# Patient Record
Sex: FEMALE | Race: BLACK OR AFRICAN AMERICAN | NOT HISPANIC OR LATINO | Employment: OTHER | URBAN - METROPOLITAN AREA
[De-identification: names, ages, dates, MRNs, and addresses within clinical notes are randomized per-mention and may not be internally consistent; named-entity substitution may affect disease eponyms.]

---

## 2020-01-27 ENCOUNTER — APPOINTMENT (EMERGENCY)
Dept: RADIOLOGY | Facility: HOSPITAL | Age: 80
End: 2020-01-27
Payer: MEDICARE

## 2020-01-27 ENCOUNTER — HOSPITAL ENCOUNTER (EMERGENCY)
Facility: HOSPITAL | Age: 80
Discharge: HOME | End: 2020-01-27
Attending: EMERGENCY MEDICINE | Admitting: EMERGENCY MEDICINE
Payer: MEDICARE

## 2020-01-27 VITALS
WEIGHT: 216.71 LBS | OXYGEN SATURATION: 99 % | DIASTOLIC BLOOD PRESSURE: 74 MMHG | SYSTOLIC BLOOD PRESSURE: 145 MMHG | TEMPERATURE: 99.2 F | RESPIRATION RATE: 22 BRPM | HEART RATE: 68 BPM

## 2020-01-27 DIAGNOSIS — J40 BRONCHITIS: Primary | ICD-10-CM

## 2020-01-27 DIAGNOSIS — R05.9 COUGH: ICD-10-CM

## 2020-01-27 DIAGNOSIS — R07.9 CHEST PAIN: ICD-10-CM

## 2020-01-27 LAB
ALBUMIN SERPL BCP-MCNC: 3.8 G/DL (ref 3.5–5)
ALP SERPL-CCNC: 85 U/L (ref 46–116)
ALT SERPL W P-5'-P-CCNC: 21 U/L (ref 12–78)
ANION GAP SERPL CALCULATED.3IONS-SCNC: 7 MMOL/L (ref 4–13)
APTT PPP: 28 SECONDS (ref 25–32)
AST SERPL W P-5'-P-CCNC: 14 U/L (ref 5–45)
BASOPHILS # BLD AUTO: 0.01 THOUSANDS/ΜL (ref 0–0.1)
BASOPHILS NFR BLD AUTO: 0 % (ref 0–1)
BILIRUB SERPL-MCNC: 0.3 MG/DL (ref 0.2–1)
BUN SERPL-MCNC: 14 MG/DL (ref 5–25)
CALCIUM SERPL-MCNC: 8.7 MG/DL (ref 8.3–10.1)
CHLORIDE SERPL-SCNC: 103 MMOL/L (ref 100–108)
CO2 SERPL-SCNC: 30 MMOL/L (ref 21–32)
CREAT SERPL-MCNC: 1.27 MG/DL (ref 0.6–1.3)
EOSINOPHIL # BLD AUTO: 0.12 THOUSAND/ΜL (ref 0–0.61)
EOSINOPHIL NFR BLD AUTO: 3 % (ref 0–6)
ERYTHROCYTE [DISTWIDTH] IN BLOOD BY AUTOMATED COUNT: 14.1 % (ref 11.6–15.1)
GFR SERPL CREATININE-BSD FRML MDRD: 47 ML/MIN/1.73SQ M
GLUCOSE SERPL-MCNC: 83 MG/DL (ref 65–140)
HCT VFR BLD AUTO: 40.7 % (ref 34.8–46.1)
HGB BLD-MCNC: 13.2 G/DL (ref 11.5–15.4)
IMM GRANULOCYTES # BLD AUTO: 0.01 THOUSAND/UL (ref 0–0.2)
IMM GRANULOCYTES NFR BLD AUTO: 0 % (ref 0–2)
INR PPP: 0.96 (ref 0.91–1.09)
LYMPHOCYTES # BLD AUTO: 1.69 THOUSANDS/ΜL (ref 0.6–4.47)
LYMPHOCYTES NFR BLD AUTO: 37 % (ref 14–44)
MCH RBC QN AUTO: 28.8 PG (ref 26.8–34.3)
MCHC RBC AUTO-ENTMCNC: 32.4 G/DL (ref 31.4–37.4)
MCV RBC AUTO: 89 FL (ref 82–98)
MONOCYTES # BLD AUTO: 0.34 THOUSAND/ΜL (ref 0.17–1.22)
MONOCYTES NFR BLD AUTO: 8 % (ref 4–12)
NEUTROPHILS # BLD AUTO: 2.37 THOUSANDS/ΜL (ref 1.85–7.62)
NEUTS SEG NFR BLD AUTO: 52 % (ref 43–75)
NRBC BLD AUTO-RTO: 0 /100 WBCS
PLATELET # BLD AUTO: 187 THOUSANDS/UL (ref 149–390)
PMV BLD AUTO: 10.5 FL (ref 8.9–12.7)
POTASSIUM SERPL-SCNC: 4.3 MMOL/L (ref 3.5–5.3)
PROT SERPL-MCNC: 8.2 G/DL (ref 6.4–8.2)
PROTHROMBIN TIME: 10.3 SECONDS (ref 9.8–12)
RBC # BLD AUTO: 4.58 MILLION/UL (ref 3.81–5.12)
SODIUM SERPL-SCNC: 140 MMOL/L (ref 136–145)
TROPONIN I SERPL-MCNC: <0.02 NG/ML
WBC # BLD AUTO: 4.54 THOUSAND/UL (ref 4.31–10.16)

## 2020-01-27 PROCEDURE — 84484 ASSAY OF TROPONIN QUANT: CPT

## 2020-01-27 PROCEDURE — 93005 ELECTROCARDIOGRAM TRACING: CPT

## 2020-01-27 PROCEDURE — 85730 THROMBOPLASTIN TIME PARTIAL: CPT

## 2020-01-27 PROCEDURE — 99283 EMERGENCY DEPT VISIT LOW MDM: CPT | Performed by: EMERGENCY MEDICINE

## 2020-01-27 PROCEDURE — 85025 COMPLETE CBC W/AUTO DIFF WBC: CPT

## 2020-01-27 PROCEDURE — 99285 EMERGENCY DEPT VISIT HI MDM: CPT

## 2020-01-27 PROCEDURE — 36415 COLL VENOUS BLD VENIPUNCTURE: CPT

## 2020-01-27 PROCEDURE — 94640 AIRWAY INHALATION TREATMENT: CPT

## 2020-01-27 PROCEDURE — 85610 PROTHROMBIN TIME: CPT

## 2020-01-27 PROCEDURE — 71045 X-RAY EXAM CHEST 1 VIEW: CPT

## 2020-01-27 PROCEDURE — 80053 COMPREHEN METABOLIC PANEL: CPT

## 2020-01-27 RX ORDER — IPRATROPIUM BROMIDE AND ALBUTEROL SULFATE 2.5; .5 MG/3ML; MG/3ML
3 SOLUTION RESPIRATORY (INHALATION) ONCE
Status: COMPLETED | OUTPATIENT
Start: 2020-01-27 | End: 2020-01-27

## 2020-01-27 RX ORDER — MELATONIN
1000 DAILY
COMMUNITY
End: 2020-11-10 | Stop reason: SDUPTHER

## 2020-01-27 RX ORDER — AMOXICILLIN AND CLAVULANATE POTASSIUM 875; 125 MG/1; MG/1
1 TABLET, FILM COATED ORAL ONCE
Status: COMPLETED | OUTPATIENT
Start: 2020-01-27 | End: 2020-01-27

## 2020-01-27 RX ORDER — MULTIVIT WITH MINERALS/LUTEIN
1000 TABLET ORAL DAILY
COMMUNITY
End: 2020-11-10 | Stop reason: SDUPTHER

## 2020-01-27 RX ORDER — PANTOPRAZOLE SODIUM 40 MG/1
40 TABLET, DELAYED RELEASE ORAL DAILY
COMMUNITY
End: 2020-11-10 | Stop reason: SDUPTHER

## 2020-01-27 RX ORDER — METHIMAZOLE 5 MG/1
2.5 TABLET ORAL DAILY
COMMUNITY
End: 2020-11-10 | Stop reason: SDUPTHER

## 2020-01-27 RX ORDER — GUAIFENESIN AND CODEINE PHOSPHATE 100; 10 MG/5ML; MG/5ML
5 SOLUTION ORAL 3 TIMES DAILY PRN
COMMUNITY
End: 2020-11-10 | Stop reason: SDUPTHER

## 2020-01-27 RX ORDER — LISINOPRIL 20 MG/1
20 TABLET ORAL DAILY
COMMUNITY
End: 2020-11-10 | Stop reason: SDUPTHER

## 2020-01-27 RX ORDER — FUROSEMIDE 20 MG/1
20 TABLET ORAL DAILY
COMMUNITY
End: 2020-11-10 | Stop reason: SDUPTHER

## 2020-01-27 RX ORDER — IPRATROPIUM BROMIDE AND ALBUTEROL SULFATE 2.5; .5 MG/3ML; MG/3ML
SOLUTION RESPIRATORY (INHALATION)
Status: COMPLETED
Start: 2020-01-27 | End: 2020-01-27

## 2020-01-27 RX ORDER — PREDNISONE 20 MG/1
40 TABLET ORAL DAILY
Qty: 10 TABLET | Refills: 0 | Status: SHIPPED | OUTPATIENT
Start: 2020-01-27 | End: 2020-02-01

## 2020-01-27 RX ORDER — ISOSORBIDE DINITRATE 30 MG/1
30 TABLET ORAL 4 TIMES DAILY
COMMUNITY
End: 2020-11-10 | Stop reason: ALTCHOICE

## 2020-01-27 RX ORDER — LYSINE HCL 500 MG
1 TABLET ORAL DAILY
COMMUNITY

## 2020-01-27 RX ORDER — AMOXICILLIN AND CLAVULANATE POTASSIUM 875; 125 MG/1; MG/1
1 TABLET, FILM COATED ORAL EVERY 12 HOURS
Qty: 20 TABLET | Refills: 0 | Status: SHIPPED | OUTPATIENT
Start: 2020-01-27 | End: 2020-02-06

## 2020-01-27 RX ORDER — ASPIRIN 81 MG/1
81 TABLET, CHEWABLE ORAL DAILY
COMMUNITY
End: 2020-11-10 | Stop reason: SDUPTHER

## 2020-01-27 RX ADMIN — IPRATROPIUM BROMIDE AND ALBUTEROL SULFATE 3 ML: 2.5; .5 SOLUTION RESPIRATORY (INHALATION) at 21:03

## 2020-01-27 RX ADMIN — AMOXICILLIN AND CLAVULANATE POTASSIUM 1 TABLET: 875; 125 TABLET, FILM COATED ORAL at 21:52

## 2020-01-28 LAB
ATRIAL RATE: 72 BPM
P AXIS: 9 DEGREES
PR INTERVAL: 136 MS
QRS AXIS: -12 DEGREES
QRSD INTERVAL: 86 MS
QT INTERVAL: 360 MS
QTC INTERVAL: 394 MS
T WAVE AXIS: 31 DEGREES
VENTRICULAR RATE: 72 BPM

## 2020-01-28 PROCEDURE — 93010 ELECTROCARDIOGRAM REPORT: CPT | Performed by: INTERNAL MEDICINE

## 2020-01-28 NOTE — ED PROVIDER NOTES
History  Chief Complaint   Patient presents with    Chest Pain     C/O midsternal CP X 2 weeks  Patient reports she has an appointment with her Dr for friday but could wait "The pain is really bad tonight   Cough     Pt reports she always has a cough and is always SOB X 14 years but cough is worse today  Patient is a 71-year-old female that is presenting with complaint of approximately 2 week history of intermittent sharp stabbing chest pain that is substernal, she has also been complaining about increased mucus production from her tracheostomy and worsening of cough  The patient has no chest pain at this time  Patient states there is some shortness of breath this is a common issue with the patient secondary to her underlying medical conditions  Patient denies any direct sick contacts, there has been no aggravating or alleviating factors to this chest pain that she is having  The pain is sharp and stabbing nonradiating and only last for several seconds  Patient denies any URI symptoms consistent with influenza  Prior to Admission Medications   Prescriptions Last Dose Informant Patient Reported? Taking?    Ascorbic Acid (VITAMIN C) 1000 MG tablet 1/27/2020 at Unknown time  Yes Yes   Sig: Take 1,000 mg by mouth daily   Calcium Carbonate-Vitamin D (CALCIUM 600+D) 600-400 MG-UNIT per tablet 1/27/2020 at Unknown time  Yes Yes   Sig: Take 1 tablet by mouth daily   Multiple Vitamins-Minerals (CENTRUM SILVER 50+WOMEN PO) 1/27/2020 at Unknown time  Yes Yes   Sig: Take by mouth   aspirin 81 mg chewable tablet 1/27/2020 at Unknown time  Yes Yes   Sig: Chew 81 mg daily   cholecalciferol (VITAMIN D3) 1,000 units tablet 1/27/2020 at Unknown time  Yes Yes   Sig: Take 1,000 Units by mouth daily   furosemide (LASIX) 20 mg tablet 1/27/2020 at Unknown time  Yes Yes   Sig: Take 20 mg by mouth 2 (two) times a day   guaifenesin-codeine (GUAIFENESIN AC) 100-10 MG/5ML liquid Past Week at Unknown time  Yes Yes   Sig: Take 5 mL by mouth 3 (three) times a day as needed for cough   isosorbide dinitrate (ISORDIL) 30 mg tablet 1/27/2020 at Unknown time  Yes Yes   Sig: Take 30 mg by mouth 4 (four) times a day   lisinopril (ZESTRIL) 20 mg tablet 1/27/2020 at Unknown time  Yes Yes   Sig: Take 20 mg by mouth daily   methimazole (TAPAZOLE) 5 mg tablet 1/27/2020 at Unknown time  Yes Yes   Sig: Take 5 mg by mouth 3 (three) times a day   pantoprazole (PROTONIX) 40 mg tablet 1/27/2020 at Unknown time  Yes Yes   Sig: Take 40 mg by mouth daily      Facility-Administered Medications: None       Past Medical History:   Diagnosis Date    BOOP (bronchiolitis obliterans with organizing pneumonia) ([facility] ) 01/01/2006    Diabetes mellitus ([facility] )     Hypertension        Past Surgical History:   Procedure Laterality Date    TRACHEOSTOMY  01/01/2006       History reviewed  No pertinent family history  I have reviewed and agree with the history as documented  Social History     Tobacco Use    Smoking status: Never Smoker    Smokeless tobacco: Never Used   Substance Use Topics    Alcohol use: Never     Frequency: Never    Drug use: Never        Review of Systems   Constitutional: Negative for chills and fever  HENT: Negative for congestion, facial swelling, sore throat and trouble swallowing  Respiratory: Positive for cough, chest tightness and shortness of breath  Cardiovascular: Positive for chest pain  Negative for palpitations and leg swelling  Gastrointestinal: Negative for abdominal pain, nausea and vomiting  Genitourinary: Negative for dysuria and flank pain  Musculoskeletal: Negative for back pain and neck pain  Skin: Negative  Neurological: Negative for weakness and numbness  Hematological: Negative  Psychiatric/Behavioral: Negative  Physical Exam  Physical Exam   Constitutional: She is oriented to person, place, and time  She appears well-developed and well-nourished  No distress     HENT:   Head: Normocephalic and atraumatic  Neck: Normal range of motion  No tracheal deviation present  Tracheostomy in place   Cardiovascular: Normal rate, regular rhythm and normal pulses  Pulmonary/Chest: Tachypnea noted  No respiratory distress  She has decreased breath sounds in the right lower field and the left lower field  She has no wheezes  She has rhonchi in the right upper field, the right middle field, the left upper field and the left middle field  Musculoskeletal: Normal range of motion  Right lower leg: She exhibits no edema  Left lower leg: She exhibits no edema  Neurological: She is alert and oriented to person, place, and time  Skin: Skin is warm and dry  Capillary refill takes less than 2 seconds  Psychiatric: She has a normal mood and affect  Her behavior is normal    Nursing note and vitals reviewed        Vital Signs  ED Triage Vitals [01/27/20 2011]   Temperature Pulse Respirations Blood Pressure SpO2   99 2 °F (37 3 °C) 75 (!) 24 168/89 98 %      Temp Source Heart Rate Source Patient Position - Orthostatic VS BP Location FiO2 (%)   Tympanic Monitor Lying Left arm --      Pain Score       9           Vitals:    01/27/20 2011 01/27/20 2130   BP: 168/89 145/74   Pulse: 75 68   Patient Position - Orthostatic VS: Lying          Visual Acuity      ED Medications  Medications   ipratropium-albuterol (DUO-NEB) 0 5-2 5 mg/3 mL inhalation solution 3 mL (3 mL Nebulization Given 1/27/20 2103)   amoxicillin-clavulanate (AUGMENTIN) 875-125 mg per tablet 1 tablet (1 tablet Oral Given 1/27/20 2152)       Diagnostic Studies  Results Reviewed     Procedure Component Value Units Date/Time    Protime-INR [[id number]]  (Normal) Collected:  01/27/20 2019    Lab Status:  Final result Specimen:  Blood from Arm, Right Updated:  01/27/20 2052     Protime 10 3 seconds      INR 0 96    APTT [[id number]]  (Normal) Collected:  01/27/20 2019    Lab Status:  Final result Specimen:  Blood from Arm, Right Updated:  01/27/20 2052     PTT 28 seconds     Troponin I [[id number]]  (Normal) Collected:  01/27/20 2019    Lab Status:  Final result Specimen:  Blood from Arm, Right Updated:  01/27/20 2045     Troponin I <0 02 ng/mL     Comprehensive metabolic panel [[id number]] Collected:  01/27/20 2019    Lab Status:  Final result Specimen:  Blood from Arm, Right Updated:  01/27/20 2043     Sodium 140 mmol/L      Potassium 4 3 mmol/L      Chloride 103 mmol/L      CO2 30 mmol/L      ANION GAP 7 mmol/L      BUN 14 mg/dL      Creatinine 1 27 mg/dL      Glucose 83 mg/dL      Calcium 8 7 mg/dL      AST 14 U/L      ALT 21 U/L      Alkaline Phosphatase 85 U/L      Total Protein 8 2 g/dL      Albumin 3 8 g/dL      Total Bilirubin 0 30 mg/dL      eGFR 47 ml/min/1 73sq m     Narrative:       [place] guidelines for Chronic Kidney Disease (CKD):     Stage 1 with normal or high GFR (GFR > 90 mL/min/1 73 square meters)    Stage 2 Mild CKD (GFR = 60-89 mL/min/1 73 square meters)    Stage 3A Moderate CKD (GFR = 45-59 mL/min/1 73 square meters)    Stage 3B Moderate CKD (GFR = 30-44 mL/min/1 73 square meters)    Stage 4 Severe CKD (GFR = 15-29 mL/min/1 73 square meters)    Stage 5 End Stage CKD (GFR <15 mL/min/1 73 square meters)  Note: GFR calculation is accurate only with a steady state creatinine    CBC and differential [[id number]] Collected:  01/27/20 2019    Lab Status:  Final result Specimen:  Blood from Arm, Right Updated:  01/27/20 2025     WBC 4 54 Thousand/uL      RBC 4 58 Million/uL      Hemoglobin 13 2 g/dL      Hematocrit 40 7 %      MCV 89 fL      MCH 28 8 pg      MCHC 32 4 g/dL      RDW 14 1 %      MPV 10 5 fL      Platelets 875 Thousands/uL      nRBC 0 /100 WBCs      Neutrophils Relative 52 %      Immat GRANS % 0 %      Lymphocytes Relative 37 %      Monocytes Relative 8 %      Eosinophils Relative 3 %      Basophils Relative 0 %      Neutrophils Absolute 2 37 Thousands/µL      Immature Grans Absolute 0 01 Thousand/uL      Lymphocytes Absolute 1 69 Thousands/µL      Monocytes Absolute 0 34 Thousand/µL      Eosinophils Absolute 0 12 Thousand/µL      Basophils Absolute 0 01 Thousands/µL                  XR chest 1 view portable   Final Result by Larry Dorman MD (01/28 0840)      No acute cardiopulmonary disease  Workstation performed: [id number]                    Procedures  ECG 12 Lead Documentation Only  Date/Time: 1/27/2020 8:10 PM  Performed by: Clare Lutz MD  Authorized by: Clare Lutz MD     Indications / Diagnosis:  Chest pain  Patient location:  ED  Previous ECG:     Previous ECG:  Unavailable    Comparison to cardiac monitor: Yes    Interpretation:     Interpretation: abnormal    Rate:     ECG rate:  72    ECG rate assessment: normal    Rhythm:     Rhythm: sinus rhythm    Ectopy:     Ectopy: none    QRS:     QRS axis:  Normal    QRS intervals:  Normal  Conduction:     Conduction: normal    ST segments:     ST segments:  Normal  T waves:     T waves: normal    Other findings:     Other findings: LVH               ED Course         HEART Risk Score      Most Recent Value   History  0 Filed at: 01/27/2020 2020   ECG  0 Filed at: 01/27/2020 2020   Age  2 Filed at: 01/27/2020 2020   Risk Factors  1 Filed at: 01/27/2020 2020   Troponin  0 Filed at: 01/27/2020 2020   Heart Score Risk Calculator   History  0 Filed at: 01/27/2020 2020   ECG  0 Filed at: 01/27/2020 2020   Age  2 Filed at: 01/27/2020 2020   Risk Factors  1 Filed at: 01/27/2020 2020   Troponin  0 Filed at: 01/27/2020 2020   HEART Score  3 Filed at: 01/27/2020 2020   HEART Score  3 Filed at: 01/27/2020 2020                            MDM  Number of Diagnoses or Management Options  Bronchitis:   Chest pain:   Cough:   Diagnosis management comments: Patient felt better after nebulizer, the x-ray showed no acute abnormalities, other troponins and EKG showed no acute changes    Given the low risk score the patient is going to be discharged home with follow-up to her cardiologist which is not in the [address] [place] [address] system  She has also instructed return to the emergency room any worsening of symptoms  I answered all questions the best my ability, the patient verbalizes understanding and is in agreement with the assessment and plan  Amount and/or Complexity of Data Reviewed  Clinical lab tests: ordered and reviewed  Tests in the radiology section of CPT®: ordered and reviewed          Disposition  Final diagnoses:   Bronchitis   Cough   Chest pain     Time reflects when diagnosis was documented in both MDM as applicable and the Disposition within this note     Time User Action Codes Description Comment    1/27/2020  9:45 PM Estee Anthony Add [J40] Bronchitis     1/27/2020  9:45 PM Estee Anthony Add [R05] Cough     1/27/2020  9:45 PM Estee Anthony Add [R07 9] Chest pain       ED Disposition     ED Disposition Condition Date/Time Comment    Discharge Stable Mon Jan 27, 2020  9:45 PM Swati Nails discharge to home/self care              Follow-up Information     Follow up With Specialties Details Why Marcus March MD Internal Medicine Schedule an appointment as soon as possible for a visit in 1 week  [address] [place] [address]  [phone]            Discharge Medication List as of 1/27/2020  9:48 PM      START taking these medications    Details   amoxicillin-clavulanate (AUGMENTIN) 875-125 mg per tablet Take 1 tablet by mouth every 12 (twelve) hours for 10 days, Starting Mon 1/27/2020, Until Thu 2/6/2020, Print      predniSONE 20 mg tablet Take 2 tablets (40 mg total) by mouth daily for 5 days, Starting Mon 1/27/2020, Until Sat 2/1/2020, Print         CONTINUE these medications which have NOT CHANGED    Details   Ascorbic Acid (VITAMIN C) 1000 MG tablet Take 1,000 mg by mouth daily, Historical Med      aspirin 81 mg chewable tablet Chew 81 mg daily, Historical Med      Calcium Carbonate-Vitamin D (CALCIUM 600+D) 600-400 MG-UNIT per tablet Take 1 tablet by mouth daily, Historical Med      cholecalciferol (VITAMIN D3) 1,000 units tablet Take 1,000 Units by mouth daily, Historical Med      furosemide (LASIX) 20 mg tablet Take 20 mg by mouth 2 (two) times a day, Historical Med      guaifenesin-codeine (GUAIFENESIN AC) 100-10 MG/5ML liquid Take 5 mL by mouth 3 (three) times a day as needed for cough, Historical Med      isosorbide dinitrate (ISORDIL) 30 mg tablet Take 30 mg by mouth 4 (four) times a day, Historical Med      lisinopril (ZESTRIL) 20 mg tablet Take 20 mg by mouth daily, Historical Med      methimazole (TAPAZOLE) 5 mg tablet Take 5 mg by mouth 3 (three) times a day, Historical Med      Multiple Vitamins-Minerals (CENTRUM SILVER 50+WOMEN PO) Take by mouth, Historical Med      pantoprazole (PROTONIX) 40 mg tablet Take 40 mg by mouth daily, Historical Med           No discharge procedures on file      ED Provider  Electronically Signed by           Victor Manuel Rodriguez MD  02/02/20 7650

## 2020-07-14 ENCOUNTER — APPOINTMENT (EMERGENCY)
Dept: RADIOLOGY | Facility: HOSPITAL | Age: 80
End: 2020-07-14
Attending: EMERGENCY MEDICINE
Payer: MEDICARE

## 2020-07-14 ENCOUNTER — HOSPITAL ENCOUNTER (EMERGENCY)
Facility: HOSPITAL | Age: 80
Discharge: HOME | End: 2020-07-14
Attending: EMERGENCY MEDICINE | Admitting: EMERGENCY MEDICINE
Payer: MEDICARE

## 2020-07-14 VITALS
WEIGHT: 210 LBS | OXYGEN SATURATION: 98 % | HEART RATE: 69 BPM | RESPIRATION RATE: 40 BRPM | TEMPERATURE: 98.5 F | DIASTOLIC BLOOD PRESSURE: 74 MMHG | SYSTOLIC BLOOD PRESSURE: 149 MMHG

## 2020-07-14 DIAGNOSIS — I80.9 SUPERFICIAL THROMBOPHLEBITIS: Primary | ICD-10-CM

## 2020-07-14 DIAGNOSIS — I83.91 VARICOSE VEINS OF RIGHT LOWER EXTREMITY: ICD-10-CM

## 2020-07-14 LAB
ANION GAP SERPL CALCULATED.3IONS-SCNC: 7 MMOL/L (ref 4–13)
APTT PPP: 30 SECONDS (ref 23–37)
BUN SERPL-MCNC: 17 MG/DL (ref 5–25)
CALCIUM SERPL-MCNC: 8.7 MG/DL (ref 8.3–10.1)
CHLORIDE SERPL-SCNC: 104 MMOL/L (ref 100–108)
CO2 SERPL-SCNC: 29 MMOL/L (ref 21–32)
CREAT SERPL-MCNC: 1.16 MG/DL (ref 0.6–1.3)
GFR SERPL CREATININE-BSD FRML MDRD: 52 ML/MIN/1.73SQ M
GLUCOSE SERPL-MCNC: 125 MG/DL (ref 65–140)
INR PPP: 1 (ref 0.84–1.19)
POTASSIUM SERPL-SCNC: 4.3 MMOL/L (ref 3.5–5.3)
PROTHROMBIN TIME: 13.1 SECONDS (ref 11.6–14.5)
SODIUM SERPL-SCNC: 140 MMOL/L (ref 136–145)

## 2020-07-14 PROCEDURE — 93971 EXTREMITY STUDY: CPT | Performed by: SURGERY

## 2020-07-14 PROCEDURE — 80048 BASIC METABOLIC PNL TOTAL CA: CPT | Performed by: EMERGENCY MEDICINE

## 2020-07-14 PROCEDURE — 93971 EXTREMITY STUDY: CPT

## 2020-07-14 PROCEDURE — 93005 ELECTROCARDIOGRAM TRACING: CPT

## 2020-07-14 PROCEDURE — 99285 EMERGENCY DEPT VISIT HI MDM: CPT | Performed by: EMERGENCY MEDICINE

## 2020-07-14 PROCEDURE — 36415 COLL VENOUS BLD VENIPUNCTURE: CPT | Performed by: EMERGENCY MEDICINE

## 2020-07-14 PROCEDURE — 99285 EMERGENCY DEPT VISIT HI MDM: CPT

## 2020-07-14 PROCEDURE — 85610 PROTHROMBIN TIME: CPT | Performed by: EMERGENCY MEDICINE

## 2020-07-14 PROCEDURE — 85730 THROMBOPLASTIN TIME PARTIAL: CPT | Performed by: EMERGENCY MEDICINE

## 2020-07-14 RX ORDER — ACETAMINOPHEN 325 MG/1
650 TABLET ORAL ONCE
Status: COMPLETED | OUTPATIENT
Start: 2020-07-14 | End: 2020-07-14

## 2020-07-14 RX ORDER — GLIPIZIDE 2.5 MG/1
2.5 TABLET, EXTENDED RELEASE ORAL DAILY
COMMUNITY
End: 2020-11-10 | Stop reason: SDUPTHER

## 2020-07-14 RX ORDER — FAMOTIDINE 20 MG/1
20 TABLET, FILM COATED ORAL 2 TIMES DAILY
COMMUNITY
End: 2020-11-10 | Stop reason: ALTCHOICE

## 2020-07-14 RX ADMIN — ACETAMINOPHEN 650 MG: 325 TABLET, FILM COATED ORAL at 10:08

## 2020-07-14 NOTE — ED PROVIDER NOTES
History  Chief Complaint   Patient presents with    Shortness of Breath     sx started at 1230 last night -     Leg Swelling     R calf swelling since last night     27-year-old female with history of BOOP status post chronic trach, hypertension, diabetes, presents to the ED for evaluation of right proximal/medial calf pain over the past few days  Patient states that she had an episode of blood clots about 15 years ago for which she was on anticoagulation for short period of time  She cannot recall which extremity she had the clot in  Patient has some pain with walking in right medial proximal calf  Patient came to the ED for further evaluation  Patient has chronic shortness of breath secondary to her BOOP and trach placement  Patient does not complain of any increased shortness of breath from baseline  History provided by:  Patient  Shortness of Breath   Associated symptoms: no abdominal pain, no chest pain, no cough, no ear pain, no fever, no headaches, no rash and no wheezing        Prior to Admission Medications   Prescriptions Last Dose Informant Patient Reported? Taking?    Ascorbic Acid (VITAMIN C) 1000 MG tablet 7/13/2020 at Unknown time  Yes Yes   Sig: Take 1,000 mg by mouth daily   Calcium Carbonate-Vitamin D (CALCIUM 600+D) 600-400 MG-UNIT per tablet   Yes No   Sig: Take 1 tablet by mouth daily   Multiple Vitamins-Minerals (CENTRUM SILVER 50+WOMEN PO) 7/13/2020 at Unknown time  Yes Yes   Sig: Take by mouth   aspirin 81 mg chewable tablet 7/13/2020 at Unknown time  Yes Yes   Sig: Chew 81 mg daily   cholecalciferol (VITAMIN D3) 1,000 units tablet 7/13/2020 at Unknown time  Yes Yes   Sig: Take 1,000 Units by mouth daily   famotidine (PEPCID) 20 mg tablet 7/13/2020 at Unknown time  Yes Yes   Sig: Take 20 mg by mouth 2 (two) times a day   furosemide (LASIX) 20 mg tablet 7/13/2020 at Unknown time  Yes Yes   Sig: Take 20 mg by mouth daily    glipiZIDE (GLUCOTROL XL) 2 5 mg 24 hr tablet 7/13/2020 at Unknown time  Yes Yes   Sig: Take 2 5 mg by mouth daily   guaifenesin-codeine (GUAIFENESIN AC) 100-10 MG/5ML liquid   Yes No   Sig: Take 5 mL by mouth 3 (three) times a day as needed for cough   isosorbide dinitrate (ISORDIL) 30 mg tablet 7/13/2020 at Unknown time  Yes Yes   Sig: Take 30 mg by mouth 4 (four) times a day   lisinopril (ZESTRIL) 20 mg tablet 7/13/2020 at Unknown time  Yes Yes   Sig: Take 20 mg by mouth daily   methimazole (TAPAZOLE) 5 mg tablet 7/13/2020 at Unknown time  Yes Yes   Sig: Take 2 5 mg by mouth daily    pantoprazole (PROTONIX) 40 mg tablet 7/13/2020 at Unknown time  Yes Yes   Sig: Take 40 mg by mouth daily      Facility-Administered Medications: None       Past Medical History:   Diagnosis Date    BOOP (bronchiolitis obliterans with organizing pneumonia) [facility] ) 01/01/2006    Diabetes mellitus ([facility] )     Hypertension        Past Surgical History:   Procedure Laterality Date    TRACHEOSTOMY  01/01/2006       History reviewed  No pertinent family history  I have reviewed and agree with the history as documented  E-Cigarette/Vaping     E-Cigarette/Vaping Substances     Social History     Tobacco Use    Smoking status: Never Smoker    Smokeless tobacco: Never Used   Substance Use Topics    Alcohol use: Never     Frequency: Never    Drug use: Never       Review of Systems   Constitutional: Negative for activity change, appetite change, chills and fever  HENT: Negative for congestion and ear pain  Eyes: Negative for pain and discharge  Respiratory: Negative for cough, chest tightness, wheezing and stridor  Cardiovascular: Negative for chest pain and palpitations  Gastrointestinal: Negative for abdominal distention, abdominal pain, constipation, diarrhea and nausea  Endocrine: Negative for cold intolerance  Genitourinary: Negative for dysuria, frequency and urgency  Musculoskeletal: Positive for myalgias  Negative for arthralgias and back pain     Skin: Negative for color change and rash  Allergic/Immunologic: Negative for environmental allergies and food allergies  Neurological: Negative for dizziness, weakness, numbness and headaches  Hematological: Negative for adenopathy  Psychiatric/Behavioral: Negative for agitation, behavioral problems and confusion  The patient is not nervous/anxious  All other systems reviewed and are negative  Physical Exam  Physical Exam   Constitutional: She is oriented to person, place, and time  She appears well-developed and well-nourished  HENT:   Head: Normocephalic and atraumatic  Mouth/Throat: Oropharynx is clear and moist    Eyes: Conjunctivae and EOM are normal    Neck: Normal range of motion  Neck supple  Cardiovascular: Normal rate, regular rhythm, normal heart sounds and intact distal pulses  Pulmonary/Chest: Effort normal and breath sounds normal    Abdominal: Soft  Bowel sounds are normal  She exhibits no distension  There is no tenderness  Musculoskeletal: Normal range of motion  Pulses intact bilateral lower extremities  3 cm x 3 cm area of venous varicosities noted to the proximal medial right calf region that is mildly tender to palpation  Neurological: She is alert and oriented to person, place, and time  Skin: Skin is warm and dry  Psychiatric: She has a normal mood and affect  Her behavior is normal  Judgment and thought content normal    Nursing note and vitals reviewed        Vital Signs  ED Triage Vitals [07/14/20 0819]   Temperature Pulse Respirations Blood Pressure SpO2   98 5 °F (36 9 °C) 69 (!) 40 149/74 98 %      Temp Source Heart Rate Source Patient Position - Orthostatic VS BP Location FiO2 (%)   Oral Monitor Sitting Left arm --      Pain Score       --           Vitals:    07/14/20 0819   BP: 149/74   Pulse: 69   Patient Position - Orthostatic VS: Sitting         Visual Acuity      ED Medications  Medications   acetaminophen (TYLENOL) tablet 650 mg (650 mg Oral Given 7/14/20 1008) Diagnostic Studies  Results Reviewed     Procedure Component Value Units Date/Time    Basic metabolic panel [[id number]] Collected:  07/14/20 0903    Lab Status:  Final result Specimen:  Blood from Arm, Right Updated:  07/14/20 4780     Sodium 140 mmol/L      Potassium 4 3 mmol/L      Chloride 104 mmol/L      CO2 29 mmol/L      ANION GAP 7 mmol/L      BUN 17 mg/dL      Creatinine 1 16 mg/dL      Glucose 125 mg/dL      Calcium 8 7 mg/dL      eGFR 52 ml/min/1 73sq m     Narrative:       [place] guidelines for Chronic Kidney Disease (CKD):     Stage 1 with normal or high GFR (GFR > 90 mL/min/1 73 square meters)    Stage 2 Mild CKD (GFR = 60-89 mL/min/1 73 square meters)    Stage 3A Moderate CKD (GFR = 45-59 mL/min/1 73 square meters)    Stage 3B Moderate CKD (GFR = 30-44 mL/min/1 73 square meters)    Stage 4 Severe CKD (GFR = 15-29 mL/min/1 73 square meters)    Stage 5 End Stage CKD (GFR <15 mL/min/1 73 square meters)  Note: GFR calculation is accurate only with a steady state creatinine    Protime-INR [[id number]]  (Normal) Collected:  07/14/20 0903    Lab Status:  Final result Specimen:  Blood from Arm, Left Updated:  07/14/20 0920     Protime 13 1 seconds      INR 1 00    APTT [[id number]]  (Normal) Collected:  07/14/20 8476    Lab Status:  Final result Specimen:  Blood from Arm, Left Updated:  07/14/20 0920     PTT 30 seconds                  VAS lower limb venous duplex study, unilateral/limited    (Results Pending)              Procedures  ECG 12 Lead Documentation Only  Date/Time: 7/14/2020 8:14 AM  Performed by: Brenna Francois DO  Authorized by: Brenna Francois DO     Indications / Diagnosis:  Shortness of breath  ECG reviewed by me, the ED Provider: yes    Patient location:  ED  Previous ECG:     Previous ECG:  Compared to current    Similarity:  No change    Comparison to cardiac monitor: Yes    Interpretation:     Interpretation: abnormal    Comments:      Sinus rhythm, rate 71, normal axis, normal intervals, no acute ST/T-wave abnormalities noted, moderate voltage criteria for LVH noted that is unchanged from previous study  ED Course                                             MDM  Number of Diagnoses or Management Options  Superficial thrombophlebitis: new and requires workup  Varicose veins of right lower extremity: new and requires workup  Diagnosis management comments: Obtain blood work, venous duplex right lower extremity to rule out DVT  Give Tylenol for pain  Amount and/or Complexity of Data Reviewed  Clinical lab tests: reviewed and ordered  Tests in the radiology section of CPT®: ordered and reviewed  Tests in the medicine section of CPT®: ordered and reviewed  Review and summarize past medical records: yes  Independent visualization of images, tracings, or specimens: yes    Risk of Complications, Morbidity, and/or Mortality  General comments: Blood work was unremarkable  Venous duplex lower extremity did not show any DVTs  Study did show some superficial thrombophlebitis in the area where patient was having pain  At this time I discussed supportive care with patient including applying warm compress  Patient can take over-the-counter pain medication as needed  Patient discharged home with follow-up to PCP  Close return instructions given to return to the ER for any worsening symptoms  Patient agrees with discharge plan  Patient well appearing at time of discharge        Patient Progress  Patient progress: stable        Disposition  Final diagnoses:   Superficial thrombophlebitis   Varicose veins of right lower extremity     Time reflects when diagnosis was documented in both MDM as applicable and the Disposition within this note     Time User Action Codes Description Comment    7/14/2020  9:54 AM Joanne Cee Add [I80 9] Superficial thrombophlebitis     7/14/2020  9:55 AM Joanne Cee Add [I83 91] Varicose veins of right lower extremity ED Disposition     ED Disposition Condition Date/Time Comment    Discharge Stable Tue Jul 14, 2020  9:54 AM Pennie May discharge to home/self care  Follow-up Information     Follow up With Specialties Details Why Randy Muro MD Internal Medicine In 1 week  [address]  [phone]            Discharge Medication List as of 7/14/2020  9:59 AM      CONTINUE these medications which have NOT CHANGED    Details   Ascorbic Acid (VITAMIN C) 1000 MG tablet Take 1,000 mg by mouth daily, Historical Med      aspirin 81 mg chewable tablet Chew 81 mg daily, Historical Med      cholecalciferol (VITAMIN D3) 1,000 units tablet Take 1,000 Units by mouth daily, Historical Med      famotidine (PEPCID) 20 mg tablet Take 20 mg by mouth 2 (two) times a day, Historical Med      furosemide (LASIX) 20 mg tablet Take 20 mg by mouth daily , Historical Med      glipiZIDE (GLUCOTROL XL) 2 5 mg 24 hr tablet Take 2 5 mg by mouth daily, Historical Med      isosorbide dinitrate (ISORDIL) 30 mg tablet Take 30 mg by mouth 4 (four) times a day, Historical Med      lisinopril (ZESTRIL) 20 mg tablet Take 20 mg by mouth daily, Historical Med      methimazole (TAPAZOLE) 5 mg tablet Take 2 5 mg by mouth daily , Historical Med      Multiple Vitamins-Minerals (CENTRUM SILVER 50+WOMEN PO) Take by mouth, Historical Med      pantoprazole (PROTONIX) 40 mg tablet Take 40 mg by mouth daily, Historical Med      Calcium Carbonate-Vitamin D (CALCIUM 600+D) 600-400 MG-UNIT per tablet Take 1 tablet by mouth daily, Historical Med      guaifenesin-codeine (GUAIFENESIN AC) 100-10 MG/5ML liquid Take 5 mL by mouth 3 (three) times a day as needed for cough, Historical Med           No discharge procedures on file      PDMP Review     None          ED Provider  Electronically Signed by           Hal Hutchins DO  07/14/20 901 [facility],   07/14/20 5185

## 2020-07-16 LAB
ATRIAL RATE: 71 BPM
P AXIS: 49 DEGREES
PR INTERVAL: 134 MS
QRS AXIS: -17 DEGREES
QRSD INTERVAL: 88 MS
QT INTERVAL: 378 MS
QTC INTERVAL: 410 MS
T WAVE AXIS: 15 DEGREES
VENTRICULAR RATE: 71 BPM

## 2020-07-16 PROCEDURE — 93010 ELECTROCARDIOGRAM REPORT: CPT | Performed by: INTERNAL MEDICINE

## 2020-09-13 ENCOUNTER — HOSPITAL ENCOUNTER (EMERGENCY)
Facility: HOSPITAL | Age: 80
Discharge: HOME | End: 2020-09-13
Attending: EMERGENCY MEDICINE | Admitting: EMERGENCY MEDICINE
Payer: MEDICARE

## 2020-09-13 ENCOUNTER — APPOINTMENT (EMERGENCY)
Dept: RADIOLOGY | Facility: HOSPITAL | Age: 80
End: 2020-09-13
Payer: MEDICARE

## 2020-09-13 VITALS
TEMPERATURE: 97.3 F | RESPIRATION RATE: 26 BRPM | DIASTOLIC BLOOD PRESSURE: 79 MMHG | HEART RATE: 56 BPM | SYSTOLIC BLOOD PRESSURE: 152 MMHG | OXYGEN SATURATION: 98 %

## 2020-09-13 DIAGNOSIS — J40 BRONCHITIS: ICD-10-CM

## 2020-09-13 DIAGNOSIS — R07.89 ATYPICAL CHEST PAIN: Primary | ICD-10-CM

## 2020-09-13 LAB
ALBUMIN SERPL BCP-MCNC: 3.5 G/DL (ref 3.5–5)
ALP SERPL-CCNC: 72 U/L (ref 46–116)
ALT SERPL W P-5'-P-CCNC: 18 U/L (ref 12–78)
ANION GAP SERPL CALCULATED.3IONS-SCNC: 9 MMOL/L (ref 4–13)
AST SERPL W P-5'-P-CCNC: 15 U/L (ref 5–45)
BASOPHILS # BLD AUTO: 0.01 THOUSANDS/ΜL (ref 0–0.1)
BASOPHILS NFR BLD AUTO: 0 % (ref 0–1)
BILIRUB SERPL-MCNC: 0.4 MG/DL (ref 0.2–1)
BUN SERPL-MCNC: 17 MG/DL (ref 5–25)
CALCIUM SERPL-MCNC: 8.7 MG/DL (ref 8.3–10.1)
CHLORIDE SERPL-SCNC: 105 MMOL/L (ref 100–108)
CO2 SERPL-SCNC: 25 MMOL/L (ref 21–32)
CREAT SERPL-MCNC: 1.11 MG/DL (ref 0.6–1.3)
EOSINOPHIL # BLD AUTO: 0.07 THOUSAND/ΜL (ref 0–0.61)
EOSINOPHIL NFR BLD AUTO: 1 % (ref 0–6)
ERYTHROCYTE [DISTWIDTH] IN BLOOD BY AUTOMATED COUNT: 13.2 % (ref 11.6–15.1)
GFR SERPL CREATININE-BSD FRML MDRD: 55 ML/MIN/1.73SQ M
GLUCOSE SERPL-MCNC: 127 MG/DL (ref 65–140)
HCT VFR BLD AUTO: 38.9 % (ref 34.8–46.1)
HGB BLD-MCNC: 12.7 G/DL (ref 11.5–15.4)
IMM GRANULOCYTES # BLD AUTO: 0.01 THOUSAND/UL (ref 0–0.2)
IMM GRANULOCYTES NFR BLD AUTO: 0 % (ref 0–2)
LYMPHOCYTES # BLD AUTO: 1.46 THOUSANDS/ΜL (ref 0.6–4.47)
LYMPHOCYTES NFR BLD AUTO: 30 % (ref 14–44)
MCH RBC QN AUTO: 29.7 PG (ref 26.8–34.3)
MCHC RBC AUTO-ENTMCNC: 32.6 G/DL (ref 31.4–37.4)
MCV RBC AUTO: 91 FL (ref 82–98)
MONOCYTES # BLD AUTO: 0.33 THOUSAND/ΜL (ref 0.17–1.22)
MONOCYTES NFR BLD AUTO: 7 % (ref 4–12)
NEUTROPHILS # BLD AUTO: 3.01 THOUSANDS/ΜL (ref 1.85–7.62)
NEUTS SEG NFR BLD AUTO: 62 % (ref 43–75)
NRBC BLD AUTO-RTO: 0 /100 WBCS
PLATELET # BLD AUTO: 170 THOUSANDS/UL (ref 149–390)
PMV BLD AUTO: 10.9 FL (ref 8.9–12.7)
POTASSIUM SERPL-SCNC: 4 MMOL/L (ref 3.5–5.3)
PROT SERPL-MCNC: 7.9 G/DL (ref 6.4–8.2)
RBC # BLD AUTO: 4.28 MILLION/UL (ref 3.81–5.12)
SODIUM SERPL-SCNC: 139 MMOL/L (ref 136–145)
TROPONIN I SERPL-MCNC: <0.02 NG/ML
WBC # BLD AUTO: 4.89 THOUSAND/UL (ref 4.31–10.16)

## 2020-09-13 PROCEDURE — 85025 COMPLETE CBC W/AUTO DIFF WBC: CPT | Performed by: EMERGENCY MEDICINE

## 2020-09-13 PROCEDURE — 84484 ASSAY OF TROPONIN QUANT: CPT | Performed by: EMERGENCY MEDICINE

## 2020-09-13 PROCEDURE — 99285 EMERGENCY DEPT VISIT HI MDM: CPT

## 2020-09-13 PROCEDURE — 36415 COLL VENOUS BLD VENIPUNCTURE: CPT | Performed by: EMERGENCY MEDICINE

## 2020-09-13 PROCEDURE — 93005 ELECTROCARDIOGRAM TRACING: CPT

## 2020-09-13 PROCEDURE — 71045 X-RAY EXAM CHEST 1 VIEW: CPT

## 2020-09-13 PROCEDURE — 99285 EMERGENCY DEPT VISIT HI MDM: CPT | Performed by: EMERGENCY MEDICINE

## 2020-09-13 PROCEDURE — 94640 AIRWAY INHALATION TREATMENT: CPT

## 2020-09-13 PROCEDURE — 80053 COMPREHEN METABOLIC PANEL: CPT | Performed by: EMERGENCY MEDICINE

## 2020-09-13 RX ORDER — IPRATROPIUM BROMIDE AND ALBUTEROL SULFATE 2.5; .5 MG/3ML; MG/3ML
3 SOLUTION RESPIRATORY (INHALATION) ONCE
Status: COMPLETED | OUTPATIENT
Start: 2020-09-13 | End: 2020-09-13

## 2020-09-13 RX ORDER — PREDNISONE 20 MG/1
40 TABLET ORAL DAILY
Qty: 10 TABLET | Refills: 0 | Status: SHIPPED | OUTPATIENT
Start: 2020-09-13 | End: 2020-09-18

## 2020-09-13 RX ORDER — PREDNISONE 20 MG/1
40 TABLET ORAL ONCE
Status: COMPLETED | OUTPATIENT
Start: 2020-09-13 | End: 2020-09-13

## 2020-09-13 RX ORDER — AMOXICILLIN AND CLAVULANATE POTASSIUM 875; 125 MG/1; MG/1
1 TABLET, FILM COATED ORAL EVERY 12 HOURS SCHEDULED
Qty: 20 TABLET | Refills: 0 | Status: SHIPPED | OUTPATIENT
Start: 2020-09-13 | End: 2020-09-23

## 2020-09-13 RX ORDER — AMOXICILLIN AND CLAVULANATE POTASSIUM 875; 125 MG/1; MG/1
1 TABLET, FILM COATED ORAL ONCE
Status: COMPLETED | OUTPATIENT
Start: 2020-09-13 | End: 2020-09-13

## 2020-09-13 RX ADMIN — AMOXICILLIN AND CLAVULANATE POTASSIUM 1 TABLET: 875; 125 TABLET, FILM COATED ORAL at 14:23

## 2020-09-13 RX ADMIN — PREDNISONE 40 MG: 20 TABLET ORAL at 14:23

## 2020-09-13 RX ADMIN — IPRATROPIUM BROMIDE AND ALBUTEROL SULFATE 3 ML: 2.5; .5 SOLUTION RESPIRATORY (INHALATION) at 12:29

## 2020-09-13 NOTE — RESPIRATORY THERAPY NOTE
Patient with a cap trach tube on room air at home was set up with a trach mask for nebulization tx Rn informed and aware

## 2020-09-13 NOTE — ED PROVIDER NOTES
History  Chief Complaint   Patient presents with    Chest Pain     patient c/o chest pain since Friday  History provided by:  Patient   used: No      Patient with chest pain for the past several days  States he feels this way when she gets bronchitis  Has a history of chronic tracheostomy  Is not requiring increased oxygen home  Denies any new shortness of breath  Describes cough but states this is baseline  Prior to Admission Medications   Prescriptions Last Dose Informant Patient Reported? Taking?    Ascorbic Acid (VITAMIN C) 1000 MG tablet   Yes Yes   Sig: Take 1,000 mg by mouth daily   Calcium Carbonate-Vitamin D (CALCIUM 600+D) 600-400 MG-UNIT per tablet   Yes Yes   Sig: Take 1 tablet by mouth daily   Multiple Vitamins-Minerals (CENTRUM SILVER 50+WOMEN PO)   Yes Yes   Sig: Take by mouth   aspirin 81 mg chewable tablet   Yes Yes   Sig: Chew 81 mg daily   cholecalciferol (VITAMIN D3) 1,000 units tablet   Yes Yes   Sig: Take 1,000 Units by mouth daily   famotidine (PEPCID) 20 mg tablet   Yes Yes   Sig: Take 20 mg by mouth 2 (two) times a day   furosemide (LASIX) 20 mg tablet   Yes Yes   Sig: Take 20 mg by mouth daily    glipiZIDE (GLUCOTROL XL) 2 5 mg 24 hr tablet   Yes Yes   Sig: Take 2 5 mg by mouth daily   guaifenesin-codeine (GUAIFENESIN AC) 100-10 MG/5ML liquid   Yes Yes   Sig: Take 5 mL by mouth 3 (three) times a day as needed for cough   isosorbide dinitrate (ISORDIL) 30 mg tablet   Yes Yes   Sig: Take 30 mg by mouth 4 (four) times a day   lisinopril (ZESTRIL) 20 mg tablet   Yes Yes   Sig: Take 20 mg by mouth daily   methimazole (TAPAZOLE) 5 mg tablet   Yes Yes   Sig: Take 2 5 mg by mouth daily    pantoprazole (PROTONIX) 40 mg tablet   Yes Yes   Sig: Take 40 mg by mouth daily      Facility-Administered Medications: None       Past Medical History:   Diagnosis Date    BOOP (bronchiolitis obliterans with organizing pneumonia) ([place] [address] ) 01/01/2006    Diabetes mellitus ([place] [address] )     Hypertension        Past Surgical History:   Procedure Laterality Date    TRACHEOSTOMY  01/01/2006       History reviewed  No pertinent family history  I have reviewed and agree with the history as documented  E-Cigarette/Vaping     E-Cigarette/Vaping Substances     Social History     Tobacco Use    Smoking status: Never Smoker    Smokeless tobacco: Never Used   Substance Use Topics    Alcohol use: Never     Frequency: Never    Drug use: Never       Review of Systems   Cardiovascular: Positive for chest pain  All other systems reviewed and are negative  Physical Exam  Physical Exam  Vitals signs and nursing note reviewed  Constitutional:       General: She is not in acute distress  Appearance: She is well-developed  HENT:      Head: Normocephalic and atraumatic  Eyes:      Pupils: Pupils are equal, round, and reactive to light  Neck:      Comments: Tracheostomy in place  Clean, dry, intact  Cardiovascular:      Rate and Rhythm: Normal rate and regular rhythm  Heart sounds: Normal heart sounds  No murmur  Pulmonary:      Effort: Pulmonary effort is normal  Tachypnea present  No respiratory distress  Breath sounds: Normal breath sounds  No wheezing or rales  Abdominal:      General: Bowel sounds are normal  There is no distension  Palpations: Abdomen is soft  Tenderness: There is no abdominal tenderness  There is no guarding or rebound  Musculoskeletal: Normal range of motion  General: No deformity  Lymphadenopathy:      Cervical: No cervical adenopathy  Skin:     Capillary Refill: Capillary refill takes less than 2 seconds  Findings: No erythema or rash  Neurological:      Mental Status: She is alert and oriented to person, place, and time  Cranial Nerves: No cranial nerve deficit  Motor: No abnormal muscle tone        Coordination: Coordination normal    Psychiatric:         Behavior: Behavior normal          Vital Signs  ED Triage Vitals [09/13/20 1213]   Temperature Pulse Respirations Blood Pressure SpO2   (!) 97 3 °F (36 3 °C) 76 (!) 26 (!) 189/91 99 %      Temp Source Heart Rate Source Patient Position - Orthostatic VS BP Location FiO2 (%)   Tympanic Monitor Lying Right arm --      Pain Score       9           Vitals:    09/13/20 1213 09/13/20 1329 09/13/20 1330   BP: (!) 189/91 132/72 132/72   Pulse: 76 64 62   Patient Position - Orthostatic VS: Lying Lying          Visual Acuity      ED Medications  Medications   ipratropium-albuterol (DUO-NEB) 0 5-2 5 mg/3 mL inhalation solution 3 mL (3 mL Nebulization Given 9/13/20 1229)   predniSONE tablet 40 mg (40 mg Oral Given 9/13/20 1423)   amoxicillin-clavulanate (AUGMENTIN) 875-125 mg per tablet 1 tablet (1 tablet Oral Given 9/13/20 1423)       Diagnostic Studies  Results Reviewed     Procedure Component Value Units Date/Time    Troponin I [[id number]]  (Normal) Collected:  09/13/20 1225    Lab Status:  Final result Specimen:  Blood from Arm, Left Updated:  09/13/20 1251     Troponin I <0 02 ng/mL     Comprehensive metabolic panel [[id number]] Collected:  09/13/20 1225    Lab Status:  Final result Specimen:  Blood from Arm, Left Updated:  09/13/20 1247     Sodium 139 mmol/L      Potassium 4 0 mmol/L      Chloride 105 mmol/L      CO2 25 mmol/L      ANION GAP 9 mmol/L      BUN 17 mg/dL      Creatinine 1 11 mg/dL      Glucose 127 mg/dL      Calcium 8 7 mg/dL      AST 15 U/L      ALT 18 U/L      Alkaline Phosphatase 72 U/L      Total Protein 7 9 g/dL      Albumin 3 5 g/dL      Total Bilirubin 0 40 mg/dL      eGFR 55 ml/min/1 73sq m     Narrative:       [place] guidelines for Chronic Kidney Disease (CKD):     Stage 1 with normal or high GFR (GFR > 90 mL/min/1 73 square meters)    Stage 2 Mild CKD (GFR = 60-89 mL/min/1 73 square meters)    Stage 3A Moderate CKD (GFR = 45-59 mL/min/1 73 square meters)    Stage 3B Moderate CKD (GFR = 30-44 mL/min/1 73 square meters)   Stage 4 Severe CKD (GFR = 15-29 mL/min/1 73 square meters)    Stage 5 End Stage CKD (GFR <15 mL/min/1 73 square meters)  Note: GFR calculation is accurate only with a steady state creatinine    CBC and differential [[id number]] Collected:  09/13/20 1225    Lab Status:  Final result Specimen:  Blood from Arm, Left Updated:  09/13/20 1231     WBC 4 89 Thousand/uL      RBC 4 28 Million/uL      Hemoglobin 12 7 g/dL      Hematocrit 38 9 %      MCV 91 fL      MCH 29 7 pg      MCHC 32 6 g/dL      RDW 13 2 %      MPV 10 9 fL      Platelets 105 Thousands/uL      nRBC 0 /100 WBCs      Neutrophils Relative 62 %      Immat GRANS % 0 %      Lymphocytes Relative 30 %      Monocytes Relative 7 %      Eosinophils Relative 1 %      Basophils Relative 0 %      Neutrophils Absolute 3 01 Thousands/µL      Immature Grans Absolute 0 01 Thousand/uL      Lymphocytes Absolute 1 46 Thousands/µL      Monocytes Absolute 0 33 Thousand/µL      Eosinophils Absolute 0 07 Thousand/µL      Basophils Absolute 0 01 Thousands/µL                  XR chest 1 view portable   Final Result by Jaden Armstrong MD (09/13 1401)      No acute cardiopulmonary disease              Workstation performed: [id number]                    Procedures  ECG 12 Lead Documentation Only    Date/Time: 9/13/2020 12:05 PM  Performed by: Efe Tate MD  Authorized by: Efe Tate MD     Indications / Diagnosis:  Chest pain  ECG reviewed by me, the ED Provider: yes    Patient location:  ED  Interpretation:     Interpretation: normal    Rate:     ECG rate:  68    ECG rate assessment: normal    Rhythm:     Rhythm: sinus rhythm    QRS:     QRS axis:  Left    QRS intervals:  Normal  Conduction:     Conduction: normal    ST segments:     ST segments:  Normal  T waves:     T waves: normal               ED Course           HEART Risk Score      Most Recent Value   Heart Score Risk Calculator   History  0 Filed at: 09/13/2020 1407   ECG  0 Filed at: 09/13/2020 1407   Age  2 Filed at: 09/13/2020 1407   Risk Factors  1 Filed at: 09/13/2020 1407   Troponin  0 Filed at: 09/13/2020 1407   HEART Score  3 Filed at: 09/13/2020 1407                      SBIRT 20yo+      Most Recent Value   SBIRT (23 yo +)   In order to provide better care to our patients, we are screening all of our patients for alcohol and drug use  Would it be okay to ask you these screening questions? Yes Filed at: 09/13/2020 1237   Initial Alcohol Screen: US AUDIT-C    1  How often do you have a drink containing alcohol?  0 Filed at: 09/13/2020 1237   2  How many drinks containing alcohol do you have on a typical day you are drinking? 0 Filed at: 09/13/2020 1237   3a  Male UNDER 65: How often do you have five or more drinks on one occasion? 0 Filed at: 09/13/2020 1237   3b  FEMALE Any Age, or MALE 65+: How often do you have 4 or more drinks on one occassion? 0 Filed at: 09/13/2020 1237   Audit-C Score  0 Filed at: 09/13/2020 1237   SERINA: How many times in the past year have you    Used an illegal drug or used a prescription medication for non-medical reasons? Never Filed at: 09/13/2020 1237                  MDM  Number of Diagnoses or Management Options  Atypical chest pain: new and requires workup  Bronchitis: new and requires workup  Diagnosis management comments: Cough, congestion, chest pain  History of the same  Reports recurrent infections  States she has improved above fentanyl prednisone in the past   States she has always tachypneic  EKG, laboratory studies unremarkable  Pain since Friday  Heart score 3  Doubt cardiac pathology  Pain since Friday, no 2nd troponin indicated  Patient on room air  Will give Augmentin and prednisone as this has helped her in the past for bronchitis  She will follow up her primary care physician or return ER with any new or concerning symptoms         Amount and/or Complexity of Data Reviewed  Clinical lab tests: ordered and reviewed  Tests in the radiology section of CPT®: ordered and reviewed    Risk of Complications, Morbidity, and/or Mortality  Presenting problems: high  Diagnostic procedures: moderate  Management options: moderate    Patient Progress  Patient progress: improved      Disposition  Final diagnoses:   Atypical chest pain   Bronchitis     Time reflects when diagnosis was documented in both MDM as applicable and the Disposition within this note     Time User Action Codes Description Comment    9/13/2020  2:08 PM Dakotah Hobbs Add [R07 89] Atypical chest pain     9/13/2020  2:08 PM Dakotah Hobbs Add [J40] Bronchitis       ED Disposition     ED Disposition Condition Date/Time Comment    Discharge Stable Sun Sep 13, 2020  2:08 PM Rj Park discharge to home/self care  Follow-up Information     Follow up With Specialties Details Why Contact Info Additional Information    Stella Richmond MD Internal Medicine Schedule an appointment as soon as possible for a visit in 2 days As needed [address] [place] [address] Emergency Department Emergency Medicine Go to  If symptoms worsen [address] [facility] ED, [place], [place], [address]          Patient's Medications   Discharge Prescriptions    AMOXICILLIN-CLAVULANATE (AUGMENTIN) 875-125 MG PER TABLET    Take 1 tablet by mouth every 12 (twelve) hours for 10 days       Start Date: 9/13/2020 End Date: 9/23/2020       Order Dose: 1 tablet       Quantity: 20 tablet    Refills: 0    PREDNISONE 20 MG TABLET    Take 2 tablets (40 mg total) by mouth daily for 5 days       Start Date: 9/13/2020 End Date: 9/18/2020       Order Dose: 40 mg       Quantity: 10 tablet    Refills: 0     No discharge procedures on file      PDMP Review     None          ED Provider  Electronically Signed by           Pratima Yin MD  09/13/20 4169

## 2020-09-18 LAB
ATRIAL RATE: 68 BPM
P AXIS: 43 DEGREES
PR INTERVAL: 134 MS
QRS AXIS: -15 DEGREES
QRSD INTERVAL: 86 MS
QT INTERVAL: 380 MS
QTC INTERVAL: 404 MS
T WAVE AXIS: 7 DEGREES
VENTRICULAR RATE: 68 BPM

## 2020-09-18 PROCEDURE — 93010 ELECTROCARDIOGRAM REPORT: CPT | Performed by: INTERNAL MEDICINE

## 2020-11-10 ENCOUNTER — OFFICE VISIT (OUTPATIENT)
Dept: FAMILY MEDICINE CLINIC | Facility: CLINIC | Age: 80
End: 2020-11-10
Payer: MEDICARE

## 2020-11-10 VITALS
OXYGEN SATURATION: 92 % | DIASTOLIC BLOOD PRESSURE: 68 MMHG | HEART RATE: 85 BPM | SYSTOLIC BLOOD PRESSURE: 146 MMHG | WEIGHT: 221.4 LBS | TEMPERATURE: 98.3 F

## 2020-11-10 DIAGNOSIS — Z00.00 WELL ADULT EXAM: ICD-10-CM

## 2020-11-10 DIAGNOSIS — E55.9 VITAMIN D DEFICIENCY: ICD-10-CM

## 2020-11-10 DIAGNOSIS — J84.89 BOOP (BRONCHIOLITIS OBLITERANS WITH ORGANIZING PNEUMONIA) (HCC): Primary | ICD-10-CM

## 2020-11-10 DIAGNOSIS — E11.9 TYPE 2 DIABETES MELLITUS WITHOUT COMPLICATION, WITHOUT LONG-TERM CURRENT USE OF INSULIN (HCC): ICD-10-CM

## 2020-11-10 DIAGNOSIS — K21.9 GASTROESOPHAGEAL REFLUX DISEASE WITHOUT ESOPHAGITIS: ICD-10-CM

## 2020-11-10 DIAGNOSIS — Z00.00 ENCOUNTER FOR MEDICAL EXAMINATION TO ESTABLISH CARE: Primary | ICD-10-CM

## 2020-11-10 DIAGNOSIS — J39.8 TRACHEAL STENOSIS: ICD-10-CM

## 2020-11-10 DIAGNOSIS — I25.119 CORONARY ARTERY DISEASE WITH ANGINA PECTORIS, UNSPECIFIED VESSEL OR LESION TYPE, UNSPECIFIED WHETHER NATIVE OR TRANSPLANTED HEART (HCC): ICD-10-CM

## 2020-11-10 DIAGNOSIS — E89.0 POST-SURGICAL HYPOTHYROIDISM: ICD-10-CM

## 2020-11-10 DIAGNOSIS — Z93.0 TRACHEOSTOMY PRESENT (HCC): ICD-10-CM

## 2020-11-10 DIAGNOSIS — I10 HYPERTENSION, ESSENTIAL: ICD-10-CM

## 2020-11-10 DIAGNOSIS — J84.89 BOOP (BRONCHIOLITIS OBLITERANS WITH ORGANIZING PNEUMONIA) (HCC): ICD-10-CM

## 2020-11-10 PROBLEM — I25.10 CORONARY ARTERY DISEASE: Status: ACTIVE | Noted: 2017-07-05

## 2020-11-10 PROCEDURE — 99203 OFFICE O/P NEW LOW 30 MIN: CPT | Performed by: FAMILY MEDICINE

## 2020-11-10 RX ORDER — ASPIRIN 81 MG/1
81 TABLET, CHEWABLE ORAL DAILY
Qty: 30 TABLET | Refills: 5 | Status: SHIPPED | OUTPATIENT
Start: 2020-11-10

## 2020-11-10 RX ORDER — MULTIVIT WITH MINERALS/LUTEIN
1000 TABLET ORAL DAILY
Qty: 30 TABLET | Refills: 5 | Status: SHIPPED | OUTPATIENT
Start: 2020-11-10

## 2020-11-10 RX ORDER — PANTOPRAZOLE SODIUM 40 MG/1
40 TABLET, DELAYED RELEASE ORAL DAILY
Qty: 30 TABLET | Refills: 5 | Status: SHIPPED | OUTPATIENT
Start: 2020-11-10 | End: 2021-04-12

## 2020-11-10 RX ORDER — ALBUTEROL SULFATE 2.5 MG/3ML
2.5 SOLUTION RESPIRATORY (INHALATION)
COMMUNITY
Start: 2020-05-11 | End: 2020-11-10 | Stop reason: SDUPTHER

## 2020-11-10 RX ORDER — ISOSORBIDE MONONITRATE 30 MG/1
TABLET, EXTENDED RELEASE ORAL
COMMUNITY
Start: 2020-09-28 | End: 2020-11-10 | Stop reason: SDUPTHER

## 2020-11-10 RX ORDER — MELATONIN
1000 DAILY
Qty: 30 TABLET | Refills: 5 | Status: SHIPPED | OUTPATIENT
Start: 2020-11-10

## 2020-11-10 RX ORDER — GUAIFENESIN AND CODEINE PHOSPHATE 100; 10 MG/5ML; MG/5ML
5 SOLUTION ORAL 3 TIMES DAILY PRN
Qty: 118 ML | Refills: 0 | Status: SHIPPED | OUTPATIENT
Start: 2020-11-10 | End: 2021-03-01 | Stop reason: SDUPTHER

## 2020-11-10 RX ORDER — ALBUTEROL SULFATE 2.5 MG/3ML
2.5 SOLUTION RESPIRATORY (INHALATION) EVERY 4 HOURS PRN
Qty: 1 VIAL | Refills: 5 | Status: SHIPPED | OUTPATIENT
Start: 2020-11-10 | End: 2021-04-08 | Stop reason: SDUPTHER

## 2020-11-10 RX ORDER — LISINOPRIL 20 MG/1
20 TABLET ORAL DAILY
Qty: 30 TABLET | Refills: 5 | Status: SHIPPED | OUTPATIENT
Start: 2020-11-10 | End: 2021-05-11

## 2020-11-10 RX ORDER — FUROSEMIDE 20 MG/1
20 TABLET ORAL DAILY
Qty: 30 TABLET | Refills: 5 | Status: SHIPPED | OUTPATIENT
Start: 2020-11-10 | End: 2021-08-16 | Stop reason: SDUPTHER

## 2020-11-10 RX ORDER — GLIPIZIDE 2.5 MG/1
2.5 TABLET, EXTENDED RELEASE ORAL DAILY
Qty: 30 TABLET | Refills: 5 | Status: SHIPPED | OUTPATIENT
Start: 2020-11-10 | End: 2021-03-25 | Stop reason: SDUPTHER

## 2020-11-10 RX ORDER — ISOSORBIDE MONONITRATE 30 MG/1
30 TABLET, EXTENDED RELEASE ORAL DAILY
Qty: 30 TABLET | Refills: 5 | Status: SHIPPED | OUTPATIENT
Start: 2020-11-10 | End: 2021-06-08

## 2020-11-10 RX ORDER — BUDESONIDE 0.5 MG/2ML
1 INHALANT ORAL
COMMUNITY
Start: 2020-05-11 | End: 2020-11-10 | Stop reason: SDUPTHER

## 2020-11-10 RX ORDER — LISINOPRIL 40 MG/1
TABLET ORAL
COMMUNITY
Start: 2020-09-04 | End: 2020-11-10 | Stop reason: ALTCHOICE

## 2020-11-10 RX ORDER — BUDESONIDE 0.5 MG/2ML
1 INHALANT ORAL 2 TIMES DAILY
Qty: 240 ML | Refills: 11 | Status: SHIPPED | OUTPATIENT
Start: 2020-11-10 | End: 2021-04-08 | Stop reason: SDUPTHER

## 2020-11-10 RX ORDER — METHIMAZOLE 5 MG/1
2.5 TABLET ORAL DAILY
Qty: 30 TABLET | Refills: 2 | Status: SHIPPED | OUTPATIENT
Start: 2020-11-10 | End: 2021-01-25 | Stop reason: SDUPTHER

## 2020-11-27 ENCOUNTER — HOSPITAL ENCOUNTER (EMERGENCY)
Facility: HOSPITAL | Age: 80
Discharge: HOME | End: 2020-11-27
Attending: EMERGENCY MEDICINE | Admitting: EMERGENCY MEDICINE
Payer: MEDICARE

## 2020-11-27 VITALS
HEIGHT: 63 IN | TEMPERATURE: 97.8 F | SYSTOLIC BLOOD PRESSURE: 169 MMHG | RESPIRATION RATE: 24 BRPM | HEART RATE: 64 BPM | WEIGHT: 224.65 LBS | BODY MASS INDEX: 39.8 KG/M2 | DIASTOLIC BLOOD PRESSURE: 80 MMHG | OXYGEN SATURATION: 100 %

## 2020-11-27 DIAGNOSIS — J95.00 TRACHEOSTOMY COMPLICATION (HCC): Primary | ICD-10-CM

## 2020-11-27 PROCEDURE — 99284 EMERGENCY DEPT VISIT MOD MDM: CPT | Performed by: EMERGENCY MEDICINE

## 2020-11-27 PROCEDURE — 99283 EMERGENCY DEPT VISIT LOW MDM: CPT

## 2020-11-27 PROCEDURE — 99283 EMERGENCY DEPT VISIT LOW MDM: CPT | Performed by: OTOLARYNGOLOGY

## 2020-11-27 PROCEDURE — 31502 CHANGE OF WINDPIPE AIRWAY: CPT | Performed by: OTOLARYNGOLOGY

## 2020-11-27 RX ORDER — LIDOCAINE HYDROCHLORIDE 20 MG/ML
JELLY TOPICAL ONCE
Status: COMPLETED | OUTPATIENT
Start: 2020-11-27 | End: 2020-11-27

## 2020-11-27 RX ORDER — LIDOCAINE HYDROCHLORIDE AND EPINEPHRINE 10; 10 MG/ML; UG/ML
1 INJECTION, SOLUTION INFILTRATION; PERINEURAL ONCE
Status: COMPLETED | OUTPATIENT
Start: 2020-11-27 | End: 2020-11-27

## 2020-11-27 RX ADMIN — LIDOCAINE HYDROCHLORIDE,EPINEPHRINE BITARTRATE 1 ML: 10; .01 INJECTION, SOLUTION INFILTRATION; PERINEURAL at 14:41

## 2020-11-27 RX ADMIN — LIDOCAINE HYDROCHLORIDE: 20 JELLY TOPICAL at 14:41

## 2020-11-27 RX ADMIN — SILVER NITRATE APPLICATORS 1 APPLICATOR: 25; 75 STICK TOPICAL at 14:41

## 2020-12-16 ENCOUNTER — OFFICE VISIT (OUTPATIENT)
Dept: FAMILY MEDICINE CLINIC | Facility: CLINIC | Age: 80
End: 2020-12-16
Payer: MEDICARE

## 2020-12-16 VITALS
TEMPERATURE: 97.5 F | OXYGEN SATURATION: 99 % | HEART RATE: 76 BPM | WEIGHT: 226.19 LBS | RESPIRATION RATE: 24 BRPM | HEIGHT: 63 IN | DIASTOLIC BLOOD PRESSURE: 76 MMHG | SYSTOLIC BLOOD PRESSURE: 148 MMHG | BODY MASS INDEX: 40.08 KG/M2

## 2020-12-16 DIAGNOSIS — Z00.00 MEDICARE ANNUAL WELLNESS VISIT, INITIAL: Primary | ICD-10-CM

## 2020-12-16 PROCEDURE — G0438 PPPS, INITIAL VISIT: HCPCS | Performed by: FAMILY MEDICINE

## 2020-12-16 RX ORDER — LISINOPRIL 40 MG/1
TABLET ORAL
COMMUNITY
Start: 2020-11-27 | End: 2021-05-11 | Stop reason: SDUPTHER

## 2020-12-21 ENCOUNTER — OFFICE VISIT (OUTPATIENT)
Dept: OTOLARYNGOLOGY | Facility: CLINIC | Age: 80
End: 2020-12-21
Payer: MEDICARE

## 2020-12-21 VITALS
OXYGEN SATURATION: 99 % | TEMPERATURE: 98.2 F | HEIGHT: 63 IN | WEIGHT: 226 LBS | BODY MASS INDEX: 40.04 KG/M2 | HEART RATE: 68 BPM

## 2020-12-21 DIAGNOSIS — Z93.0 TRACHEOSTOMY PRESENT (HCC): ICD-10-CM

## 2020-12-21 DIAGNOSIS — J39.8 TRACHEAL STENOSIS: Primary | ICD-10-CM

## 2020-12-21 PROCEDURE — 99213 OFFICE O/P EST LOW 20 MIN: CPT | Performed by: SPECIALIST

## 2020-12-30 ENCOUNTER — CONSULT (OUTPATIENT)
Dept: PULMONOLOGY | Facility: MEDICAL CENTER | Age: 80
End: 2020-12-30
Payer: MEDICARE

## 2020-12-30 VITALS
BODY MASS INDEX: 40.04 KG/M2 | WEIGHT: 226 LBS | RESPIRATION RATE: 20 BRPM | OXYGEN SATURATION: 97 % | HEART RATE: 75 BPM | SYSTOLIC BLOOD PRESSURE: 156 MMHG | DIASTOLIC BLOOD PRESSURE: 98 MMHG | TEMPERATURE: 97.6 F | HEIGHT: 63 IN

## 2020-12-30 DIAGNOSIS — J45.30 MILD PERSISTENT ASTHMA WITHOUT COMPLICATION: Primary | ICD-10-CM

## 2020-12-30 DIAGNOSIS — G47.34 SLEEP-RELATED HYPOXIA: ICD-10-CM

## 2020-12-30 DIAGNOSIS — J84.89 BOOP (BRONCHIOLITIS OBLITERANS WITH ORGANIZING PNEUMONIA) (HCC): ICD-10-CM

## 2020-12-30 DIAGNOSIS — R05.3 CHRONIC COUGH: ICD-10-CM

## 2020-12-30 DIAGNOSIS — E66.01 CLASS 3 SEVERE OBESITY DUE TO EXCESS CALORIES WITHOUT SERIOUS COMORBIDITY WITH BODY MASS INDEX (BMI) OF 40.0 TO 44.9 IN ADULT (HCC): ICD-10-CM

## 2020-12-30 DIAGNOSIS — J38.6 SUBGLOTTIC STENOSIS: ICD-10-CM

## 2020-12-30 PROCEDURE — 99204 OFFICE O/P NEW MOD 45 MIN: CPT | Performed by: INTERNAL MEDICINE

## 2020-12-30 RX ORDER — GUAIFENESIN AND CODEINE PHOSPHATE 100; 10 MG/5ML; MG/5ML
5 SOLUTION ORAL 2 TIMES DAILY PRN
Qty: 300 ML | Refills: 0 | Status: SHIPPED | OUTPATIENT
Start: 2020-12-30 | End: 2021-03-01 | Stop reason: SDUPTHER

## 2020-12-30 RX ORDER — BENZONATATE 100 MG/1
100 CAPSULE ORAL 2 TIMES DAILY PRN
Qty: 30 CAPSULE | Refills: 3 | Status: SHIPPED | OUTPATIENT
Start: 2020-12-30 | End: 2021-04-27

## 2020-12-30 NOTE — PROGRESS NOTES
Assessment/Plan:       Problem List Items Addressed This Visit        Respiratory    BOOP (bronchiolitis obliterans with organizing pneumonia) ([facility] [address] )     Patient has history of bronchiolitis obliterans with organizing pneumonia back in 2005 2006  She is on steroids for appeared time but not on any on regular basis 1st last several years  She had CT scan done in 2017 which showed just couple small lung nodules and no evidence of any lung infiltrates  Chest x-ray done September was reviewed and there are no lung infiltrates  These lung nodules are not visible  Relevant Medications    benzonatate (TESSALON PERLES) 100 mg capsule    Subglottic stenosis     Patient has history of subglottic stenosis  She did have a prolonged intubation back in 2005 2006  She has had tracheostomy since then  Was attempted to be removed at 1 point she had difficulty breathing had have a tracheostomy placed back again       She had recent tracheostomy change done by ENT in ER on November 27th  She will be following with Dr Patrick Fermin for further tracheostomy tube changes  Sleep-related hypoxia     Alaina uses 3 L of oxygen at night will continue use 3 L of oxygen at night  Not having any nocturnal dyspnea  Mild persistent asthma without complication - Primary     She does get some intermittent wheeze and does have exertional shortness of breath  Has been budesonide 0 5 mg b i d  And periodically uses nebulizer with albuterol  She appears stable on this regimen will not make any changes  Other    Chronic cough     Honey has had chronic cough for several years  She does take Tussionex periodically which helps her  She has for refill this  This cough often bothers her at nighttime and disturbs her sleep  I also told she can try benzonatate 100 mg b i d   As needed for cough         Relevant Medications    benzonatate (TESSALON PERLES) 100 mg capsule    Class 3 severe obesity due to excess calories without serious comorbidity with body mass index (BMI) of 40 0 to 44 9 in [facility])     Patient is overweight  This contributes to her shortness of breath  I did encourage her to lose weight  Return in about 4 months (around 4/30/2021)  All questions are answered to the patient's satisfaction and understanding  She verbalizes understanding  She is encouraged to call with any further questions or concerns  Portions of the record may have been created with voice recognition software  Occasional wrong word or "sound a like" substitutions may have occurred due to the inherent limitations of voice recognition software  Read the chart carefully and recognize, using context, where substitutions have occurred  a    Electronically Signed by Kelin To DO    ______________________________________________________________________    Chief Complaint:   Chief Complaint   Patient presents with    Tracheal Stenosis        Patient ID: Garret Urias is a 79 y o  y o  female has a past medical history of BOOP (bronchiolitis obliterans with organizing pneumonia) ([facility] [address] ) (01/01/2006), Diabetes mellitus ([facility] [address] ), and Hypertension  12/30/2020  Patient presents today for initial visit  HPI    She does have chronic cough that is usually nonproductive  Her previous pulmonologist had prescribed her guaifenesin with codeine which she take 5 mL usually twice a day to help control the cough  She does not recall being on benzonatate  Patient states she became very sick in year 2005 to 2006  She was issued 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  She also may was transferred to [facility]  She thinks she had pneumonia or bronchiolitis obliterans  She states she is very ill for at least 6 weeks and was on a ventilator  She did have tracheostomy and PEG tube placed  This was around 2005   Her tracheostomy tube was removed but then she started to have difficulty breathing and had to be revised and put back in  She was diagnosed with subglottic stenosis  Peg tube was able to be removed  She does have oxygen she uses sometimes 3 L at nighttime  She who axis up with humidity to her tracheostomy tube with a trach collar  She denies any history of asthma  Does get occasional gastric reflux  Also has some chest discomfort at times from coughing frequently  This is more anterior and along the parasternal regions bilaterally  Not have any fever chills  No recent weight loss  She does use nebulizer treatments with budesonide twice a day    On November 27th she was having problems with tracheostomy tube and had difficulty some granulation tissue partly occluding it  She was seen in consultation then by ENT and granulation tissue was removed in her 6  Shiley cuffless fenestrated tracheostomy tube was replaced with a new one by ENT physician  She now follows with Dr Shy Abraham had recent appointment with him  She has had prior coronary artery stent done before  I was several years ago  She also has had prior echocardiogram showed normal LV systolic function, grade 2 diastolic dysfunction and mild-to-moderate pulmonary hypertension with estimated PA pressure of 46 mm Hg  She is able to get around her house but she does have some chronic exertional dyspnea  She states that she did have some thyroid nodules that were initially felt to be cancerous but if the questioned partial thyroidectomy was done was found that was not cancer  She is on methimazole  She does have an appointment with a local endocrinologist Dr Jorge Dimas now  She just recently moved to this area and previously 1 to [address] [facility]  She has seen pulmonologist, Dr Nelson Mayers    She has had prior CT scans done last 1 June 21, 2017 there which reported some small lung nodules measuring 4-6 mm   6 mm nodule in the right middle lobe and she had immediate left lower lobe lung nodule not changed from before  Is also a questionable history of prior cryptogenic organizing pneumonia the past   She did have echocardiogram done before which showed normal LV systolic function ejection fraction of 79% grade 2 diastolic dysfunction  Estimated PA pressure was 46 mm Hg and RV function was normal       Patient has history of coronary disease and did have cardiac stent done around 2000  She also history of asthma  She has had bilateral knee replacement surgeries  These were done several years ago prior to her respiratory problems  Her mother had heart failure  Patient cannot really recall being on any long-term oral steroid therapy  She may have had it at the time she had her tracheostomy and respiratory difficulties but was never kept on for prolonged period of time  She denies any history of asthma when she was younger  Not have any difficulty swallowing  She was accompanied by her son Junior Johanna Jovel history:  Did work in Environmental services for about 15 years at [employer] before she had her respiratory issues  She did use cleaning agents to clean rooms    Review of Systems   Constitutional: Negative for activity change, appetite change, chills, fever and unexpected weight change  HENT: Negative for congestion, dental problem, postnasal drip, sinus pressure, sinus pain, sore throat and voice change  Eyes: Negative for visual disturbance  Respiratory: Positive for cough, shortness of breath and wheezing  See HPI   Cardiovascular: Negative for chest pain, palpitations and leg swelling  Gastrointestinal: Negative for abdominal pain, diarrhea, nausea and vomiting  Endocrine: Negative for polydipsia and polyphagia  Genitourinary: Negative for difficulty urinating and hematuria  Musculoskeletal: Negative for arthralgias  Skin: Negative for rash and wound     Allergic/Immunologic: Negative for environmental allergies and food allergies  Neurological: Negative for dizziness, light-headedness and headaches  Hematological: Negative for adenopathy  Psychiatric/Behavioral: Negative for agitation, behavioral problems and confusion  Social history: She reports that she has never smoked  She has never used smokeless tobacco  She reports that she does not drink alcohol or use drugs      Past surgical history:   Past Surgical History:   Procedure Laterality Date    HYSTERECTOMY      REPLACEMENT TOTAL KNEE      TRACHEOSTOMY  01/01/2006     Family history:   Family History   Problem Relation Age of Onset    Heart disease Mother     Hypertension Mother     Heart disease Father        Immunization History   Administered Date(s) Administered    INFLUENZA 09/30/2005, 02/23/2008    Pneumococcal Polysaccharide PPV23 09/30/2005     Current Outpatient Medications   Medication Sig Dispense Refill    Ascorbic Acid (vitamin C) 1000 MG tablet Take 1 tablet (1,000 mg total) by mouth daily 30 tablet 5    aspirin 81 mg chewable tablet Chew 1 tablet (81 mg total) daily 30 tablet 5    Calcium Carbonate-Vitamin D (CALCIUM 600+D) 600-400 MG-UNIT per tablet Take 1 tablet by mouth daily      cholecalciferol (VITAMIN D3) 1,000 units tablet Take 1 tablet (1,000 Units total) by mouth daily 30 tablet 5    furosemide (LASIX) 20 mg tablet Take 1 tablet (20 mg total) by mouth daily 30 tablet 5    isosorbide mononitrate (IMDUR) 30 mg 24 hr tablet Take 1 tablet (30 mg total) by mouth daily 30 tablet 5    lisinopril (ZESTRIL) 20 mg tablet Take 1 tablet (20 mg total) by mouth daily 30 tablet 5    lisinopril (ZESTRIL) 40 mg tablet       Multiple Vitamins-Minerals (CENTRUM SILVER 50+WOMEN PO) Take by mouth      albuterol (2 5 mg/3 mL) 0 083 % nebulizer solution Take 1 vial (2 5 mg total) by nebulization every 4 (four) hours as needed for wheezing or shortness of breath 120 vial 11    atorvastatin (LIPITOR) 20 mg tablet Take 1 tablet (20 mg total) by mouth daily 90 tablet 2    benzonatate (TESSALON PERLES) 100 mg capsule Take 1 capsule (100 mg total) by mouth 2 (two) times a day as needed for cough 30 capsule 3    budesonide (PULMICORT) 0 5 mg/2 mL nebulizer solution Take 1 vial (0 5 mg total) by nebulization 2 (two) times a day 60 vial 11    glipiZIDE (GLUCOTROL XL) 2 5 mg 24 hr tablet Take 1 tablet (2 5 mg total) by mouth daily 30 tablet 5    guaifenesin-codeine (GUAIFENESIN AC) 100-10 MG/5ML liquid Take 5 mL by mouth 2 (two) times a day as needed for cough 300 mL 0    methimazole (TAPAZOLE) 5 mg tablet Take 0 5 tablets (2 5 mg total) by mouth daily 45 tablet 1    pantoprazole (PROTONIX) 40 mg tablet TAKE 1 TABLET DAILY 30 tablet 5     No current facility-administered medications for this visit  Allergies: Iodine - food allergy, Shellfish-derived products - food allergy, and Morphine    Objective:  Vitals:    12/30/20 1033   BP: 156/98   BP Location: Left arm   Patient Position: Sitting   Cuff Size: Adult   Pulse: 75   Resp: 20   Temp: 97 6 °F (36 4 °C)   SpO2: 97%   Weight: 103 kg (226 lb)   Height: 5' 3" (1 6 m)   Oxygen Therapy  SpO2: 97 %    Wt Readings from Last 3 Encounters:   04/08/21 102 kg (224 lb)   04/05/21 102 kg (224 lb)   04/01/21 102 kg (224 lb)     Body mass index is 40 03 kg/m²  Physical Exam  Vitals signs reviewed  Constitutional:       General: She is not in acute distress  Appearance: She is well-developed  She is obese  HENT:      Head: Normocephalic  Nose: Nose normal       Mouth/Throat:      Mouth: Mucous membranes are dry  Pharynx: Oropharynx is clear  No oropharyngeal exudate  Comments: Mallampati score is 4  Eyes:      Conjunctiva/sclera: Conjunctivae normal       Pupils: Pupils are equal, round, and reactive to light  Neck:      Musculoskeletal: Neck supple  Vascular: No JVD  Trachea: No tracheal deviation        Comments: Shiley tracheostomy tube in place and tracheostomy site is clear without any erythema or discharge  Cardiovascular:      Rate and Rhythm: Normal rate and regular rhythm  Heart sounds: Normal heart sounds  Pulmonary:      Effort: Pulmonary effort is normal       Comments: Few inspiratory crackles right lower lobe which cleared after deep breaths  Abdominal:      General: There is no distension  Palpations: Abdomen is soft  Tenderness: There is no abdominal tenderness  There is no guarding  Musculoskeletal:      Comments: Mild tenderness over anterior parasternal regions of chest bilaterally  No edema, cyanosis or clubbing   Lymphadenopathy:      Cervical: No cervical adenopathy  Skin:     General: Skin is warm and dry  Findings: No rash  Neurological:      Mental Status: She is alert and oriented to person, place, and time  Psychiatric:         Behavior: Behavior normal          Thought Content: Thought content normal          Lab Review:   Lab Results   Component Value Date    K 4 3 01/20/2021     01/20/2021    CO2 23 01/20/2021    BUN 12 01/20/2021    CREATININE 1 05 (H) 01/20/2021    CREATININE 1 11 09/13/2020    CALCIUM 8 7 09/13/2020     Lab Results   Component Value Date    WBC 4 1 01/20/2021    WBC 4 89 09/13/2020    HGB 12 5 01/20/2021    HGB 12 7 09/13/2020    HCT 37 5 01/20/2021    HCT 38 9 09/13/2020    MCV 90 01/20/2021    MCV 91 09/13/2020     01/20/2021     09/13/2020       Diagnostics:  I have personally reviewed pertinent films in PACS  Chest x-ray: 9/13/20 - decreased lung volumes  No infiltrates    Has tracheostomy tube in place

## 2021-01-08 PROBLEM — E66.813 CLASS 3 SEVERE OBESITY DUE TO EXCESS CALORIES WITHOUT SERIOUS COMORBIDITY WITH BODY MASS INDEX (BMI) OF 40.0 TO 44.9 IN ADULT (HCC): Status: ACTIVE | Noted: 2021-01-08

## 2021-01-08 PROBLEM — J45.30 MILD PERSISTENT ASTHMA WITHOUT COMPLICATION: Status: ACTIVE | Noted: 2021-01-08

## 2021-01-08 PROBLEM — E66.01 CLASS 3 SEVERE OBESITY DUE TO EXCESS CALORIES WITHOUT SERIOUS COMORBIDITY WITH BODY MASS INDEX (BMI) OF 40.0 TO 44.9 IN ADULT (HCC): Status: ACTIVE | Noted: 2021-01-08

## 2021-01-08 PROBLEM — G47.34 SLEEP-RELATED HYPOXIA: Status: ACTIVE | Noted: 2021-01-08

## 2021-01-08 PROBLEM — R05.3 CHRONIC COUGH: Status: ACTIVE | Noted: 2021-01-08

## 2021-01-08 PROBLEM — J38.6 SUBGLOTTIC STENOSIS: Status: ACTIVE | Noted: 2021-01-08

## 2021-01-08 NOTE — ASSESSMENT & PLAN NOTE
She does get some intermittent wheeze and does have exertional shortness of breath  Has been budesonide 0 5 mg b i d  And periodically uses nebulizer with albuterol  She appears stable on this regimen will not make any changes

## 2021-01-08 NOTE — ASSESSMENT & PLAN NOTE
Patient is overweight  This contributes to her shortness of breath  I did encourage her to lose weight

## 2021-01-08 NOTE — ASSESSMENT & PLAN NOTE
Honey has had chronic cough for several years  She does take Tussionex periodically which helps her  She has for refill this  This cough often bothers her at nighttime and disturbs her sleep  I also told she can try benzonatate 100 mg b i d   As needed for cough

## 2021-01-08 NOTE — ASSESSMENT & PLAN NOTE
Patient has history of subglottic stenosis  She did have a prolonged intubation back in 2005 2006  She has had tracheostomy since then  Was attempted to be removed at 1 point she had difficulty breathing had have a tracheostomy placed back again       She had recent tracheostomy change done by ENT in ER on November 27th  She will be following with Dr Rasheeda Torres for further tracheostomy tube changes

## 2021-01-08 NOTE — ASSESSMENT & PLAN NOTE
Alaina uses 3 L of oxygen at night will continue use 3 L of oxygen at night  Not having any nocturnal dyspnea

## 2021-01-08 NOTE — ASSESSMENT & PLAN NOTE
Patient has history of bronchiolitis obliterans with organizing pneumonia back in 2005 2006  She is on steroids for appeared time but not on any on regular basis 1st last several years  She had CT scan done in 2017 which showed just couple small lung nodules and no evidence of any lung infiltrates  Chest x-ray done September was reviewed and there are no lung infiltrates  These lung nodules are not visible

## 2021-01-19 ENCOUNTER — CONSULT (OUTPATIENT)
Dept: ENDOCRINOLOGY | Facility: CLINIC | Age: 81
End: 2021-01-19
Payer: COMMERCIAL

## 2021-01-19 VITALS
HEIGHT: 63 IN | BODY MASS INDEX: 41.83 KG/M2 | WEIGHT: 236.1 LBS | TEMPERATURE: 96.9 F | SYSTOLIC BLOOD PRESSURE: 142 MMHG | HEART RATE: 98 BPM | DIASTOLIC BLOOD PRESSURE: 86 MMHG

## 2021-01-19 DIAGNOSIS — E05.90 HYPERTHYROIDISM: Primary | ICD-10-CM

## 2021-01-19 DIAGNOSIS — E11.9 TYPE 2 DIABETES MELLITUS WITHOUT COMPLICATION, WITHOUT LONG-TERM CURRENT USE OF INSULIN (HCC): ICD-10-CM

## 2021-01-19 DIAGNOSIS — C73 PAPILLARY THYROID CARCINOMA (HCC): ICD-10-CM

## 2021-01-19 DIAGNOSIS — E89.0 POST-SURGICAL HYPOTHYROIDISM: ICD-10-CM

## 2021-01-19 PROCEDURE — 99204 OFFICE O/P NEW MOD 45 MIN: CPT | Performed by: INTERNAL MEDICINE

## 2021-01-19 PROCEDURE — 1160F RVW MEDS BY RX/DR IN RCRD: CPT | Performed by: INTERNAL MEDICINE

## 2021-01-19 NOTE — PROGRESS NOTES
ENDOCRINOLOGY  NEW PATIENT H&P     ? 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 Grave's (had TSI Ab positive Oct 2020) vs  Toxic adenoma/MNG, currently on methimazole 5mg qday, repeat TSH, Free T4, Total T3, TSI Ab for further evaluation  Counseled on adverse SEs of methimazole therapy including hepatotoxicity and agranulocytosis  Instructed to stop methimazole if developed sore throat/fever and call office  DM2- BG on labs in Oct 2020 high 312, A1C Oct 2020 9 3% high, repeat diabetes labs now, continue glipizide 2 5mg XL qday for now, if still uncontrolled will need to start check BGs for medication adjustment     RTC 3 months    Jenaro HERNÁNDEZ  Endocrinology        History of Present Illness:  Mrs Grzegorz Owen is a 58-year-old female who presents for thyroid disorder evaluation  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  PMH-hyperthyroidism, BOOP s/p tracheostomy, hypertension, diabetes, vitamin-D deficiency, CAD status post stent, tracheal stenosis, hyperlipidemia, history of PE  PSH-knee surgery, PEG tube  FHx-no thyroid issues in family   SHx-neg x 2, retired housekeeping at hospital, from [employer]        ? Review of Systems:     Review of Systems   Constitutional: Negative for appetite change, chills, diaphoresis, fatigue, fever and unexpected weight change  HENT: Negative for congestion, ear pain, hearing loss, rhinorrhea, sinus pressure, sinus pain, sore throat, trouble swallowing and voice change  Eyes: Negative for photophobia, redness and visual disturbance  Respiratory: Negative for apnea, cough, chest tightness, shortness of breath, wheezing and stridor  Cardiovascular: Negative for palpitations and leg swelling  +chest pain when coughing  Gastrointestinal: Negative for abdominal distention, abdominal pain, constipation, diarrhea, nausea and vomiting  Endocrine: Negative for cold intolerance, heat intolerance, polydipsia, polyphagia and polyuria  Genitourinary: Negative for difficulty urinating, dysuria, flank pain, frequency, hematuria and urgency  Musculoskeletal: Negative for arthralgias, back pain, gait problem, joint swelling and myalgias  Skin: Negative for color change, pallor, rash and wound  Allergic/Immunologic: Negative for immunocompromised state  Neurological: Negative for dizziness, tremors, syncope, weakness, light-headedness and headaches  Hematological: Negative for adenopathy  Does not bruise/bleed easily  Psychiatric/Behavioral: Negative for confusion and sleep disturbance  The patient is not nervous/anxious  ?   Patient History:     Past Medical History:   Diagnosis Date    BOOP (bronchiolitis obliterans with organizing pneumonia) ([facility] ) 01/01/2006    Diabetes mellitus ([facility] )     Hypertension      Past Surgical History:   Procedure Laterality Date    HYSTERECTOMY      REPLACEMENT TOTAL KNEE      TRACHEOSTOMY  01/01/2006     Social History     Socioeconomic History    Marital status:      Spouse name: Not on file    Number of children: Not on file    Years of education: Not on file    Highest education level: Not on file   Occupational History    Not on file   Social Needs    Financial resource strain: Not on file    Food insecurity     Worry: Not on file     Inability: Not on file    Transportation needs     Medical: Not on file     Non-medical: Not on file   Tobacco Use    Smoking status: Never Smoker    Smokeless tobacco: Never Used   Substance and Sexual Activity    Alcohol use: Never     Frequency: Never    Drug use: Never    Sexual activity: Not on file   Lifestyle    Physical activity     Days per week: Not on file     Minutes per session: Not on file    Stress: Not on file   Relationships    Social connections     Talks on phone: Not on file     Gets together: Not on file     Attends Caoda[employer] service: Not on file     Active member of club or organization: Not on file     Attends meetings of clubs or organizations: Not on file     Relationship status: Not on file    Intimate partner violence     Fear of current or ex partner: Not on file     Emotionally abused: Not on file     Physically abused: Not on file     Forced sexual activity: Not on file   Other Topics Concern    Not on file   Social History Narrative    Not on file     Family History   Problem Relation Age of Onset    Heart disease Mother     Hypertension Mother     Heart disease Father        Current Medications: At the time this note was written these were the medications the patient was on    Current Outpatient Medications   Medication Sig Dispense Refill    albuterol (2 5 mg/3 mL) 0 083 % nebulizer solution Take 1 vial (2 5 mg total) by nebulization every 4 (four) hours as needed for wheezing or shortness of breath 1 vial 5    aspirin 81 mg chewable tablet Chew 1 tablet (81 mg total) daily 30 tablet 5    benzonatate (TESSALON PERLES) 100 mg capsule Take 1 capsule (100 mg total) by mouth 2 (two) times a day as needed for cough 30 capsule 3    budesonide (PULMICORT) 0 5 mg/2 mL nebulizer solution Take 2 vials (1 mg total) by nebulization 2 (two) times a day 240 mL 11    Calcium Carbonate-Vitamin D (CALCIUM 600+D) 600-400 MG-UNIT per tablet Take 1 tablet by mouth daily      cholecalciferol (VITAMIN D3) 1,000 units tablet Take 1 tablet (1,000 Units total) by mouth daily 30 tablet 5    furosemide (LASIX) 20 mg tablet Take 1 tablet (20 mg total) by mouth daily 30 tablet 5    glipiZIDE (GLUCOTROL XL) 2 5 mg 24 hr tablet Take 1 tablet (2 5 mg total) by mouth daily 30 tablet 5    guaifenesin-codeine (GUAIFENESIN AC) 100-10 MG/5ML liquid Take 5 mL by mouth 3 (three) times a day as needed for cough 118 mL 0    guaifenesin-codeine (GUAIFENESIN AC) 100-10 MG/5ML liquid Take 5 mL by mouth 2 (two) times a day as needed for cough 300 mL 0    isosorbide mononitrate (IMDUR) 30 mg 24 hr tablet Take 1 tablet (30 mg total) by mouth daily 30 tablet 5    lisinopril (ZESTRIL) 20 mg tablet Take 1 tablet (20 mg total) by mouth daily 30 tablet 5    methimazole (TAPAZOLE) 5 mg tablet Take 0 5 tablets (2 5 mg total) by mouth daily 30 tablet 2    Multiple Vitamins-Minerals (CENTRUM SILVER 50+WOMEN PO) Take by mouth      pantoprazole (PROTONIX) 40 mg tablet Take 1 tablet (40 mg total) by mouth daily 30 tablet 5    Ascorbic Acid (vitamin C) 1000 MG tablet Take 1 tablet (1,000 mg total) by mouth daily 30 tablet 5    lisinopril (ZESTRIL) 40 mg tablet        No current facility-administered medications for this visit          Allergies: Iodine, Shellfish-derived products, and Morphine    Physical Exam:   Vital Signs:   /86   Pulse 98   Temp (!) 96 9 °F (36 1 °C)   Ht 5' 3" (1 6 m)   Wt 107 kg (236 lb 1 6 oz)   BMI 41 82 kg/m² Physical Exam  Vitals signs reviewed  Constitutional:       General: She is not in acute distress  Appearance: Normal appearance  She is not ill-appearing, toxic-appearing or diaphoretic  HENT:      Head: Normocephalic and atraumatic  Right Ear: External ear normal       Left Ear: External ear normal       Nose: Nose normal    Eyes:      General: No scleral icterus  Extraocular Movements: Extraocular movements intact  Conjunctiva/sclera: Conjunctivae normal    Neck:      Musculoskeletal: Normal range of motion and neck supple  No muscular tenderness  Comments: +tracheostomy intact   Cardiovascular:      Rate and Rhythm: Normal rate and regular rhythm  Heart sounds: Normal heart sounds  No murmur  No friction rub  No gallop  Pulmonary:      Effort: Pulmonary effort is normal  No respiratory distress  Breath sounds: Normal breath sounds  No stridor  No wheezing, rhonchi or rales  Abdominal:      General: Bowel sounds are normal  There is no distension  Palpations: Abdomen is soft  There is no mass  Tenderness: There is no abdominal tenderness  There is no guarding or rebound  Hernia: No hernia is present  Musculoskeletal: Normal range of motion  General: No swelling  Lymphadenopathy:      Cervical: No cervical adenopathy  Skin:     General: Skin is warm and dry  Coloration: Skin is not pale  Findings: No erythema or rash  Neurological:      General: No focal deficit present  Mental Status: She is alert and oriented to person, place, and time  Psychiatric:         Mood and Affect: Mood normal          Behavior: Behavior normal          Thought Content:  Thought content normal          Judgment: Judgment normal           Labs and Imaging:      ?no recent labs

## 2021-01-22 LAB
ALBUMIN SERPL-MCNC: 4.1 G/DL (ref 3.7–4.7)
ALBUMIN/CREAT UR: <3 MG/G CREAT (ref 0–29)
ALBUMIN/GLOB SERPL: 1.2 {RATIO} (ref 1.2–2.2)
ALP SERPL-CCNC: 69 IU/L (ref 39–117)
ALT SERPL-CCNC: 13 IU/L (ref 0–32)
AST SERPL-CCNC: 16 IU/L (ref 0–40)
BASOPHILS # BLD AUTO: 0 X10E3/UL (ref 0–0.2)
BASOPHILS NFR BLD AUTO: 0 %
BILIRUB SERPL-MCNC: 0.3 MG/DL (ref 0–1.2)
BUN SERPL-MCNC: 12 MG/DL (ref 8–27)
BUN/CREAT SERPL: 11 (ref 12–28)
CALCIUM SERPL-MCNC: 9.3 MG/DL (ref 8.7–10.3)
CHLORIDE SERPL-SCNC: 100 MMOL/L (ref 96–106)
CHOLEST SERPL-MCNC: 213 MG/DL (ref 100–199)
CO2 SERPL-SCNC: 23 MMOL/L (ref 20–29)
CREAT SERPL-MCNC: 1.05 MG/DL (ref 0.57–1)
CREAT UR-MCNC: 119.4 MG/DL
EOSINOPHIL # BLD AUTO: 0.1 X10E3/UL (ref 0–0.4)
EOSINOPHIL NFR BLD AUTO: 3 %
ERYTHROCYTE [DISTWIDTH] IN BLOOD BY AUTOMATED COUNT: 13.1 % (ref 11.7–15.4)
GLOBULIN SER-MCNC: 3.3 G/DL (ref 1.5–4.5)
GLUCOSE SERPL-MCNC: 133 MG/DL (ref 65–99)
HBA1C MFR BLD: 8 % (ref 4.8–5.6)
HCT VFR BLD AUTO: 37.5 % (ref 34–46.6)
HDLC SERPL-MCNC: 41 MG/DL
HGB BLD-MCNC: 12.5 G/DL (ref 11.1–15.9)
IMM GRANULOCYTES # BLD: 0 X10E3/UL (ref 0–0.1)
IMM GRANULOCYTES NFR BLD: 0 %
LDLC SERPL CALC-MCNC: 152 MG/DL (ref 0–99)
LYMPHOCYTES # BLD AUTO: 1.7 X10E3/UL (ref 0.7–3.1)
LYMPHOCYTES NFR BLD AUTO: 42 %
MCH RBC QN AUTO: 29.8 PG (ref 26.6–33)
MCHC RBC AUTO-ENTMCNC: 33.3 G/DL (ref 31.5–35.7)
MCV RBC AUTO: 90 FL (ref 79–97)
MICROALBUMIN UR-MCNC: <3 UG/ML
MONOCYTES # BLD AUTO: 0.3 X10E3/UL (ref 0.1–0.9)
MONOCYTES NFR BLD AUTO: 8 %
NEUTROPHILS # BLD AUTO: 1.9 X10E3/UL (ref 1.4–7)
NEUTROPHILS NFR BLD AUTO: 47 %
PLATELET # BLD AUTO: 179 X10E3/UL (ref 150–450)
POTASSIUM SERPL-SCNC: 4.3 MMOL/L (ref 3.5–5.2)
PROT SERPL-MCNC: 7.4 G/DL (ref 6–8.5)
RBC # BLD AUTO: 4.19 X10E6/UL (ref 3.77–5.28)
SL AMB EGFR AFRICAN AMERICAN: 58 ML/MIN/1.73
SL AMB EGFR NON AFRICAN AMERICAN: 51 ML/MIN/1.73
SL AMB VLDL CHOLESTEROL CALC: 20 MG/DL (ref 5–40)
SODIUM SERPL-SCNC: 136 MMOL/L (ref 134–144)
T3 SERPL-MCNC: 75 NG/DL (ref 71–180)
T4 FREE SERPL-MCNC: 0.82 NG/DL (ref 0.82–1.77)
THYROPEROXIDASE AB SERPL-ACNC: <9 IU/ML (ref 0–34)
TRIGL SERPL-MCNC: 109 MG/DL (ref 0–149)
TSH SERPL DL<=0.005 MIU/L-ACNC: 6.55 UIU/ML (ref 0.45–4.5)
TSI SER-ACNC: 1.46 IU/L (ref 0–0.55)
WBC # BLD AUTO: 4.1 X10E3/UL (ref 3.4–10.8)

## 2021-01-25 ENCOUNTER — TELEPHONE (OUTPATIENT)
Dept: ENDOCRINOLOGY | Facility: CLINIC | Age: 81
End: 2021-01-25

## 2021-01-25 DIAGNOSIS — E89.0 POST-SURGICAL HYPOTHYROIDISM: ICD-10-CM

## 2021-01-25 DIAGNOSIS — E05.90 HYPERTHYROIDISM: Primary | ICD-10-CM

## 2021-01-25 RX ORDER — METHIMAZOLE 5 MG/1
2.5 TABLET ORAL DAILY
Qty: 45 TABLET | Refills: 1 | Status: SHIPPED | OUTPATIENT
Start: 2021-01-25 | End: 2021-04-01 | Stop reason: SDUPTHER

## 2021-01-25 NOTE — TELEPHONE ENCOUNTER
Spoke to patient, went over lab results in detail, send BG log in 2-3 weeks, repeat labs in 6 weeks, reduce methimazole to 2 5 mg once a day She understood  She stated she was on cholesterol medication 2 yrs ago

## 2021-02-12 DIAGNOSIS — E05.90 HYPERTHYROIDISM: Primary | ICD-10-CM

## 2021-03-01 DIAGNOSIS — R05.3 CHRONIC COUGH: ICD-10-CM

## 2021-03-03 RX ORDER — GUAIFENESIN AND CODEINE PHOSPHATE 100; 10 MG/5ML; MG/5ML
5 SOLUTION ORAL 2 TIMES DAILY PRN
Qty: 300 ML | Refills: 0 | Status: SHIPPED | OUTPATIENT
Start: 2021-03-03 | End: 2021-04-01

## 2021-03-05 LAB
T3 SERPL-MCNC: 99 NG/DL (ref 71–180)
T4 FREE SERPL-MCNC: 0.99 NG/DL (ref 0.82–1.77)
TSH SERPL DL<=0.005 MIU/L-ACNC: 3.45 UIU/ML (ref 0.45–4.5)

## 2021-03-08 ENCOUNTER — TELEPHONE (OUTPATIENT)
Dept: ENDOCRINOLOGY | Facility: CLINIC | Age: 81
End: 2021-03-08

## 2021-03-25 DIAGNOSIS — E11.9 TYPE 2 DIABETES MELLITUS WITHOUT COMPLICATION, WITHOUT LONG-TERM CURRENT USE OF INSULIN (HCC): ICD-10-CM

## 2021-03-25 NOTE — TELEPHONE ENCOUNTER
Received fax from [facility] [address] [place] [address],[address] on behalf of [address] [place] [address] and patient for a 90 days supply refill on Glipizide ER tablets 2 5 mg  Initially prescribed by Dr Shary Habermann

## 2021-03-29 RX ORDER — GLIPIZIDE 2.5 MG/1
2.5 TABLET, EXTENDED RELEASE ORAL DAILY
Qty: 30 TABLET | Refills: 5 | Status: SHIPPED | OUTPATIENT
Start: 2021-03-29 | End: 2021-09-10

## 2021-04-01 ENCOUNTER — OFFICE VISIT (OUTPATIENT)
Dept: FAMILY MEDICINE CLINIC | Facility: CLINIC | Age: 81
End: 2021-04-01
Payer: COMMERCIAL

## 2021-04-01 VITALS
OXYGEN SATURATION: 97 % | RESPIRATION RATE: 18 BRPM | WEIGHT: 224 LBS | TEMPERATURE: 97.3 F | HEART RATE: 73 BPM | DIASTOLIC BLOOD PRESSURE: 80 MMHG | HEIGHT: 62 IN | BODY MASS INDEX: 41.22 KG/M2 | SYSTOLIC BLOOD PRESSURE: 126 MMHG

## 2021-04-01 DIAGNOSIS — I25.119 CORONARY ARTERY DISEASE WITH ANGINA PECTORIS, UNSPECIFIED VESSEL OR LESION TYPE, UNSPECIFIED WHETHER NATIVE OR TRANSPLANTED HEART (HCC): Primary | ICD-10-CM

## 2021-04-01 DIAGNOSIS — R05.3 CHRONIC COUGH: ICD-10-CM

## 2021-04-01 DIAGNOSIS — E05.90 HYPERTHYROIDISM: ICD-10-CM

## 2021-04-01 PROCEDURE — G0439 PPPS, SUBSEQ VISIT: HCPCS | Performed by: FAMILY MEDICINE

## 2021-04-01 PROCEDURE — 3288F FALL RISK ASSESSMENT DOCD: CPT | Performed by: FAMILY MEDICINE

## 2021-04-01 RX ORDER — METHIMAZOLE 5 MG/1
2.5 TABLET ORAL DAILY
Qty: 45 TABLET | Refills: 1 | Status: SHIPPED | OUTPATIENT
Start: 2021-04-01 | End: 2022-01-04

## 2021-04-01 RX ORDER — GUAIFENESIN AND CODEINE PHOSPHATE 100; 10 MG/5ML; MG/5ML
5 SOLUTION ORAL 2 TIMES DAILY PRN
Qty: 300 ML | Refills: 0 | Status: SHIPPED | OUTPATIENT
Start: 2021-04-01 | End: 2021-05-05

## 2021-04-01 RX ORDER — GUAIFENESIN AND CODEINE PHOSPHATE 100; 10 MG/5ML; MG/5ML
5 SOLUTION ORAL 2 TIMES DAILY PRN
Qty: 300 ML | Refills: 0 | Status: CANCELLED | OUTPATIENT
Start: 2021-04-01

## 2021-04-01 NOTE — PROGRESS NOTES
Last Medicare Wellness visit information reviewed, patient interviewed and updates made to the record today  Health Risk Assessment:   Patient rates overall health as good  Patient feels that their physical health rating is slightly worse  Patient is satisfied with their life  Eyesight was rated as same  Hearing was rated as same  Patient feels that their emotional and mental health rating is same  Patients states they are never, rarely angry  Patient states they are never, rarely unusually tired/fatigued  Pain experienced in the last 7 days has been a lot  Patient's pain rating has been 10/10  Patient states that she has experienced weight loss or gain in last 6 months  Depression Screening:   PHQ-2 Score: 0      Fall Risk Screening: In the past year, patient has experienced: no history of falling in past year      Urinary Incontinence Screening:   Patient has not leaked urine accidently in the last six months  Home Safety:  Patient does not have trouble with stairs inside or outside of their home  Patient has working smoke alarms and has working carbon monoxide detector  Home safety hazards include: none  Nutrition:   Current diet is Regular  Medications:   Patient is currently taking over-the-counter supplements  OTC medications include: see medication list  Multi vitamin,calcium,vit c,vit d     Activities of Daily Living (ADLs)/Instrumental Activities of Daily Living (IADLs):   Walk and transfer into and out of bed and chair?: Yes  Dress and groom yourself?: Yes    Bathe or shower yourself?: Yes    Feed yourself? Yes  Do your laundry/housekeeping?: Yes  Manage your money, pay your bills and track your expenses?: Yes  Make your own meals?: Yes    Do your own shopping?: Yes    ADL comments: Lynne Cotter is son pt  Live with him and he helps with every thing she needs help with       Durable Medical Equipment Suppliers  none    Previous Hospitalizations:   Any hospitalizations or ED visits within the last 12 months?: Yes    How many hospitalizations have you had in the last year?: 3-4    Hospitalization Comments: Chest pain twice also blood clot,trach needs to change  Advance Care Planning:   Living will: No    Durable POA for healthcare: No    Advanced directive: No    Advanced directive counseling given: No    Five wishes given: No    Patient declined ACP directive: No    End of Life Decisions reviewed with patient: No    Provider agrees with end of life decisions: No      Comments: Per pt  All her 6 childern will do the best for her so she is not going to do any of these paper work  PREVENTIVE SCREENINGS      Cardiovascular Screening:    General: Screening Current      Diabetes Screening:     General: Screening Not Indicated and History Diabetes      Cervical Cancer Screening:    General: Screening Not Indicated      Lung Cancer Screening:     General: Screening Not Indicated    Screening, Brief Intervention, and Referral to Treatment (SBIRT)    Screening  Typical number of drinks in a day: 0    Single Item Drug Screening:  How often have you used an illegal drug (including marijuana) or a prescription medication for non-medical reasons in the past year? never    Single Item Drug Screen Score: 0  Interpretation: Negative screen for possible drug use disorder      PMH DM, hyperthroidism, asthma, htn, BOOP, chronic trach for medicare AWV  Patient also complains of cough and chest pain  Chest pain is chronic, located in center of chest and does not radiate  Can not describe quality  Coughing makes the pain worse  The cough is chronic and becoming more severe, coughing keeps her awake at night  Patient has history of CAD s/p stent 15 years ago  Previously seen by cardiologist at [facility], would like to establish care with cardiologist at [facility]  Patient follows with pulmonology for BOOP, prescribed guaifenesin-codeine and Tessalon perles for cough    Patient reports having rash after taking Tessalon perles and stopped taking them  Her cough is now worse  A/P  Chest pain likely secondary to cough although cardiac etiology within differential   Needs to establish new cardiology care  · Ordering referral to Cardiology  · Refilling guaifenesin-codeine syrup  · Encouraged patient follow up with pulmonologist about alternative medications  Hyperthroidism  Patient takes methimazole 2 5mg daily, follows with endocrinology  Out of medication    · Methimazole refilled

## 2021-04-05 ENCOUNTER — OFFICE VISIT (OUTPATIENT)
Dept: OTOLARYNGOLOGY | Facility: CLINIC | Age: 81
End: 2021-04-05
Payer: COMMERCIAL

## 2021-04-05 VITALS — BODY MASS INDEX: 41.22 KG/M2 | WEIGHT: 224 LBS | HEIGHT: 62 IN | TEMPERATURE: 98 F

## 2021-04-05 DIAGNOSIS — R05.3 CHRONIC COUGH: ICD-10-CM

## 2021-04-05 DIAGNOSIS — J39.8 TRACHEAL STENOSIS: Primary | ICD-10-CM

## 2021-04-05 DIAGNOSIS — Z93.0 TRACHEOSTOMY PRESENT (HCC): ICD-10-CM

## 2021-04-05 PROCEDURE — 99214 OFFICE O/P EST MOD 30 MIN: CPT | Performed by: SPECIALIST

## 2021-04-05 NOTE — PROGRESS NOTES
Assessment/Plan:    Tracheal stenosis  Reviewed recent ER visit with tracheostomy change  Discussed Chronic tracheostomy for subglottic stenosis with granulation tissue growing into the fenestrated tracheostomy tube observed during ER visit  Excision of granuloma and removal of the tube with replacement of a nonfenestrated tube      Bronchoscopy today indicating granulation tissue, mobile vocal cords, no procedure  Tracheostomy cleansed prior to reinsertion  Discussed options for tracheostomy including sizing down, and permanent removal   Reviewed on going care for tracheostomy change and cleansing  Recommend changing trache itself every 3 months  Agree to continue 6 CFS  Follow up in 2 months for change tracheostomy       Chronic cough  Defer to pulmonology for medication management of nebulizer and oxygen  PT in need of renewal of trache nebulizer equipment and trache ties  Diagnoses and all orders for this visit:    Tracheal stenosis    Tracheostomy present (HCC)    Chronic cough          Subjective:      Patient ID: Adria Anderson is a 79 y o  female  Presents today as a new paitent due to tracheostomy care  Tracheotsomy for 14 to 15 years  Initially placed at [facility]  Replaced about 2 weeks ago while in [place] ER  Some bleeding around trach this morning    6 CFS in place today  No difficulty breathing  Cough at times  History of a long standing tracheostomy secondary to subglottic stenosis, at least since 2016, according to [place] records on care everywhere  She has had resections of granuloma the area of the tracheostomy (stoma?) in the past according to the records from the [place]  Last tracheostomy change February of 2020 in [place]    Surgical pathology from 09/07/2006, 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, tracheal granuloma         The following portions of the patient's history were reviewed and updated as appropriate: allergies, current medications, past family history, past medical history, past social history, past surgical history and problem list     Review of Systems   Constitutional: Negative  HENT: Negative for congestion, ear discharge, ear pain, hearing loss, nosebleeds, postnasal drip, rhinorrhea, sinus pressure, sinus pain, sore throat, tinnitus and voice change  Eyes: Negative  Respiratory: Positive for cough and shortness of breath  Negative for chest tightness  Tracheostomy     Cardiovascular: Negative  Gastrointestinal: Negative  Endocrine: Negative  Musculoskeletal: Negative  Skin: Negative for color change  Neurological: Negative for dizziness, numbness and headaches  Psychiatric/Behavioral: Negative  Objective:      Temp 98 °F (36 7 °C) (Temporal)   Ht 5' 2" (1 575 m)   Wt 102 kg (224 lb)   BMI 40 97 kg/m²          Physical Exam  Constitutional:       Appearance: She is well-developed  HENT:      Head: Normocephalic  Right Ear: Hearing, tympanic membrane, ear canal and external ear normal  No decreased hearing noted  No drainage or tenderness  Tympanic membrane is not perforated or erythematous  Left Ear: Hearing, tympanic membrane, ear canal and external ear normal  No decreased hearing noted  No drainage or tenderness  Tympanic membrane is not perforated or erythematous  Nose: Nose normal  No nasal deformity or septal deviation  Mouth/Throat:      Mouth: Mucous membranes are not pale and not dry  No oral lesions  Dentition: Normal dentition  Pharynx: Uvula midline  No oropharyngeal exudate  Neck:      Musculoskeletal: Full passive range of motion without pain, normal range of motion and neck supple  Trachea: Tracheostomy present  No tracheal deviation  Cardiovascular:      Rate and Rhythm: Normal rate  Pulmonary:      Effort: Pulmonary effort is normal  No accessory muscle usage or respiratory distress     Musculoskeletal:      Right shoulder: She exhibits normal range of motion  Lymphadenopathy:      Cervical: No cervical adenopathy  Skin:     General: Skin is warm and dry  Neurological:      Mental Status: She is alert and oriented to person, place, and time  Cranial Nerves: No cranial nerve deficit  Sensory: No sensory deficit  Psychiatric:         Behavior: Behavior is cooperative           Scribe Attestation    I,:  KIERA Nino am acting as a scribe while in the presence of the attending physician :       I,:  Fara Morales MD personally performed the services described in this documentation    as scribed in my presence :

## 2021-04-05 NOTE — ASSESSMENT & PLAN NOTE
Reviewed recent ER visit with tracheostomy change  Discussed Chronic tracheostomy for subglottic stenosis with granulation tissue growing into the fenestrated tracheostomy tube observed during ER visit  Excision of granuloma and removal of the tube with replacement of a nonfenestrated tube      Bronchoscopy today indicating granulation tissue, mobile vocal cords, no procedure  Tracheostomy cleansed prior to reinsertion  Discussed options for tracheostomy including sizing down, and permanent removal   Reviewed on going care for tracheostomy change and cleansing  Recommend changing trache itself every 3 months      Agree to continue 6 CFS  Follow up in 2 months for change tracheostomy

## 2021-04-05 NOTE — ASSESSMENT & PLAN NOTE
Defer to pulmonology for medication management of nebulizer and oxygen  PT in need of renewal of trache nebulizer equipment and trache ties

## 2021-04-08 ENCOUNTER — CONSULT (OUTPATIENT)
Dept: CARDIOLOGY CLINIC | Facility: CLINIC | Age: 81
End: 2021-04-08
Payer: COMMERCIAL

## 2021-04-08 VITALS
DIASTOLIC BLOOD PRESSURE: 70 MMHG | TEMPERATURE: 97.6 F | RESPIRATION RATE: 18 BRPM | HEIGHT: 62 IN | SYSTOLIC BLOOD PRESSURE: 130 MMHG | OXYGEN SATURATION: 98 % | WEIGHT: 224 LBS | HEART RATE: 81 BPM | BODY MASS INDEX: 41.22 KG/M2

## 2021-04-08 DIAGNOSIS — I25.119 CORONARY ARTERY DISEASE WITH ANGINA PECTORIS, UNSPECIFIED VESSEL OR LESION TYPE, UNSPECIFIED WHETHER NATIVE OR TRANSPLANTED HEART (HCC): ICD-10-CM

## 2021-04-08 DIAGNOSIS — E78.5 HYPERLIPIDEMIA LDL GOAL <70: Primary | ICD-10-CM

## 2021-04-08 DIAGNOSIS — J84.89 BOOP (BRONCHIOLITIS OBLITERANS WITH ORGANIZING PNEUMONIA) (HCC): ICD-10-CM

## 2021-04-08 PROCEDURE — 1036F TOBACCO NON-USER: CPT | Performed by: INTERNAL MEDICINE

## 2021-04-08 PROCEDURE — 93000 ELECTROCARDIOGRAM COMPLETE: CPT | Performed by: INTERNAL MEDICINE

## 2021-04-08 PROCEDURE — 99203 OFFICE O/P NEW LOW 30 MIN: CPT | Performed by: INTERNAL MEDICINE

## 2021-04-08 PROCEDURE — 1160F RVW MEDS BY RX/DR IN RCRD: CPT | Performed by: INTERNAL MEDICINE

## 2021-04-08 RX ORDER — ALBUTEROL SULFATE 2.5 MG/3ML
2.5 SOLUTION RESPIRATORY (INHALATION) EVERY 4 HOURS PRN
Qty: 120 VIAL | Refills: 11 | Status: SHIPPED | OUTPATIENT
Start: 2021-04-08 | End: 2021-05-08

## 2021-04-08 RX ORDER — BUDESONIDE 0.5 MG/2ML
0.5 INHALANT ORAL 2 TIMES DAILY
Qty: 60 VIAL | Refills: 11 | Status: SHIPPED | OUTPATIENT
Start: 2021-04-08 | End: 2022-01-27 | Stop reason: SDUPTHER

## 2021-04-08 RX ORDER — ATORVASTATIN CALCIUM 20 MG/1
20 TABLET, FILM COATED ORAL DAILY
Qty: 90 TABLET | Refills: 2 | Status: SHIPPED | OUTPATIENT
Start: 2021-04-08 | End: 2021-07-23

## 2021-04-08 NOTE — PROGRESS NOTES
Consultation - Cardiology Office  [facility]  Gumaro Dang 79 y o  female MRN: [id number]  : 1940  Unit/Bed#:  Encounter: [id number]      ASSESSMENT:  Retrosternal chest pain, off and on unrelated to any activity     CAD, s/p PCI at [facility], 25 years ago  ASCVD risk score is 34 3%  On aspirin 81 mg, Imdur 30 mg  Was previously on Zocor which was stopped a few years ago  According to the patient it was started because her cholesterol was too low  Currently LDL is 152    Echo   Normal left ventricular size  Normal LV systolic function (EF 53 %)  There are no regional wall motion abnormalities  Normal diastolic LV function  Normal RV function  Normal size right ventricle  Left atrium is enlarged  Right atrium is enlarged  Normal mitral valve  Aortic valve tri-leaflet  Normal tricuspid valve  Pulmonary artery systolic pressure is normal (30 mmHg)  Stress :10/16: - Assessment: no ischemia-induced wall motion abnormalities (negative stress echo  test)  - At rest, normal global systolic LV-function (EF 85-69%)  - With stress, hypercontractility of the left ventricle (EF 80%)  - In the recovery phase, normal global systolic LV function (EF 17%)  - Assessment: normal LV function      Hyperlipidemia  2021:  , normal liver enzymes    Hyperthyroidism    DMT2, hemoglobin A1c is 8 0    Obesity:  BMI is 40 97    Hypertension  On lisinopril, Imdur, Lasix    History of PE    BOOP, history of tracheal stenosis, s/p tracheostomy    Allergy to iodine, shellfish derived products and morphine      RECOMMENDATIONS:  Atorvastatin 20 mg daily  Recheck lipid profile and LFTs in 3 months  Lexiscan nuclear stress      Thank you for your consultation    If you have any question please call me at [phone]      Primary Care Physician Requesting Consult: Nina Marquez DO      Reason for Consult / Principal Problem:  Cardiac management        HPI : Leatha Gabriel is a 79y o  year old female who was referred by primary care doctor for ongoing cardiac management  She has a history of Coronary artery disease and remote stent placement in unknown blood vessel about 25 years ago at [facility]   She has more recently been having retrosternal chest pain which she sees has been going on for about 5 years it occurs off and on last for a short while and is unrelated to any activity or level of exertion and even occurs at rest   Patient has multiple other medical problems including BOOP and status post, tracheostomy,, diabetes mellitus, obesity, hypertension, and hyperlipidemia      REVIEW OF SYSTEMS:    Constitutional: Negative for activity change, appetite change, chills, fatigue, fever and unexpected weight change  HENT: Negative for congestion, sore throat and trouble swallowing  Eyes: Negative for discharge and redness  Respiratory: Negative for apnea, cough,  and wheezing  positive for shortness of breath    Cardiovascular:  Positive for chest pain, and shortness of breath, negative for palpitations and leg swelling  Gastrointestinal: Negative for abdominal distention, abdominal pain, anal bleeding, blood in stool, constipation, diarrhea, nausea and vomiting  Endocrine: Negative for polydipsia, polyphagia and polyuria  Genitourinary: Negative for difficulty urinating, dysuria, flank pain and hematuria  Musculoskeletal: Negative for arthralgias, myalgias and neck stiffness  Skin: Negative for pallor and rash  Allergic/Immunologic: Negative for environmental allergies  Neurological: Negative for dizziness, syncope, light-headedness, numbness and headaches  Hematological: Negative for adenopathy  Does not bruise/bleed easily  Psychiatric/Behavioral: Negative for confusion and hallucinations  The patient is not nervous/anxious        Historical Information   Past Medical History:   Diagnosis Date    [facility]/2006    Diabetes mellitus ([facility] )     Hypertension      Past Surgical History:   Procedure Laterality Date    HYSTERECTOMY      REPLACEMENT TOTAL KNEE      TRACHEOSTOMY  01/01/2006     Social History     Substance and Sexual Activity   Alcohol Use Never    Frequency: Never     Social History     Substance and Sexual Activity   Drug Use Never     Social History     Tobacco Use   Smoking Status Never Smoker   Smokeless Tobacco Never Used     Family History:   Family History   Problem Relation Age of Onset    Heart disease Mother     Hypertension Mother     Heart disease Father        Meds/Allergies     Allergies   Allergen Reactions    Iodine - Food Allergy Swelling    Shellfish-Derived Products - Food Allergy Hives    Morphine Rash       Current Outpatient Medications:     albuterol (2 5 mg/3 mL) 0 083 % nebulizer solution, Take 1 vial (2 5 mg total) by nebulization every 4 (four) hours as needed for wheezing or shortness of breath, Disp: 120 vial, Rfl: 11    Ascorbic Acid (vitamin C) 1000 MG tablet, Take 1 tablet (1,000 mg total) by mouth daily, Disp: 30 tablet, Rfl: 5    aspirin 81 mg chewable tablet, Chew 1 tablet (81 mg total) daily, Disp: 30 tablet, Rfl: 5    benzonatate (TESSALON PERLES) 100 mg capsule, Take 1 capsule (100 mg total) by mouth 2 (two) times a day as needed for cough, Disp: 30 capsule, Rfl: 3    budesonide (PULMICORT) 0 5 mg/2 mL nebulizer solution, Take 1 vial (0 5 mg total) by nebulization 2 (two) times a day, Disp: 60 vial, Rfl: 11    Calcium Carbonate-Vitamin D (CALCIUM 600+D) 600-400 MG-UNIT per tablet, Take 1 tablet by mouth daily, Disp: , Rfl:     cholecalciferol (VITAMIN D3) 1,000 units tablet, Take 1 tablet (1,000 Units total) by mouth daily, Disp: 30 tablet, Rfl: 5    furosemide (LASIX) 20 mg tablet, Take 1 tablet (20 mg total) by mouth daily, Disp: 30 tablet, Rfl: 5    glipiZIDE (GLUCOTROL XL) 2 5 mg 24 hr tablet, Take 1 tablet (2 5 mg total) by mouth daily, Disp: 30 tablet, Rfl: 5    guaifenesin-codeine (GUAIFENESIN AC) 100-10 MG/5ML liquid, Take 5 mL by mouth 2 (two) times a day as needed for cough, Disp: 300 mL, Rfl: 0    isosorbide mononitrate (IMDUR) 30 mg 24 hr tablet, Take 1 tablet (30 mg total) by mouth daily, Disp: 30 tablet, Rfl: 5    lisinopril (ZESTRIL) 20 mg tablet, Take 1 tablet (20 mg total) by mouth daily, Disp: 30 tablet, Rfl: 5    lisinopril (ZESTRIL) 40 mg tablet, , Disp: , Rfl:     methimazole (TAPAZOLE) 5 mg tablet, Take 0 5 tablets (2 5 mg total) by mouth daily, Disp: 45 tablet, Rfl: 1    Multiple Vitamins-Minerals (CENTRUM SILVER 50+WOMEN PO), Take by mouth, Disp: , Rfl:     pantoprazole (PROTONIX) 40 mg tablet, Take 1 tablet (40 mg total) by mouth daily, Disp: 30 tablet, Rfl: 5    Vitals:   /70 mmHg  HR 81/Min  BP Readings from Last 3 Encounters:   04/01/21 126/80   01/19/21 142/86   12/30/20 156/98         PHYSICAL EXAMINATION:  Neurologic:  Alert & oriented x 3, no new focal deficits, Not in any acute distress,  Constitutional:  Obese  Eyes:  Pupil equal and reacting to light, conjunctiva normal, No JVP, No LNP   HENT:  Atraumatic, oropharynx moist, Neck- patient has tracheostomy, no tenderness,  Neck supple   Respiratory:  Bilateral air entry, mostly clear to auscultation  Cardiovascular: S1-S2 regular with a I/VI systolic murmur   GI:  Soft, nondistended, normal bowel sounds, nontender, no hepatosplenomegaly appreciated  Musculoskeletal:  , no tenderness, no deformities  Skin:  Well hydrated, no rash   Lymphatic:  No lymphadenopathy noted   Extremities:  Trace edema and distal pulses are present    Diagnostic Studies Review Cardio:      EKG:  Normal sinus rhythm, heart rate 71 per minute, isolated PVC, minimal voltage criteria for LVH, maybe normal variant,    Cardiac testing:   No results found for this or any previous visit        Imaging:  Chest X-Ray:   No Chest XR results available for this patient  CT-scan of the chest:     No CTA results available for this patient  Lab Review   Lab Results   Component Value Date    WBC 4 1 01/20/2021    HGB 12 5 01/20/2021    HCT 37 5 01/20/2021    MCV 90 01/20/2021    RDW 13 1 01/20/2021     01/20/2021     BMP:  Lab Results   Component Value Date    SODIUM 136 01/20/2021    K 4 3 01/20/2021     01/20/2021    CO2 23 01/20/2021    BUN 12 01/20/2021    CREATININE 1 05 (H) 01/20/2021    GLUC 133 (H) 01/20/2021    CALCIUM 8 7 09/13/2020    EGFR 55 09/13/2020     LFT:  Lab Results   Component Value Date    AST 16 01/20/2021    ALT 13 01/20/2021    ALKPHOS 72 09/13/2020    TP 7 4 01/20/2021    ALB 4 1 01/20/2021      No results found for: [id number]  No components found for: [facility]  Lab Results   Component Value Date    HGBA1C 8 0 (H) 01/20/2021     Lipid Profile:   Lab Results   Component Value Date    CHOLESTEROL 213 (H) 01/20/2021    HDL 41 01/20/2021    LDLCALC 152 (H) 01/20/2021    TRIG 109 01/20/2021     Lab Results   Component Value Date    CHOLESTEROL 213 (H) 01/20/2021     Lab Results   Component Value Date    TROPONINI <0 02 09/13/2020     No results found for: NTBNP   No results found for this or any previous visit (from the past 672 hour(s))  Dr Tequila Brady MD, [facility]      "This note has been constructed using a voice recognition system  Therefore there may be syntax, spelling, and/or grammatical errors   Please call if you have any questions  "

## 2021-04-10 DIAGNOSIS — K21.9 GASTROESOPHAGEAL REFLUX DISEASE WITHOUT ESOPHAGITIS: ICD-10-CM

## 2021-04-12 RX ORDER — PANTOPRAZOLE SODIUM 40 MG/1
TABLET, DELAYED RELEASE ORAL
Qty: 30 TABLET | Refills: 5 | Status: SHIPPED | OUTPATIENT
Start: 2021-04-12 | End: 2021-10-04

## 2021-04-15 ENCOUNTER — HOSPITAL ENCOUNTER (OUTPATIENT)
Dept: RADIOLOGY | Facility: HOSPITAL | Age: 81
Discharge: HOME | End: 2021-04-15
Attending: INTERNAL MEDICINE
Payer: COMMERCIAL

## 2021-04-15 ENCOUNTER — HOSPITAL ENCOUNTER (OUTPATIENT)
Dept: NON INVASIVE DIAGNOSTICS | Facility: HOSPITAL | Age: 81
Discharge: HOME | End: 2021-04-15
Attending: INTERNAL MEDICINE
Payer: COMMERCIAL

## 2021-04-15 DIAGNOSIS — I25.119 CORONARY ARTERY DISEASE WITH ANGINA PECTORIS, UNSPECIFIED VESSEL OR LESION TYPE, UNSPECIFIED WHETHER NATIVE OR TRANSPLANTED HEART (HCC): ICD-10-CM

## 2021-04-15 LAB
CHEST PAIN STATEMENT: NORMAL
MAX DIASTOLIC BP: 77 MMHG
MAX HEART RATE: 88 BPM
MAX PREDICTED HEART RATE: 141 BPM
MAX. SYSTOLIC BP: 140 MMHG
PROTOCOL NAME: NORMAL
REASON FOR TERMINATION: NORMAL
TARGET HR FORMULA: NORMAL
TEST INDICATION: NORMAL
TIME IN EXERCISE PHASE: NORMAL

## 2021-04-15 PROCEDURE — 93016 CV STRESS TEST SUPVJ ONLY: CPT | Performed by: INTERNAL MEDICINE

## 2021-04-15 PROCEDURE — G1004 CDSM NDSC: HCPCS

## 2021-04-15 PROCEDURE — 93017 CV STRESS TEST TRACING ONLY: CPT

## 2021-04-15 PROCEDURE — 93018 CV STRESS TEST I&R ONLY: CPT | Performed by: INTERNAL MEDICINE

## 2021-04-15 PROCEDURE — 78452 HT MUSCLE IMAGE SPECT MULT: CPT

## 2021-04-15 PROCEDURE — A9502 TC99M TETROFOSMIN: HCPCS

## 2021-04-15 PROCEDURE — 78452 HT MUSCLE IMAGE SPECT MULT: CPT | Performed by: INTERNAL MEDICINE

## 2021-04-15 RX ADMIN — REGADENOSON 0.4 MG: 0.08 INJECTION, SOLUTION INTRAVENOUS at 09:12

## 2021-04-16 ENCOUNTER — TELEPHONE (OUTPATIENT)
Dept: CARDIOLOGY CLINIC | Facility: CLINIC | Age: 81
End: 2021-04-16

## 2021-04-16 NOTE — TELEPHONE ENCOUNTER
Please call the patient's son at your earliest convenience [phone]  He would like to know more details about the abnormal echo and the suggested cardiac cath  Thank you

## 2021-04-16 NOTE — TELEPHONE ENCOUNTER
Pt was given results  She is concerned about the cardiac cath and would like to discuss with her family and let us know if she decides to have it done

## 2021-04-16 NOTE — TELEPHONE ENCOUNTER
----- Message from Mae Castro MD sent at 4/15/2021  4:38 PM EDT -----  Please inform the patient that the stress test  Is abnormal and she needs to have a cardiac catheterization      If she is agreeable this should preferably be scheduled at [facility]

## 2021-04-21 ENCOUNTER — TELEPHONE (OUTPATIENT)
Dept: CARDIOLOGY CLINIC | Facility: CLINIC | Age: 81
End: 2021-04-21

## 2021-04-21 DIAGNOSIS — Z01.818 PREOP TESTING: Primary | ICD-10-CM

## 2021-04-21 NOTE — TELEPHONE ENCOUNTER
Patient is ready to have cath done  Could you please enter the order in Epic and I will get her scheduled at [facility]   Thank you

## 2021-04-21 NOTE — TELEPHONE ENCOUNTER
Just found out that her insurance does not allow her to go to [place]  She would have to stay in [place]  Should I schedule her for [facility]?

## 2021-04-23 NOTE — TELEPHONE ENCOUNTER
Verbal instructions given to her son (jayden) to increase her Atorvastatin to 40 mg daily as per Dr Rik Chi

## 2021-04-27 ENCOUNTER — OFFICE VISIT (OUTPATIENT)
Dept: PULMONOLOGY | Facility: MEDICAL CENTER | Age: 81
End: 2021-04-27
Payer: COMMERCIAL

## 2021-04-27 VITALS
SYSTOLIC BLOOD PRESSURE: 122 MMHG | TEMPERATURE: 97 F | HEIGHT: 62 IN | RESPIRATION RATE: 12 BRPM | HEART RATE: 87 BPM | DIASTOLIC BLOOD PRESSURE: 82 MMHG | WEIGHT: 227 LBS | BODY MASS INDEX: 41.77 KG/M2 | OXYGEN SATURATION: 97 %

## 2021-04-27 DIAGNOSIS — G47.34 SLEEP-RELATED HYPOXIA: ICD-10-CM

## 2021-04-27 DIAGNOSIS — J38.6 SUBGLOTTIC STENOSIS: ICD-10-CM

## 2021-04-27 DIAGNOSIS — Z01.818 PREOP TESTING: ICD-10-CM

## 2021-04-27 DIAGNOSIS — J84.89 BOOP (BRONCHIOLITIS OBLITERANS WITH ORGANIZING PNEUMONIA) (HCC): Primary | ICD-10-CM

## 2021-04-27 DIAGNOSIS — J45.30 MILD PERSISTENT ASTHMA WITHOUT COMPLICATION: ICD-10-CM

## 2021-04-27 PROCEDURE — U0003 INFECTIOUS AGENT DETECTION BY NUCLEIC ACID (DNA OR RNA); SEVERE ACUTE RESPIRATORY SYNDROME CORONAVIRUS 2 (SARS-COV-2) (CORONAVIRUS DISEASE [COVID-19]), AMPLIFIED PROBE TECHNIQUE, MAKING USE OF HIGH THROUGHPUT TECHNOLOGIES AS DESCRIBED BY CMS-2020-01-R: HCPCS | Performed by: INTERNAL MEDICINE

## 2021-04-27 PROCEDURE — U0005 INFEC AGEN DETEC AMPLI PROBE: HCPCS | Performed by: INTERNAL MEDICINE

## 2021-04-27 PROCEDURE — 99214 OFFICE O/P EST MOD 30 MIN: CPT | Performed by: INTERNAL MEDICINE

## 2021-04-27 NOTE — PATIENT INSTRUCTIONS
No changes and year pulmonary medication    Call cardiologist Dr Una Valdes about whether you need any kind of steroid prep for your contrast allergy for your cardiac catheterization on Friday

## 2021-04-27 NOTE — PROGRESS NOTES
Assessment/Plan        Problem List Items Addressed This Visit        Respiratory    BOOP (bronchiolitis obliterans with organizing pneumonia) ([facility] ) - Primary       She has history of bronchiolitis obliterans with organizing pneumonia  Not on any chronic oral steroid therapy at this time  Relevant Orders    Pulse oximetry overnight    Subglottic stenosis      She has chronic tracheostomy tube in place  Her tracheostomy tube is 6  In size  Her neck is tracheostomy tube be change in June this year by ENT  Sleep-related hypoxia      She had been on oxygen 3 liters/minute at bedtime before but recently has not been using her oxygen  Not have any nocturnal dyspnea  I did  Order nocturnal pulse oximetry be done with her on  room air to see if she has any oxygen desaturation without her oxygen         Relevant Orders    Pulse oximetry overnight    Mild persistent asthma without complication       She does have some intermittent wheeze and chronic cough  This could be due to mild asthma versus just chronic bronchitis related to her subglottic stenosis and some impaired mucus clearance  She is using nebulizer with budesonide 0 5 mg b i d  and also nebulizer with albuterol periodically this seems to help her  No change in her symptoms since last visit  She did get a rash from benzonatate so this is now this is an allergy                 CC:    Has chronic cough and some mild shortness of breath activity      KIMBERLEE     Nakul Cormier presents for follow-up visit today  Since last visit she did have nuclear stress test done April 15th  EKG showed no ischemia but on perfusion portion there is evidence of some reversible ischemia in the tired lateral and apical wall  LV ejection fraction was normal at 61%  She is scheduled for was cardiac catheterization April 21st by Dr Rik Chi    she does have subglottic stenosis from prior prolonged intubation back in 2005 to 2006  She does use 3 L of oxygen nighttime    She also has mild persistent asthma for which she is using budesonide nebulizer 0 5 mg b i d  and albuterol in the nebulizer periodically  She does have chronic cough which she does use Tussionex  She has had this chronic cough for years  Did not have asthma when she  was younger but has had problems with chronic bronchitis and wheezing since she has had a tracheostomy tube and BOOP  Last visit I gave her benzonatate 100 mg she can try for her cough but she states she got a rash from it and now is listed as an allergy  Has some mild chronic exertional dyspnea but this is stable  Does get occasional retrosternal chest pain that is nonradiating  She is scheduled for  a cardiac catheterization on Friday April 30th at [facility]  She states she has history of coronary disease and had stent placed about 25 years ago at [facility] [place]   She states she is allergic to Yesenia Railing had recent follow-up with ENT physician Dr Betty Wolf  Her initial trach change done few months ago was done by ENT in ER as she has some granulation tissue growing inter fenestrated trach 2  This excise and she now has a regular trach tube in place 6  In size  She will be scheduled for tracheostomy change  in June of this year  She has had 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, 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  Previously in 2005 she had large left thyroid mass extend to the left superior mediastinum by 5 cm  She states her breathing has been stable  She has mild chronic exertional dyspnea  She has oxygen  at home and was supposed to be using 3 liters/minute at bedtime but she states does not use any oxygen at nighttime  No nocturnal dyspnea  She has a chronic cough sometimes productive for some white mucus    She does get periodic wheezing        Past Medical History:   Diagnosis Date    [facility]    [facility]  Hypertension        Past Surgical History:   Procedure Laterality Date    HYSTERECTOMY      REPLACEMENT TOTAL KNEE      TRACHEOSTOMY  01/01/2006         Current Outpatient Medications:     Ascorbic Acid (vitamin C) 1000 MG tablet, Take 1 tablet (1,000 mg total) by mouth daily, Disp: 30 tablet, Rfl: 5    aspirin 81 mg chewable tablet, Chew 1 tablet (81 mg total) daily, Disp: 30 tablet, Rfl: 5    atorvastatin (LIPITOR) 20 mg tablet, Take 1 tablet (20 mg total) by mouth daily (Patient taking differently: Take 40 mg by mouth daily ), Disp: 90 tablet, Rfl: 2    budesonide (PULMICORT) 0 5 mg/2 mL nebulizer solution, Take 1 vial (0 5 mg total) by nebulization 2 (two) times a day, Disp: 60 vial, Rfl: 11    Calcium Carbonate-Vitamin D (CALCIUM 600+D) 600-400 MG-UNIT per tablet, Take 1 tablet by mouth daily, Disp: , Rfl:     cholecalciferol (VITAMIN D3) 1,000 units tablet, Take 1 tablet (1,000 Units total) by mouth daily, Disp: 30 tablet, Rfl: 5    furosemide (LASIX) 20 mg tablet, Take 1 tablet (20 mg total) by mouth daily, Disp: 30 tablet, Rfl: 5    glipiZIDE (GLUCOTROL XL) 2 5 mg 24 hr tablet, Take 1 tablet (2 5 mg total) by mouth daily, Disp: 30 tablet, Rfl: 5    isosorbide mononitrate (IMDUR) 30 mg 24 hr tablet, Take 1 tablet (30 mg total) by mouth daily, Disp: 30 tablet, Rfl: 5    lisinopril (ZESTRIL) 20 mg tablet, Take 1 tablet (20 mg total) by mouth daily, Disp: 30 tablet, Rfl: 5    lisinopril (ZESTRIL) 40 mg tablet, , Disp: , Rfl:     methimazole (TAPAZOLE) 5 mg tablet, Take 0 5 tablets (2 5 mg total) by mouth daily, Disp: 45 tablet, Rfl: 1    Multiple Vitamins-Minerals (CENTRUM SILVER 50+WOMEN PO), Take by mouth, Disp: , Rfl:     pantoprazole (PROTONIX) 40 mg tablet, TAKE 1 TABLET DAILY, Disp: 30 tablet, Rfl: 5    guaiFENesin-codeine (ROBITUSSIN AC) 100-10 mg/5 mL oral solution, ONE TEASPOONFUL TWICE A DAY AS NEEDED FOR COUGH, Disp: 300 mL, Rfl: 0    Allergies   Allergen Reactions    Iodine - Food Allergy Swelling    Shellfish-Derived Products - Food Allergy Hives    Benzonatate Rash    Morphine Rash       Social History     Tobacco Use    Smoking status: Never Smoker    Smokeless tobacco: Never Used   Substance Use Topics    Alcohol use: Never     Frequency: Never         Family History   Problem Relation Age of Onset    Heart disease Mother     Hypertension Mother     Heart disease Father        Review of Systems   Constitutional: Negative for chills, fever and unexpected weight change  HENT: Negative for congestion, rhinorrhea and sore throat  Eyes: Negative for discharge and redness  Respiratory: Positive for cough and shortness of breath  Cardiovascular: Positive for chest pain  Negative for palpitations and leg swelling  Gastrointestinal: Negative for abdominal distention, abdominal pain and nausea  Endocrine: Negative for polydipsia and polyphagia  Genitourinary: Negative for dysuria  Musculoskeletal: Negative for joint swelling and myalgias  Skin: Negative for rash  Neurological: Negative for light-headedness  Psychiatric/Behavioral: Negative for decreased concentration  Vitals:    04/27/21 0802   BP: 122/82   Pulse: 87   Resp: 12   Temp: (!) 97 °F (36 1 °C)   SpO2: 97%           Physical Exam  Vitals signs reviewed  Constitutional:       General: She is not in acute distress  Appearance: She is well-developed  She is obese  Comments: Has tracheostomy tube in place  Room air O2 saturation 97%   HENT:      Head: Normocephalic  Nose: Nose normal       Mouth/Throat:      Pharynx: No oropharyngeal exudate  Eyes:      Conjunctiva/sclera: Conjunctivae normal       Pupils: Pupils are equal, round, and reactive to light  Neck:      Musculoskeletal: Neck supple  No neck rigidity  Cardiovascular:      Rate and Rhythm: Normal rate and regular rhythm  Heart sounds: Normal heart sounds     Pulmonary:      Effort: Pulmonary effort is normal  Comments: Lung sounds reveal few faint expiratory wheezes in both lower lobes  Abdominal:      General: There is no distension  Palpations: Abdomen is soft  Tenderness: There is no abdominal tenderness  Musculoskeletal:      Comments: Trace edema  No cyanosis or clubbing   Skin:     General: Skin is warm and dry  Neurological:      Mental Status: She is alert and oriented to person, place, and time  Psychiatric:         Mood and Affect: Mood normal          Behavior: Behavior normal          Thought Content:  Thought content normal

## 2021-04-28 ENCOUNTER — HOSPITAL ENCOUNTER (OUTPATIENT)
Dept: RADIOLOGY | Facility: HOSPITAL | Age: 81
Discharge: HOME | End: 2021-04-28
Attending: INTERNAL MEDICINE
Payer: COMMERCIAL

## 2021-04-28 ENCOUNTER — TRANSCRIBE ORDERS (OUTPATIENT)
Dept: ADMINISTRATIVE | Facility: HOSPITAL | Age: 81
End: 2021-04-28

## 2021-04-28 DIAGNOSIS — Z01.818 PREOP EXAMINATION: ICD-10-CM

## 2021-04-28 LAB
ALBUMIN SERPL-MCNC: 4 G/DL (ref 3.7–4.7)
ALP SERPL-CCNC: 69 IU/L (ref 39–117)
ALT SERPL-CCNC: 9 IU/L (ref 0–32)
AST SERPL-CCNC: 13 IU/L (ref 0–40)
BILIRUB DIRECT SERPL-MCNC: 0.08 MG/DL (ref 0–0.4)
BILIRUB SERPL-MCNC: 0.3 MG/DL (ref 0–1.2)
CHOLEST SERPL-MCNC: 131 MG/DL (ref 100–199)
CHOLEST/HDLC SERPL: 3.7 RATIO (ref 0–4.4)
HDLC SERPL-MCNC: 35 MG/DL
LDLC SERPL CALC-MCNC: 79 MG/DL (ref 0–99)
PROT SERPL-MCNC: 6.9 G/DL (ref 6–8.5)
SARS-COV-2 RNA RESP QL NAA+PROBE: NEGATIVE
SL AMB VLDL CHOLESTEROL CALC: 17 MG/DL (ref 5–40)
TRIGL SERPL-MCNC: 90 MG/DL (ref 0–149)

## 2021-04-28 PROCEDURE — 71046 X-RAY EXAM CHEST 2 VIEWS: CPT

## 2021-04-29 ENCOUNTER — RA CDI HCC (OUTPATIENT)
Dept: OTHER | Facility: HOSPITAL | Age: 81
End: 2021-04-29

## 2021-04-29 ENCOUNTER — TELEPHONE (OUTPATIENT)
Dept: CARDIOLOGY CLINIC | Facility: CLINIC | Age: 81
End: 2021-04-29

## 2021-04-29 DIAGNOSIS — Z01.818 PREOP TESTING: Primary | ICD-10-CM

## 2021-04-29 NOTE — PROGRESS NOTES
[facility]  coding opportunities          Chart reviewed, no opportunity found: CHART REVIEWED, NO OPPORTUNITY FOUND              Patients insurance company: [employer] (Medicare and Commercial for [employer] and [employer])

## 2021-04-29 NOTE — TELEPHONE ENCOUNTER
Pt son jayden called to let us know that erin wants to cancel her cardiac cath  He said we should call her and go over everything

## 2021-04-29 NOTE — TELEPHONE ENCOUNTER
Called and spoke with pt, she declined to having apt tomorrow and still wishes to cancel cardiac cath

## 2021-05-03 ENCOUNTER — TELEPHONE (OUTPATIENT)
Dept: CARDIOLOGY CLINIC | Facility: CLINIC | Age: 81
End: 2021-05-03

## 2021-05-03 NOTE — TELEPHONE ENCOUNTER
----- Message from Brea Cates MD sent at 5/3/2021  9:07 AM EDT -----  Please inform the patient that her chest x-ray revealed no significant abnormality of the heart or the lungs

## 2021-05-04 DIAGNOSIS — R05.3 CHRONIC COUGH: ICD-10-CM

## 2021-05-05 RX ORDER — CODEINE PHOSPHATE AND GUAIFENESIN 10; 100 MG/5ML; MG/5ML
SOLUTION ORAL
Qty: 300 ML | Refills: 0 | Status: SHIPPED | OUTPATIENT
Start: 2021-05-05 | End: 2021-06-01

## 2021-05-10 NOTE — ASSESSMENT & PLAN NOTE
She had been on oxygen 3 liters/minute at bedtime before but recently has not been using her oxygen  Not have any nocturnal dyspnea    I did  Order nocturnal pulse oximetry be done with her on  room air to see if she has any oxygen desaturation without her oxygen

## 2021-05-10 NOTE — ASSESSMENT & PLAN NOTE
She does have some intermittent wheeze and chronic cough  This could be due to mild asthma versus just chronic bronchitis related to her subglottic stenosis and some impaired mucus clearance  She is using nebulizer with budesonide 0 5 mg b i d  and also nebulizer with albuterol periodically this seems to help her  No change in her symptoms since last visit    She did get a rash from benzonatate so this is now this is an allergy

## 2021-05-10 NOTE — ASSESSMENT & PLAN NOTE
She has history of bronchiolitis obliterans with organizing pneumonia  Not on any chronic oral steroid therapy at this time

## 2021-05-10 NOTE — ASSESSMENT & PLAN NOTE
She has chronic tracheostomy tube in place  Her tracheostomy tube is 6  In size  Her neck is tracheostomy tube be change in June this year by ENT

## 2021-05-11 DIAGNOSIS — I10 HYPERTENSION, ESSENTIAL: ICD-10-CM

## 2021-05-11 RX ORDER — LISINOPRIL 20 MG/1
TABLET ORAL
Qty: 30 TABLET | Refills: 5 | Status: SHIPPED | OUTPATIENT
Start: 2021-05-11 | End: 2021-07-01 | Stop reason: SDUPTHER

## 2021-05-31 DIAGNOSIS — R05.3 CHRONIC COUGH: ICD-10-CM

## 2021-06-01 RX ORDER — CODEINE PHOSPHATE AND GUAIFENESIN 10; 100 MG/5ML; MG/5ML
SOLUTION ORAL
Qty: 300 ML | Refills: 0 | Status: SHIPPED | OUTPATIENT
Start: 2021-06-01 | End: 2021-06-29

## 2021-06-08 DIAGNOSIS — I10 HYPERTENSION, ESSENTIAL: ICD-10-CM

## 2021-06-08 RX ORDER — ISOSORBIDE MONONITRATE 30 MG/1
TABLET, EXTENDED RELEASE ORAL
Qty: 30 TABLET | Refills: 5 | Status: SHIPPED | OUTPATIENT
Start: 2021-06-08 | End: 2021-12-10

## 2021-06-28 DIAGNOSIS — R05.3 CHRONIC COUGH: ICD-10-CM

## 2021-06-29 RX ORDER — CODEINE PHOSPHATE AND GUAIFENESIN 10; 100 MG/5ML; MG/5ML
SOLUTION ORAL
Qty: 300 ML | Refills: 0 | Status: SHIPPED | OUTPATIENT
Start: 2021-06-29 | End: 2021-07-27 | Stop reason: SDUPTHER

## 2021-06-30 ENCOUNTER — APPOINTMENT (EMERGENCY)
Dept: RADIOLOGY | Facility: HOSPITAL | Age: 81
End: 2021-06-30
Payer: COMMERCIAL

## 2021-06-30 ENCOUNTER — HOSPITAL ENCOUNTER (EMERGENCY)
Facility: HOSPITAL | Age: 81
Discharge: HOME | End: 2021-06-30
Attending: EMERGENCY MEDICINE
Payer: COMMERCIAL

## 2021-06-30 VITALS
OXYGEN SATURATION: 97 % | DIASTOLIC BLOOD PRESSURE: 60 MMHG | BODY MASS INDEX: 41.52 KG/M2 | RESPIRATION RATE: 26 BRPM | HEART RATE: 70 BPM | WEIGHT: 227 LBS | TEMPERATURE: 97.4 F | SYSTOLIC BLOOD PRESSURE: 108 MMHG

## 2021-06-30 DIAGNOSIS — M54.50 LOW BACK PAIN: Primary | ICD-10-CM

## 2021-06-30 PROCEDURE — 72131 CT LUMBAR SPINE W/O DYE: CPT

## 2021-06-30 PROCEDURE — 99284 EMERGENCY DEPT VISIT MOD MDM: CPT | Performed by: EMERGENCY MEDICINE

## 2021-06-30 PROCEDURE — 99284 EMERGENCY DEPT VISIT MOD MDM: CPT

## 2021-06-30 PROCEDURE — G1004 CDSM NDSC: HCPCS

## 2021-06-30 RX ORDER — TRAMADOL HYDROCHLORIDE 50 MG/1
50 TABLET ORAL EVERY 8 HOURS PRN
Qty: 15 TABLET | Refills: 0 | Status: SHIPPED | OUTPATIENT
Start: 2021-06-30 | End: 2021-06-30

## 2021-06-30 RX ORDER — ACETAMINOPHEN 325 MG/1
650 TABLET ORAL ONCE
Status: COMPLETED | OUTPATIENT
Start: 2021-06-30 | End: 2021-06-30

## 2021-06-30 RX ORDER — SENNOSIDES 8.6 MG
650 CAPSULE ORAL EVERY 8 HOURS PRN
Qty: 30 TABLET | Refills: 0 | Status: SHIPPED | OUTPATIENT
Start: 2021-06-30

## 2021-06-30 RX ORDER — PREDNISONE 50 MG/1
50 TABLET ORAL DAILY
Qty: 5 TABLET | Refills: 0 | Status: SHIPPED | OUTPATIENT
Start: 2021-06-30 | End: 2021-07-05

## 2021-06-30 RX ORDER — TRAMADOL HYDROCHLORIDE 50 MG/1
50 TABLET ORAL ONCE
Status: DISCONTINUED | OUTPATIENT
Start: 2021-06-30 | End: 2021-06-30

## 2021-06-30 RX ADMIN — ACETAMINOPHEN 650 MG: 325 TABLET, FILM COATED ORAL at 20:12

## 2021-06-30 RX ADMIN — PREDNISONE 50 MG: 20 TABLET ORAL at 23:35

## 2021-07-01 DIAGNOSIS — I10 HYPERTENSION, ESSENTIAL: ICD-10-CM

## 2021-07-01 RX ORDER — LISINOPRIL 20 MG/1
20 TABLET ORAL DAILY
Qty: 90 TABLET | Refills: 1 | Status: SHIPPED | OUTPATIENT
Start: 2021-07-01 | End: 2021-07-09

## 2021-07-01 NOTE — ED PROVIDER NOTES
History  Chief Complaint   Patient presents with    Back Pain     Co back paiun for 2 weeks and worsening, denies any injury  50-year-old female presents to the ED complaining back pain for 2 weeks worsening states she denies any injury no history of back issues the past   Patient is awake alert no fevers no chills no urinary symptoms no bowel or bladder dysfunction no diarrhea  She states pain is worse when she sits on it and when she is up walking around  No distal neuro complaints  No alleviating factors  History provided by:  Patient   used: No        Prior to Admission Medications   Prescriptions Last Dose Informant Patient Reported? Taking?    Ascorbic Acid (vitamin C) 1000 MG tablet  Self No No   Sig: Take 1 tablet (1,000 mg total) by mouth daily   Calcium Carbonate-Vitamin D (CALCIUM 600+D) 600-400 MG-UNIT per tablet  Self Yes No   Sig: Take 1 tablet by mouth daily   Multiple Vitamins-Minerals (CENTRUM SILVER 50+WOMEN PO)  Self Yes No   Sig: Take by mouth   aspirin 81 mg chewable tablet  Self No No   Sig: Chew 1 tablet (81 mg total) daily   atorvastatin (LIPITOR) 20 mg tablet  Self No No   Sig: Take 1 tablet (20 mg total) by mouth daily   Patient taking differently: Take 40 mg by mouth daily    budesonide (PULMICORT) 0 5 mg/2 mL nebulizer solution  Self No No   Sig: Take 1 vial (0 5 mg total) by nebulization 2 (two) times a day   cholecalciferol (VITAMIN D3) 1,000 units tablet  Self No No   Sig: Take 1 tablet (1,000 Units total) by mouth daily   furosemide (LASIX) 20 mg tablet  Self No No   Sig: Take 1 tablet (20 mg total) by mouth daily   glipiZIDE (GLUCOTROL XL) 2 5 mg 24 hr tablet  Self No No   Sig: Take 1 tablet (2 5 mg total) by mouth daily   guaiFENesin-codeine (ROBITUSSIN AC) 100-10 mg/5 mL oral solution   No No   Sig: ONE TEASPOONFUL TWICE A DAY AS NEEDED FOR COUGH   isosorbide mononitrate (IMDUR) 30 mg 24 hr tablet   No No   Sig: TAKE 1 TABLET DAILY   lisinopril (ZESTRIL) 20 mg tablet   No No   Sig: TAKE 1 TABLET DAILY   methimazole (TAPAZOLE) 5 mg tablet  Self No No   Sig: Take 0 5 tablets (2 5 mg total) by mouth daily   pantoprazole (PROTONIX) 40 mg tablet  Self No No   Sig: TAKE 1 TABLET DAILY      Facility-Administered Medications: None       Past Medical History:   Diagnosis Date    BOOP (bronchiolitis obliterans with organizing pneumonia) ([facility] ) 01/01/2006    Diabetes mellitus ([facility] )     Hypertension        Past Surgical History:   Procedure Laterality Date    HYSTERECTOMY      REPLACEMENT TOTAL KNEE      TRACHEOSTOMY  01/01/2006       Family History   Problem Relation Age of Onset    Heart disease Mother     Hypertension Mother     Heart disease Father      I have reviewed and agree with the history as documented  E-Cigarette/Vaping    E-Cigarette Use Never User      E-Cigarette/Vaping Substances    Nicotine No     THC No     CBD No     Flavoring No     Other No     Unknown No      Social History     Tobacco Use    Smoking status: Never Smoker    Smokeless tobacco: Never Used   Vaping Use    Vaping Use: Never used   Substance Use Topics    Alcohol use: Never    Drug use: Never       Review of Systems   Constitutional: Negative for activity change, chills, diaphoresis and fever  HENT: Negative for congestion, ear pain, nosebleeds, sore throat, trouble swallowing and voice change  Eyes: Negative for pain, discharge and redness  Respiratory: Negative for apnea, cough, choking, shortness of breath, wheezing and stridor  Cardiovascular: Negative for chest pain and palpitations  Gastrointestinal: Negative for abdominal distention, abdominal pain, constipation, diarrhea, nausea and vomiting  Endocrine: Negative for polydipsia  Genitourinary: Negative for difficulty urinating, dysuria, flank pain, frequency, hematuria and urgency  Musculoskeletal: Positive for back pain   Negative for gait problem, joint swelling, myalgias, neck pain and neck stiffness  Skin: Negative for pallor and rash  Neurological: Negative for dizziness, tremors, syncope, speech difficulty, weakness, numbness and headaches  Hematological: Negative for adenopathy  Psychiatric/Behavioral: Negative for confusion, hallucinations, self-injury and suicidal ideas  The patient is not nervous/anxious  Physical Exam  Physical Exam  Vitals and nursing note reviewed  Constitutional:       General: She is not in acute distress  Appearance: She is well-developed  She is not diaphoretic  HENT:      Head: Normocephalic and atraumatic  Right Ear: External ear normal       Left Ear: External ear normal       Nose: Nose normal    Eyes:      Conjunctiva/sclera: Conjunctivae normal       Pupils: Pupils are equal, round, and reactive to light  Cardiovascular:      Rate and Rhythm: Normal rate and regular rhythm  Heart sounds: Normal heart sounds  Pulmonary:      Effort: Pulmonary effort is normal       Breath sounds: Normal breath sounds  Abdominal:      General: Bowel sounds are normal       Palpations: Abdomen is soft  Musculoskeletal:         General: Normal range of motion  Cervical back: Normal range of motion and neck supple  Comments: Patient does have some slight tenderness in the lumbar spine and midline  Does not appear to radiate down either buttock  Skin:     General: Skin is warm and dry  Neurological:      Mental Status: She is alert and oriented to person, place, and time  Deep Tendon Reflexes: Reflexes are normal and symmetric  Psychiatric:         Behavior: Behavior is cooperative           Vital Signs  ED Triage Vitals [06/30/21 1853]   Temperature Pulse Respirations Blood Pressure SpO2   (!) 97 4 °F (36 3 °C) 73 (!) 25 147/88 97 %      Temp src Heart Rate Source Patient Position - Orthostatic VS BP Location FiO2 (%)   -- Monitor Sitting Right arm --      Pain Score       Worst Possible Pain           Vitals: 06/30/21 1853 06/30/21 2015 06/30/21 6647   BP: 147/88 100/55 108/60   Pulse: 73 69 70   Patient Position - Orthostatic VS: Sitting Lying          Visual Acuity      ED Medications  Medications   acetaminophen (TYLENOL) tablet 650 mg (650 mg Oral Given 6/30/21 2012)   predniSONE tablet 50 mg (50 mg Oral Given 6/30/21 2335)       Diagnostic Studies  Results Reviewed     None                 CT lumbar spine without contrast   Final Result by Charlene Aguilar MD (06/30 2207)      Multilevel degenerative changes as detailed above, most notably involving the L4-L5 and L5-S1 levels  Workstation performed: [id number]                    Procedures  Procedures         ED Course                                           MDM    Disposition  Final diagnoses:   Low back pain     Time reflects when diagnosis was documented in both MDM as applicable and the Disposition within this note     Time User Action Codes Description Comment    6/30/2021 11:24 PM Dusty Medellin Add [M54 5] Low back pain       ED Disposition     ED Disposition Condition Date/Time Comment    Discharge Stable Wed Jun 30, 2021 11:24 PM [facility] discharge to home/self care              Follow-up Information     Follow up With Specialties Details Why Contact Info    Xavi Rosa MD Orthopedic Surgery Schedule an appointment as soon as possible for a visit   [address] [place] [address], [address] In 1 week  [place] [address]  [phone]            Discharge Medication List as of 6/30/2021 11:46 PM      START taking these medications    Details   acetaminophen (TYLENOL) 650 mg CR tablet Take 1 tablet (650 mg total) by mouth every 8 (eight) hours as needed for mild pain, Starting Wed 6/30/2021, Normal      predniSONE 50 mg tablet Take 1 tablet (50 mg total) by mouth daily for 5 days, Starting Wed 6/30/2021, Until Mon 7/5/2021, Normal         CONTINUE these medications which have NOT CHANGED    Details   Ascorbic Acid (vitamin C) 1000 MG tablet Take 1 tablet (1,000 mg total) by mouth daily, Starting Tue 11/10/2020, Normal      aspirin 81 mg chewable tablet Chew 1 tablet (81 mg total) daily, Starting Tue 11/10/2020, Normal      atorvastatin (LIPITOR) 20 mg tablet Take 1 tablet (20 mg total) by mouth daily, Starting Thu 4/8/2021, Normal      budesonide (PULMICORT) 0 5 mg/2 mL nebulizer solution Take 1 vial (0 5 mg total) by nebulization 2 (two) times a day, Starting Thu 4/8/2021, Until Sat 5/8/2021, Normal      Calcium Carbonate-Vitamin D (CALCIUM 600+D) 600-400 MG-UNIT per tablet Take 1 tablet by mouth daily, Historical Med      cholecalciferol (VITAMIN D3) 1,000 units tablet Take 1 tablet (1,000 Units total) by mouth daily, Starting Tue 11/10/2020, Normal      furosemide (LASIX) 20 mg tablet Take 1 tablet (20 mg total) by mouth daily, Starting Tue 11/10/2020, Normal      glipiZIDE (GLUCOTROL XL) 2 5 mg 24 hr tablet Take 1 tablet (2 5 mg total) by mouth daily, Starting Mon 3/29/2021, Normal      guaiFENesin-codeine (ROBITUSSIN AC) 100-10 mg/5 mL oral solution ONE TEASPOONFUL TWICE A DAY AS NEEDED FOR COUGH, Normal      isosorbide mononitrate (IMDUR) 30 mg 24 hr tablet TAKE 1 TABLET DAILY, Normal      lisinopril (ZESTRIL) 20 mg tablet TAKE 1 TABLET DAILY, Normal      methimazole (TAPAZOLE) 5 mg tablet Take 0 5 tablets (2 5 mg total) by mouth daily, Starting Thu 4/1/2021, Normal      Multiple Vitamins-Minerals (CENTRUM SILVER 50+WOMEN PO) Take by mouth, Historical Med      pantoprazole (PROTONIX) 40 mg tablet TAKE 1 TABLET DAILY, Normal           No discharge procedures on file      PDMP Review     None          ED Provider  Electronically Signed by           Emily Tatum DO  07/01/21 3063

## 2021-07-01 NOTE — ED CARE HANDOFF
Emergency Department Sign Out Note        Sign out and transfer of care from previous provider  See Separate Emergency Department note  The patient, Antonino Crowder, was evaluated by the previous provider for back pain  Workup Completed:  Pain medication    ED Course / Workup Pending (followup):  CT lumbar spine                                  ED Course as of Jun 30 2337   Wed Jun 30, 2021 2114 Patient signed out from previous attending  Patient is currently awaiting CT lumbar spine  If CT lumbar spine is unremarkable then patient will be discharged home  Procedures  MDM  Number of Diagnoses or Management Options  Low back pain: new and requires workup     Amount and/or Complexity of Data Reviewed  Tests in the radiology section of CPT®: reviewed  Tests in the medicine section of CPT®: ordered and reviewed  Review and summarize past medical records: yes  Independent visualization of images, tracings, or specimens: yes    Risk of Complications, Morbidity, and/or Mortality  General comments: CT lumbar spine showed multilevel degenerative changes as detailed above, most notably involving the L4-L5 and L5-S1 levels  At this time patient placed on prednisone burst for her pain  Patient states that Tylenol is helping but she needs something stronger  Patient refused to take tramadol  Patient is discharged home with follow-up to Orthopedic surgery  Close return instructions given to return to the ER for any worsening symptoms  Patient agrees with discharge plan  Patient well appearing at time of discharge  Please Note: Fluency Direct voice recognition software may have been used in the creation of this document  Wrong words or sound a like substitutions may have occurred due to the inherent limitations of the voice software         Patient Progress  Patient progress: stable      Disposition  Final diagnoses:   Low back pain     Time reflects when diagnosis was documented in both MDM as applicable and the Disposition within this note     Time User Action Codes Description Comment    6/30/2021 11:24 PM Dianelys Flores Add [M54 5] Low back pain       ED Disposition     ED Disposition Condition Date/Time Comment    Discharge Stable Wed Jun 30, 2021 11:24 PM Jolene Doyleom discharge to home/self care  Follow-up Information     Follow up With Specialties Details Why Contact Info    Logan Ashford MD Orthopedic Surgery Schedule an appointment as soon as possible for a visit   [address] [facility] [address] [facility]      Alisha Henriquez MD Family Medicine In 1 week  [address]  [phone]          Patient's Medications   Discharge Prescriptions    ACETAMINOPHEN (TYLENOL) 650 MG CR TABLET    Take 1 tablet (650 mg total) by mouth every 8 (eight) hours as needed for mild pain       Start Date: 6/30/2021 End Date: --       Order Dose: 650 mg       Quantity: 30 tablet    Refills: 0    PREDNISONE 50 MG TABLET    Take 1 tablet (50 mg total) by mouth daily for 5 days       Start Date: 6/30/2021 End Date: 7/5/2021       Order Dose: 50 mg       Quantity: 5 tablet    Refills: 0     No discharge procedures on file         ED Provider  Electronically Signed by     Celio Decker DO  06/30/21 8030

## 2021-07-09 ENCOUNTER — OFFICE VISIT (OUTPATIENT)
Dept: CARDIOLOGY CLINIC | Facility: CLINIC | Age: 81
End: 2021-07-09
Payer: COMMERCIAL

## 2021-07-09 ENCOUNTER — RA CDI HCC (OUTPATIENT)
Dept: OTHER | Facility: HOSPITAL | Age: 81
End: 2021-07-09

## 2021-07-09 VITALS
RESPIRATION RATE: 18 BRPM | SYSTOLIC BLOOD PRESSURE: 110 MMHG | DIASTOLIC BLOOD PRESSURE: 72 MMHG | TEMPERATURE: 98.2 F | WEIGHT: 219 LBS | HEIGHT: 62 IN | HEART RATE: 78 BPM | OXYGEN SATURATION: 99 % | BODY MASS INDEX: 40.3 KG/M2

## 2021-07-09 DIAGNOSIS — E78.5 HYPERLIPIDEMIA LDL GOAL <70: Primary | ICD-10-CM

## 2021-07-09 DIAGNOSIS — E66.01 CLASS 3 SEVERE OBESITY DUE TO EXCESS CALORIES WITHOUT SERIOUS COMORBIDITY WITH BODY MASS INDEX (BMI) OF 40.0 TO 44.9 IN ADULT (HCC): ICD-10-CM

## 2021-07-09 DIAGNOSIS — I10 HYPERTENSION, ESSENTIAL: ICD-10-CM

## 2021-07-09 PROCEDURE — 99214 OFFICE O/P EST MOD 30 MIN: CPT | Performed by: INTERNAL MEDICINE

## 2021-07-09 RX ORDER — LISINOPRIL 10 MG/1
10 TABLET ORAL DAILY
Qty: 90 TABLET | Refills: 1 | Status: SHIPPED | OUTPATIENT
Start: 2021-07-09 | End: 2021-10-06 | Stop reason: DRUGHIGH

## 2021-07-09 NOTE — PROGRESS NOTES
[facility]  coding opportunities          Chart reviewed, no opportunity found: CHART REVIEWED, NO OPPORTUNITY FOUND                     Patients insurance company: [employer] (Medicare and Commercial for [employer] and [employer])

## 2021-07-09 NOTE — PROGRESS NOTES
Progress Note - Cardiology Office  [facility]    Alida [de-identified] y o  female MRN: [id number]  : 1940  Encounter: [id number]      ASSESSMENT:   Patient states that since she is not having chest pain any more she does not want to undergo cardiac catheterization  She was informed that the purpose of the procedure was to evaluate and revascularize if needed in view of an abnormal stress test   Advised patient to let us know if she changes her mind and she starts having chest pain again     CAD, s/p PCI at [facility], 25 years ago  ASCVD risk score is 34 3%  On aspirin 81 mg, Imdur 30 mg, atorvastatin 40 mg     LDL was 152     Echo   Normal left ventricular size  Normal LV systolic function (EF 53 %)  There are no regional wall motion abnormalities  Normal diastolic LV function  Normal RV function  Normal size right ventricle  Left atrium is enlarged  Right atrium is enlarged  Normal mitral valve  Aortic valve tri-leaflet  Normal tricuspid valve  Pulmonary artery systolic pressure is normal (30 mmHg)        Stress :10/16: - Assessment: no ischemia-induced wall motion abnormalities (negative stress echo  test)  - At rest, normal global systolic LV-function (EF 88-09%)  - With stress, hypercontractility of the left ventricle (EF 80%)  - In the recovery phase, normal global systolic LV function (EF 37%)  - Assessment: normal LV function    Lexiscan, 04/15/2021    IMPRESSIONS: Abnormal study after pharmacologic vasodilation  There is evidence of partially reversible apical lateral wall defect  Defect was of mild in severity  TID ratio of 1 4 also noted  EF 61% Left ventricular systolic function was  normal      Patient was scheduled for cardiac catheterization at [place] [address]    Patient at that time had deferred the plans for cardiac catheterization        Hyperlipidemia  2021:  , normal liver enzymes  2021:  LDL 79, triglyceride 90, HDL 35, normal AST and ALT       Hyperthyroidism     DMT2, hemoglobin A1c is 8 0     Obesity:  BMI is 40 06     Hypertension  On lisinopril, Imdur, Lasix     History of PE     BOOP, history of tracheal stenosis, s/p tracheostomy     Allergy to iodine, shellfish derived products and morphine        RECOMMENDATIONS:  Recheck lipid profile and LFTs and adjust dose of atorvastatin  Patient and son currently not showed of how much atorvastatin she is taking and will call and let us know about the dose    Decrease lisinopril to 10 mg daily, since severe are adding beta-blocker because of her underlying Coronary artery disease and abnormal stress test and history of angina    Metoprolol tartrate 12 5 mg b i d  Please call [phone] if any questions  HPI :     Wong Perkins is a [de-identified]y o  year old female who came for follow up  She was previously seen and underwent a pharmacologic stress test which was abnormal and subsequently she was scheduled for a cardiac catheterization at [place] [address]  with Dr Kd Solitario  Patient did not undergo the procedure and currently does not wanted since she is not having chest pain any more   She was informed that the purpose of the procedure was to evaluate and revascularize if needed in view of an abnormal stress test   Advised patient to let us know if she changes her mind and she starts having chest pain again  Because of underlying Coronary artery disease, abnormal stress test and history of angina I am adding a beta-blocker, low-dose metoprolol and decreasing her lisinopril in order not to lower her blood pressure        REVIEW OF SYSTEMS:  Complains of severe back pain and has difficulty in ambulation  Denies chest pain, worsening dyspnea, palpitations or syncope    Historical Information   Past Medical History:   Diagnosis Date    BOOP (bronchiolitis obliterans with organizing pneumonia) ([place] [address] ) 01/01/2006    Diabetes mellitus ([place] [address] )  Hypertension      Past Surgical History:   Procedure Laterality Date    HYSTERECTOMY      REPLACEMENT TOTAL KNEE      TRACHEOSTOMY  01/01/2006     Social History     Substance and Sexual Activity   Alcohol Use Never     Social History     Substance and Sexual Activity   Drug Use Never     Social History     Tobacco Use   Smoking Status Never Smoker   Smokeless Tobacco Never Used     Family History:   Family History   Problem Relation Age of Onset    Heart disease Mother     Hypertension Mother     Heart disease Father        Meds/Allergies     Allergies   Allergen Reactions    Iodine - Food Allergy Swelling    Shellfish-Derived Products - Food Allergy Hives    Benzonatate Rash    Morphine Rash       Current Outpatient Medications:     acetaminophen (TYLENOL) 650 mg CR tablet, Take 1 tablet (650 mg total) by mouth every 8 (eight) hours as needed for mild pain, Disp: 30 tablet, Rfl: 0    Ascorbic Acid (vitamin C) 1000 MG tablet, Take 1 tablet (1,000 mg total) by mouth daily, Disp: 30 tablet, Rfl: 5    aspirin 81 mg chewable tablet, Chew 1 tablet (81 mg total) daily, Disp: 30 tablet, Rfl: 5    atorvastatin (LIPITOR) 20 mg tablet, Take 1 tablet (20 mg total) by mouth daily (Patient taking differently: Take 40 mg by mouth daily ), Disp: 90 tablet, Rfl: 2    budesonide (PULMICORT) 0 5 mg/2 mL nebulizer solution, Take 1 vial (0 5 mg total) by nebulization 2 (two) times a day, Disp: 60 vial, Rfl: 11    Calcium Carbonate-Vitamin D (CALCIUM 600+D) 600-400 MG-UNIT per tablet, Take 1 tablet by mouth daily, Disp: , Rfl:     cholecalciferol (VITAMIN D3) 1,000 units tablet, Take 1 tablet (1,000 Units total) by mouth daily, Disp: 30 tablet, Rfl: 5    furosemide (LASIX) 20 mg tablet, Take 1 tablet (20 mg total) by mouth daily, Disp: 30 tablet, Rfl: 5    glipiZIDE (GLUCOTROL XL) 2 5 mg 24 hr tablet, Take 1 tablet (2 5 mg total) by mouth daily, Disp: 30 tablet, Rfl: 5    guaiFENesin-codeine (ROBITUSSIN AC) 100-10 mg/5 mL oral solution, ONE TEASPOONFUL TWICE A DAY AS NEEDED FOR COUGH, Disp: 300 mL, Rfl: 0    isosorbide mononitrate (IMDUR) 30 mg 24 hr tablet, TAKE 1 TABLET DAILY, Disp: 30 tablet, Rfl: 5    lisinopril (ZESTRIL) 20 mg tablet, Take 1 tablet (20 mg total) by mouth daily, Disp: 90 tablet, Rfl: 1    methimazole (TAPAZOLE) 5 mg tablet, Take 0 5 tablets (2 5 mg total) by mouth daily, Disp: 45 tablet, Rfl: 1    Multiple Vitamins-Minerals (CENTRUM SILVER 50+WOMEN PO), Take by mouth, Disp: , Rfl:     pantoprazole (PROTONIX) 40 mg tablet, TAKE 1 TABLET DAILY, Disp: 30 tablet, Rfl: 5    Vitals:  BP is 110/72 mmHg  HR 78/min  BP Readings from Last 3 Encounters:   21 108/60   21 122/82   21 130/70       Physical Exam:    Neurologic:  Alert & oriented x 3, no new focal deficits, Not in any acute distress,  Constitutional:  Obese  Eyes:  Pupil equal and reacting to light, conjunctiva normal,   HENT:  Atraumatic, oropharynx moist, Neck- normal range of motion, no tenderness,  Neck supple, No JVP,   Respiratory:  Bilateral air entry, mostly clear to auscultation  Cardiovascular: S1-S2 regular with a I/VI ejection systolic murmur   GI:  Soft, nondistended, normal bowel sounds, nontender, no hepatosplenomegaly appreciated    Musculoskeletal:  Positive tenderness in the back  Skin:  Well hydrated, no rash   Lymphatic:  No lymphadenopathy noted   Extremities:  No edema and distal pulses are present      Results for orders placed during the hospital encounter of 04/15/21    NM myocardial perfusion spect (rx stress and/or rest)    [address] 6 [phone]    Rest/Stress Gated SPECT Myocardial Perfusion Imaging After Regadenoson    Patient: Devendra Hyatt  MR number: [id number]  Account number: [de-identified]  : 1940  Age: 79 years  Gender: Female  Status: Outpatient  Location: Stress lab  Height: 62 in  Weight: 224 lb  BP: 122/ 65 mmHg    Allergies: IODINE - FOOD ALLERGY, SHELLFISH-DERIVED PRODUCTS - FOOD ALLERGY, MORPHINE    Diagnosis: R07 9 - Chest pain, unspecified    Primary Physician:  Sam Martinez  RN:  RISA Aparicio  Referring Physician:  Shelby Roberto MD  Group:  [facility]  Report Prepared By[de-identified]  RISA Aparicio  Interpreting Physician:  Sim Barclay MD    INDICATIONS: Evaluation of known coronary artery disease  HISTORY: The patient is a 79year old  female  Chest pain status: chest pain  Coronary artery disease risk factors: dyslipidemia, hypertension, family history of premature coronary artery disease, and diabetes mellitus  Cardiovascular history: coronary artery disease  Prior cardiovascular procedures: percutaneous transluminal coronary angioplasty  Co-morbidity: history of lung disease and obesity  Medications: a nitrate, an ACE inhibitor/ARB, a diuretic,  aspirin, and diabetic medications  PHYSICAL EXAM: Baseline physical exam screening: no wheezes audible  REST ECG: Normal sinus rhythm  The ECG showed rare premature ventricular contractions  PROCEDURE: The study was performed in the the Stress lab  A regadenoson infusion pharmacologic stress test was performed  Gated SPECT myocardial perfusion imaging was performed after stress and at rest  Systolic blood pressure was 122  mmHg, at the start of the study  Diastolic blood pressure was 65 mmHg, at the start of the study  The heart rate was 66 bpm, at the start of the study  IV double checked  Regadenoson protocol:  Time HR bpm SBP mmHg DBP mmHg Symptoms Rhythm/conduct  Baseline 09:09 66 122 65 none NSR  Immediate 09:12 86 132 76 none NSR, frequent PVC's  1 min 09:13 836 136 79 none same as above  2 min 09:14 82 140 77 none same as above  3 min 09:15 85 133 74 none --  4 min 09:16 79 134 76 none same as above  No medications or fluids given  STRESS SUMMARY: Duration of pharmacologic stress was 3 min   Maximal heart rate during stress was 88 bpm  The heart rate response to stress was normal  There was normal resting blood pressure with an appropriate response to stress  The  rate-pressure product for the peak heart rate and blood pressure was 34114  There was no chest pain during stress  The stress test was terminated due to protocol completion  Pre oxygen saturation: 97 %  Peak oxygen saturation: 99 %  The  stress ECG was negative for ischemia and normal  Arrhythmia during stress: isolated premature ventricular beats  ISOTOPE ADMINISTRATION:  Resting isotope administration Stress isotope administration  Agent Tetrofosmin Tetrofosmin  Dose 10 5 mCi 33 mCi  Date 04/15/2021 04/15/2021    The radiopharmaceutical was injected at the peak effect of pharmacologic stress  MYOCARDIAL PERFUSION IMAGING:  Rotating projection images reveal mild breast attenuation and moderate subdiaphragmatic activity  Left ventricular size was normal  The TID ratio was 1 4  PERFUSION DEFECTS:  -  There was a small to moderate, mildly severe, partially reversible myocardial perfusion defect of the entire lateral and apical wall  GATED SPECT:  The calculated left ventricular ejection fraction was 61 %  Left ventricular ejection fraction was within normal limits by visual estimate  There was no left ventricular regional abnormality  SUMMARY:  -  Stress results: There was no chest pain during stress  -  ECG conclusions: The stress ECG was negative for ischemia and normal   -  Perfusion imaging: There was a small to moderate, mildly severe, partially reversible myocardial perfusion defect of the entire lateral and apical wall  -  Gated SPECT: The calculated left ventricular ejection fraction was 61 %  Left ventricular ejection fraction was within normal limits by visual estimate  There was no left ventricular regional abnormality   -  Impressions and recommendations: Abnormal study after pharmacologic vasodilation   There is evidence of partially reversible apical lateral wall defect  Defect was of mild severity  T i d  of 1 4 also noted  EF 61%    IMPRESSIONS: Abnormal study after pharmacologic vasodilation  There is evidence of partially reversible apical lateral wall defect  Defect was of mild severity  T i d  of 1 4 also noted  EF 61% Left ventricular systolic function was  normal     Prepared and signed by    Adarsh Kamara MD  Signed 04/15/2021 13:53:15        Imaging:  Chest X-Ray:   X-ray chest 2 views    Result Date: 5/2/2021  Impression No acute cardiopulmonary disease  Workstation performed: [id number]       CT-scan of the chest:     No CTA results available for this patient  Lab Review   Lab Results   Component Value Date    WBC 4 1 01/20/2021    HGB 12 5 01/20/2021    HCT 37 5 01/20/2021    MCV 90 01/20/2021    RDW 13 1 01/20/2021     01/20/2021     BMP:  Lab Results   Component Value Date    SODIUM 136 01/20/2021    K 4 3 01/20/2021     01/20/2021    CO2 23 01/20/2021    BUN 12 01/20/2021    CREATININE 1 05 (H) 01/20/2021    GLUC 133 (H) 01/20/2021    CALCIUM 8 7 09/13/2020    EGFR 55 09/13/2020     LFT:  Lab Results   Component Value Date    AST 13 04/26/2021    ALT 9 04/26/2021    ALKPHOS 72 09/13/2020    TP 6 9 04/26/2021    ALB 4 0 04/26/2021      No components found for: TSH3  No results found for: [facility]  Lab Results   Component Value Date    HGBA1C 8 0 (H) 01/20/2021     Lipid Profile:   Lab Results   Component Value Date    CHOLESTEROL 131 04/26/2021    HDL 35 (L) 04/26/2021    LDLCALC 79 04/26/2021    TRIG 90 04/26/2021     Lab Results   Component Value Date    CHOLESTEROL 131 04/26/2021    CHOLESTEROL 213 (H) 01/20/2021     Lab Results   Component Value Date    TROPONINI <0 02 09/13/2020     No results found for: NTBNP   No results found for this or any previous visit (from the past 672 hour(s))  Dr Karissa Gomes MD, [facility]      "This note has been constructed using a voice recognition system  Therefore there may be syntax, spelling, and/or grammatical errors   Please call if you have any questions  "

## 2021-07-15 ENCOUNTER — OFFICE VISIT (OUTPATIENT)
Dept: FAMILY MEDICINE CLINIC | Facility: CLINIC | Age: 81
End: 2021-07-15
Payer: COMMERCIAL

## 2021-07-15 VITALS
SYSTOLIC BLOOD PRESSURE: 110 MMHG | BODY MASS INDEX: 40.48 KG/M2 | RESPIRATION RATE: 18 BRPM | OXYGEN SATURATION: 97 % | HEART RATE: 55 BPM | WEIGHT: 220 LBS | TEMPERATURE: 97.9 F | HEIGHT: 62 IN | DIASTOLIC BLOOD PRESSURE: 62 MMHG

## 2021-07-15 DIAGNOSIS — M54.50 CHRONIC BILATERAL LOW BACK PAIN WITHOUT SCIATICA: Primary | ICD-10-CM

## 2021-07-15 DIAGNOSIS — G89.29 CHRONIC BILATERAL LOW BACK PAIN WITHOUT SCIATICA: Primary | ICD-10-CM

## 2021-07-15 PROBLEM — E78.5 HYPERLIPIDEMIA: Status: ACTIVE | Noted: 2017-07-05

## 2021-07-15 PROBLEM — I11.9 HYPERTENSIVE HEART DISEASE: Status: ACTIVE | Noted: 2017-07-05

## 2021-07-15 PROCEDURE — 1036F TOBACCO NON-USER: CPT | Performed by: FAMILY MEDICINE

## 2021-07-15 PROCEDURE — 3078F DIAST BP <80 MM HG: CPT | Performed by: FAMILY MEDICINE

## 2021-07-15 PROCEDURE — 1160F RVW MEDS BY RX/DR IN RCRD: CPT | Performed by: FAMILY MEDICINE

## 2021-07-15 PROCEDURE — 99214 OFFICE O/P EST MOD 30 MIN: CPT | Performed by: FAMILY MEDICINE

## 2021-07-15 PROCEDURE — 3074F SYST BP LT 130 MM HG: CPT | Performed by: FAMILY MEDICINE

## 2021-07-15 NOTE — PROGRESS NOTES
HPI: [de-identified] y/o women  presents with low back pain for few months  Only a little better  Significant trauma history? no  Significant overuse history? no  What makes symptoms worse: ? What makes symptoms better/treatments that have helped: sleeping, laying down  Radiation of symptoms down leg(s)? no  Severity range: severe 7 out of 10   Associated symptoms: none    Previous Physical therapy? no    Workman's compensation or legal case? no}    Stress level: moderate  Relevant Home & Work activities: does house activities  Fitness/exercise/sports: none  Sleep: fair    Red flag screens: No unexplained fevers/chills/sweats/weight loss/history of IVDA/immunosuppressive disorder/personal history of cancer/unusual night pain/deep bone pain/unexplained neurological symptoms such as numbness, motor weakness, sphincter dysfunction of bladder or bowel, saddle anesthesia, cramping ache legs with activity such as walking (for spinal stenosis) NEG    Chart review of relevant past medical and surgical and psychosocial history, problem list, medications and allergies completed  Imaging done: 6/30/21 CT LS spine: Multilevel degenerative changes as detailed above, most notably involving the L4-L5 and L5-S1 levels  CNS pain processing dysfunction clues (multiple functional dx, mood disorders, possible  secondary gain conducive situation)? Risk appears: medium     BMI Counseling: Body mass index is 40 24 kg/m²  The BMI is above normal  Nutrition recommendations include encouraging healthy choices of fruits and vegetables, decreasing fast food intake and consuming healthier snacks  EXAM: Vital signs as noted  Alert, NAD  Posture: fair   General coordination/movement integration, gait: poor - leaning forward, hunched    Spine exam:  ? Inspection & palpation: No midline tenderness  Paralumbar muscles: + spasm   Myofascial findings: + regional paralumbar trigger points  ? Active range of motion: very limited in extension  Flex 1/2 nl, lateral bends mild limited  ? Special tests for:  ? Nerve tension signs:  ? SLR (with augmentation signs as needed): NEG    Distal neurovascular (heel and toe walking/strength, sensory to light touch, vibration, deep tendon reflexes, Plantar (Babinski) reflexes, pulses and color) intact intact         General summary and Impression:   1  Chronic bilateral low back pain without sciatica  Mainly likely caused by spondylosis LS spine with weak core, poor posture  Need to clarify psychosocial context further  RTO for acupuncture  Tylenol, PT, heating pad    F/u 7/21      Risks and benefits of therapeutic plan discussed, answered all patient questions and concerns and patient expressed understanding and agreement of therapeutic plan      Follow up in few weeks

## 2021-07-21 ENCOUNTER — PROCEDURE VISIT (OUTPATIENT)
Dept: FAMILY MEDICINE CLINIC | Facility: CLINIC | Age: 81
End: 2021-07-21
Payer: COMMERCIAL

## 2021-07-21 VITALS
DIASTOLIC BLOOD PRESSURE: 78 MMHG | OXYGEN SATURATION: 99 % | HEART RATE: 79 BPM | WEIGHT: 224 LBS | RESPIRATION RATE: 26 BRPM | SYSTOLIC BLOOD PRESSURE: 132 MMHG | HEIGHT: 62 IN | TEMPERATURE: 98.3 F | BODY MASS INDEX: 41.22 KG/M2

## 2021-07-21 DIAGNOSIS — M54.50 CHRONIC BILATERAL LOW BACK PAIN WITHOUT SCIATICA: Primary | ICD-10-CM

## 2021-07-21 DIAGNOSIS — G89.29 CHRONIC BILATERAL LOW BACK PAIN WITHOUT SCIATICA: Primary | ICD-10-CM

## 2021-07-21 PROCEDURE — 3078F DIAST BP <80 MM HG: CPT | Performed by: FAMILY MEDICINE

## 2021-07-21 PROCEDURE — 99214 OFFICE O/P EST MOD 30 MIN: CPT | Performed by: FAMILY MEDICINE

## 2021-07-21 PROCEDURE — 97813 ACUP 1/> W/ESTIM 1ST 15 MIN: CPT | Performed by: FAMILY MEDICINE

## 2021-07-21 PROCEDURE — 3075F SYST BP GE 130 - 139MM HG: CPT | Performed by: FAMILY MEDICINE

## 2021-07-21 NOTE — PATIENT INSTRUCTIONS
Dear Ms Chris Fede : Welcome to the acupuncture/myofascial treatment suite! Dr Cal Salter is certified in medical acupuncture and will evaluate your clinical situation and decide with you the best treatment course  In addition to [facility], Dr Cal Salter is board certified in primary care Sports Medicine and in [address] [place] [address]  He will generally combine different approaches and modalities to optimize your path towards healing  In other words, acupuncture will generally be just one modality in the total therapeutic plan  Usually you will need several acupuncture treatments 1/2 to 4 weeks apart to address most concerns  What to expect during an acupuncture/myofascial treatment session:  Please arrive a few minutes early so the medical assistant can get you set up in the treatment room  Generally the full treatment time (a minimum of 30 minutes should be scheduled) is needed for the treatment, so it is usually not possible to attend to other issues at the same time  If you have other health needs that also need attention, please schedule a separate visit for these  Please wear loose clothing so it is easy to disrobe or expose areas of the body for treatment, including the arms and feet and lower legs  In most cases, you do not need to take off under-garments, though women may need to undo the back of their bra to access the back as needed  Avoid eating a heavy meal right before a session and be sure to empty your bladder/bowels before a session  Acupuncture is designed to treat the entire bio-energy system of the body, so don't be surprised if Dr Cal Salter places needles in your feet, legs, arms or ear (very tiny ones) even though your area of concern is your headache, neck or back, etc      Once an initial evaluation is done, you will generally lay down on your back (supine) or stomach (prone)  Pillows and blankets are available for your comfort  [place] are single use, sterile packed   Usually no antiseptic is used or needed, since infection rate from acupuncture is extremely low  Needles are placed, often with electrostimulation added to enhance the effect  Electrostimulation will generally feel like a pleasant 'flow' or 'buzz' of energy through the part of the body being treated  By giving Dr Jitendra Ward feedback as he adjusts the level of electrostimulation, you will be totally in control of the level that feels right for you  Dr Jitendra Ward may add some modalities such as ear acupuncture (auriculotherapy), massage, cupping, heat, relaxing aromas and sound and other techniques to enhance the treatment  Many patients will experience the treatment as a pleasant, relaxing sensation, and you may fall asleep  After the treatment is completed, please get up slowly  Sometimes patients will feel a little 'floaty' or giddy after a treatment  Be sure not to drive if you do not feel you can concentrate sufficiently  You are welcome to wait in our waiting room until your sense of concentration re-establishes, usually in several minutes  After a treatment, your body will need some space to re-equilibrate its bio-energy, so take it easy for the 24 hrs  after a treatment, avoiding any intense mental or physical activity  Best to avoid sexual activity and heavy meals during this period  Over the next few days your symptoms will generally improve  Infrequently there may be a temporary worsening for a day or so and then an improvement, or no change at all, indicating the treatment protocol needs adjustment  Please give Dr Jitendra Ward feedback on the effect of your treatment or any concerns you have  FAQ's:  Q: Will acupuncture hurt? A: Acupuncture needles are much thinner than needles used to draw blood or give medical injections, which are cutting needles  Acupuncture needles instead are designed to part tissue, that is, to slip between tissues   So the experience of receiving acupuncture is markedly different than getting blood drawn or getting a shot  Most patients will either feel nothing or minimal to mild discomfort, though occasionally if a body part is particularly sensitive, there might be more discomfort  Dr Porsha Palmer considers your comfort paramount and will work with you to minimize any discomfort  Once an acupuncture needle is placed, is is designed to tap into the body's energy system, at which time you may feel a mild deep ache that travels along the energy channel being treated (this is a good sign of potential efficacy)  Q: What is acupuncture useful for?  A: Acupuncture can be used to treat a wide range of medical problems  Some of the most common include:   1  Musculoskeletal pain such as neck, back, shoulder, knee pain   2  Headaches, including tension, migraine, etc    3  Various other pain syndromes, such as chronic pelvic pain   3  Anxiety and stress disorders   4  Chronic functional issues such as Irritable bowel or bladder syndromes    Q: Is there any scientific evidence supporting efficacy of acupuncture? A: There is good scientific evidence supporting acupuncture for many chronic pain syndromes such as chronic low back pain and headaches  Other traditional indications have less clear evidence  Q: Are there any adverse effects or complications of acupuncture? A: Acupuncture is a remarkably safe modality, much safer than many medical treatments commonly used  Nevertheless, like any modality, there are possible adverse effects  Notify us if:   1  - you easily faint with needling (vasovagal reaction)    2  - you are pregnant, since certain acupuncture points should be avoided in this case  3  - you have a  pacemaker or other electronic implant  Dr Porsha Palmer will ensure the treatment is adjusted to be  compatible with these devices  4  - you are on blood thinners   Generally acupuncture can still proceed, but Dr Porsha Palmer will take this into account in designing the treatment program  Blood thinners increase the chance of bleeding or bruising, which is usually harmless and self-limited  5  Common minor adverse effects include occasional tiny bleeds or small bruises or mild residual soreness at a needling site   These are generally harmless and resolve over several days, but report if you have any unusual symptoms  Occasionally an acupuncture treatment may flare up an issue it is designed to treat, such as increasing muscle irritability at a needled site, this usually resolves on its own after several days  6  Occasionally the needle could touch a nerve or other sensitive structure  This usually does not cause any significant long term harm, but be sure to give Dr Abril Haskins feedback if you experience any such discomfort and he can adjust the needle  7  Rarely a needle could  puncture a lung, other organ or cause damage to a nerve or other structure leading to serious medical issues  Infections at needling sites are also rare, though less rare in patients with significant immunosuppression such as cancer patients on chemotherapy or transplant patients on immunosuppressants  All acupuncture needles are sterile packed and single use, so there is no chance of getting an infection from another patient via a contaminated needle  Serious adverse effects are very rare in acupuncture, and Dr Abril Haskins is trained to minimize the chance of these happening

## 2021-07-21 NOTE — PROGRESS NOTES
1  Chronic bilateral low back pain without sciatica  Tx as noted       Follow up in 1 - 2 weeks    HPI: Here for follow up of  myofascial dysfunction/pain at L lower back    Chart reviewed for relevant medical, surgical and psychosocial history, medications and allergies  Review of systems  NO unexplained fever, chills, sweats or unexplained weight loss  NO new or unexplained chest pain or shortness of breath  NO unexplained changes in digestion or bowel movements or urination  NO red flag symptoms such as new or worsening neurological symptoms, deep bone pain in middle of night, major immunosuppressive disorder    Exam:   Alert, NAD /78 (BP Location: Left arm, Patient Position: Sitting, Cuff Size: Standard)   Pulse 79   Temp 98 3 °F (36 8 °C) (Tympanic)   Resp (!) 26   Ht 5' 2" (1 575 m)   Wt 102 kg (224 lb)   SpO2 99%   BMI 40 97 kg/m²   Pulse: regular  + KI def  Skin: no pallor  Respiratory: no respiratory labor  Cardiac: Regular rate  Extremities: No cyanosis, clubbing or edema  Has trigger points, tender muscle regions in areas noted  Risks and benefits of therapeutic plan discussed, answered all patient questions and concerns and patient expressed understanding and agreement of therapeutic plan  Patient gave verbal consent for acupuncture/dry needling of noted trigger points with electrostimulation/Percutaneous neuro- stimulation (PENS) as needed  Anca chain trigger point deactivation (15 Hz, 15 min ) L ischial origin lumbar muscles      Patient tolerated procedure well  Sterile single use disposable acupuncture needles used and all needles were accounted for and disposed of in the sharps container after the procedure  Patient instructed in post-procedure care and expressed understanding        I spent 30 minutes face to face with patient

## 2021-07-23 ENCOUNTER — TELEPHONE (OUTPATIENT)
Dept: CARDIOLOGY CLINIC | Facility: CLINIC | Age: 81
End: 2021-07-23

## 2021-07-23 LAB
ALBUMIN SERPL-MCNC: 4 G/DL (ref 3.7–4.7)
ALP SERPL-CCNC: 82 IU/L (ref 48–121)
ALT SERPL-CCNC: 11 IU/L (ref 0–32)
AST SERPL-CCNC: 18 IU/L (ref 0–40)
BILIRUB DIRECT SERPL-MCNC: 0.09 MG/DL (ref 0–0.4)
BILIRUB SERPL-MCNC: 0.4 MG/DL (ref 0–1.2)
CHOLEST SERPL-MCNC: 138 MG/DL (ref 100–199)
CHOLEST/HDLC SERPL: 4.1 RATIO (ref 0–4.4)
HDLC SERPL-MCNC: 34 MG/DL
LDLC SERPL CALC-MCNC: 80 MG/DL (ref 0–99)
PROT SERPL-MCNC: 7 G/DL (ref 6–8.5)
SL AMB VLDL CHOLESTEROL CALC: 24 MG/DL (ref 5–40)
TRIGL SERPL-MCNC: 133 MG/DL (ref 0–149)

## 2021-07-23 NOTE — TELEPHONE ENCOUNTER
----- Message from Siva Vigil MD sent at 7/23/2021  9:19 AM EDT -----   Please call and inform patient that her bad cholesterol LDL is still elevated at 80  Target is less than 70      Recommend increasing atorvastatin  to 40 mg daily  Recommend repeat lipid profile and Liver Enzymes/Hepatic Panel in 6 months

## 2021-07-23 NOTE — TELEPHONE ENCOUNTER
Spoke with patient's son, message given per Dr Melisa Carrera  Patient will continue lisinopril and check BP next week       Jayant (son) verbalized understanding and was transferred to  for nurse appointment for next week

## 2021-07-23 NOTE — TELEPHONE ENCOUNTER
Spoke with patient's son, message given to increase atorvastatin to 40mg daily and repeat labs  Son verbalized understanding, however was questioning whether patient should continue lisinopril,  concerned BP would go to low      Please advise  Thank you

## 2021-07-27 ENCOUNTER — OFFICE VISIT (OUTPATIENT)
Dept: PULMONOLOGY | Facility: MEDICAL CENTER | Age: 81
End: 2021-07-27
Payer: COMMERCIAL

## 2021-07-27 ENCOUNTER — CLINICAL SUPPORT (OUTPATIENT)
Dept: CARDIOLOGY CLINIC | Facility: CLINIC | Age: 81
End: 2021-07-27
Payer: COMMERCIAL

## 2021-07-27 VITALS
DIASTOLIC BLOOD PRESSURE: 84 MMHG | TEMPERATURE: 96.4 F | RESPIRATION RATE: 12 BRPM | WEIGHT: 224 LBS | HEIGHT: 62 IN | OXYGEN SATURATION: 99 % | SYSTOLIC BLOOD PRESSURE: 122 MMHG | HEART RATE: 79 BPM | BODY MASS INDEX: 41.22 KG/M2

## 2021-07-27 VITALS
TEMPERATURE: 97.6 F | DIASTOLIC BLOOD PRESSURE: 70 MMHG | HEIGHT: 62 IN | RESPIRATION RATE: 18 BRPM | BODY MASS INDEX: 41.04 KG/M2 | WEIGHT: 223 LBS | SYSTOLIC BLOOD PRESSURE: 122 MMHG | HEART RATE: 67 BPM | OXYGEN SATURATION: 97 %

## 2021-07-27 DIAGNOSIS — J84.89 BOOP (BRONCHIOLITIS OBLITERANS WITH ORGANIZING PNEUMONIA) (HCC): Primary | ICD-10-CM

## 2021-07-27 DIAGNOSIS — J38.6 SUBGLOTTIC STENOSIS: ICD-10-CM

## 2021-07-27 DIAGNOSIS — J45.30 MILD PERSISTENT ASTHMA WITHOUT COMPLICATION: ICD-10-CM

## 2021-07-27 DIAGNOSIS — E66.01 CLASS 3 SEVERE OBESITY DUE TO EXCESS CALORIES WITHOUT SERIOUS COMORBIDITY WITH BODY MASS INDEX (BMI) OF 40.0 TO 44.9 IN ADULT (HCC): ICD-10-CM

## 2021-07-27 DIAGNOSIS — I10 HYPERTENSION, ESSENTIAL: Primary | ICD-10-CM

## 2021-07-27 DIAGNOSIS — R05.3 CHRONIC COUGH: ICD-10-CM

## 2021-07-27 PROCEDURE — 99212 OFFICE O/P EST SF 10 MIN: CPT

## 2021-07-27 PROCEDURE — 99214 OFFICE O/P EST MOD 30 MIN: CPT | Performed by: INTERNAL MEDICINE

## 2021-07-27 PROCEDURE — 1160F RVW MEDS BY RX/DR IN RCRD: CPT | Performed by: INTERNAL MEDICINE

## 2021-07-27 PROCEDURE — 1036F TOBACCO NON-USER: CPT | Performed by: INTERNAL MEDICINE

## 2021-07-27 RX ORDER — CODEINE PHOSPHATE AND GUAIFENESIN 10; 100 MG/5ML; MG/5ML
5 SOLUTION ORAL 2 TIMES DAILY PRN
Qty: 300 ML | Refills: 1 | Status: SHIPPED | OUTPATIENT
Start: 2021-07-27 | End: 2021-09-10

## 2021-07-27 NOTE — PATIENT INSTRUCTIONS
Call our back line and let us know the names of the 2 nebulizer medicatiosn you have at home  [phone]    Appointment with Dr Xander Elizabeth to change tracheostomy tube in August

## 2021-07-27 NOTE — PROGRESS NOTES
Assessment/Plan        Problem List Items Addressed This Visit        Respiratory    BOOP (bronchiolitis obliterans with organizing pneumonia) ([facility] [address] ) - Primary      She has history of group in 2005 to 2006 and was on steroid therapy for for appeared time  No longer on steroids and seems to be doing okay  Relevant Medications    guaiFENesin-codeine (ROBITUSSIN AC) 100-10 mg/5 mL oral solution    Subglottic stenosis      She sees Dr Junior Aguila in mid August likely to have tracheostomy tube change done at that time  She hs #6 CF Shiley tracheostomy  Tube in place  She does  Used to inner cannula and only inserts it once a day to help clear mucus  I did advise her that inter cannula was meant to be kept in continuously and removed to clean or replace with disposable inner cannula         Mild persistent asthma without complication       She does have some reactive airways disease and/or chronic bronchitis probably related her group and prior respiratory failure  She does seem to benefit from use of budesonide 0 5 mg b i d  and albuterol 2 5 mg nebulized when needed            Other    Chronic cough      She has chronic cough and Robitussin codeine as only thing that seems to help this  She usually takes this at nighttime         Relevant Medications    guaiFENesin-codeine (ROBITUSSIN AC) 100-10 mg/5 mL oral solution    Class 3 severe obesity due to excess calories without serious comorbidity with body mass index (BMI) of 40 0 to 44 9 in [facility])      I did encourage her to lose weight                 CC:  Has chronic cough  Cough is not that bad right now      [facility] presents today for follow-up visit  She was accompanied by her son jayden  She states she is doing okay  She does have a chronic cough right now it is not that bad  She does take Robitussin with codeine 5 mL twice a day when needed for cough  This seemed to control her cough  She does have a 6  Tracheostomy tube in placed    She is scheduled to see Dr Geraldine Boswell in mid August and will have tracheostomy tube  change at that time  Sherle Dakins  does have  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 back in 2005 to 2006 for acute respiratory failure  She has tracheostomy because of this  She is not have much in way of any respiratory tract secretions  She does have chronic lower back pain  She does have some mild exertional dyspnea which is chronic  She does have chronic lower back pain  No recent respiratory illnesses  She denies any nocturnal dyspnea  She used to use oxygen at bedtime but has not used at night for quite some time and is not having shortness of breath at night   She did not have asthma when she was younger  She was going to be scheduled for cardiac catheterization after I last saw her but as she is not have any chest pain she changed her mind and decided not to have the cardiac catheterization  She does see cardiologist Harini Carrillo   She does have history coronary disease and had cardiac stent placed about 25 years ago at [facility]   She is allergic to IV contrast       She also has 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, 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  Dr Natividad Hong note indicates surgical pathology report was done 09/07/2006     She was accompanied by her son Jayant     She  did have bronchiolitis obliterans with organizing pneumonia back in 2006 to 2006  She had been on steroid therapy for several years but now is off of them  She never smoked  She does use nebulized treatment with budesonide 0 5 mg twice a day for chronic bronchitis-possible mild asthma  Never had asthma when she was younger    Also has albuterol 2 5 mg she can use when needed      Past Medical History:   Diagnosis Date    BOOP (bronchiolitis obliterans with organizing pneumonia) ([facility] [address] ) 01/01/2006    [facility]     Hypertension        Past Surgical History:   Procedure Laterality Date    HYSTERECTOMY      REPLACEMENT TOTAL KNEE      TRACHEOSTOMY  01/01/2006         Current Outpatient Medications:     Ascorbic Acid (vitamin C) 1000 MG tablet, Take 1 tablet (1,000 mg total) by mouth daily, Disp: 30 tablet, Rfl: 5    aspirin 81 mg chewable tablet, Chew 1 tablet (81 mg total) daily, Disp: 30 tablet, Rfl: 5    atorvastatin (LIPITOR) 40 mg tablet, Take 1 tablet (40 mg total) by mouth daily, Disp: 90 tablet, Rfl: 2    budesonide (PULMICORT) 0 5 mg/2 mL nebulizer solution, Take 1 vial (0 5 mg total) by nebulization 2 (two) times a day, Disp: 60 vial, Rfl: 11    Calcium Carbonate-Vitamin D (CALCIUM 600+D) 600-400 MG-UNIT per tablet, Take 1 tablet by mouth daily, Disp: , Rfl:     cholecalciferol (VITAMIN D3) 1,000 units tablet, Take 1 tablet (1,000 Units total) by mouth daily, Disp: 30 tablet, Rfl: 5    furosemide (LASIX) 20 mg tablet, Take 1 tablet (20 mg total) by mouth daily, Disp: 30 tablet, Rfl: 5    glipiZIDE (GLUCOTROL XL) 2 5 mg 24 hr tablet, Take 1 tablet (2 5 mg total) by mouth daily, Disp: 30 tablet, Rfl: 5    guaiFENesin-codeine (ROBITUSSIN AC) 100-10 mg/5 mL oral solution, Take 5 mL by mouth 2 (two) times a day as needed for cough, Disp: 300 mL, Rfl: 1    isosorbide mononitrate (IMDUR) 30 mg 24 hr tablet, TAKE 1 TABLET DAILY, Disp: 30 tablet, Rfl: 5    lisinopril (ZESTRIL) 10 mg tablet, Take 1 tablet (10 mg total) by mouth daily, Disp: 90 tablet, Rfl: 1    methimazole (TAPAZOLE) 5 mg tablet, Take 0 5 tablets (2 5 mg total) by mouth daily, Disp: 45 tablet, Rfl: 1    metoprolol tartrate (LOPRESSOR) 25 mg tablet, Take 0 5 tablets (12 5 mg total) by mouth every 12 (twelve) hours, Disp: 60 tablet, Rfl: 2    Multiple Vitamins-Minerals (CENTRUM SILVER 50+WOMEN PO), Take by mouth, Disp: , Rfl:     pantoprazole (PROTONIX) 40 mg tablet, TAKE 1 TABLET DAILY, Disp: 30 tablet, Rfl: 5    acetaminophen (TYLENOL) 650 mg CR tablet, Take 1 tablet (650 mg total) by mouth every 8 (eight) hours as needed for mild pain (Patient not taking: Reported on 7/15/2021), Disp: 30 tablet, Rfl: 0    Allergies   Allergen Reactions    Iodine - Food Allergy Swelling    Shellfish-Derived Products - Food Allergy Hives    Benzonatate Rash    Morphine Rash       Social History     Tobacco Use    Smoking status: Never Smoker    Smokeless tobacco: Never Used   Substance Use Topics    Alcohol use: Never         Family History   Problem Relation Age of Onset    Heart disease Mother     Hypertension Mother     Heart disease Father        Review of Systems   Constitutional: Negative for chills, fever and unexpected weight change  HENT: Negative for congestion, rhinorrhea and sore throat  Eyes: Negative for discharge and redness  Respiratory: Positive for cough  Has some mild chronic shortness of breath   Cardiovascular: Negative for chest pain, palpitations and leg swelling  Gastrointestinal: Negative for abdominal distention, abdominal pain and nausea  Endocrine: Negative for polydipsia and polyphagia  Genitourinary: Negative for dysuria  Musculoskeletal: Negative for joint swelling and myalgias  Has lower back pain   Skin: Negative for rash  Neurological: Negative for light-headedness  Psychiatric/Behavioral: Negative for confusion  weight 223 lb BMI 40 79    Vitals:    07/27/21 0800   BP: 122/84   Pulse: 79   Resp: 12   Temp: (!) 96 4 °F (35 8 °C)   SpO2: 99%      Room air O2 saturation was 98-99%      Physical Exam  Vitals reviewed  Constitutional:       General: She is not in acute distress  Appearance: Normal appearance  She is well-developed  She is obese  HENT:      Head: Normocephalic  Nose: Nose normal       Mouth/Throat:      Mouth: Mucous membranes are moist       Pharynx: Oropharynx is clear  No oropharyngeal exudate  Comments: Has tracheostomy tube in place    Tracheostomy site appears clear without any drainage or erythema  Eyes:      Conjunctiva/sclera: Conjunctivae normal       Pupils: Pupils are equal, round, and reactive to light  Cardiovascular:      Rate and Rhythm: Normal rate and regular rhythm  Heart sounds: Normal heart sounds  Pulmonary:      Effort: Pulmonary effort is normal       Comments: There are some faint expiratory wheezes right lower lobe  Otherwise lung sounds are clear  i  Abdominal:      General: There is no distension  Palpations: Abdomen is soft  Tenderness: There is no abdominal tenderness  Musculoskeletal:      Cervical back: Neck supple  Comments: No edema, cyanosis or clubbing   Lymphadenopathy:      Cervical: No cervical adenopathy  Skin:     General: Skin is warm and dry  Neurological:      Mental Status: She is alert and oriented to person, place, and time  Psychiatric:         Mood and Affect: Mood normal          Behavior: Behavior normal          Thought Content:  Thought content normal

## 2021-08-01 NOTE — ASSESSMENT & PLAN NOTE
She sees Dr Junior Aguila in mid August likely to have tracheostomy tube change done at that time  She hs #6 CF Shiley tracheostomy  Tube in place  She does  Used to inner cannula and only inserts it once a day to help clear mucus    I did advise her that inter cannula was meant to be kept in continuously and removed to clean or replace with disposable inner cannula

## 2021-08-01 NOTE — ASSESSMENT & PLAN NOTE
She does have some reactive airways disease and/or chronic bronchitis probably related her group and prior respiratory failure    She does seem to benefit from use of budesonide 0 5 mg b i d  and albuterol 2 5 mg nebulized when needed

## 2021-08-01 NOTE — ASSESSMENT & PLAN NOTE
She has history of group in 2005 to 2006 and was on steroid therapy for for appeared time  No longer on steroids and seems to be doing okay

## 2021-08-01 NOTE — ASSESSMENT & PLAN NOTE
She has chronic cough and Robitussin codeine as only thing that seems to help this    She usually takes this at nighttime

## 2021-08-16 ENCOUNTER — OFFICE VISIT (OUTPATIENT)
Dept: OTOLARYNGOLOGY | Facility: CLINIC | Age: 81
End: 2021-08-16
Payer: COMMERCIAL

## 2021-08-16 VITALS
DIASTOLIC BLOOD PRESSURE: 82 MMHG | HEIGHT: 62 IN | HEART RATE: 72 BPM | BODY MASS INDEX: 41.04 KG/M2 | SYSTOLIC BLOOD PRESSURE: 120 MMHG | WEIGHT: 223 LBS

## 2021-08-16 DIAGNOSIS — R05.3 CHRONIC COUGH: ICD-10-CM

## 2021-08-16 DIAGNOSIS — Z93.0 TRACHEOSTOMY PRESENT (HCC): ICD-10-CM

## 2021-08-16 DIAGNOSIS — J39.8 TRACHEAL STENOSIS: Primary | ICD-10-CM

## 2021-08-16 DIAGNOSIS — I10 HYPERTENSION, ESSENTIAL: ICD-10-CM

## 2021-08-16 DIAGNOSIS — J38.6 SUBGLOTTIC STENOSIS: ICD-10-CM

## 2021-08-16 PROCEDURE — 3079F DIAST BP 80-89 MM HG: CPT | Performed by: SPECIALIST

## 2021-08-16 PROCEDURE — 99214 OFFICE O/P EST MOD 30 MIN: CPT | Performed by: SPECIALIST

## 2021-08-16 PROCEDURE — 1160F RVW MEDS BY RX/DR IN RCRD: CPT | Performed by: SPECIALIST

## 2021-08-16 PROCEDURE — 3074F SYST BP LT 130 MM HG: CPT | Performed by: SPECIALIST

## 2021-08-16 PROCEDURE — 1036F TOBACCO NON-USER: CPT | Performed by: SPECIALIST

## 2021-08-16 RX ORDER — FUROSEMIDE 20 MG/1
20 TABLET ORAL DAILY
Qty: 30 TABLET | Refills: 0 | Status: SHIPPED | OUTPATIENT
Start: 2021-08-16 | End: 2021-09-12

## 2021-08-16 NOTE — ASSESSMENT & PLAN NOTE
Reviewed recent ER visit with tracheostomy change   Discussed Chronic tracheostomy for subglottic stenosis with granulation tissue growing into the fenestrated tracheostomy tube observed during ER visit  Deborah Jain of granuloma and removal of the tube with replacement of a nonfenestrated tube      Bronchoscopy today indicating granulation tissue, mobile vocal cords, no procedure  Tracheostomy cleansed prior to reinsertion     Discussed options for tracheostomy including sizing down, and permanent removal   Reviewed on going care for tracheostomy change and cleansing   Recommend changing trache itself every 3 months     Agree to continue 6 CFS  Follow up in 6 months, sooner if needed

## 2021-08-16 NOTE — PROGRESS NOTES
Assessment/Plan:    Tracheal stenosis  Reviewed recent ER visit with tracheostomy change   Discussed Chronic tracheostomy for subglottic stenosis with granulation tissue growing into the fenestrated tracheostomy tube observed during ER visit  Elissa Johnson of granuloma and removal of the tube with replacement of a nonfenestrated tube      Bronchoscopy today indicating granulation tissue, mobile vocal cords, no procedure  Tracheostomy cleansed prior to reinsertion  Discussed options for tracheostomy including sizing down, and permanent removal   Reviewed on going care for tracheostomy change and cleansing   Recommend changing trache itself every 3 months     Agree to continue 6 CFS  Follow up in 6 months, sooner if needed                Diagnoses and all orders for this visit:    Tracheal stenosis    Tracheostomy present (HCC)    Subglottic stenosis    Chronic cough          Subjective:      Patient ID: Chi Olivares is a [de-identified] y o  female  Presents today as a follow up due to tracheostomy care  Tracheostomy for 14 to 15 years  [facility]ially placed at [facility]  Replaced about 2 weeks ago while in [facility] ER  Some bleeding around trach this morning    6 CFS in place today  No difficulty breathing  Cough at times  History of a long standing tracheostomy secondary to subglottic stenosis, at least since 2016, according to [place] records on care everywhere  She has had resections of granuloma the area of the tracheostomy (stoma) in the past according to the records from the [place]  Last tracheostomy change February of 2020 in [place]    Surgical pathology from 09/07/2006, 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, tracheal granuloma         The following portions of the patient's history were reviewed and updated as appropriate: allergies, current medications, past family history, past medical history, past social history, past surgical history and problem list     Review of Systems Constitutional: Negative  HENT: Positive for sore throat and voice change  Negative for congestion, ear discharge, ear pain, hearing loss, nosebleeds, postnasal drip, rhinorrhea, sinus pressure, sinus pain and tinnitus  Eyes: Negative  Respiratory: Positive for cough  Negative for chest tightness and shortness of breath  Cardiovascular: Negative  Gastrointestinal: Negative  Endocrine: Negative  Musculoskeletal: Positive for neck pain and neck stiffness  Skin: Negative for color change  Neurological: Negative for dizziness, numbness and headaches  Psychiatric/Behavioral: Negative  Objective:      /82   Pulse 72   Ht 5' 2" (1 575 m)   Wt 101 kg (223 lb)   BMI 40 79 kg/m²          Physical Exam  Constitutional:       Appearance: She is well-developed  HENT:      Head: Normocephalic  Right Ear: Hearing, tympanic membrane, ear canal and external ear normal  No decreased hearing noted  No drainage or tenderness  Tympanic membrane is not perforated or erythematous  Left Ear: Hearing, tympanic membrane, ear canal and external ear normal  No decreased hearing noted  No drainage or tenderness  Tympanic membrane is not perforated or erythematous  Nose: Nose normal  No nasal deformity or septal deviation  Mouth/Throat:      Mouth: Mucous membranes are not pale and not dry  No oral lesions  Dentition: Normal dentition  Pharynx: Uvula midline  No oropharyngeal exudate  Neck:      Trachea: No tracheal deviation  Cardiovascular:      Rate and Rhythm: Normal rate  Pulmonary:      Effort: Pulmonary effort is normal  No accessory muscle usage or respiratory distress  Musculoskeletal:      Right shoulder: Normal range of motion  Cervical back: Full passive range of motion without pain, normal range of motion and neck supple  Lymphadenopathy:      Cervical: No cervical adenopathy  Skin:     General: Skin is warm and dry     Neurological: Mental Status: She is alert and oriented to person, place, and time  Cranial Nerves: No cranial nerve deficit  Sensory: No sensory deficit  Psychiatric:         Behavior: Behavior is cooperative           Scribe Attestation    I,:  KIERA Staley am acting as a scribe while in the presence of the attending physician :       I,:  Bruce Rojo MD personally performed the services described in this documentation    as scribed in my presence :

## 2021-09-08 ENCOUNTER — RA CDI HCC (OUTPATIENT)
Dept: OTHER | Facility: HOSPITAL | Age: 81
End: 2021-09-08

## 2021-09-08 NOTE — PROGRESS NOTES
[facility]  coding opportunities       Chart reviewed, no opportunity found: CHART REVIEWED, NO OPPORTUNITY FOUND                        Patients insurance company: "[employer] (Medicare and Commercial for [employer] and [employer])

## 2021-09-09 DIAGNOSIS — R05.3 CHRONIC COUGH: ICD-10-CM

## 2021-09-09 DIAGNOSIS — E11.9 TYPE 2 DIABETES MELLITUS WITHOUT COMPLICATION, WITHOUT LONG-TERM CURRENT USE OF INSULIN (HCC): ICD-10-CM

## 2021-09-10 RX ORDER — ALBUTEROL SULFATE 2.5 MG/3ML
SOLUTION RESPIRATORY (INHALATION)
COMMUNITY
Start: 2021-08-21 | End: 2022-06-23

## 2021-09-10 RX ORDER — GLIPIZIDE 2.5 MG/1
TABLET, EXTENDED RELEASE ORAL
Qty: 30 TABLET | Refills: 5 | Status: SHIPPED | OUTPATIENT
Start: 2021-09-10 | End: 2021-11-11 | Stop reason: SDUPTHER

## 2021-09-10 RX ORDER — CODEINE PHOSPHATE AND GUAIFENESIN 10; 100 MG/5ML; MG/5ML
5 SOLUTION ORAL 2 TIMES DAILY PRN
Qty: 300 ML | Refills: 1 | Status: SHIPPED | OUTPATIENT
Start: 2021-09-10 | End: 2021-11-11

## 2021-10-02 DIAGNOSIS — K21.9 GASTROESOPHAGEAL REFLUX DISEASE WITHOUT ESOPHAGITIS: ICD-10-CM

## 2021-10-04 RX ORDER — PANTOPRAZOLE SODIUM 40 MG/1
TABLET, DELAYED RELEASE ORAL
Qty: 30 TABLET | Refills: 5 | Status: SHIPPED | OUTPATIENT
Start: 2021-10-04 | End: 2022-04-12

## 2021-10-05 DIAGNOSIS — I10 HYPERTENSION, ESSENTIAL: ICD-10-CM

## 2021-10-06 RX ORDER — LISINOPRIL 20 MG/1
TABLET ORAL
Qty: 90 TABLET | Refills: 1 | Status: SHIPPED | OUTPATIENT
Start: 2021-10-06 | End: 2022-02-03 | Stop reason: SDUPTHER

## 2021-10-13 ENCOUNTER — TELEPHONE (OUTPATIENT)
Dept: CARDIOLOGY CLINIC | Facility: CLINIC | Age: 81
End: 2021-10-13

## 2021-10-14 ENCOUNTER — OFFICE VISIT (OUTPATIENT)
Dept: CARDIOLOGY CLINIC | Facility: CLINIC | Age: 81
End: 2021-10-14
Payer: COMMERCIAL

## 2021-10-14 VITALS
BODY MASS INDEX: 41.04 KG/M2 | WEIGHT: 223 LBS | HEART RATE: 69 BPM | TEMPERATURE: 97.5 F | HEIGHT: 62 IN | OXYGEN SATURATION: 97 % | DIASTOLIC BLOOD PRESSURE: 78 MMHG | SYSTOLIC BLOOD PRESSURE: 142 MMHG

## 2021-10-14 DIAGNOSIS — I25.119 CORONARY ARTERY DISEASE WITH ANGINA PECTORIS, UNSPECIFIED VESSEL OR LESION TYPE, UNSPECIFIED WHETHER NATIVE OR TRANSPLANTED HEART (HCC): Primary | ICD-10-CM

## 2021-10-14 PROCEDURE — 99214 OFFICE O/P EST MOD 30 MIN: CPT | Performed by: INTERNAL MEDICINE

## 2021-10-14 PROCEDURE — 93000 ELECTROCARDIOGRAM COMPLETE: CPT | Performed by: INTERNAL MEDICINE

## 2021-10-20 ENCOUNTER — OFFICE VISIT (OUTPATIENT)
Dept: PULMONOLOGY | Facility: MEDICAL CENTER | Age: 81
End: 2021-10-20
Payer: COMMERCIAL

## 2021-10-20 VITALS
DIASTOLIC BLOOD PRESSURE: 76 MMHG | OXYGEN SATURATION: 96 % | TEMPERATURE: 97.8 F | SYSTOLIC BLOOD PRESSURE: 120 MMHG | BODY MASS INDEX: 41.04 KG/M2 | HEART RATE: 64 BPM | WEIGHT: 223 LBS | HEIGHT: 62 IN | RESPIRATION RATE: 20 BRPM

## 2021-10-20 DIAGNOSIS — J84.89 BOOP (BRONCHIOLITIS OBLITERANS WITH ORGANIZING PNEUMONIA) (HCC): ICD-10-CM

## 2021-10-20 DIAGNOSIS — J20.9 ACUTE BRONCHITIS, UNSPECIFIED ORGANISM: Primary | ICD-10-CM

## 2021-10-20 DIAGNOSIS — J45.31 MILD PERSISTENT ASTHMA WITH EXACERBATION: ICD-10-CM

## 2021-10-20 DIAGNOSIS — J38.6 SUBGLOTTIC STENOSIS: ICD-10-CM

## 2021-10-20 PROCEDURE — 99214 OFFICE O/P EST MOD 30 MIN: CPT | Performed by: INTERNAL MEDICINE

## 2021-10-20 RX ORDER — AZITHROMYCIN 250 MG/1
TABLET, FILM COATED ORAL
Qty: 6 TABLET | Refills: 0 | Status: SHIPPED | OUTPATIENT
Start: 2021-10-20 | End: 2021-10-24

## 2021-10-20 RX ORDER — PREDNISONE 20 MG/1
TABLET ORAL
Qty: 15 TABLET | Refills: 0 | Status: SHIPPED | OUTPATIENT
Start: 2021-10-20 | End: 2022-02-03 | Stop reason: DRUGHIGH

## 2021-10-21 PROBLEM — J20.9 ACUTE BRONCHITIS: Status: ACTIVE | Noted: 2021-10-21

## 2021-10-21 PROBLEM — J45.31 MILD PERSISTENT ASTHMA WITH EXACERBATION: Status: ACTIVE | Noted: 2021-10-21

## 2021-10-28 ENCOUNTER — OFFICE VISIT (OUTPATIENT)
Dept: FAMILY MEDICINE CLINIC | Facility: CLINIC | Age: 81
End: 2021-10-28
Payer: COMMERCIAL

## 2021-10-28 VITALS
OXYGEN SATURATION: 98 % | RESPIRATION RATE: 18 BRPM | TEMPERATURE: 97.2 F | SYSTOLIC BLOOD PRESSURE: 142 MMHG | DIASTOLIC BLOOD PRESSURE: 74 MMHG | HEART RATE: 90 BPM | BODY MASS INDEX: 40.85 KG/M2 | HEIGHT: 62 IN | WEIGHT: 222 LBS

## 2021-10-28 DIAGNOSIS — M54.50 CHRONIC BILATERAL LOW BACK PAIN WITHOUT SCIATICA: Primary | ICD-10-CM

## 2021-10-28 DIAGNOSIS — G89.29 CHRONIC BILATERAL LOW BACK PAIN WITHOUT SCIATICA: Primary | ICD-10-CM

## 2021-10-28 DIAGNOSIS — H25.9 AGE-RELATED CATARACT OF BOTH EYES, UNSPECIFIED AGE-RELATED CATARACT TYPE: ICD-10-CM

## 2021-10-28 DIAGNOSIS — Z23 ENCOUNTER FOR IMMUNIZATION: ICD-10-CM

## 2021-10-28 PROCEDURE — 99214 OFFICE O/P EST MOD 30 MIN: CPT | Performed by: FAMILY MEDICINE

## 2021-10-28 PROCEDURE — 97813 ACUP 1/> W/ESTIM 1ST 15 MIN: CPT | Performed by: FAMILY MEDICINE

## 2021-10-28 PROCEDURE — 3078F DIAST BP <80 MM HG: CPT | Performed by: FAMILY MEDICINE

## 2021-10-28 PROCEDURE — 1036F TOBACCO NON-USER: CPT | Performed by: FAMILY MEDICINE

## 2021-10-28 PROCEDURE — 3077F SYST BP >= 140 MM HG: CPT | Performed by: FAMILY MEDICINE

## 2021-10-28 PROCEDURE — 90662 IIV NO PRSV INCREASED AG IM: CPT

## 2021-10-28 PROCEDURE — G0008 ADMIN INFLUENZA VIRUS VAC: HCPCS

## 2021-10-28 PROCEDURE — 1160F RVW MEDS BY RX/DR IN RCRD: CPT | Performed by: FAMILY MEDICINE

## 2021-11-04 DIAGNOSIS — R05.3 CHRONIC COUGH: ICD-10-CM

## 2021-11-11 DIAGNOSIS — E11.9 TYPE 2 DIABETES MELLITUS WITHOUT COMPLICATION, WITHOUT LONG-TERM CURRENT USE OF INSULIN (HCC): ICD-10-CM

## 2021-11-11 RX ORDER — CODEINE PHOSPHATE AND GUAIFENESIN 10; 100 MG/5ML; MG/5ML
SOLUTION ORAL
Qty: 300 ML | Refills: 1 | Status: SHIPPED | OUTPATIENT
Start: 2021-11-11 | End: 2022-03-07

## 2021-11-11 RX ORDER — GLIPIZIDE 2.5 MG/1
2.5 TABLET, EXTENDED RELEASE ORAL DAILY
Qty: 90 TABLET | Refills: 3 | Status: SHIPPED | OUTPATIENT
Start: 2021-11-11

## 2021-11-17 ENCOUNTER — RA CDI HCC (OUTPATIENT)
Dept: OTHER | Facility: HOSPITAL | Age: 81
End: 2021-11-17

## 2021-11-24 ENCOUNTER — PROCEDURE VISIT (OUTPATIENT)
Dept: FAMILY MEDICINE CLINIC | Facility: CLINIC | Age: 81
End: 2021-11-24
Payer: COMMERCIAL

## 2021-11-24 VITALS
HEART RATE: 80 BPM | WEIGHT: 226.2 LBS | BODY MASS INDEX: 41.62 KG/M2 | HEIGHT: 62 IN | RESPIRATION RATE: 24 BRPM | SYSTOLIC BLOOD PRESSURE: 132 MMHG | OXYGEN SATURATION: 99 % | TEMPERATURE: 97.3 F | DIASTOLIC BLOOD PRESSURE: 78 MMHG

## 2021-11-24 DIAGNOSIS — G89.29 CHRONIC BILATERAL LOW BACK PAIN WITHOUT SCIATICA: Primary | ICD-10-CM

## 2021-11-24 DIAGNOSIS — E66.01 CLASS 3 SEVERE OBESITY DUE TO EXCESS CALORIES WITHOUT SERIOUS COMORBIDITY WITH BODY MASS INDEX (BMI) OF 40.0 TO 44.9 IN ADULT (HCC): ICD-10-CM

## 2021-11-24 DIAGNOSIS — J84.89 BOOP (BRONCHIOLITIS OBLITERANS WITH ORGANIZING PNEUMONIA) (HCC): ICD-10-CM

## 2021-11-24 DIAGNOSIS — M54.50 CHRONIC BILATERAL LOW BACK PAIN WITHOUT SCIATICA: Primary | ICD-10-CM

## 2021-11-24 PROCEDURE — 3075F SYST BP GE 130 - 139MM HG: CPT | Performed by: FAMILY MEDICINE

## 2021-11-24 PROCEDURE — 97813 ACUP 1/> W/ESTIM 1ST 15 MIN: CPT | Performed by: FAMILY MEDICINE

## 2021-11-24 PROCEDURE — 99214 OFFICE O/P EST MOD 30 MIN: CPT | Performed by: FAMILY MEDICINE

## 2021-11-24 PROCEDURE — 3078F DIAST BP <80 MM HG: CPT | Performed by: FAMILY MEDICINE

## 2021-11-29 ENCOUNTER — OFFICE VISIT (OUTPATIENT)
Dept: ENDOCRINOLOGY | Facility: CLINIC | Age: 81
End: 2021-11-29
Payer: COMMERCIAL

## 2021-11-29 VITALS
DIASTOLIC BLOOD PRESSURE: 88 MMHG | TEMPERATURE: 96.8 F | HEART RATE: 70 BPM | SYSTOLIC BLOOD PRESSURE: 124 MMHG | BODY MASS INDEX: 41.96 KG/M2 | WEIGHT: 228 LBS | HEIGHT: 62 IN

## 2021-11-29 DIAGNOSIS — N18.31 STAGE 3A CHRONIC KIDNEY DISEASE (HCC): ICD-10-CM

## 2021-11-29 DIAGNOSIS — E05.90 HYPERTHYROIDISM: ICD-10-CM

## 2021-11-29 DIAGNOSIS — C73 PAPILLARY THYROID CARCINOMA (HCC): Primary | ICD-10-CM

## 2021-11-29 DIAGNOSIS — E11.9 TYPE 2 DIABETES MELLITUS WITHOUT COMPLICATION, WITHOUT LONG-TERM CURRENT USE OF INSULIN (HCC): ICD-10-CM

## 2021-11-29 PROCEDURE — 1036F TOBACCO NON-USER: CPT | Performed by: INTERNAL MEDICINE

## 2021-11-29 PROCEDURE — 99214 OFFICE O/P EST MOD 30 MIN: CPT | Performed by: INTERNAL MEDICINE

## 2021-11-29 PROCEDURE — 1160F RVW MEDS BY RX/DR IN RCRD: CPT | Performed by: INTERNAL MEDICINE

## 2021-12-02 DIAGNOSIS — J45.30 MILD PERSISTENT ASTHMA WITHOUT COMPLICATION: Primary | ICD-10-CM

## 2021-12-02 RX ORDER — SODIUM CHLORIDE FOR INHALATION 0.9 %
3 VIAL, NEBULIZER (ML) INHALATION 2 TIMES DAILY PRN
Qty: 60 ML | Refills: 1 | Status: SHIPPED | OUTPATIENT
Start: 2021-12-02 | End: 2022-01-27 | Stop reason: SDUPTHER

## 2021-12-04 LAB
BUN SERPL-MCNC: 11 MG/DL (ref 8–27)
BUN/CREAT SERPL: 12 (ref 12–28)
CALCIUM SERPL-MCNC: 9.2 MG/DL (ref 8.7–10.3)
CHLORIDE SERPL-SCNC: 105 MMOL/L (ref 96–106)
CO2 SERPL-SCNC: 22 MMOL/L (ref 20–29)
CREAT SERPL-MCNC: 0.9 MG/DL (ref 0.57–1)
GLUCOSE SERPL-MCNC: 130 MG/DL (ref 65–99)
POTASSIUM SERPL-SCNC: 4.6 MMOL/L (ref 3.5–5.2)
SL AMB EGFR AFRICAN AMERICAN: 70 ML/MIN/1.73
SL AMB EGFR NON AFRICAN AMERICAN: 61 ML/MIN/1.73
SODIUM SERPL-SCNC: 140 MMOL/L (ref 134–144)
T3 SERPL-MCNC: 108 NG/DL (ref 71–180)
T3FREE SERPL-MCNC: 3.1 PG/ML (ref 2–4.4)
T4 FREE SERPL-MCNC: 1.2 NG/DL (ref 0.82–1.77)
THYROGLOB AB SERPL-ACNC: <1 IU/ML (ref 0–0.9)
THYROGLOB SERPL-MCNC: 106.7 NG/ML (ref 1.5–38.5)
TSH SERPL DL<=0.005 MIU/L-ACNC: 0.22 UIU/ML (ref 0.45–4.5)

## 2021-12-06 LAB
EST. AVERAGE GLUCOSE BLD GHB EST-MCNC: 183 MG/DL
HBA1C MFR BLD: 8 % (ref 4.8–5.6)

## 2021-12-08 ENCOUNTER — TELEPHONE (OUTPATIENT)
Dept: ENDOCRINOLOGY | Facility: CLINIC | Age: 81
End: 2021-12-08

## 2021-12-10 DIAGNOSIS — I10 HYPERTENSION, ESSENTIAL: ICD-10-CM

## 2021-12-10 RX ORDER — ISOSORBIDE MONONITRATE 30 MG/1
TABLET, EXTENDED RELEASE ORAL
Qty: 30 TABLET | Refills: 5 | Status: SHIPPED | OUTPATIENT
Start: 2021-12-10 | End: 2022-06-06

## 2021-12-14 DIAGNOSIS — E11.9 TYPE 2 DIABETES MELLITUS WITHOUT COMPLICATION, WITHOUT LONG-TERM CURRENT USE OF INSULIN (HCC): Primary | ICD-10-CM

## 2021-12-15 RX ORDER — FLASH GLUCOSE SENSOR
KIT MISCELLANEOUS
Qty: 2 EACH | Refills: 12 | Status: SHIPPED | OUTPATIENT
Start: 2021-12-15

## 2021-12-15 RX ORDER — FLASH GLUCOSE SCANNING READER
EACH MISCELLANEOUS
Qty: 1 EACH | Refills: 0 | Status: SHIPPED | OUTPATIENT
Start: 2021-12-15

## 2021-12-16 ENCOUNTER — PROCEDURE VISIT (OUTPATIENT)
Dept: FAMILY MEDICINE CLINIC | Facility: CLINIC | Age: 81
End: 2021-12-16
Payer: COMMERCIAL

## 2021-12-16 VITALS
RESPIRATION RATE: 18 BRPM | HEIGHT: 62 IN | OXYGEN SATURATION: 97 % | TEMPERATURE: 98.2 F | SYSTOLIC BLOOD PRESSURE: 122 MMHG | BODY MASS INDEX: 41.59 KG/M2 | HEART RATE: 75 BPM | WEIGHT: 226 LBS | DIASTOLIC BLOOD PRESSURE: 70 MMHG

## 2021-12-16 DIAGNOSIS — E11.9 TYPE 2 DIABETES MELLITUS WITHOUT COMPLICATION, WITHOUT LONG-TERM CURRENT USE OF INSULIN (HCC): Primary | ICD-10-CM

## 2021-12-16 DIAGNOSIS — M54.50 CHRONIC BILATERAL LOW BACK PAIN WITHOUT SCIATICA: ICD-10-CM

## 2021-12-16 DIAGNOSIS — G89.29 CHRONIC BILATERAL LOW BACK PAIN WITHOUT SCIATICA: ICD-10-CM

## 2021-12-16 PROCEDURE — 99214 OFFICE O/P EST MOD 30 MIN: CPT | Performed by: FAMILY MEDICINE

## 2021-12-16 PROCEDURE — 3078F DIAST BP <80 MM HG: CPT | Performed by: FAMILY MEDICINE

## 2021-12-16 PROCEDURE — 97813 ACUP 1/> W/ESTIM 1ST 15 MIN: CPT | Performed by: FAMILY MEDICINE

## 2021-12-16 PROCEDURE — 1160F RVW MEDS BY RX/DR IN RCRD: CPT | Performed by: FAMILY MEDICINE

## 2021-12-16 PROCEDURE — 3074F SYST BP LT 130 MM HG: CPT | Performed by: FAMILY MEDICINE

## 2022-01-03 DIAGNOSIS — I10 HYPERTENSION, ESSENTIAL: ICD-10-CM

## 2022-01-17 ENCOUNTER — TELEPHONE (OUTPATIENT)
Dept: ENDOCRINOLOGY | Facility: CLINIC | Age: 82
End: 2022-01-17

## 2022-01-17 NOTE — TELEPHONE ENCOUNTER
Completed prescription order form for freestyle swapnil 2 and faxed to  Elaine Borrego @ [phone]

## 2022-01-20 ENCOUNTER — TELEPHONE (OUTPATIENT)
Dept: FAMILY MEDICINE CLINIC | Facility: CLINIC | Age: 82
End: 2022-01-20

## 2022-01-20 ENCOUNTER — PROCEDURE VISIT (OUTPATIENT)
Dept: FAMILY MEDICINE CLINIC | Facility: CLINIC | Age: 82
End: 2022-01-20
Payer: COMMERCIAL

## 2022-01-20 VITALS
TEMPERATURE: 97.7 F | DIASTOLIC BLOOD PRESSURE: 80 MMHG | SYSTOLIC BLOOD PRESSURE: 148 MMHG | BODY MASS INDEX: 41.72 KG/M2 | OXYGEN SATURATION: 98 % | RESPIRATION RATE: 26 BRPM | HEIGHT: 62 IN | WEIGHT: 226.7 LBS | HEART RATE: 78 BPM

## 2022-01-20 DIAGNOSIS — G89.29 CHRONIC BILATERAL LOW BACK PAIN WITHOUT SCIATICA: Primary | ICD-10-CM

## 2022-01-20 DIAGNOSIS — R26.2 AMBULATORY DYSFUNCTION: ICD-10-CM

## 2022-01-20 DIAGNOSIS — M54.50 CHRONIC BILATERAL LOW BACK PAIN WITHOUT SCIATICA: Primary | ICD-10-CM

## 2022-01-20 PROCEDURE — 3079F DIAST BP 80-89 MM HG: CPT | Performed by: FAMILY MEDICINE

## 2022-01-20 PROCEDURE — 3077F SYST BP >= 140 MM HG: CPT | Performed by: FAMILY MEDICINE

## 2022-01-20 PROCEDURE — 99213 OFFICE O/P EST LOW 20 MIN: CPT | Performed by: FAMILY MEDICINE

## 2022-01-20 PROCEDURE — 97813 ACUP 1/> W/ESTIM 1ST 15 MIN: CPT | Performed by: FAMILY MEDICINE

## 2022-01-20 RX ORDER — CALCIUM CARBONATE 160(400)MG
TABLET,CHEWABLE ORAL ONCE
Qty: 1 EACH | Refills: 0 | Status: SHIPPED | OUTPATIENT
Start: 2022-01-20 | End: 2022-01-20

## 2022-01-20 NOTE — TELEPHONE ENCOUNTER
Patient provided a copy of COVID-19 Vaccination Card, this has been scanned into patients chart  Entered Vaccine Information under IMMUNIZATIONS and is attached to this Encounter

## 2022-01-20 NOTE — PROGRESS NOTES
1  Chronic bilateral low back pain without sciatica  Tx as noted  Follow up in 2 - 3 weeks    HPI: Here for follow up of  myofascial dysfunction/pain at R lower back    Chart reviewed for relevant medical, surgical and psychosocial history, medications and allergies  Review of systems  NO unexplained fever, chills, sweats or unexplained weight loss  NO new or unexplained chest pain  NO worse shortness of breath  NO unexplained changes in digestion or bowel movements or urination  NO red flag symptoms such as new or worsening neurological symptoms, deep bone pain in middle of night, major immunosuppressive disorder    Exam:   Alert, NAD /80   Pulse 78   Temp 97 7 °F (36 5 °C) (Tympanic)   Resp (!) 26   Ht 5' 2" (1 575 m)   Wt 103 kg (226 lb 11 2 oz)   SpO2 98%   BMI 41 46 kg/m²   Pulse: regular  Skin: no pallor  Respiratory: + same baseline respiratory labor (has SOB at baseline  Cardiac: Regular rate  Extremities: No cyanosis, clubbing or edema  Has trigger points, tender muscle regions in areas noted  Risks and benefits of therapeutic plan discussed, answered all patient questions and concerns and patient expressed understanding and agreement of therapeutic plan  Patient gave verbal consent for acupuncture/dry needling of noted trigger points with electrostimulation/Percutaneous neuro- stimulation (PENS) as needed  Anca chain trigger point deactivation (15 Hz, 15 min )  R L3 - L medial ischial crest and R L1 to L lateral ischial crest     Massage    Patient tolerated procedure well  Sterile single use disposable acupuncture needles used and all needles were accounted for and disposed of in the sharps container after the procedure  Patient instructed in post-procedure care and expressed understanding        I spent 30 minutes face to face with patient

## 2022-01-27 ENCOUNTER — OFFICE VISIT (OUTPATIENT)
Dept: PULMONOLOGY | Facility: MEDICAL CENTER | Age: 82
End: 2022-01-27
Payer: COMMERCIAL

## 2022-01-27 VITALS
HEART RATE: 84 BPM | WEIGHT: 225 LBS | TEMPERATURE: 98.7 F | BODY MASS INDEX: 41.41 KG/M2 | RESPIRATION RATE: 20 BRPM | HEIGHT: 62 IN | OXYGEN SATURATION: 98 % | SYSTOLIC BLOOD PRESSURE: 136 MMHG | DIASTOLIC BLOOD PRESSURE: 78 MMHG

## 2022-01-27 DIAGNOSIS — J38.6 SUBGLOTTIC STENOSIS: ICD-10-CM

## 2022-01-27 DIAGNOSIS — J45.30 MILD PERSISTENT ASTHMA WITHOUT COMPLICATION: ICD-10-CM

## 2022-01-27 DIAGNOSIS — J45.31 MILD PERSISTENT ASTHMA WITH EXACERBATION: Primary | ICD-10-CM

## 2022-01-27 DIAGNOSIS — Z93.0 TRACHEOSTOMY PRESENT (HCC): ICD-10-CM

## 2022-01-27 DIAGNOSIS — J84.89 BOOP (BRONCHIOLITIS OBLITERANS WITH ORGANIZING PNEUMONIA) (HCC): ICD-10-CM

## 2022-01-27 DIAGNOSIS — R05.3 CHRONIC COUGH: ICD-10-CM

## 2022-01-27 PROCEDURE — 99214 OFFICE O/P EST MOD 30 MIN: CPT | Performed by: INTERNAL MEDICINE

## 2022-01-27 PROCEDURE — 1160F RVW MEDS BY RX/DR IN RCRD: CPT | Performed by: INTERNAL MEDICINE

## 2022-01-27 PROCEDURE — 1036F TOBACCO NON-USER: CPT | Performed by: INTERNAL MEDICINE

## 2022-01-27 RX ORDER — PREDNISONE 10 MG/1
TABLET ORAL
Qty: 24 TABLET | Refills: 0 | Status: SHIPPED | OUTPATIENT
Start: 2022-01-27

## 2022-01-27 RX ORDER — BUDESONIDE 0.5 MG/2ML
0.5 INHALANT ORAL 2 TIMES DAILY
Qty: 120 ML | Refills: 11 | Status: SHIPPED | OUTPATIENT
Start: 2022-01-27 | End: 2022-07-21

## 2022-01-27 RX ORDER — PROMETHAZINE HYDROCHLORIDE AND CODEINE PHOSPHATE 6.25; 1 MG/5ML; MG/5ML
5 SYRUP ORAL 3 TIMES DAILY PRN
Qty: 120 ML | Refills: 0 | Status: SHIPPED | OUTPATIENT
Start: 2022-01-27 | End: 2022-02-11

## 2022-01-27 RX ORDER — SODIUM CHLORIDE FOR INHALATION 0.9 %
3 VIAL, NEBULIZER (ML) INHALATION 2 TIMES DAILY PRN
Qty: 180 ML | Refills: 6 | Status: SHIPPED | OUTPATIENT
Start: 2022-01-27

## 2022-01-27 NOTE — PROGRESS NOTES
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 this around [id number] for this problem  She had prolonged intubation previous to that for respiratory difficulties and respiratory failure from BOOP         Mild persistent asthma without complication     She will continue with budesonide 0 5 mg b i d  and albuterol 2 5 mg b i d  to q i d  as needed  Temporary with her increased shortness of breath and cough recently I gave her 14 days of Yuperli she is using her nebulizer once a day in addition to her regular nebulizer medication  I did give her samples of this medication  Relevant Medications    budesonide (PULMICORT) 0 5 mg/2 mL nebulizer solution    sodium chloride 0 9 % nebulizer solution    Mild persistent asthma with exacerbation - Primary     I prescribed prednisone 30 mg for 4 days then 20 mg for 4 days then 10 mg for 4 days    I advised her that her cough did not improve with above therapy will consider given her antibiotic therapy  She will contact my office if she has no improvement         Relevant Medications    predniSONE 10 mg tablet    budesonide (PULMICORT) 0 5 mg/2 mL nebulizer solution       Other    Tracheostomy present ([facility] )     She has a 6  Shiley tracheostomy tube in place  Tracheostomy site appears clear         Chronic cough     She has had increased cough over the last week or so  Number she takes Robitussin with codeine once or twice a day when needed    She does have some upper airway postnasal drainage I will have her use Phenergan with codeine 5 mL twice a day as needed in place of the Robitussin with codeine Relevant Medications    promethazine-codeine (PHENERGAN WITH CODEINE) 6 25-10 mg/5 mL syrup            Follow-up, has cough for white mucus  Some mild shortness of breath      HPI     Complains of cough productive for white mucus  States she is coughing more than usual   She does not feel like she has infection as the mucus is white  Took prednisone before which helped partially  She has been off it for a while now  She sometimes gets short of breath with activity and when she coughs  No fever or chills  She usually always has some cough but recently has been worse  Herve Mendieta does have history of subglottic stenosis and has a size #6  Shiley tracheostomy tube in place   She has had his tracheostomy since around 2005 to 2006 initials placed because of acute respiratory failure need for prolonged intubation  She states she was treated for BOOP at that time and was on prolonged steroid therapy  She does have 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, 2006 for micro papillary thyroid cancer  Also history of coronary disease and had coronary artery stent placed about 25 years ago at [facility]   She is allergic to IV contrast     She is on chronic cough suppression medicine with Robitussin codeine and she usually takes at night  She also has been using nebulizer with budesonide 0 5 mg b i d  and nebulizer with albuterol 2 5 mg b i d   Not have any fever chills  No wheezing COVID exposures      She does use oxygen 3 liters/minute at bedtime    Past Medical History:   Diagnosis Date    BOOP (bronchiolitis obliterans with organizing pneumonia) ([facility] ) 01/01/2006    Coronary artery disease     Diabetes mellitus ([facility] )     Hypertension        Past Surgical History:   Procedure Laterality Date    HYSTERECTOMY      REPLACEMENT TOTAL KNEE      TRACHEOSTOMY  01/01/2006         Current Outpatient Medications:     albuterol (2 5 mg/3 mL) 0 083 % nebulizer solution, , Disp: , Rfl:     Ascorbic Acid (vitamin C) 1000 MG tablet, Take 1 tablet (1,000 mg total) by mouth daily, Disp: 30 tablet, Rfl: 5    aspirin 81 mg chewable tablet, Chew 1 tablet (81 mg total) daily, Disp: 30 tablet, Rfl: 5    atorvastatin (LIPITOR) 40 mg tablet, Take 1 tablet (40 mg total) by mouth daily, Disp: 90 tablet, Rfl: 2    Calcium Carbonate-Vitamin D3 (Calcium 600+D) 600-400 MG-UNIT TABS, Take 1 tablet by mouth daily  , Disp: , Rfl:     cholecalciferol (VITAMIN D3) 1,000 units tablet, Take 1 tablet (1,000 Units total) by mouth daily, Disp: 30 tablet, Rfl: 5    Continuous Blood Gluc  (FreeStyle Rasheeda 2 Bath) JUD, Use to test blood sugar twice a day, Disp: 1 each, Rfl: 0    Continuous Blood Gluc Sensor (FreeStyle Rasheeda 2 Sensor) MISC, Use to test blood sugar twice a day, Disp: 2 each, Rfl: 12    furosemide (LASIX) 20 mg tablet, TAKE 1 TABLET (20 MG TOTAL) BY MOUTH DAILY, Disp: 30 tablet, Rfl: 4    glipiZIDE (GLUCOTROL XL) 2 5 mg 24 hr tablet, Take 1 tablet (2 5 mg total) by mouth daily, Disp: 90 tablet, Rfl: 3    guaiFENesin-codeine (ROBITUSSIN AC) 100-10 mg/5 mL oral solution, TAKE FIVE ML TWO TIMES DAILY AS NEEDED FOR COUGH, Disp: 300 mL, Rfl: 1    isosorbide mononitrate (IMDUR) 30 mg 24 hr tablet, TAKE 1 TABLET DAILY, Disp: 30 tablet, Rfl: 5    lisinopril (ZESTRIL) 20 mg tablet, TAKE 1 TABLET DAILY, Disp: 90 tablet, Rfl: 1    methimazole (TAPAZOLE) 5 mg tablet, TAKE 1/2 TABLET DAILY, Disp: 45 tablet, Rfl: 0    metoprolol tartrate (LOPRESSOR) 25 mg tablet, TAKE 1/2 TAB EVERY 12 HOURS, Disp: 60 tablet, Rfl: 2    Multiple Vitamins-Minerals (CENTRUM SILVER 50+WOMEN PO), Take by mouth, Disp: , Rfl:     pantoprazole (PROTONIX) 40 mg tablet, TAKE 1 TABLET DAILY, Disp: 30 tablet, Rfl: 5    predniSONE 20 mg tablet, Take 2 pills for 5 days then 1 pill for 5 days, Disp: 15 tablet, Rfl: 0    sodium chloride 0 9 % nebulizer solution, Take 3 mL by nebulization 2 (two) times a day as needed for wheezing, Disp: 180 mL, Rfl: 6    acetaminophen (TYLENOL) 650 mg CR tablet, Take 1 tablet (650 mg total) by mouth every 8 (eight) hours as needed for mild pain (Patient not taking: Reported on 7/15/2021), Disp: 30 tablet, Rfl: 0    budesonide (PULMICORT) 0 5 mg/2 mL nebulizer solution, Take 2 mL (0 5 mg total) by nebulization 2 (two) times a day, Disp: 120 mL, Rfl: 11    lisinopril (ZESTRIL) 20 mg tablet, Take 1 tablet by mouth daily (Patient not taking: Reported on 11/29/2021 ), Disp: , Rfl:     predniSONE 10 mg tablet, Take 3 tabs x 4 days then 2 tabs x 4 days then 1 tab daily x 4 days, Disp: 24 tablet, Rfl: 0    promethazine-codeine (PHENERGAN WITH CODEINE) 6 25-10 mg/5 mL syrup, Take 5 mL by mouth 3 (three) times a day as needed for cough, Disp: 120 mL, Rfl: 0    Allergies   Allergen Reactions    Iodine - Food Allergy Swelling    Shellfish-Derived Products - Food Allergy Hives    Benzonatate Rash    Morphine Rash       Social History     Tobacco Use    Smoking status: Never Smoker    Smokeless tobacco: Never Used   Substance Use Topics    Alcohol use: Never         Family History   Problem Relation Age of Onset    Heart disease Mother     Hypertension Mother     Heart disease Father        Review of Systems   Constitutional: Negative for chills, fever and unexpected weight change  HENT: Negative for congestion, rhinorrhea and sore throat  Eyes: Negative for discharge and redness  Respiratory: Positive for cough and shortness of breath  Cardiovascular: Negative for chest pain, palpitations and leg swelling  Gastrointestinal: Negative for abdominal distention, abdominal pain and nausea  Endocrine: Negative for polydipsia and polyphagia  Genitourinary: Negative for dysuria  Musculoskeletal: Negative for joint swelling and myalgias  Skin: Negative for rash  Neurological: Negative for light-headedness  Psychiatric/Behavioral: Negative for confusion             Vitals:    01/27/22 1247   BP: 136/78 Pulse: 84   Resp: 20   Temp: 98 7 °F (37 1 °C)   SpO2: 98%     Height 5 ft 2 in tall weight 225 lb BMI 41 15      Physical Exam  Vitals reviewed  Constitutional:       General: She is not in acute distress  Appearance: Normal appearance  She is well-developed  She is obese  HENT:      Head: Normocephalic  Right Ear: External ear normal       Left Ear: External ear normal       Nose: Nose normal       Mouth/Throat:      Pharynx: No oropharyngeal exudate  Eyes:      Conjunctiva/sclera: Conjunctivae normal       Pupils: Pupils are equal, round, and reactive to light  Cardiovascular:      Rate and Rhythm: Normal rate and regular rhythm  Heart sounds: Normal heart sounds  Pulmonary:      Effort: Pulmonary effort is normal       Comments: Occasional rhonchi in lower lobes  When patient coughs lung sounds are clear  Abdominal:      General: There is no distension  Palpations: Abdomen is soft  Tenderness: There is no abdominal tenderness  Musculoskeletal:      Cervical back: Neck supple  Comments: No edema   Lymphadenopathy:      Cervical: No cervical adenopathy  Skin:     General: Skin is warm and dry  Neurological:      Mental Status: She is alert and oriented to person, place, and time     Psychiatric:         Mood and Affect: Mood normal          Behavior: Behavior normal

## 2022-01-27 NOTE — PATIENT INSTRUCTIONS
Prednisone 3 tablets for 4 days then 2 tablets for 4 days then 1 tablet for 4 days    Try Phenergan with codeine 1 tsp full 3 times a day as needed for cough    If these medications do not help her cough then contact our office and I will prescribe an antibiotic [phone]    Try Yuperli - 1 vial in nebulizer once a day    Can do this right before lunch

## 2022-01-28 NOTE — ASSESSMENT & PLAN NOTE
She has had increased cough over the last week or so  Number she takes Robitussin with codeine once or twice a day when needed    She does have some upper airway postnasal drainage I will have her use Phenergan with codeine 5 mL twice a day as needed in place of the Robitussin with codeine

## 2022-01-28 NOTE — ASSESSMENT & PLAN NOTE
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

## 2022-01-28 NOTE — ASSESSMENT & PLAN NOTE
I prescribed prednisone 30 mg for 4 days then 20 mg for 4 days then 10 mg for 4 days    I advised her that her cough did not improve with above therapy will consider given her antibiotic therapy    She will contact my office if she has no improvement

## 2022-01-28 NOTE — ASSESSMENT & PLAN NOTE
She will continue with budesonide 0 5 mg b i d  and albuterol 2 5 mg b i d  to q i d  as needed  Temporary with her increased shortness of breath and cough recently I gave her 14 days of Yuperli she is using her nebulizer once a day in addition to her regular nebulizer medication  I did give her samples of this medication

## 2022-02-03 ENCOUNTER — PROCEDURE VISIT (OUTPATIENT)
Dept: FAMILY MEDICINE CLINIC | Facility: CLINIC | Age: 82
End: 2022-02-03
Payer: COMMERCIAL

## 2022-02-03 VITALS
SYSTOLIC BLOOD PRESSURE: 160 MMHG | OXYGEN SATURATION: 97 % | DIASTOLIC BLOOD PRESSURE: 92 MMHG | RESPIRATION RATE: 24 BRPM | TEMPERATURE: 98.3 F | HEART RATE: 80 BPM

## 2022-02-03 DIAGNOSIS — G89.29 CHRONIC BILATERAL LOW BACK PAIN WITHOUT SCIATICA: Primary | ICD-10-CM

## 2022-02-03 DIAGNOSIS — I10 HYPERTENSION, ESSENTIAL: ICD-10-CM

## 2022-02-03 DIAGNOSIS — E11.9 TYPE 2 DIABETES MELLITUS WITHOUT COMPLICATION, WITHOUT LONG-TERM CURRENT USE OF INSULIN (HCC): ICD-10-CM

## 2022-02-03 DIAGNOSIS — I25.119 CORONARY ARTERY DISEASE WITH ANGINA PECTORIS, UNSPECIFIED VESSEL OR LESION TYPE, UNSPECIFIED WHETHER NATIVE OR TRANSPLANTED HEART (HCC): ICD-10-CM

## 2022-02-03 DIAGNOSIS — M54.50 CHRONIC BILATERAL LOW BACK PAIN WITHOUT SCIATICA: Primary | ICD-10-CM

## 2022-02-03 DIAGNOSIS — E78.5 HYPERLIPIDEMIA LDL GOAL <70: ICD-10-CM

## 2022-02-03 PROBLEM — E78.2 MIXED HYPERLIPIDEMIA: Status: ACTIVE | Noted: 2017-07-05

## 2022-02-03 PROCEDURE — 3077F SYST BP >= 140 MM HG: CPT | Performed by: FAMILY MEDICINE

## 2022-02-03 PROCEDURE — 3080F DIAST BP >= 90 MM HG: CPT | Performed by: FAMILY MEDICINE

## 2022-02-03 PROCEDURE — 99214 OFFICE O/P EST MOD 30 MIN: CPT | Performed by: FAMILY MEDICINE

## 2022-02-03 PROCEDURE — 97813 ACUP 1/> W/ESTIM 1ST 15 MIN: CPT | Performed by: FAMILY MEDICINE

## 2022-02-03 RX ORDER — ATORVASTATIN CALCIUM 40 MG/1
TABLET, FILM COATED ORAL
Qty: 90 TABLET | Refills: 2 | Status: SHIPPED | OUTPATIENT
Start: 2022-02-03

## 2022-02-03 NOTE — PATIENT INSTRUCTIONS
Integrative approach to treating insulin resistance related disorders, including metabolic syndrome and Type 2 diabetes (DM2), fatty liver:    Eat healthy:  1  Eat lots of veggies (at least 1/2 your plate), no more than 1/4 plate high carb foods like beans, grains, starchy vegetables  2  Avoid all processes/fast food  This includes avoiding/minimizing all sources of high glycemic index/load carbs  Practically speaking, this means eliminating/minimizing all commercial flour products and products with added sugars  Modest amounts of whole grains can be eaten, though better is obtaining carbs from root vegetables  Avoid eating un-opposed carbs  That is, have some healthy fat or protein with any carb source  Move regularly:  Exercise by walking as tolerated and as safe    Interrupt sitting with moving around for at least 5 minutes every 1/2 hour

## 2022-02-03 NOTE — PROGRESS NOTES
1  Chronic bilateral low back pain without sciatica  Tx as noted  Gentle stretches and walking as tolerated  Gentle heating pad    2  Type 2 diabetes mellitus without complication, without long-term current use of insulin (HCC)  Healthy diet reviewed  Home BS ok  Last A1C was 8 0 12/3/21  Dr Nallely Ayoub addresses thyroid and DM meds  Takes prednisone 10 mg for lungs for flares         Follow up in 1 - 2 weeks    HPI: Here for follow up of  myofascial dysfunction/pain at R low back  Chart reviewed for relevant medical, surgical and psychosocial history, medications and allergies  Review of systems  NO unexplained fever, chills, sweats or unexplained weight loss  NO new or unexplained chest pain or shortness of breath  NO unexplained changes in digestion or bowel movements or urination  NO red flag symptoms such as new or worsening neurological symptoms, deep bone pain in middle of night, major immunosuppressive disorder    Exam:   Alert, NAD /92   Pulse 80   Temp 98 3 °F (36 8 °C) (Tympanic)   Resp (!) 24   SpO2 97%   Skin: no pallor  Respiratory: no respiratory labor  Has tracheotomy  Cardiac: Regular rate  Has trigger points, tender muscle regions in areas noted  Risks and benefits of therapeutic plan discussed, answered all patient questions and concerns and patient expressed understanding and agreement of therapeutic plan  Patient gave verbal consent for acupuncture/dry needling of noted trigger points with electrostimulation/Percutaneous neuro- stimulation (PENS) as needed  Anca chain trigger point deactivation (15 Hz, 15 min )  R L4 - R BL 54 t and R S2 to R  Mid gluteal       Massage    Patient tolerated procedure well  Sterile single use disposable acupuncture needles used and all needles were accounted for and disposed of in the sharps container after the procedure  Patient instructed in post-procedure care and expressed understanding        I spent 30 minutes face to face with patient

## 2022-02-04 RX ORDER — FUROSEMIDE 20 MG/1
TABLET ORAL
Qty: 30 TABLET | Refills: 4 | Status: SHIPPED | OUTPATIENT
Start: 2022-02-04 | End: 2022-07-05

## 2022-02-07 ENCOUNTER — OFFICE VISIT (OUTPATIENT)
Dept: CARDIOLOGY CLINIC | Facility: CLINIC | Age: 82
End: 2022-02-07
Payer: COMMERCIAL

## 2022-02-07 VITALS
DIASTOLIC BLOOD PRESSURE: 66 MMHG | TEMPERATURE: 97 F | OXYGEN SATURATION: 97 % | BODY MASS INDEX: 40.67 KG/M2 | HEIGHT: 62 IN | SYSTOLIC BLOOD PRESSURE: 122 MMHG | WEIGHT: 221 LBS | HEART RATE: 68 BPM

## 2022-02-07 DIAGNOSIS — I25.10 ATHEROSCLEROSIS OF NATIVE CORONARY ARTERY OF NATIVE HEART WITHOUT ANGINA PECTORIS: Primary | ICD-10-CM

## 2022-02-07 PROCEDURE — 99214 OFFICE O/P EST MOD 30 MIN: CPT | Performed by: INTERNAL MEDICINE

## 2022-02-07 PROCEDURE — 1160F RVW MEDS BY RX/DR IN RCRD: CPT | Performed by: INTERNAL MEDICINE

## 2022-02-07 PROCEDURE — 1036F TOBACCO NON-USER: CPT | Performed by: INTERNAL MEDICINE

## 2022-02-07 NOTE — PROGRESS NOTES
Progress Note - Cardiology Office  [facility]    Alida [de-identified] y o  female MRN: [id number]  : 1940  Encounter: [id number]         ASSESSMENT:   CAD, s/p PCI at [facility], 25 years ago  ASCVD risk score is 34 3%    Patient has declined to undergo cardiac catheterization stating that she does not have chest pain at the moment    She was informed that the purpose of the procedure was to evaluate and revascularize if needed in view of an abnormal stress test   Again re-emphasized to the patient to let us know if she changes her mind and she starts having chest pain again    On aspirin 81 mg, Imdur 30 mg, atorvastatin 40 mg   10/12/2021:  LDL 62     Echo   Normal left ventricular size  Normal LV systolic function (EF 53 %)  There are no regional wall motion abnormalities  Normal diastolic LV function  Normal RV function  Normal size right ventricle  Left atrium is enlarged  Right atrium is enlarged  Normal mitral valve  Aortic valve tri-leaflet  Normal tricuspid valve  Pulmonary artery systolic pressure is normal (30 mmHg)        Stress :10/16: - Assessment: no ischemia-induced wall motion abnormalities (negative stress echo  test)  - At rest, normal global systolic LV-function (EF 20-29%)  - With stress, hypercontractility of the left ventricle (EF 80%)  - In the recovery phase, normal global systolic LV function (EF 73%)  - Assessment: normal LV function     Lexiscan, 04/15/2021    IMPRESSIONS: Abnormal study after pharmacologic vasodilation  There is evidence of partially reversible apical lateral wall defect  Defect was of mild in severity  TID ratio of 1 4 also noted   EF 61% Left ventricular systolic function was  normal      Patient was scheduled for cardiac catheterization at [address] [place] [address] at that time had deferred the plans for cardiac catheterization        Hyperlipidemia  2021:  , normal liver enzymes  04/26/2021:  LDL 79, triglyceride 90, HDL 35, normal AST and ALT  10/12/2021:  LDL 62, triglycerides 103, HDL 34, normal AST and ALT        Hyperthyroidism     DMT2, hemoglobin A1c is 8 0     Obesity:  BMI is 40 7     Hypertension  BP in the office today is 122/66 mmHg    History of PE     BOOP, history of tracheal stenosis, s/p tracheostomy     Allergy to iodine, shellfish derived products and morphine        RECOMMENDATIONS:  Continue current cardiac medications  Periodic lipid profile and LFTs  Continue cardiovascular exercise as tolerated  Low salt, low carb, low sugar, low-cholesterol diet        Please call [phone] if any questions  HPI :     Karissa Howard is a [de-identified]y o  year old female who came for follow up  She is doing well overall and offers no new or acute symptoms    REVIEW OF SYSTEMS:  Denies any new or acute cardiac symptoms    Denies chest pain, unusual dyspnea, or syncope    Historical Information   Past Medical History:   Diagnosis Date    BOOP (bronchiolitis obliterans with organizing pneumonia) ([facility] ) 01/01/2006    Coronary artery disease     Diabetes mellitus ([facility] )      Past Surgical History:   Procedure Laterality Date    HYSTERECTOMY      REPLACEMENT TOTAL KNEE      TRACHEOSTOMY  01/01/2006     Social History     Substance and Sexual Activity   Alcohol Use Never     Social History     Substance and Sexual Activity   Drug Use Never     Social History     Tobacco Use   Smoking Status Never Smoker   Smokeless Tobacco Never Used     Family History:   Family History   Problem Relation Age of Onset    Heart disease Mother     Hypertension Mother     Heart disease Father        Meds/Allergies     Allergies   Allergen Reactions    Iodine - Food Allergy Swelling    Shellfish-Derived Products - Food Allergy Hives    Benzonatate Rash    Morphine Rash       Current Outpatient Medications:     acetaminophen (TYLENOL) 650 mg CR tablet, Take 1 tablet (650 mg total) by mouth every 8 (eight) hours as needed for mild pain (Patient not taking: Reported on 7/15/2021), Disp: 30 tablet, Rfl: 0    albuterol (2 5 mg/3 mL) 0 083 % nebulizer solution, , Disp: , Rfl:     Ascorbic Acid (vitamin C) 1000 MG tablet, Take 1 tablet (1,000 mg total) by mouth daily, Disp: 30 tablet, Rfl: 5    aspirin 81 mg chewable tablet, Chew 1 tablet (81 mg total) daily, Disp: 30 tablet, Rfl: 5    atorvastatin (LIPITOR) 40 mg tablet, TAKE 1 TABLET DAILY, Disp: 90 tablet, Rfl: 2    budesonide (PULMICORT) 0 5 mg/2 mL nebulizer solution, Take 2 mL (0 5 mg total) by nebulization 2 (two) times a day, Disp: 120 mL, Rfl: 11    Calcium Carbonate-Vitamin D3 (Calcium 600+D) 600-400 MG-UNIT TABS, Take 1 tablet by mouth daily  , Disp: , Rfl:     cholecalciferol (VITAMIN D3) 1,000 units tablet, Take 1 tablet (1,000 Units total) by mouth daily, Disp: 30 tablet, Rfl: 5    Continuous Blood Gluc  (FreeStyle Rasheeda 2 [place]) [facility], Use to test blood sugar twice a day, Disp: 1 each, Rfl: 0    Continuous Blood Gluc Sensor (FreeStyle Rasheeda 2 Sensor) MISC, Use to test blood sugar twice a day, Disp: 2 each, Rfl: 12    furosemide (LASIX) 20 mg tablet, TAKE 1 TABLET DAILY, Disp: 30 tablet, Rfl: 4    glipiZIDE (GLUCOTROL XL) 2 5 mg 24 hr tablet, Take 1 tablet (2 5 mg total) by mouth daily, Disp: 90 tablet, Rfl: 3    guaiFENesin-codeine (ROBITUSSIN AC) 100-10 mg/5 mL oral solution, TAKE FIVE ML TWO TIMES DAILY AS NEEDED FOR COUGH, Disp: 300 mL, Rfl: 1    isosorbide mononitrate (IMDUR) 30 mg 24 hr tablet, TAKE 1 TABLET DAILY, Disp: 30 tablet, Rfl: 5    lisinopril (ZESTRIL) 20 mg tablet, Take 1 tablet by mouth daily (Patient not taking: Reported on 11/29/2021 ), Disp: , Rfl:     methimazole (TAPAZOLE) 5 mg tablet, TAKE 1/2 TABLET DAILY, Disp: 45 tablet, Rfl: 0    metoprolol tartrate (LOPRESSOR) 25 mg tablet, TAKE 1/2 TAB EVERY 12 HOURS, Disp: 60 tablet, Rfl: 2    Multiple Vitamins-Minerals (CENTRUM SILVER 50+WOMEN PO), Take by mouth, Disp: , Rfl:     pantoprazole (PROTONIX) 40 mg tablet, TAKE 1 TABLET DAILY, Disp: 30 tablet, Rfl: 5    predniSONE 10 mg tablet, Take 3 tabs x 4 days then 2 tabs x 4 days then 1 tab daily x 4 days, Disp: 24 tablet, Rfl: 0    promethazine-codeine (PHENERGAN WITH CODEINE) 6 25-10 mg/5 mL syrup, Take 5 mL by mouth 3 (three) times a day as needed for cough, Disp: 120 mL, Rfl: 0    sodium chloride 0 9 % nebulizer solution, Take 3 mL by nebulization 2 (two) times a day as needed for wheezing, Disp: 180 mL, Rfl: 6    Vitals:   /66 mmHg  HR 68/minute  BP Readings from Last 3 Encounters:   22 160/92   22 136/78   22 148/80       Physical Exam:  Neurologic:  Alert & oriented x 3, no new focal deficits, Not in any acute distress,  Constitutional:  Well developed, well nourished, non-toxic appearance   Eyes:  Pupil equal and reacting to light, conjunctiva normal,   HENT:  Atraumatic, oropharynx moist, Neck- normal range of motion, no tenderness,  Neck supple, No JVP, No LNP   Respiratory:  Bilateral air entry, mostly clear to auscultation  Cardiovascular: S1-S2 regular with a I/VI ejection systolic murmur   GI:  Soft, nondistended, normal bowel sounds, nontender, no hepatosplenomegaly appreciated  Musculoskeletal:  No edema, no tenderness, no deformities  Skin:  Well hydrated, no rash   Lymphatic:  No lymphadenopathy noted   Extremities:  No edema and distal pulses are present      Cardiac testing:   No results found for this or any previous visit      Results for orders placed during the hospital encounter of 04/15/21    NM myocardial perfusion spect (rx stress and/or rest)    Narrative  [address] [facility]  [phone]    Rest/Stress Gated SPECT Myocardial Perfusion Imaging After Regadenoson    Patient: Maryanne De Santiago  MR number: [id number]  Account number: [de-identified]  : 1940  Age: 79 years  Gender: Female  Status: Outpatient  Location: Stress lab  Height: 62 in  Weight: 224 lb  BP: 122/ 65 mmHg    Allergies: IODINE - FOOD ALLERGY, SHELLFISH-DERIVED PRODUCTS - FOOD ALLERGY, MORPHINE    Diagnosis: R07 9 - Chest pain, unspecified    Primary Physician:  Niki Lewis  RN:  RISA Reynolds  Referring Physician:  Shantell Bradley MD  Group:  [facility]  Report Prepared By[de-identified]  RISA Reynolds  Interpreting Physician:  Mandi Jiang MD    INDICATIONS: Evaluation of known coronary artery disease  HISTORY: The patient is a 79year old  female  Chest pain status: chest pain  Coronary artery disease risk factors: dyslipidemia, hypertension, family history of premature coronary artery disease, and diabetes mellitus  Cardiovascular history: coronary artery disease  Prior cardiovascular procedures: percutaneous transluminal coronary angioplasty  Co-morbidity: history of lung disease and obesity  Medications: a nitrate, an ACE inhibitor/ARB, a diuretic,  aspirin, and diabetic medications  PHYSICAL EXAM: Baseline physical exam screening: no wheezes audible  REST ECG: Normal sinus rhythm  The ECG showed rare premature ventricular contractions  PROCEDURE: The study was performed in the the Stress lab  A regadenoson infusion pharmacologic stress test was performed  Gated SPECT myocardial perfusion imaging was performed after stress and at rest  Systolic blood pressure was 122  mmHg, at the start of the study  Diastolic blood pressure was 65 mmHg, at the start of the study  The heart rate was 66 bpm, at the start of the study  IV double checked  Regadenoson protocol:  Time HR bpm SBP mmHg DBP mmHg Symptoms Rhythm/conduct  Baseline 09:09 66 122 65 none NSR  Immediate 09:12 86 132 76 none NSR, frequent PVC's  1 min 09:13 836 136 79 none same as above  2 min 09:14 82 140 77 none same as above  3 min 09:15 85 133 74 none --  4 min 09:16 79 134 76 none same as above  No medications or fluids given      STRESS SUMMARY: Duration of pharmacologic stress was 3 min  Maximal heart rate during stress was 88 bpm  The heart rate response to stress was normal  There was normal resting blood pressure with an appropriate response to stress  The  rate-pressure product for the peak heart rate and blood pressure was 61379  There was no chest pain during stress  The stress test was terminated due to protocol completion  Pre oxygen saturation: 97 %  Peak oxygen saturation: 99 %  The  stress ECG was negative for ischemia and normal  Arrhythmia during stress: isolated premature ventricular beats  ISOTOPE ADMINISTRATION:  Resting isotope administration Stress isotope administration  Agent Tetrofosmin Tetrofosmin  Dose 10 5 mCi 33 mCi  Date 04/15/2021 04/15/2021    The radiopharmaceutical was injected at the peak effect of pharmacologic stress  MYOCARDIAL PERFUSION IMAGING:  Rotating projection images reveal mild breast attenuation and moderate subdiaphragmatic activity  Left ventricular size was normal  The TID ratio was 1 4  PERFUSION DEFECTS:  -  There was a small to moderate, mildly severe, partially reversible myocardial perfusion defect of the entire lateral and apical wall  GATED SPECT:  The calculated left ventricular ejection fraction was 61 %  Left ventricular ejection fraction was within normal limits by visual estimate  There was no left ventricular regional abnormality  SUMMARY:  -  Stress results: There was no chest pain during stress  -  ECG conclusions: The stress ECG was negative for ischemia and normal   -  Perfusion imaging: There was a small to moderate, mildly severe, partially reversible myocardial perfusion defect of the entire lateral and apical wall  -  Gated SPECT: The calculated left ventricular ejection fraction was 61 %  Left ventricular ejection fraction was within normal limits by visual estimate   There was no left ventricular regional abnormality   -  Impressions and recommendations: Abnormal study after pharmacologic vasodilation  There is evidence of partially reversible apical lateral wall defect  Defect was of mild severity  T i d  of 1 4 also noted  EF 61%    IMPRESSIONS: Abnormal study after pharmacologic vasodilation  There is evidence of partially reversible apical lateral wall defect  Defect was of mild severity  T i d  of 1 4 also noted  EF 61% Left ventricular systolic function was  normal     Prepared and signed by    Kim Haley MD  Signed 04/15/2021 13:53:15      Imaging:  Chest X-Ray:   X-ray chest 2 views    Result Date: 5/2/2021  Impression No acute cardiopulmonary disease  Workstation performed: [id number]       CT-scan of the chest:     No CTA results available for this patient  Lab Review   Lab Results   Component Value Date    WBC 4 1 01/20/2021    HGB 12 5 01/20/2021    HCT 37 5 01/20/2021    MCV 90 01/20/2021    RDW 13 1 01/20/2021     01/20/2021     BMP:  Lab Results   Component Value Date    SODIUM 140 12/03/2021    K 4 6 12/03/2021     12/03/2021    CO2 22 12/03/2021    BUN 11 12/03/2021    CREATININE 0 90 12/03/2021    GLUC 130 (H) 12/03/2021    CALCIUM 8 7 09/13/2020    EGFR 55 09/13/2020     LFT:  Lab Results   Component Value Date    AST 16 10/12/2021    ALT 11 10/12/2021    ALKPHOS 72 09/13/2020    TP 7 2 10/12/2021    ALB 4 1 10/12/2021      No components found for: TSH3  No results found for: [facility]  Lab Results   Component Value Date    HGBA1C 8 0 (H) 12/03/2021     Lipid Profile:   Lab Results   Component Value Date    CHOLESTEROL 115 10/12/2021    HDL 34 (L) 10/12/2021    LDLCALC 62 10/12/2021    TRIG 103 10/12/2021     Lab Results   Component Value Date    CHOLESTEROL 115 10/12/2021    CHOLESTEROL 138 07/22/2021     Lab Results   Component Value Date    TROPONINI <0 02 09/13/2020     No results found for: NTBNP   No results found for this or any previous visit (from the past 672 hour(s))            Dr Tamika Dhaliwal MD, [facility]      "This note has been constructed using a voice recognition system  Therefore there may be syntax, spelling, and/or grammatical errors   Please call if you have any questions  "

## 2022-02-11 ENCOUNTER — TELEPHONE (OUTPATIENT)
Dept: FAMILY MEDICINE CLINIC | Facility: CLINIC | Age: 82
End: 2022-02-11

## 2022-02-11 DIAGNOSIS — R05.3 CHRONIC COUGH: ICD-10-CM

## 2022-02-11 RX ORDER — PROMETHAZINE HYDROCHLORIDE AND CODEINE PHOSPHATE 6.25; 1 MG/5ML; MG/5ML
SOLUTION ORAL
Qty: 120 ML | Refills: 0 | Status: SHIPPED | OUTPATIENT
Start: 2022-02-11

## 2022-02-11 NOTE — TELEPHONE ENCOUNTER
[facility], [place], [facility] received [employer]

## 2022-02-28 ENCOUNTER — TELEPHONE (OUTPATIENT)
Dept: PULMONOLOGY | Facility: CLINIC | Age: 82
End: 2022-02-28

## 2022-02-28 NOTE — TELEPHONE ENCOUNTER
1.  Nuclear Sclerotic Cataract OU: Explained how cataracts can effect vision. Recommend clinical observation. The patient was advised to contact us if any change or worsening of vision. 2. PVD OU:  Patient was cautioned to call our office immediately if they experience a substantial change in their symptoms such as an increase in floaters persistent flashes loss of visual field (may appear as a shadow or a curtain) or decrease in visual acuity as these may indicate a retinal tear or detachment. If this is a new problem patient will need to return for re-examination  as determined by the [address] s/p RK OUReturn for an appointment in 1 year for comprehensive exam. w/ BAT with Dr. Oksana Brenner. Patient called, she was very hard to understand  She only wanted to speak with the Doctor, I tried to get more information from her as to what this would be about and all she said is a medication  That she needs to speak to the Doctor to give him an update, I tried getting the update from her and she said no you are not my doctor I'm not talking to you, go get my Doctor  I told her I would let you know she would like a call back to discuss   Please advise

## 2022-03-07 DIAGNOSIS — R05.3 CHRONIC COUGH: ICD-10-CM

## 2022-03-07 RX ORDER — CODEINE PHOSPHATE AND GUAIFENESIN 10; 100 MG/5ML; MG/5ML
5 SOLUTION ORAL 2 TIMES DAILY PRN
Qty: 300 ML | Refills: 1 | Status: SHIPPED | OUTPATIENT
Start: 2022-03-07 | End: 2022-04-27 | Stop reason: SDUPTHER

## 2022-04-12 DIAGNOSIS — K21.9 GASTROESOPHAGEAL REFLUX DISEASE WITHOUT ESOPHAGITIS: ICD-10-CM

## 2022-04-12 RX ORDER — PANTOPRAZOLE SODIUM 40 MG/1
TABLET, DELAYED RELEASE ORAL
Qty: 30 TABLET | Refills: 5 | Status: SHIPPED | OUTPATIENT
Start: 2022-04-12

## 2022-04-21 ENCOUNTER — OFFICE VISIT (OUTPATIENT)
Dept: OTOLARYNGOLOGY | Facility: CLINIC | Age: 82
End: 2022-04-21
Payer: COMMERCIAL

## 2022-04-21 VITALS — BODY MASS INDEX: 41.04 KG/M2 | TEMPERATURE: 97.3 F | WEIGHT: 223 LBS | HEIGHT: 62 IN

## 2022-04-21 DIAGNOSIS — Z93.0 TRACHEOSTOMY PRESENT (HCC): ICD-10-CM

## 2022-04-21 PROCEDURE — 99213 OFFICE O/P EST LOW 20 MIN: CPT | Performed by: OTOLARYNGOLOGY

## 2022-04-21 PROCEDURE — 31502 CHANGE OF WINDPIPE AIRWAY: CPT | Performed by: OTOLARYNGOLOGY

## 2022-04-21 PROCEDURE — 1036F TOBACCO NON-USER: CPT | Performed by: OTOLARYNGOLOGY

## 2022-04-21 PROCEDURE — 1160F RVW MEDS BY RX/DR IN RCRD: CPT | Performed by: OTOLARYNGOLOGY

## 2022-04-21 NOTE — PROGRESS NOTES
ENT f/u- ENT   Ebony Frank [de-identified] y o  female MRN: [id number]  Unit/Bed#:  Encounter: [id number]      Assessment/Plan     Assessment:  Trach changed to a new Shiley #6 CFS (cuffless, non-fenestrated)  I believe the patient would find a trach with disposable inner cannulas much more convenient and hygienic, so I have ordered that for next time  Besides that, the Shiley CFS has been discontinued and will not be available in the near future  I also believe she would do well with a Passey-Amadeo Speaking Valve, and have ordered her one  Plan:  F/u 3 months for trach change again  History of Present Illness   Physician Requesting Consult: No att  providers found  Reason for Consult / Principal Problem: tracheostomy care  HPI: Ebony Frank is a [de-identified]y o  year old female who presents with longstanding tracheostomy, for trach change  Consults    Review of Systems    Historical Information   Past Medical History:   Diagnosis Date    [facility] 01/01/2006    Coronary artery disease     [facility]      Past Surgical History:   Procedure Laterality Date    HYSTERECTOMY      REPLACEMENT TOTAL KNEE      TRACHEOSTOMY  01/01/2006     Social History   Social History     Substance and Sexual Activity   Alcohol Use Never     Social History     Substance and Sexual Activity   Drug Use Never     Social History     Tobacco Use   Smoking Status Never Smoker   Smokeless Tobacco Never Used     Family History: non-contributory    Meds/Allergies   all current active meds have been reviewed    Allergies   Allergen Reactions    Iodine - Food Allergy Swelling    Shellfish-Derived Products - Food Allergy Hives    Benzonatate Rash    Morphine Rash       Objective     [unfilled]    Invasive Devices  Report    Airway            Surgical Airway 814 days                Physical Exam  Neck: no thyromegaly nor adenopathy  Shiley #6 CFS in place    Stoma mature and clean  Lab Results: I have personally reviewed pertinent lab results  , CBC: No results found for: WBC, HGB, HCT, MCV, PLT, ADJUSTEDWBC, MCH, MCHC, RDW, MPV, NRBC  Imaging Studies: I have personally reviewed pertinent reports  EKG, Pathology, and Other Studies: I have personally reviewed pertinent reports

## 2022-04-27 ENCOUNTER — OFFICE VISIT (OUTPATIENT)
Dept: PULMONOLOGY | Facility: MEDICAL CENTER | Age: 82
End: 2022-04-27
Payer: COMMERCIAL

## 2022-04-27 VITALS
HEART RATE: 79 BPM | RESPIRATION RATE: 20 BRPM | HEIGHT: 62 IN | BODY MASS INDEX: 41.04 KG/M2 | SYSTOLIC BLOOD PRESSURE: 120 MMHG | DIASTOLIC BLOOD PRESSURE: 74 MMHG | OXYGEN SATURATION: 99 % | WEIGHT: 223 LBS | TEMPERATURE: 97.8 F

## 2022-04-27 DIAGNOSIS — J20.9 ACUTE TRACHEOBRONCHITIS: Primary | ICD-10-CM

## 2022-04-27 DIAGNOSIS — J38.6 SUBGLOTTIC STENOSIS: ICD-10-CM

## 2022-04-27 DIAGNOSIS — G47.34 SLEEP-RELATED NONOBSTRUCTIVE ALVEOLAR HYPOVENTILATION: ICD-10-CM

## 2022-04-27 DIAGNOSIS — R05.3 CHRONIC COUGH: ICD-10-CM

## 2022-04-27 DIAGNOSIS — J84.89 BOOP (BRONCHIOLITIS OBLITERANS WITH ORGANIZING PNEUMONIA) (HCC): ICD-10-CM

## 2022-04-27 PROCEDURE — 99214 OFFICE O/P EST MOD 30 MIN: CPT | Performed by: INTERNAL MEDICINE

## 2022-04-27 RX ORDER — CODEINE PHOSPHATE AND GUAIFENESIN 10; 100 MG/5ML; MG/5ML
5 SOLUTION ORAL 2 TIMES DAILY PRN
Qty: 300 ML | Refills: 1 | Status: SHIPPED | OUTPATIENT
Start: 2022-04-27 | End: 2022-06-23

## 2022-04-27 RX ORDER — CEFUROXIME AXETIL 500 MG/1
500 TABLET ORAL EVERY 12 HOURS SCHEDULED
Qty: 14 TABLET | Refills: 0 | Status: SHIPPED | OUTPATIENT
Start: 2022-04-27 | End: 2022-05-04

## 2022-04-27 RX ORDER — PREDNISONE 10 MG/1
TABLET ORAL
Qty: 30 TABLET | Refills: 0 | Status: SHIPPED | OUTPATIENT
Start: 2022-04-27

## 2022-04-27 NOTE — PROGRESS NOTES
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  Has had tracheostomy tube in place for several years  Had this changed to a #6 Shiley cuffless, non fenestrated tracheostomy on 4/21/22 by Dr Chayo Bell  Plan for her next visit with him was to try a [employer] valve see if she can speak better and not need to use her finger to occlude her trach to speak         Acute tracheobronchitis - Primary     Has increased cough recently for yellow mucus  I prescribe Ceftin 500 mg b i d  for 7 days  Also prednisone 10 mg - 30 mg x 4 days then 20 mg x 4 days then 10 mg for 4 days         Relevant Medications    cefuroxime (CEFTIN) 500 mg tablet    predniSONE 10 mg tablet    guaiFENesin-codeine (ROBITUSSIN AC) 100-10 mg/5 mL oral solution       Other    Chronic cough     Does have some increased 6 cough over baseline cough  She does well with Robitussin with codeine this helps her cough on starts to act up  She does not take every night  Relevant Medications    guaiFENesin-codeine (ROBITUSSIN AC) 100-10 mg/5 mL oral solution    Sleep-related nonobstructive alveolar hypoventilation     I did order nocturnal pulse oximetry recording to be done at nighttime she has not been using oxygen during sleep  Not having any nocturnal dyspnea  Relevant Orders    Pulse oximetry overnight            Follow-up, cough worsen usual      HPI     Alaina iscomplaining of some increased cough recently particular at nighttime  Sometimes has some discolored mucus that she expectorates through trach  Sometimes yellow  No fever orchills    Has slight shortness of breath withactivity  Not using her oxygen at night anymore  She did see ENT physician Dr Diana Willams and at that visit he did replace her cuffless Shiley with a new #6  Shiley cuffless nonfenestrated tracheostomy tube  He will be following up with her and plan was for her to try a [facility] speaking valve  She does have mild persistent asthma and is using budesonide 0 5 mg b i d  and nebulizer albuterol 2 5 mg b i d  to q i d  June Genao She does have history of bronchiolitis obliterans organizing pneumonia in the past    She has history of subglottic stenosis  She has had tracheostomy since 2005 to 2016 that was initially placed after she had acute respiratory failure prolonged ventilation when she was diagnosed with BOOP  She also has history of micropapillary thyroid cancer and had total thyroidectomy done September 7, 2006  She has coronary disease and has coronary stent placed 25 years ago at [facility]   She does have history of IV contrast allergy  She used to use 3 L of oxygen at bedtime    Has not used recently        Past Medical History:   Diagnosis Date    BOOP (bronchiolitis obliterans with organizing pneumonia) ([facility] [address] ) 01/01/2006    Coronary artery disease     Diabetes mellitus ([facility] [address] )        Past Surgical History:   Procedure Laterality Date    HYSTERECTOMY      REPLACEMENT TOTAL KNEE      TRACHEOSTOMY  01/01/2006         Current Outpatient Medications:     acetaminophen (TYLENOL) 650 mg CR tablet, Take 1 tablet (650 mg total) by mouth every 8 (eight) hours as needed for mild pain, Disp: 30 tablet, Rfl: 0    albuterol (2 5 mg/3 mL) 0 083 % nebulizer solution, , Disp: , Rfl:     Ascorbic Acid (vitamin C) 1000 MG tablet, Take 1 tablet (1,000 mg total) by mouth daily, Disp: 30 tablet, Rfl: 5    aspirin 81 mg chewable tablet, Chew 1 tablet (81 mg total) daily, Disp: 30 tablet, Rfl: 5    atorvastatin (LIPITOR) 40 mg tablet, TAKE 1 TABLET DAILY, Disp: 90 tablet, Rfl: 2    Calcium Carbonate-Vitamin D3 (Calcium 600+D) 600-400 MG-UNIT TABS, Take 1 tablet by mouth daily  , Disp: , Rfl:     cholecalciferol (VITAMIN D3) 1,000 units tablet, Take 1 tablet (1,000 Units total) by mouth daily, Disp: 30 tablet, Rfl: 5    Continuous Blood Gluc  (FreeStyle Rasheeda 2 [place]) JUD, Use to test blood sugar twice a day, Disp: 1 each, Rfl: 0    Continuous Blood Gluc Sensor (FreeStyle Rasheeda 2 Sensor) MISC, Use to test blood sugar twice a day, Disp: 2 each, Rfl: 12    furosemide (LASIX) 20 mg tablet, TAKE 1 TABLET DAILY, Disp: 30 tablet, Rfl: 4    glipiZIDE (GLUCOTROL XL) 2 5 mg 24 hr tablet, Take 1 tablet (2 5 mg total) by mouth daily, Disp: 90 tablet, Rfl: 3    guaiFENesin-codeine (ROBITUSSIN AC) 100-10 mg/5 mL oral solution, Take 5 mL by mouth 2 (two) times a day as needed for cough, Disp: 300 mL, Rfl: 1    isosorbide mononitrate (IMDUR) 30 mg 24 hr tablet, TAKE 1 TABLET DAILY, Disp: 30 tablet, Rfl: 5    lisinopril (ZESTRIL) 20 mg tablet, Take 1 tablet by mouth daily  , Disp: , Rfl:     metoprolol tartrate (LOPRESSOR) 25 mg tablet, TAKE 1/2 TAB EVERY 12 HOURS, Disp: 60 tablet, Rfl: 2    Multiple Vitamins-Minerals (CENTRUM SILVER 50+WOMEN PO), Take by mouth, Disp: , Rfl:     pantoprazole (PROTONIX) 40 mg tablet, TAKE ONE TABLET DAILY, Disp: 30 tablet, Rfl: 5    predniSONE 10 mg tablet, Take 3 tabs x 4 days then 2 tabs x 4 days then 1 tab daily x 4 days, Disp: 24 tablet, Rfl: 0    sodium chloride 0 9 % nebulizer solution, Take 3 mL by nebulization 2 (two) times a day as needed for wheezing, Disp: 180 mL, Rfl: 6    budesonide (PULMICORT) 0 5 mg/2 mL nebulizer solution, Take 2 mL (0 5 mg total) by nebulization 2 (two) times a day, Disp: 120 mL, Rfl: 11    cefuroxime (CEFTIN) 500 mg tablet, Take 1 tablet (500 mg total) by mouth every 12 (twelve) hours for 7 days, Disp: 14 tablet, Rfl: 0    methimazole (TAPAZOLE) 5 mg tablet, TAKE 1/2 TABLET DAILY, Disp: 45 tablet, Rfl: 0   predniSONE 10 mg tablet, Take 3 tablets x4 days then 2 tablets x4 days then 1 tablet x4 days  Take with breakfast, Disp: 30 tablet, Rfl: 0    Promethazine-Codeine 6 25-10 MG/5ML SOLN, TAKE 5 ML BY MOUTH 3 (THREE) TIMES A DAY AS NEEDED FOR COUGH (Patient not taking: Reported on 4/27/2022), Disp: 120 mL, Rfl: 0    Allergies   Allergen Reactions    Iodine - Food Allergy Swelling    Shellfish-Derived Products - Food Allergy Hives    Benzonatate Rash    Morphine Rash       Social History     Tobacco Use    Smoking status: Never Smoker    Smokeless tobacco: Never Used   Substance Use Topics    Alcohol use: Never         Family History   Problem Relation Age of Onset    Heart disease Mother     Hypertension Mother     Heart disease Father        Review of Systems   Constitutional: Negative for chills, fever and unexpected weight change  HENT: Negative for congestion, rhinorrhea and sore throat  Eyes: Negative for discharge and redness  Respiratory: Positive for cough and shortness of breath  Cardiovascular: Negative for chest pain, palpitations and leg swelling  Gastrointestinal: Negative for abdominal distention, abdominal pain and nausea  Endocrine: Negative for polydipsia and polyphagia  Genitourinary: Negative for dysuria  Musculoskeletal: Negative for joint swelling and myalgias  Skin: Negative for rash  Neurological: Negative for light-headedness  Psychiatric/Behavioral: Negative for decreased concentration  Height 5 ft 2 in tall weight 223 lb BMI 40 79    Vitals:    04/27/22 0957   BP: 120/74   Pulse: 79   Resp: 20   Temp: 97 8 °F (36 6 °C)   SpO2: 99%           Physical Exam  Vitals reviewed  Constitutional:       General: She is not in acute distress  Appearance: Normal appearance  She is well-developed  She is obese  HENT:      Head: Normocephalic  Nose: Nose normal       Mouth/Throat:      Mouth: Mucous membranes are moist       Pharynx: Oropharynx is clear  No oropharyngeal exudate  Comments: Mallampati score 4  Eyes:      Conjunctiva/sclera: Conjunctivae normal       Pupils: Pupils are equal, round, and reactive to light  Cardiovascular:      Rate and Rhythm: Normal rate and regular rhythm  Heart sounds: Normal heart sounds  Pulmonary:      Effort: Pulmonary effort is normal       Comments: There are some coarse expiratory wheezes in both lower lobes  Abdominal:      General: There is no distension  Palpations: Abdomen is soft  Tenderness: There is no abdominal tenderness  Musculoskeletal:      Cervical back: Neck supple  Comments: No edema, cyanosis, or clubbing   Lymphadenopathy:      Cervical: No cervical adenopathy  Skin:     General: Skin is warm and dry  Neurological:      Mental Status: She is alert and oriented to person, place, and time     Psychiatric:         Mood and Affect: Mood normal          Behavior: Behavior normal

## 2022-04-27 NOTE — PATIENT INSTRUCTIONS
Prednisone 10 mg - take 3 tablets x4 days then 2 tablets x4 days then 1 tablet x4 days  Start at dinner but take with breakfast after that    Start antibiotic Ceftin 500 mg 1 tablet twice a day    Start at dinner    I did prescribe the Robitussin with codeine you get take a tsp full twice a day as for cough    Will order oxygen test for nighttime without oxygen on

## 2022-04-29 ENCOUNTER — DOCUMENTATION (OUTPATIENT)
Dept: PULMONOLOGY | Facility: MEDICAL CENTER | Age: 82
End: 2022-04-29

## 2022-05-01 NOTE — ASSESSMENT & PLAN NOTE
Does have some increased 6 cough over baseline cough  She does well with Robitussin with codeine this helps her cough on starts to act up  She does not take every night

## 2022-05-01 NOTE — ASSESSMENT & PLAN NOTE
Last  chest x-ray done in April of 2021 did not show any infiltrates    She has history of Boop in the past

## 2022-05-01 NOTE — ASSESSMENT & PLAN NOTE
I did order nocturnal pulse oximetry recording to be done at nighttime she has not been using oxygen during sleep  Not having any nocturnal dyspnea

## 2022-05-01 NOTE — ASSESSMENT & PLAN NOTE
Has increased cough recently for yellow mucus  I prescribe Ceftin 500 mg b i d  for 7 days    Also prednisone 10 mg - 30 mg x 4 days then 20 mg x 4 days then 10 mg for 4 days

## 2022-05-01 NOTE — ASSESSMENT & PLAN NOTE
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

## 2022-05-19 ENCOUNTER — TELEPHONE (OUTPATIENT)
Dept: PULMONOLOGY | Facility: MEDICAL CENTER | Age: 82
End: 2022-05-19

## 2022-05-19 ENCOUNTER — OFFICE VISIT (OUTPATIENT)
Dept: ENDOCRINOLOGY | Facility: CLINIC | Age: 82
End: 2022-05-19
Payer: COMMERCIAL

## 2022-05-19 ENCOUNTER — TELEPHONE (OUTPATIENT)
Dept: PULMONOLOGY | Facility: CLINIC | Age: 82
End: 2022-05-19

## 2022-05-19 VITALS
HEART RATE: 69 BPM | SYSTOLIC BLOOD PRESSURE: 120 MMHG | BODY MASS INDEX: 40.48 KG/M2 | WEIGHT: 220 LBS | HEIGHT: 62 IN | DIASTOLIC BLOOD PRESSURE: 70 MMHG

## 2022-05-19 DIAGNOSIS — E11.65 TYPE 2 DIABETES MELLITUS WITH HYPERGLYCEMIA, WITHOUT LONG-TERM CURRENT USE OF INSULIN (HCC): ICD-10-CM

## 2022-05-19 DIAGNOSIS — C73 PAPILLARY THYROID CARCINOMA (HCC): Primary | ICD-10-CM

## 2022-05-19 DIAGNOSIS — E05.90 HYPERTHYROIDISM: ICD-10-CM

## 2022-05-19 DIAGNOSIS — Z93.0 TRACHEOSTOMY PRESENT (HCC): ICD-10-CM

## 2022-05-19 DIAGNOSIS — N18.31 STAGE 3A CHRONIC KIDNEY DISEASE (HCC): ICD-10-CM

## 2022-05-19 DIAGNOSIS — E66.01 CLASS 3 SEVERE OBESITY DUE TO EXCESS CALORIES WITHOUT SERIOUS COMORBIDITY WITH BODY MASS INDEX (BMI) OF 40.0 TO 44.9 IN ADULT (HCC): ICD-10-CM

## 2022-05-19 PROCEDURE — 3074F SYST BP LT 130 MM HG: CPT | Performed by: INTERNAL MEDICINE

## 2022-05-19 PROCEDURE — 1036F TOBACCO NON-USER: CPT | Performed by: INTERNAL MEDICINE

## 2022-05-19 PROCEDURE — 99214 OFFICE O/P EST MOD 30 MIN: CPT | Performed by: INTERNAL MEDICINE

## 2022-05-19 PROCEDURE — 3078F DIAST BP <80 MM HG: CPT | Performed by: INTERNAL MEDICINE

## 2022-05-19 PROCEDURE — 1160F RVW MEDS BY RX/DR IN RCRD: CPT | Performed by: INTERNAL MEDICINE

## 2022-05-19 NOTE — TELEPHONE ENCOUNTER
Called Jr back but was at dr with Laura Lux and couldn't give me much info   Will call me back once she is out of

## 2022-05-19 NOTE — TELEPHONE ENCOUNTER
Patient's son Jayant calling saying patient is coughing up blood and she believes it is coming form her lungs   Please advise and call Jayant back at [phone]

## 2022-05-19 NOTE — TELEPHONE ENCOUNTER
Jayant called back but stopped at the [place] office with [facility]  I spoke with Nabila Madrid and she said she is advising patient to go to the ED

## 2022-05-19 NOTE — PROGRESS NOTES
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  Patient has continued lower dose of methimazole 2 5 mg orally daily   -check TSH, free T4, T3     3  Type 2 diabetes mellitus not on long-term insulin therapy   4  CKD   5  Obesity   Poorly controlled with last A1c 8%   Continue glipizide XL 2 5 mg orally daily  -try to check fingerstick blood sugars  Per patient/family, has difficulty in checking fingerstick blood sugars, would like to have a continuous glucose monitor, prefers Dexcom-will order    6  History of bronchiolitis obliterans with organizing pneumonia status post tracheostomy  7  Bleeding at the site of tracheostomy, upper chest discomfort since this morning  - follow-up with pulmonology, if there is any worsening of symptoms, advised to go to urgent care/ER     CC:  Thyroid carcinoma    History of Present Illness     HPI:  Mariam Pelletier is a [de-identified] y o  female who is here for a follow-up of micro papillary thyroid carcinoma, hyperthyroidism, type 2 diabetes mellitus    Last seen in 11/2021  Per my prior notes   "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  Also history of bronchiolitis obliterans with organizing pneumonia, status post tracheostomy  Taking glipizide XL 2 5 mg orally daily   Developed hyperthyroidism postoperatively and has been on methimazole"      took methimazole 2 5 mg alternating with 5 mg daily for approximately  1 week - says that she developed breast pain when she takes the full tablet , says it makes "her feel sick:"     SOB is not worse than usual   c/o presure in the upper chest since this morning anmd  Bleeding at the trach site; bleeding has stopped, waiting to hear back from Pulm     No chest pain   Similar symptoms 10 years ago   For a month has been having occasional dizziness when she lays down or if she gets up quickly   Has not been checkign BG     For diabetes mellitus, on glipizide XL 2 5 mg orally daily     All other systems were reviewed and are negative         Review of Systems    Historical Information   Past Medical History:   Diagnosis Date    BOOP (bronchiolitis obliterans with organizing pneumonia) ([facility] [address] [facility] 01/01/2006    Coronary artery disease     Diabetes mellitus ([facility] )      Past Surgical History:   Procedure Laterality Date    HYSTERECTOMY      REPLACEMENT TOTAL KNEE      TRACHEOSTOMY  01/01/2006     Social History   Social History     Substance and Sexual Activity   Alcohol Use Never     Social History     Substance and Sexual Activity   Drug Use Never     Social History     Tobacco Use   Smoking Status Never Smoker   Smokeless Tobacco Never Used     Family History:   Family History   Problem Relation Age of Onset    Heart disease Mother     Hypertension Mother     Heart disease Father        Meds/Allergies   Current Outpatient Medications   Medication Sig Dispense Refill    acetaminophen (TYLENOL) 650 mg CR tablet Take 1 tablet (650 mg total) by mouth every 8 (eight) hours as needed for mild pain 30 tablet 0    albuterol (2 5 mg/3 mL) 0 083 % nebulizer solution       Ascorbic Acid (vitamin C) 1000 MG tablet Take 1 tablet (1,000 mg total) by mouth daily 30 tablet 5    aspirin 81 mg chewable tablet Chew 1 tablet (81 mg total) daily 30 tablet 5    atorvastatin (LIPITOR) 40 mg tablet TAKE 1 TABLET DAILY 90 tablet 2    budesonide (PULMICORT) 0 5 mg/2 mL nebulizer solution Take 2 mL (0 5 mg total) by nebulization 2 (two) times a day 120 mL 11    Calcium Carbonate-Vitamin D3 (Calcium 600+D) 600-400 MG-UNIT TABS Take 1 tablet by mouth daily        cholecalciferol (VITAMIN D3) 1,000 units tablet Take 1 tablet (1,000 Units total) by mouth daily 30 tablet 5    furosemide (LASIX) 20 mg tablet TAKE 1 TABLET DAILY 30 tablet 4    glipiZIDE (GLUCOTROL XL) 2 5 mg 24 hr tablet Take 1 tablet (2 5 mg total) by mouth daily 90 tablet 3    guaiFENesin-codeine (ROBITUSSIN AC) 100-10 mg/5 mL oral solution Take 5 mL by mouth 2 (two) times a day as needed for cough 300 mL 1    isosorbide mononitrate (IMDUR) 30 mg 24 hr tablet TAKE 1 TABLET DAILY 30 tablet 5    lisinopril (ZESTRIL) 20 mg tablet Take 1 tablet by mouth daily        methimazole (TAPAZOLE) 5 mg tablet TAKE 1/2 TABLET DAILY 45 tablet 0    metoprolol tartrate (LOPRESSOR) 25 mg tablet TAKE 1/2 TAB EVERY 12 HOURS 60 tablet 2    Multiple Vitamins-Minerals (CENTRUM SILVER 50+WOMEN PO) Take by mouth      pantoprazole (PROTONIX) 40 mg tablet TAKE ONE TABLET DAILY 30 tablet 5    predniSONE 10 mg tablet Take 3 tabs x 4 days then 2 tabs x 4 days then 1 tab daily x 4 days 24 tablet 0    predniSONE 10 mg tablet Take 3 tablets x4 days then 2 tablets x4 days then 1 tablet x4 days  Take with breakfast 30 tablet 0    sodium chloride 0 9 % nebulizer solution Take 3 mL by nebulization 2 (two) times a day as needed for wheezing 180 mL 6    Continuous Blood Gluc  (FreeStyle [place] 2 [place]) JUD Use to test blood sugar twice a day (Patient not taking: Reported on 5/19/2022) 1 each 0    Continuous Blood Gluc Sensor (FreeStyle Rasheeda 2 Sensor) MISC Use to test blood sugar twice a day (Patient not taking: Reported on 5/19/2022) 2 each 12    Promethazine-Codeine 6 25-10 MG/5ML SOLN TAKE 5 ML BY MOUTH 3 (THREE) TIMES A DAY AS NEEDED FOR COUGH (Patient not taking: Reported on 4/27/2022) 120 mL 0     No current facility-administered medications for this visit  Allergies   Allergen Reactions    Iodine - Food Allergy Swelling    Shellfish-Derived Products - Food Allergy Hives    Benzonatate Rash    Morphine Rash       Objective   Vitals: Blood pressure 120/70, pulse 69, height 5' 2" (1 575 m), weight 99 8 kg (220 lb)  Physical Exam  Constitutional:       Appearance: She is well-developed     HENT:      Head: Normocephalic and atraumatic  Eyes:      Conjunctiva/sclera: Conjunctivae normal       Pupils: Pupils are equal, round, and reactive to light  Neck:      Thyroid: No thyromegaly  Cardiovascular:      Rate and Rhythm: Normal rate and regular rhythm  Heart sounds: Normal heart sounds  No murmur heard  Pulmonary:      Breath sounds: No wheezing  Comments: Tracheostomy +  Abdominal:      General: There is no distension  Palpations: Abdomen is soft  Tenderness: There is no abdominal tenderness  Musculoskeletal:         General: Normal range of motion  Cervical back: Normal range of motion and neck supple  Right lower leg: No edema  Left lower leg: No edema  Skin:     General: Skin is warm and dry  Neurological:      Mental Status: She is alert and oriented to person, place, and time  Deep Tendon Reflexes: Reflexes normal       Comments: No thyromegaly, nontender, no nodules appreciated on palpation     Psychiatric:         Behavior: Behavior normal          The history was obtained from the review of the chart, patient  Lab Results:   Lab Results   Component Value Date/Time    Free t4 1 20 12/03/2021 07:32 AM     Lab Results   Component Value Date    WBC 4 1 01/20/2021    HGB 12 5 01/20/2021    HCT 37 5 01/20/2021    MCV 90 01/20/2021     01/20/2021     Lab Results   Component Value Date    CREATININE 0 90 12/03/2021    BUN 11 12/03/2021    K 4 6 12/03/2021     12/03/2021    CO2 22 12/03/2021     Lab Results   Component Value Date    HGBA1C 8 0 (H) 12/03/2021         Imaging Studies:       I have personally reviewed pertinent reports  Portions of the record may have been created with voice recognition software  Occasional wrong word or "sound a like" substitutions may have occurred due to the inherent limitations of voice recognition software  Read the chart carefully and recognize, using context, where substitutions have occurred

## 2022-05-19 NOTE — PATIENT INSTRUCTIONS
If there is any worsening of symptoms, please go to urgent care / ER   Continue current dose of glipizide, methimazole   Please try to check fingerstick blood sugars, we are placing a prescription for a Dexcom continuous glucose monitor  Please have labs done as ordered

## 2022-05-19 NOTE — TELEPHONE ENCOUNTER
Patient walked into the office with her son stating she spoke with Sunday  I explained Sunday is in [facility]  I spoke with patient, she states she is bleeding from her trachea stoma, the most of which happened this morning for which she  had to change as the bleeding seeped all over her clothes  I suggested that she go to the emergency room as there are no doctors in the office presently    Her son states he will take her to the ER

## 2022-05-20 LAB
EST. AVERAGE GLUCOSE BLD GHB EST-MCNC: 292 MG/DL
HBA1C MFR BLD HPLC: 11.8 %
HBA1C MFR BLD: 11.8 % (ref 4.8–5.6)

## 2022-05-21 LAB
ALBUMIN SERPL-MCNC: 3.9 G/DL (ref 3.7–4.7)
ALBUMIN/GLOB SERPL: 1.6 {RATIO} (ref 1.2–2.2)
ALP SERPL-CCNC: 100 IU/L (ref 44–121)
ALT SERPL-CCNC: 18 IU/L (ref 0–32)
AST SERPL-CCNC: 18 IU/L (ref 0–40)
BASOPHILS # BLD AUTO: 0 X10E3/UL (ref 0–0.2)
BASOPHILS NFR BLD AUTO: 0 %
BILIRUB SERPL-MCNC: <0.2 MG/DL (ref 0–1.2)
BUN SERPL-MCNC: 19 MG/DL (ref 8–27)
BUN/CREAT SERPL: 16 (ref 12–28)
CALCIUM SERPL-MCNC: 9.8 MG/DL (ref 8.7–10.3)
CHLORIDE SERPL-SCNC: 97 MMOL/L (ref 96–106)
CO2 SERPL-SCNC: 23 MMOL/L (ref 20–29)
CREAT SERPL-MCNC: 1.21 MG/DL (ref 0.57–1)
EGFR: 45 ML/MIN/1.73
EOSINOPHIL # BLD AUTO: 0.1 X10E3/UL (ref 0–0.4)
EOSINOPHIL NFR BLD AUTO: 1 %
ERYTHROCYTE [DISTWIDTH] IN BLOOD BY AUTOMATED COUNT: 12.6 % (ref 11.7–15.4)
GLOBULIN SER-MCNC: 2.4 G/DL (ref 1.5–4.5)
GLUCOSE SERPL-MCNC: 332 MG/DL (ref 65–99)
HCT VFR BLD AUTO: 40.1 % (ref 34–46.6)
HGB BLD-MCNC: 13.2 G/DL (ref 11.1–15.9)
IMM GRANULOCYTES # BLD: 0 X10E3/UL (ref 0–0.1)
IMM GRANULOCYTES NFR BLD: 1 %
LYMPHOCYTES # BLD AUTO: 2.3 X10E3/UL (ref 0.7–3.1)
LYMPHOCYTES NFR BLD AUTO: 41 %
MCH RBC QN AUTO: 28.6 PG (ref 26.6–33)
MCHC RBC AUTO-ENTMCNC: 32.9 G/DL (ref 31.5–35.7)
MCV RBC AUTO: 87 FL (ref 79–97)
MONOCYTES # BLD AUTO: 0.4 X10E3/UL (ref 0.1–0.9)
MONOCYTES NFR BLD AUTO: 7 %
NEUTROPHILS # BLD AUTO: 2.8 X10E3/UL (ref 1.4–7)
NEUTROPHILS NFR BLD AUTO: 50 %
PLATELET # BLD AUTO: 157 X10E3/UL (ref 150–450)
POTASSIUM SERPL-SCNC: 5.1 MMOL/L (ref 3.5–5.2)
PROT SERPL-MCNC: 6.3 G/DL (ref 6–8.5)
RBC # BLD AUTO: 4.61 X10E6/UL (ref 3.77–5.28)
SODIUM SERPL-SCNC: 134 MMOL/L (ref 134–144)
WBC # BLD AUTO: 5.6 X10E3/UL (ref 3.4–10.8)

## 2022-05-23 DIAGNOSIS — E11.65 TYPE 2 DIABETES MELLITUS WITH HYPERGLYCEMIA, WITHOUT LONG-TERM CURRENT USE OF INSULIN (HCC): Primary | ICD-10-CM

## 2022-05-23 DIAGNOSIS — E05.90 HYPERTHYROIDISM: ICD-10-CM

## 2022-05-23 RX ORDER — LANCETS
EACH MISCELLANEOUS
Qty: 100 EACH | Refills: 3 | Status: SHIPPED | OUTPATIENT
Start: 2022-05-23

## 2022-05-23 RX ORDER — BLOOD SUGAR DIAGNOSTIC
STRIP MISCELLANEOUS
Qty: 100 STRIP | Refills: 3 | Status: SHIPPED | OUTPATIENT
Start: 2022-05-23

## 2022-05-23 RX ORDER — BLOOD-GLUCOSE METER
EACH MISCELLANEOUS
Qty: 1 KIT | Refills: 0 | Status: SHIPPED | OUTPATIENT
Start: 2022-05-23

## 2022-05-23 NOTE — TELEPHONE ENCOUNTER
----- Message from Rick Fung MD sent at 5/23/2022  1:04 PM EDT -----  Blood sugar high at 332 mg/dL , A1c 11 8% which is not at goal has trended up from 8%   Needs better blood sugar control   Please ask patient to check blood sugars and send for review   I do not see any results for thyroid function test which were ordered, please ask the lab if these were done    If not, please advised patient to have TSH, free T4 T4, T3

## 2022-05-23 NOTE — TELEPHONE ENCOUNTER
Spoke with patient and reviewed her lab results  She understood  The patient confirmed she does not test her blood sugar  Doesn't own a meter  I will prescribe    Ordered thyroid labs to be done when received

## 2022-06-02 LAB
T3 SERPL-MCNC: 98 NG/DL (ref 71–180)
T4 FREE SERPL-MCNC: 1.24 NG/DL (ref 0.82–1.77)
TSH SERPL DL<=0.005 MIU/L-ACNC: 0.45 UIU/ML (ref 0.45–4.5)

## 2022-06-03 LAB
DME PARACHUTE DELIVERY DATE REQUESTED: NORMAL
DME PARACHUTE ITEM DESCRIPTION: NORMAL
DME PARACHUTE ORDER STATUS: NORMAL
DME PARACHUTE SUPPLIER NAME: NORMAL
DME PARACHUTE SUPPLIER PHONE: NORMAL

## 2022-06-06 DIAGNOSIS — R05.3 CHRONIC COUGH: ICD-10-CM

## 2022-06-06 DIAGNOSIS — I10 HYPERTENSION, ESSENTIAL: ICD-10-CM

## 2022-06-06 RX ORDER — ISOSORBIDE MONONITRATE 30 MG/1
TABLET, EXTENDED RELEASE ORAL
Qty: 30 TABLET | Refills: 5 | Status: SHIPPED | OUTPATIENT
Start: 2022-06-06

## 2022-06-08 ENCOUNTER — TELEPHONE (OUTPATIENT)
Dept: ENDOCRINOLOGY | Facility: CLINIC | Age: 82
End: 2022-06-08

## 2022-06-08 DIAGNOSIS — E05.90 HYPERTHYROIDISM: Primary | ICD-10-CM

## 2022-06-08 NOTE — TELEPHONE ENCOUNTER
----- Message from Ye Montilla MD sent at 6/6/2022  4:17 PM EDT -----  TSH continues to be suppressed however slightly higher compared to before  Free T4, T3 within normal limits  Repeat free to 3 months, if TSH continues to be suppressed, would strongly recommend trying a higher dose of methimazole again/considering alternative therapies for management of hyperthyroidism

## 2022-06-08 NOTE — TELEPHONE ENCOUNTER
Spoke with patient and reviewed labs and recommendations  She understood    Mailed lab slips to be done in 3 months

## 2022-06-22 DIAGNOSIS — J45.30 MILD PERSISTENT ASTHMA WITHOUT COMPLICATION: Primary | ICD-10-CM

## 2022-06-23 RX ORDER — ALBUTEROL SULFATE 2.5 MG/3ML
SOLUTION RESPIRATORY (INHALATION)
Qty: 375 ML | Refills: 11 | Status: SHIPPED | OUTPATIENT
Start: 2022-06-23

## 2022-06-23 RX ORDER — CODEINE PHOSPHATE AND GUAIFENESIN 10; 100 MG/5ML; MG/5ML
SOLUTION ORAL
Qty: 300 ML | Refills: 1 | Status: SHIPPED | OUTPATIENT
Start: 2022-06-23

## 2022-07-05 DIAGNOSIS — I10 HYPERTENSION, ESSENTIAL: Primary | ICD-10-CM

## 2022-07-06 RX ORDER — LISINOPRIL 20 MG/1
TABLET ORAL
Qty: 90 TABLET | Refills: 1 | Status: SHIPPED | OUTPATIENT
Start: 2022-07-06 | End: 2022-10-05 | Stop reason: SDUPTHER

## 2022-07-21 ENCOUNTER — OFFICE VISIT (OUTPATIENT)
Dept: OTOLARYNGOLOGY | Facility: CLINIC | Age: 82
End: 2022-07-21
Payer: COMMERCIAL

## 2022-07-21 VITALS
WEIGHT: 223 LBS | SYSTOLIC BLOOD PRESSURE: 140 MMHG | RESPIRATION RATE: 20 BRPM | HEIGHT: 62 IN | BODY MASS INDEX: 41.04 KG/M2 | TEMPERATURE: 97.4 F | DIASTOLIC BLOOD PRESSURE: 90 MMHG | HEART RATE: 80 BPM | OXYGEN SATURATION: 98 %

## 2022-07-21 DIAGNOSIS — E05.90 HYPERTHYROIDISM: ICD-10-CM

## 2022-07-21 DIAGNOSIS — Z93.0 TRACHEOSTOMY PRESENT (HCC): ICD-10-CM

## 2022-07-21 DIAGNOSIS — J39.8 TRACHEAL STENOSIS: Primary | ICD-10-CM

## 2022-07-21 PROCEDURE — 99213 OFFICE O/P EST LOW 20 MIN: CPT | Performed by: OTOLARYNGOLOGY

## 2022-07-21 PROCEDURE — 31502 CHANGE OF WINDPIPE AIRWAY: CPT | Performed by: OTOLARYNGOLOGY

## 2022-07-21 RX ORDER — METHIMAZOLE 5 MG/1
TABLET ORAL
Qty: 45 TABLET | Refills: 0 | Status: SHIPPED | OUTPATIENT
Start: 2022-07-21 | End: 2022-09-08

## 2022-07-21 NOTE — PROGRESS NOTES
Assessment/Plan:  Luis A Hyde changed to a new Quang #6 DCFS (disposable cannula, cuffless)  Change every 3 months  Change inner cannula daily and PRN  No problem-specific Assessment & Plan notes found for this encounter  Diagnoses and all orders for this visit:    Tracheal stenosis    Tracheostomy present (HCC)          Subjective:      Patient ID: Maikol Nolan is a 81 y o  female  Pt here for trach change        The following portions of the patient's history were reviewed and updated as appropriate: allergies, current medications, past family history, past medical history, past social history, past surgical history and problem list     Review of Systems      Objective:      /90 (BP Location: Left arm, Patient Position: Sitting, Cuff Size: Large)   Pulse 80   Temp (!) 97 4 °F (36 3 °C) (Temporal)   Resp 20   Ht 5' 2" (1 575 m)   Wt 101 kg (223 lb)   SpO2 98%   BMI 40 79 kg/m²          Physical Exam  Neck:

## 2022-07-27 ENCOUNTER — OFFICE VISIT (OUTPATIENT)
Dept: FAMILY MEDICINE CLINIC | Facility: CLINIC | Age: 82
End: 2022-07-27
Payer: COMMERCIAL

## 2022-07-27 VITALS
RESPIRATION RATE: 20 BRPM | SYSTOLIC BLOOD PRESSURE: 130 MMHG | WEIGHT: 224.3 LBS | BODY MASS INDEX: 41.28 KG/M2 | HEIGHT: 62 IN | DIASTOLIC BLOOD PRESSURE: 70 MMHG | OXYGEN SATURATION: 98 % | HEART RATE: 71 BPM

## 2022-07-27 DIAGNOSIS — I25.119 CORONARY ARTERY DISEASE WITH ANGINA PECTORIS, UNSPECIFIED VESSEL OR LESION TYPE, UNSPECIFIED WHETHER NATIVE OR TRANSPLANTED HEART (HCC): ICD-10-CM

## 2022-07-27 DIAGNOSIS — E11.65 TYPE 2 DIABETES MELLITUS WITH HYPERGLYCEMIA, WITHOUT LONG-TERM CURRENT USE OF INSULIN (HCC): Primary | ICD-10-CM

## 2022-07-27 PROCEDURE — 1170F FXNL STATUS ASSESSED: CPT | Performed by: FAMILY MEDICINE

## 2022-07-27 PROCEDURE — 1125F AMNT PAIN NOTED PAIN PRSNT: CPT | Performed by: FAMILY MEDICINE

## 2022-07-27 PROCEDURE — G0439 PPPS, SUBSEQ VISIT: HCPCS | Performed by: FAMILY MEDICINE

## 2022-07-27 PROCEDURE — 1160F RVW MEDS BY RX/DR IN RCRD: CPT | Performed by: FAMILY MEDICINE

## 2022-07-27 PROCEDURE — 3725F SCREEN DEPRESSION PERFORMED: CPT | Performed by: FAMILY MEDICINE

## 2022-07-27 PROCEDURE — 3288F FALL RISK ASSESSMENT DOCD: CPT | Performed by: FAMILY MEDICINE

## 2022-07-27 NOTE — PROGRESS NOTES
Eats healthy  Wonders if allergic to some fruits: gets rash and lips swell up  Daily short walk as able  Stays indoors mainly    DM: doesn't check home BS - would be difficult for her  Breathing about same, very limiting  No CP  Sleep: pretty good  Uses 3 pillows  Gets a sense of strangulating if sleeps flat  Back pain remains problematic  Takes Prednisone 1 - 3 tabs a day according to how she feels  On long term  Takes prantoprazole to avoid 'stomach burning'    Exam is stable  Given 5 wishes to fill out       Assessment and Plan:     Problem List Items Addressed This Visit        Endocrine    Type 2 diabetes mellitus with hyperglycemia, without long-term current use of insulin ([facility] ) - Primary    Relevant Orders    Ambulatory Referral to Ophthalmology       Cardiovascular and Mediastinum    Coronary artery disease with angina pectoris, unspecified vessel or lesion type, unspecified whether native or transplanted heart ([facility] )        BMI Counseling: There is no height or weight on file to calculate BMI  The BMI is above normal  Nutrition recommendations include encouraging healthy choices of fruits and vegetables, decreasing fast food intake, consuming healthier snacks and limiting drinks that contain sugar  Rationale for BMI follow-up plan is due to patient being overweight or obese  Depression Screening and Follow-up Plan: Patient was screened for depression during today's encounter  They screened negative with a PHQ-2 score of 0  Preventive health issues were discussed with patient, and age appropriate screening tests were ordered as noted in patient's After Visit Summary  Personalized health advice and appropriate referrals for health education or preventive services given if needed, as noted in patient's After Visit Summary       History of Present Illness:     Patient presents for a Medicare Wellness Visit    HPI   Patient Care Team:  Kyle Hardy MD as PCP - General (Family Medicine)  Kyle Hardy MD as PCP - PCP-[facility] ([facility])  Coy Davis MD as PCP - PCP-[facility] ([facility])     Review of Systems:     Review of Systems     Problem List:     Patient Active Problem List   Diagnosis    BOOP (bronchiolitis obliterans with organizing pneumonia) ([facility] )    Coronary artery disease with angina pectoris, unspecified vessel or lesion type, unspecified whether native or transplanted heart ([place] [address] )    Hypertension, essential    Post-surgical hypothyroidism    Tracheal stenosis    Tracheostomy present ([place] [address] )    Subglottic stenosis    Chronic cough    Class 3 severe obesity due to excess calories without serious comorbidity with body mass index (BMI) of 40 0 to 44 9 in adult ([place] [address] )    Sleep-related nonobstructive alveolar hypoventilation    Mild persistent asthma without complication    Mixed hyperlipidemia    Hypertensive heart disease    Chronic bilateral low back pain without sciatica    Acute tracheobronchitis    Mild persistent asthma with exacerbation    Stage 3a chronic kidney disease ([place] [address] )    Papillary thyroid carcinoma ([place] [address] )    Hyperthyroidism    Type 2 diabetes mellitus with hyperglycemia, without long-term current use of insulin ([facility])      Past Medical and Surgical History:     Past Medical History:   Diagnosis Date    BOOP (bronchiolitis obliterans with organizing pneumonia) ([facility] ) 2006    Coronary artery disease     Diabetes mellitus ([place] [address] )      Past Surgical History:   Procedure Laterality Date    HYSTERECTOMY      REPLACEMENT TOTAL KNEE      TRACHEOSTOMY  2006      Family History:     Family History   Problem Relation Age of Onset    Heart disease Mother     Hypertension Mother     Heart disease Father     Pneumonia Brother          of COVID    Dementia Brother       Social History:     Social History     Socioeconomic History    Marital status:       Spouse name: None    Number of children: None    Years of education: None    Highest education level: None Occupational History    None   Tobacco Use    Smoking status: Never Smoker    Smokeless tobacco: Never Used   Vaping Use    Vaping Use: Never used   Substance and Sexual Activity    Alcohol use: Never    Drug use: Never    Sexual activity: None   Other Topics Concern    None   Social History Narrative    Lives with son, who transports her to her appointments    She generally stays at home  Social Determinants of Health     Financial Resource Strain: Not on file   Food Insecurity: No Food Insecurity    Worried About Running Out of Food in the Last Year: Never true    Alfredo of Food in the Last Year: Never true   Transportation Needs: No Transportation Needs    Lack of Transportation (Medical): No    Lack of Transportation (Non-Medical):  No   Physical Activity: Not on file   Stress: Not on file   Social Connections: Not on file   Intimate Partner Violence: Not At Risk    Fear of Current or Ex-Partner: No    Emotionally Abused: No    Physically Abused: No    Sexually Abused: No   Housing Stability: Not on file      Medications and Allergies:     Current Outpatient Medications   Medication Sig Dispense Refill    acetaminophen (TYLENOL) 650 mg CR tablet Take 1 tablet (650 mg total) by mouth every 8 (eight) hours as needed for mild pain 30 tablet 0    albuterol (2 5 mg/3 mL) 0 083 % nebulizer solution USE ONE VIAL VIA NEBULIZER EVERY 4 (FOUR) HOURS AS NEEDED FOR WHEEZING OR SHORTNESS OF BREATH 375 mL 11    Ascorbic Acid (vitamin C) 1000 MG tablet Take 1 tablet (1,000 mg total) by mouth daily 30 tablet 5    aspirin 81 mg chewable tablet Chew 1 tablet (81 mg total) daily 30 tablet 5    atorvastatin (LIPITOR) 40 mg tablet TAKE 1 TABLET DAILY 90 tablet 2    Calcium Carbonate-Vitamin D3 (Calcium 600+D) 600-400 MG-UNIT TABS Take 1 tablet by mouth daily        cholecalciferol (VITAMIN D3) 1,000 units tablet Take 1 tablet (1,000 Units total) by mouth daily 30 tablet 5    furosemide (LASIX) 20 mg tablet TAKE 1 TABLET DAILY 30 tablet 0    glipiZIDE (GLUCOTROL XL) 2 5 mg 24 hr tablet Take 1 tablet (2 5 mg total) by mouth daily 90 tablet 3    guaiFENesin-codeine (ROBITUSSIN AC) 100-10 mg/5 mL oral solution TAKE 5 ML TWO TIMES A DAY AS NEEDED FOR COUGH 300 mL 1    isosorbide mononitrate (IMDUR) 30 mg 24 hr tablet TAKE 1 TABLET DAILY 30 tablet 5    lisinopril (ZESTRIL) 20 mg tablet TAKE 1 TABLET DAILY 90 tablet 1    methimazole (TAPAZOLE) 5 mg tablet TAKE 1/2 TABLET DAILY 45 tablet 0    metoprolol tartrate (LOPRESSOR) 25 mg tablet TAKE 1/2 TAB EVERY 12 HOURS 60 tablet 2    Multiple Vitamins-Minerals (CENTRUM SILVER 50+WOMEN PO) Take by mouth      pantoprazole (PROTONIX) 40 mg tablet TAKE ONE TABLET DAILY 30 tablet 5    predniSONE 10 mg tablet Take 3 tabs x 4 days then 2 tabs x 4 days then 1 tab daily x 4 days 24 tablet 0    predniSONE 10 mg tablet Take 3 tablets x4 days then 2 tablets x4 days then 1 tablet x4 days    Take with breakfast 30 tablet 0    sodium chloride 0 9 % nebulizer solution Take 3 mL by nebulization 2 (two) times a day as needed for wheezing 180 mL 6    Blood Glucose Monitoring Suppl (OneTouch Verio) w/Device KIT Use to test blood sugar daily (Patient not taking: Reported on 7/27/2022) 1 kit 0    budesonide (PULMICORT) 0 5 mg/2 mL nebulizer solution Take 2 mL (0 5 mg total) by nebulization 2 (two) times a day 120 mL 11    Continuous Blood Gluc  (FreeStyle Rasheeda 2 [place]) JUD Use to test blood sugar twice a day (Patient not taking: No sig reported) 1 each 0    Continuous Blood Gluc Sensor (FreeStyle Rasheeda 2 Sensor) MISC Use to test blood sugar twice a day (Patient not taking: No sig reported) 2 each 12    glucose blood (OneTouch Verio) test strip Use to test blood sugar 2 times daily (Patient not taking: Reported on 7/27/2022) 100 strip 3    Lancets (onetouch ultrasoft) lancets Use to test blood sugar 2 times a day (Patient not taking: Reported on 7/27/2022) 100 each 3  Promethazine-Codeine 6 25-10 MG/5ML SOLN TAKE 5 ML BY MOUTH 3 (THREE) TIMES A DAY AS NEEDED FOR COUGH (Patient not taking: No sig reported) 120 mL 0     No current facility-administered medications for this visit  Allergies   Allergen Reactions    Iodine - Food Allergy Swelling    Shellfish-Derived Products - Food Allergy Hives    Benzonatate Rash    Morphine Rash      Immunizations:     Immunization History   Administered Date(s) Administered    COVID-19 PFIZER VACCINE 0 3 ML IM 06/15/2021, 07/06/2021, 01/12/2022    INFLUENZA 09/30/2005, 02/23/2008    Influenza, high dose seasonal 0 7 mL 10/28/2021    Pneumococcal Polysaccharide PPV23 09/30/2005      Health Maintenance: There are no preventive care reminders to display for this patient  Topic Date Due    COVID-19 Vaccine (4 - Booster for Pfizer series) 05/12/2022    Influenza Vaccine (1) 09/01/2022      Medicare Screening Tests and Risk Assessments:     Darian Painting is here for her Subsequent Wellness visit  Health Risk Assessment:   Patient rates overall health as good  Patient feels that their physical health rating is same  Patient is satisfied with their life  Eyesight was rated as same  Hearing was rated as slightly worse  Patient feels that their emotional and mental health rating is same  Patients states they are never, rarely angry  Patient states they are sometimes unusually tired/fatigued  Pain experienced in the last 7 days has been some  Patient's pain rating has been 9/10  Patient states that she has experienced no weight loss or gain in last 6 months  Fall Risk Screening: In the past year, patient has experienced: no history of falling in past year      Urinary Incontinence Screening:   Patient has not leaked urine accidently in the last six months  Home Safety:  Patient does not have trouble with stairs inside or outside of their home  Patient has working smoke alarms and has working carbon monoxide detector   Home safety hazards include: none  Nutrition:   Current diet is Diabetic and Low Cholesterol  Medications:   Patient is currently taking over-the-counter supplements  OTC medications include: see medication list  Patient is able to manage medications  Activities of Daily Living (ADLs)/Instrumental Activities of Daily Living (IADLs):   Walk and transfer into and out of bed and chair?: Yes  Dress and groom yourself?: Yes    Bathe or shower yourself?: Yes    Feed yourself? Yes  Do your laundry/housekeeping?: Yes  Manage your money, pay your bills and track your expenses?: Yes  Make your own meals?: Yes    Do your own shopping?: Yes    Previous Hospitalizations:   Any hospitalizations or ED visits within the last 12 months?: No      PREVENTIVE SCREENINGS      Cardiovascular Screening:    General: Screening Not Indicated and History Lipid Disorder      Diabetes Screening:     General: Screening Not Indicated and History Diabetes      Cervical Cancer Screening:    General: Screening Not Indicated      Lung Cancer Screening:     General: Screening Not Indicated    Screening, Brief Intervention, and Referral to Treatment (SBIRT)    Screening  Typical number of drinks in a day: 0  Typical number of drinks in a week: 0  Interpretation: Low risk drinking behavior  Single Item Drug Screening:  How often have you used an illegal drug (including marijuana) or a prescription medication for non-medical reasons in the past year? never    Single Item Drug Screen Score: 0  Interpretation: Negative screen for possible drug use disorder    No exam data present     Physical Exam:       Physical Exam   /70 (BP Location: Left arm, Patient Position: Sitting, Cuff Size: Adult)   Pulse 71   Resp 20   Ht 5' 2" (1 575 m)   Wt 102 kg (224 lb 4 8 oz)   SpO2 98%   BMI 41 03 kg/m²   Baseline is mild SOB, with occasional coughing   Has tracheostomy  Lungs: mild scattered rhonchi (this is baseline)  Cor RR no obvious murmur  Abd soft  Ext: + pedal edema  Feet: + bunions, but no skin breakdown or ulcers    Sin Caicedo MD

## 2022-08-10 ENCOUNTER — OFFICE VISIT (OUTPATIENT)
Dept: CARDIOLOGY CLINIC | Facility: CLINIC | Age: 82
End: 2022-08-10
Payer: COMMERCIAL

## 2022-08-10 VITALS
HEIGHT: 62 IN | BODY MASS INDEX: 40.85 KG/M2 | WEIGHT: 222 LBS | TEMPERATURE: 98 F | DIASTOLIC BLOOD PRESSURE: 70 MMHG | SYSTOLIC BLOOD PRESSURE: 120 MMHG | HEART RATE: 70 BPM | OXYGEN SATURATION: 97 %

## 2022-08-10 DIAGNOSIS — I10 HYPERTENSION, ESSENTIAL: ICD-10-CM

## 2022-08-10 DIAGNOSIS — I25.119 CORONARY ARTERY DISEASE WITH ANGINA PECTORIS, UNSPECIFIED VESSEL OR LESION TYPE, UNSPECIFIED WHETHER NATIVE OR TRANSPLANTED HEART (HCC): Primary | ICD-10-CM

## 2022-08-10 PROCEDURE — 99214 OFFICE O/P EST MOD 30 MIN: CPT | Performed by: INTERNAL MEDICINE

## 2022-08-10 PROCEDURE — 93000 ELECTROCARDIOGRAM COMPLETE: CPT | Performed by: INTERNAL MEDICINE

## 2022-08-10 PROCEDURE — 1160F RVW MEDS BY RX/DR IN RCRD: CPT | Performed by: INTERNAL MEDICINE

## 2022-08-10 NOTE — PROGRESS NOTES
Progress Note - Cardiology Office  [facility]    Alida 81 y o  female MRN: [id number]  : 1940  Encounter: [id number]      ASSESSMENT:   CAD, s/p PCI at [facility], 25 years ago  ASCVD risk score is 34 3%     Patient has declined to undergo cardiac catheterization stating that she does not have chest pain at the [facility] was informed that the purpose of the procedure was to evaluate and revascularize if needed in view of an abnormal stress test   Again re-emphasized to the patient to let us know if she changes her mind and she starts having chest pain again    Echo   Normal left ventricular size  Normal LV systolic function (EF 53 %)  There are no regional wall motion abnormalities  Normal diastolic LV function  Normal RV function  Normal size right ventricle  Left atrium is enlarged  Right atrium is enlarged  Normal mitral valve  Aortic valve tri-leaflet  Normal tricuspid valve  Pulmonary artery systolic pressure is normal (30 mmHg)        Stress echo :10/16: - Assessment: no ischemia-induced wall motion abnormalities (negative stress echo  test)  - At rest, normal global systolic LV-function (EF 47-72%)  - With stress, hypercontractility of the left ventricle (EF 80%)  - In the recovery phase, normal global systolic LV function (EF 09%)  - Assessment: normal LV function     Lexiscan, 04/15/2021    IMPRESSIONS: Abnormal study after pharmacologic vasodilation  There is evidence of partially reversible apical lateral wall defect  Defect was of mild in severity  TID ratio of 1 4 also noted   EF 61% Left ventricular systolic function was  normal      Patient was scheduled for cardiac catheterization at [address] [place] [address] at that time and subsequently a has expressed desire not to undergo cardiac catheterization        Hyperlipidemia  2021:  , normal liver enzymes  2021:  LDL 79, triglyceride 90, HDL 35, normal AST and ALT  10/12/2021:  LDL 62, triglycerides 103, HDL 34, normal AST and ALT        Hyperthyroidism     DMT2,      Obesity:  BMI is 40 06     Hypertension  BP in the office today is  120/70 mmHg     History of PE     BOOP, history of tracheal stenosis, s/p tracheostomy     Allergy to iodine, shellfish derived products and morphine        RECOMMENDATIONS:  Continue current cardiac medications  Repeat lipid profile and LFTs  Continue cardiovascular exercise as tolerated  Low salt, low carb, low sugar, low-cholesterol diet         Please call [phone] if any questions  HPI :     Marisa Juarez is a 81y o  year old female who came for follow up  She denies any cardiac symptoms  She complains of back pain but no chest pain or unusual dyspnea  REVIEW OF SYSTEMS:  Positive for back pain    Negative for chest pain, increased dyspnea or syncope    Historical Information   Past Medical History:   Diagnosis Date    BOOP (bronchiolitis obliterans with organizing pneumonia) ([facility] [address] ) 2006    Coronary artery disease     Diabetes mellitus ([facility] )      Past Surgical History:   Procedure Laterality Date    HYSTERECTOMY      REPLACEMENT TOTAL KNEE      TRACHEOSTOMY  2006     Social History     Substance and Sexual Activity   Alcohol Use Never     Social History     Substance and Sexual Activity   Drug Use Never     Social History     Tobacco Use   Smoking Status Never Smoker   Smokeless Tobacco Never Used     Family History:   Family History   Problem Relation Age of Onset    Heart disease Mother     Hypertension Mother     Heart disease Father     Pneumonia Brother          of COVID    Dementia Brother        Meds/Allergies     Allergies   Allergen Reactions    Iodine - Food Allergy Swelling    Shellfish-Derived Products - Food Allergy Hives    Benzonatate Rash    Morphine Rash       Current Outpatient Medications:     acetaminophen (TYLENOL) 650 mg CR tablet, Take 1 tablet (650 mg total) by mouth every 8 (eight) hours as needed for mild pain, Disp: 30 tablet, Rfl: 0    albuterol (2 5 mg/3 mL) 0 083 % nebulizer solution, USE ONE VIAL VIA NEBULIZER EVERY 4 (FOUR) HOURS AS NEEDED FOR WHEEZING OR SHORTNESS OF BREATH, Disp: 375 mL, Rfl: 11    Ascorbic Acid (vitamin C) 1000 MG tablet, Take 1 tablet (1,000 mg total) by mouth daily, Disp: 30 tablet, Rfl: 5    aspirin 81 mg chewable tablet, Chew 1 tablet (81 mg total) daily, Disp: 30 tablet, Rfl: 5    atorvastatin (LIPITOR) 40 mg tablet, TAKE 1 TABLET DAILY, Disp: 90 tablet, Rfl: 2    Calcium Carbonate-Vitamin D3 (Calcium 600+D) 600-400 MG-UNIT TABS, Take 1 tablet by mouth daily  , Disp: , Rfl:     cholecalciferol (VITAMIN D3) 1,000 units tablet, Take 1 tablet (1,000 Units total) by mouth daily, Disp: 30 tablet, Rfl: 5    furosemide (LASIX) 20 mg tablet, TAKE 1 TABLET DAILY, Disp: 30 tablet, Rfl: 0    glipiZIDE (GLUCOTROL XL) 2 5 mg 24 hr tablet, Take 1 tablet (2 5 mg total) by mouth daily, Disp: 90 tablet, Rfl: 3    guaiFENesin-codeine (ROBITUSSIN AC) 100-10 mg/5 mL oral solution, TAKE 5 ML TWO TIMES A DAY AS NEEDED FOR COUGH, Disp: 300 mL, Rfl: 1    isosorbide mononitrate (IMDUR) 30 mg 24 hr tablet, TAKE 1 TABLET DAILY, Disp: 30 tablet, Rfl: 5    lisinopril (ZESTRIL) 20 mg tablet, TAKE 1 TABLET DAILY, Disp: 90 tablet, Rfl: 1    methimazole (TAPAZOLE) 5 mg tablet, TAKE 1/2 TABLET DAILY, Disp: 45 tablet, Rfl: 0    metoprolol tartrate (LOPRESSOR) 25 mg tablet, TAKE 1/2 TAB EVERY 12 HOURS, Disp: 60 tablet, Rfl: 2    Multiple Vitamins-Minerals (CENTRUM SILVER 50+WOMEN PO), Take by mouth, Disp: , Rfl:     pantoprazole (PROTONIX) 40 mg tablet, TAKE ONE TABLET DAILY, Disp: 30 tablet, Rfl: 5    sodium chloride 0 9 % nebulizer solution, Take 3 mL by nebulization 2 (two) times a day as needed for wheezing, Disp: 180 mL, Rfl: 6    Blood Glucose Monitoring Suppl (OneTouch Verio) w/Device KIT, Use to test blood sugar daily (Patient not taking: No sig reported), Disp: 1 kit, Rfl: 0    budesonide (PULMICORT) 0 5 mg/2 mL nebulizer solution, Take 2 mL (0 5 mg total) by nebulization 2 (two) times a day, Disp: 120 mL, Rfl: 11    Continuous Blood Gluc  (FreeStyle Rasheeda 2 Seeley) JUD, Use to test blood sugar twice a day (Patient not taking: No sig reported), Disp: 1 each, Rfl: 0    Continuous Blood Gluc Sensor (FreeStyle Rasheeda 2 Sensor) MISC, Use to test blood sugar twice a day (Patient not taking: No sig reported), Disp: 2 each, Rfl: 12    glucose blood (OneTouch Verio) test strip, Use to test blood sugar 2 times daily (Patient not taking: No sig reported), Disp: 100 strip, Rfl: 3    Lancets (onetouch ultrasoft) lancets, Use to test blood sugar 2 times a day (Patient not taking: No sig reported), Disp: 100 each, Rfl: 3    predniSONE 10 mg tablet, Take 3 tabs x 4 days then 2 tabs x 4 days then 1 tab daily x 4 days (Patient not taking: Reported on 8/10/2022), Disp: 24 tablet, Rfl: 0    predniSONE 10 mg tablet, Take 3 tablets x4 days then 2 tablets x4 days then 1 tablet x4 days  Take with breakfast (Patient not taking: Reported on 8/10/2022), Disp: 30 tablet, Rfl: 0    Promethazine-Codeine 6 25-10 MG/5ML SOLN, TAKE 5 ML BY MOUTH 3 (THREE) TIMES A DAY AS NEEDED FOR COUGH (Patient not taking: No sig reported), Disp: 120 mL, Rfl: 0    Vitals: Blood pressure 120/70, pulse 70, temperature 98 °F (36 7 °C), height 5' 2" (1 575 m), weight 101 kg (222 lb), SpO2 97 %  ?  Body mass index is 40 6 kg/m²    Vitals:    08/10/22 0755   Weight: 101 kg (222 lb)     BP Readings from Last 3 Encounters:   08/10/22 120/70   07/27/22 130/70   07/21/22 140/90       Physical Exam:  Neurologic:  Alert & oriented x 3, no new focal deficits, Not in any acute distress,  Constitutional:  Obese  Eyes:  Pupil equal and reacting to light, conjunctiva normal,   HENT:  Tracheostomy  Respiratory:  Bilateral air entry, mostly clear to auscultation  Cardiovascular: S1-S2 regular with a I/VI ejection systolic murmur   GI:  Soft, nondistended, normal bowel sounds, nontender, no hepatosplenomegaly appreciated  Musculoskeletal: no deformities  Skin:  Well hydrated, no rash   Lymphatic:  No lymphadenopathy noted   Extremities:  Mild right lower extremity edema        Diagnostic Studies Review Cardio:      EKG:  Normal sinus rhythm, heart rate 70 per minute, minimal voltage criteria for LVH, maybe normal variant    Cardiac testing:   No results found for this or any previous visit  Results for orders placed during the hospital encounter of 04/15/21    NM myocardial perfusion spect (rx stress and/or rest)    [facility]  [address]  [address], [place] [address]  [phone]    Rest/Stress Gated SPECT Myocardial Perfusion Imaging After Regadenoson    Patient: Manuel Cummings  MR number: [id number]  Account number: [de-identified]  : 1940  Age: 79 years  Gender: Female  Status: Outpatient  Location: Stress lab  Height: 62 in  Weight: 224 lb  BP: 122/ 65 mmHg    Allergies: IODINE - FOOD ALLERGY, SHELLFISH-DERIVED PRODUCTS - FOOD ALLERGY, MORPHINE    Diagnosis: R07 9 - Chest pain, unspecified    Primary Physician:  Shivani Tobar  RN:  RISA Kim  Referring Physician:  Radha Reyes MD  Group:  [facility]  Report Prepared By[de-identified]  RISA Kim  Interpreting Physician:  Sumit Rodriguez MD    INDICATIONS: Evaluation of known coronary artery disease  HISTORY: The patient is a 79year old  female  Chest pain status: chest pain  Coronary artery disease risk factors: dyslipidemia, hypertension, family history of premature coronary artery disease, and diabetes mellitus  Cardiovascular history: coronary artery disease  Prior cardiovascular procedures: percutaneous transluminal coronary angioplasty  Co-morbidity: history of lung disease and obesity   Medications: a nitrate, an ACE inhibitor/ARB, a diuretic,  aspirin, and diabetic medications  PHYSICAL EXAM: Baseline physical exam screening: no wheezes audible  REST ECG: Normal sinus rhythm  The ECG showed rare premature ventricular contractions  PROCEDURE: The study was performed in the the Stress lab  A regadenoson infusion pharmacologic stress test was performed  Gated SPECT myocardial perfusion imaging was performed after stress and at rest  Systolic blood pressure was 122  mmHg, at the start of the study  Diastolic blood pressure was 65 mmHg, at the start of the study  The heart rate was 66 bpm, at the start of the study  IV double checked  Regadenoson protocol:  Time HR bpm SBP mmHg DBP mmHg Symptoms Rhythm/conduct  Baseline 09:09 66 122 65 none NSR  Immediate 09:12 86 132 76 none NSR, frequent PVC's  1 min 09:13 836 136 79 none same as above  2 min 09:14 82 140 77 none same as above  3 min 09:15 85 133 74 none --  4 min 09:16 79 134 76 none same as above  No medications or fluids given  STRESS SUMMARY: Duration of pharmacologic stress was 3 min  Maximal heart rate during stress was 88 bpm  The heart rate response to stress was normal  There was normal resting blood pressure with an appropriate response to stress  The  rate-pressure product for the peak heart rate and blood pressure was 38947  There was no chest pain during stress  The stress test was terminated due to protocol completion  Pre oxygen saturation: 97 %  Peak oxygen saturation: 99 %  The  stress ECG was negative for ischemia and normal  Arrhythmia during stress: isolated premature ventricular beats  ISOTOPE ADMINISTRATION:  Resting isotope administration Stress isotope administration  Agent Tetrofosmin Tetrofosmin  Dose 10 5 mCi 33 mCi  Date 04/15/2021 04/15/2021    The radiopharmaceutical was injected at the peak effect of pharmacologic stress  MYOCARDIAL PERFUSION IMAGING:  Rotating projection images reveal mild breast attenuation and moderate subdiaphragmatic activity   Left ventricular size was normal  The TID ratio was 1 4  PERFUSION DEFECTS:  -  There was a small to moderate, mildly severe, partially reversible myocardial perfusion defect of the entire lateral and apical wall  GATED SPECT:  The calculated left ventricular ejection fraction was 61 %  Left ventricular ejection fraction was within normal limits by visual estimate  There was no left ventricular regional abnormality  SUMMARY:  -  Stress results: There was no chest pain during stress  -  ECG conclusions: The stress ECG was negative for ischemia and normal   -  Perfusion imaging: There was a small to moderate, mildly severe, partially reversible myocardial perfusion defect of the entire lateral and apical wall  -  Gated SPECT: The calculated left ventricular ejection fraction was 61 %  Left ventricular ejection fraction was within normal limits by visual estimate  There was no left ventricular regional abnormality   -  Impressions and recommendations: Abnormal study after pharmacologic vasodilation  There is evidence of partially reversible apical lateral wall defect  Defect was of mild severity  T i d  of 1 4 also noted  EF 61%    IMPRESSIONS: Abnormal study after pharmacologic vasodilation  There is evidence of partially reversible apical lateral wall defect  Defect was of mild severity  T i d  of 1 4 also noted  EF 61% Left ventricular systolic function was  normal     Prepared and signed by    Titus Hilton MD  Signed 04/15/2021 13:53:15      Imaging:  Chest X-Ray:   No Chest XR results available for this patient  CT-scan of the chest:     No CTA results available for this patient    Lab Review   Lab Results   Component Value Date    WBC 5 6 05/20/2022    HGB 13 2 05/20/2022    HCT 40 1 05/20/2022    MCV 87 05/20/2022    RDW 12 6 05/20/2022     05/20/2022     BMP:  Lab Results   Component Value Date    SODIUM 134 05/20/2022    K 5 1 05/20/2022    CL 97 05/20/2022    CO2 23 05/20/2022    BUN 19 05/20/2022    CREATININE 1 21 (H) 05/20/2022    GLUC 332 (H) 05/20/2022    CALCIUM 8 7 09/13/2020    EGFR 45 (L) 05/20/2022     LFT:  Lab Results   Component Value Date    AST 18 05/20/2022    ALT 18 05/20/2022    ALKPHOS 72 09/13/2020    TP 6 3 05/20/2022    ALB 3 9 05/20/2022      No components found for: TSH3  No results found for: [facility]  Lab Results   Component Value Date    HGBA1C 11 8 (H) 05/20/2022     Lipid Profile:   Lab Results   Component Value Date    CHOLESTEROL 115 10/12/2021    HDL 34 (L) 10/12/2021    LDLCALC 62 10/12/2021    TRIG 103 10/12/2021     Lab Results   Component Value Date    CHOLESTEROL 115 10/12/2021    CHOLESTEROL 138 07/22/2021     Lab Results   Component Value Date    TROPONINI <0 02 09/13/2020     No results found for: NTBNP   No results found for this or any previous visit (from the past 672 hour(s))  Dr Radha Reyes MD, [facility]      "This note has been constructed using a voice recognition system  Therefore there may be syntax, spelling, and/or grammatical errors   Please call if you have any questions  "

## 2022-09-01 DIAGNOSIS — R05.3 CHRONIC COUGH: ICD-10-CM

## 2022-09-02 RX ORDER — CODEINE PHOSPHATE AND GUAIFENESIN 10; 100 MG/5ML; MG/5ML
SOLUTION ORAL
Qty: 300 ML | Refills: 1 | Status: SHIPPED | OUTPATIENT
Start: 2022-09-02 | End: 2022-10-04

## 2022-09-03 LAB
ALBUMIN SERPL-MCNC: 4 G/DL (ref 3.6–4.6)
ALP SERPL-CCNC: 94 IU/L (ref 44–121)
ALT SERPL-CCNC: 16 IU/L (ref 0–32)
AST SERPL-CCNC: 19 IU/L (ref 0–40)
BILIRUB DIRECT SERPL-MCNC: 0.11 MG/DL (ref 0–0.4)
BILIRUB SERPL-MCNC: 0.3 MG/DL (ref 0–1.2)
CHOLEST SERPL-MCNC: 110 MG/DL (ref 100–199)
HDLC SERPL-MCNC: 32 MG/DL
LDLC SERPL CALC-MCNC: 61 MG/DL (ref 0–99)
LDLC/HDLC SERPL: 1.9 RATIO (ref 0–3.2)
PROT SERPL-MCNC: 6.8 G/DL (ref 6–8.5)
SL AMB VLDL CHOLESTEROL CALC: 17 MG/DL (ref 5–40)
T3 SERPL-MCNC: 178 NG/DL (ref 71–180)
T4 FREE SERPL-MCNC: 1.89 NG/DL (ref 0.82–1.77)
TRIGL SERPL-MCNC: 87 MG/DL (ref 0–149)
TSH SERPL DL<=0.005 MIU/L-ACNC: <0.005 UIU/ML (ref 0.45–4.5)

## 2022-09-06 ENCOUNTER — TELEPHONE (OUTPATIENT)
Dept: CARDIOLOGY CLINIC | Facility: CLINIC | Age: 82
End: 2022-09-06

## 2022-09-06 NOTE — TELEPHONE ENCOUNTER
----- Message from America Robert MD sent at 9/3/2022 12:16 PM EDT -----  Please call and inform patient that the blood test showed that bad cholesterol is in normal range, and  liver enzymes are normal     Continue current medications

## 2022-09-08 ENCOUNTER — OFFICE VISIT (OUTPATIENT)
Dept: ENDOCRINOLOGY | Facility: CLINIC | Age: 82
End: 2022-09-08
Payer: COMMERCIAL

## 2022-09-08 VITALS
HEART RATE: 63 BPM | SYSTOLIC BLOOD PRESSURE: 128 MMHG | DIASTOLIC BLOOD PRESSURE: 76 MMHG | HEIGHT: 62 IN | WEIGHT: 223.1 LBS | BODY MASS INDEX: 41.06 KG/M2

## 2022-09-08 DIAGNOSIS — E05.90 HYPERTHYROIDISM: Primary | ICD-10-CM

## 2022-09-08 DIAGNOSIS — E66.01 CLASS 3 SEVERE OBESITY DUE TO EXCESS CALORIES WITHOUT SERIOUS COMORBIDITY WITH BODY MASS INDEX (BMI) OF 40.0 TO 44.9 IN ADULT (HCC): ICD-10-CM

## 2022-09-08 DIAGNOSIS — C73 PAPILLARY THYROID CARCINOMA (HCC): ICD-10-CM

## 2022-09-08 DIAGNOSIS — E11.65 TYPE 2 DIABETES MELLITUS WITH HYPERGLYCEMIA, WITHOUT LONG-TERM CURRENT USE OF INSULIN (HCC): ICD-10-CM

## 2022-09-08 DIAGNOSIS — Z93.0 TRACHEOSTOMY PRESENT (HCC): ICD-10-CM

## 2022-09-08 DIAGNOSIS — N18.31 STAGE 3A CHRONIC KIDNEY DISEASE (HCC): ICD-10-CM

## 2022-09-08 LAB — SL AMB POCT HEMOGLOBIN AIC: 7.9 (ref ?–6.5)

## 2022-09-08 PROCEDURE — 99215 OFFICE O/P EST HI 40 MIN: CPT | Performed by: INTERNAL MEDICINE

## 2022-09-08 PROCEDURE — 83036 HEMOGLOBIN GLYCOSYLATED A1C: CPT | Performed by: INTERNAL MEDICINE

## 2022-09-08 RX ORDER — BLOOD-GLUCOSE METER
EACH MISCELLANEOUS
Qty: 1 KIT | Refills: 0 | Status: SHIPPED | OUTPATIENT
Start: 2022-09-08

## 2022-09-08 RX ORDER — LANCETS
EACH MISCELLANEOUS
Qty: 100 EACH | Refills: 3 | Status: SHIPPED | OUTPATIENT
Start: 2022-09-08

## 2022-09-08 RX ORDER — PROPYLTHIOURACIL 50 MG/1
50 TABLET ORAL 2 TIMES DAILY
Qty: 180 TABLET | Refills: 3 | Status: SHIPPED | OUTPATIENT
Start: 2022-09-08

## 2022-09-08 RX ORDER — BLOOD SUGAR DIAGNOSTIC
STRIP MISCELLANEOUS
Qty: 100 STRIP | Refills: 3 | Status: SHIPPED | OUTPATIENT
Start: 2022-09-08

## 2022-09-08 NOTE — PROGRESS NOTES
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  Based on labs, has hyperthyroidism, not well controlled on current dose of methimazole  Patient reports side effects with higher dose of methimazole  Discussed alternative treatments, at this time will switch to propylthiouracil, if unable to tolerate, would recommend considering radioactive iodine ablation   -discontinue methimazole  Start per parathyroidism 50 mg orally twice a day  -check TSH, free T4, T3 in 6-8 weeks along with CBC, CMP     3  Type 2 diabetes mellitus not on long-term insulin therapy with hyperglycemia   Point of care A1c 7 9%-not at goal however this has improved as compared to before  -continue glipizide at current dose  Advised to staggered check blood sugars   -consistent carb diet, try to include some physical activity into daily routine   Advised to send blood sugar log for review in 2-3 weeks   Repeat labs in 3 months   -diabetic eye exam strongly recommended     4   Right shoulder pain, limited movements-recommend follow-up with primary care, may benefit from physical therapy     CC:  Hyperthyroidism    History of Present Illness     HPI:  Katerine Calvo is a 81 y o  female who is here for a follow-up of papillary thyroid carcinoma, hyperthyroidism, type 2 diabetes mellitus     Last seen in 05/2022   Per my prior notes   "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  Also history of bronchiolitis obliterans with organizing pneumonia, status post tracheostomy     Taking glipizide XL 2 5 mg orally daily   Developed hyperthyroidism postoperatively and has been on methimazole"       took methimazole 2 5 mg alternating with 5 mg daily for approximately  1 week - says that she developed breast pain when she takes the full tablet , says it makes "her feel sick:"     Patient insists that the documentation is incorrect and that she did not have thyroid carcinoma   Reviewed path report in care everywhere:    Thyroid, right, lobectomy:          1) Minute focal area, suspicous for early papillary carcinoma  Since the last 1 week c/o pain in the right arm and leg  C/o difficulty in lifting it up  Massaging helps some     Taking methimazole 2 5 mg daily   Has been sweating more at night   No shakes/ tremors/ palpitations   No diarrhea/ constipation   Sleep is variable     For diabetes mellitus, on glipizide XL 2 5 mg orally daily   Last eye exam:  Due, says she is nunable to find a doctor who takes her insurance     does not check BG at home     Has polyuria  Never met with nutritionist   Avoids sugar, still has honey; Avoids fruit   No vision changes   No CP/SOB      All other systems were reviewed and are negative         Review of Systems    Historical Information   Past Medical History:   Diagnosis Date    BOOP (bronchiolitis obliterans with organizing pneumonia) ([facility] [address] ) 2006    Coronary artery disease     Diabetes mellitus ([facility] )      Past Surgical History:   Procedure Laterality Date    HYSTERECTOMY      REPLACEMENT TOTAL KNEE      TRACHEOSTOMY  2006     Social History   Social History     Substance and Sexual Activity   Alcohol Use Never     Social History     Substance and Sexual Activity   Drug Use Never     Social History     Tobacco Use   Smoking Status Never Smoker   Smokeless Tobacco Never Used     Family History:   Family History   Problem Relation Age of Onset    Heart disease Mother     Hypertension Mother     Heart disease Father     Pneumonia Brother          of COVID    Dementia Brother        Meds/Allergies   Current Outpatient Medications   Medication Sig Dispense Refill    acetaminophen (TYLENOL) 650 mg CR tablet Take 1 tablet (650 mg total) by mouth every 8 (eight) hours as needed for mild pain 30 tablet 0    albuterol (2 5 mg/3 mL) 0 083 % nebulizer solution USE ONE VIAL VIA NEBULIZER EVERY 4 (FOUR) HOURS AS NEEDED FOR WHEEZING OR SHORTNESS OF BREATH 375 mL 11    Ascorbic Acid (vitamin C) 1000 MG tablet Take 1 tablet (1,000 mg total) by mouth daily 30 tablet 5    aspirin 81 mg chewable tablet Chew 1 tablet (81 mg total) daily 30 tablet 5    atorvastatin (LIPITOR) 40 mg tablet TAKE 1 TABLET DAILY 90 tablet 2    budesonide (PULMICORT) 0 5 mg/2 mL nebulizer solution Take 2 mL (0 5 mg total) by nebulization 2 (two) times a day 120 mL 11    Calcium Carbonate-Vitamin D3 (Calcium 600+D) 600-400 MG-UNIT TABS Take 1 tablet by mouth daily        furosemide (LASIX) 20 mg tablet TAKE 1 TABLET DAILY 30 tablet 0    glipiZIDE (GLUCOTROL XL) 2 5 mg 24 hr tablet Take 1 tablet (2 5 mg total) by mouth daily 90 tablet 3    guaiFENesin-codeine (ROBITUSSIN AC) 100-10 mg/5 mL oral solution TAKE 5 ML BY MOUTH 2 (TWO) TIMES A DAY AS NEEDED FOR COUGH 300 mL 1    isosorbide mononitrate (IMDUR) 30 mg 24 hr tablet TAKE 1 TABLET DAILY 30 tablet 5    lisinopril (ZESTRIL) 20 mg tablet TAKE 1 TABLET DAILY 90 tablet 1    methimazole (TAPAZOLE) 5 mg tablet TAKE 1/2 TABLET DAILY 45 tablet 0    metoprolol tartrate (LOPRESSOR) 25 mg tablet TAKE 1/2 TAB EVERY 12 HOURS 60 tablet 2    Multiple Vitamins-Minerals (CENTRUM SILVER 50+WOMEN PO) Take by mouth      pantoprazole (PROTONIX) 40 mg tablet TAKE ONE TABLET DAILY 30 tablet 5    sodium chloride 0 9 % nebulizer solution Take 3 mL by nebulization 2 (two) times a day as needed for wheezing 180 mL 6    Blood Glucose Monitoring Suppl (OneTouch Verio) w/Device KIT Use to test blood sugar daily (Patient not taking: No sig reported) 1 kit 0    cholecalciferol (VITAMIN D3) 1,000 units tablet Take 1 tablet (1,000 Units total) by mouth daily (Patient not taking: Reported on 9/8/2022) 30 tablet 5    Continuous Blood Gluc  (FreeStyle Rasheeda 2 [place]) JUD Use to test blood sugar twice a day (Patient not taking: No sig reported) 1 each 0    Continuous Blood Gluc Sensor (FreeStyle Rasheeda 2 Sensor) MISC Use to test blood sugar twice a day (Patient not taking: No sig reported) 2 each 12    glucose blood (OneTouch Verio) test strip Use to test blood sugar 2 times daily (Patient not taking: No sig reported) 100 strip 3    Lancets (onetouch ultrasoft) lancets Use to test blood sugar 2 times a day (Patient not taking: No sig reported) 100 each 3    predniSONE 10 mg tablet Take 3 tabs x 4 days then 2 tabs x 4 days then 1 tab daily x 4 days (Patient not taking: No sig reported) 24 tablet 0    predniSONE 10 mg tablet Take 3 tablets x4 days then 2 tablets x4 days then 1 tablet x4 days  Take with breakfast (Patient not taking: No sig reported) 30 tablet 0    Promethazine-Codeine 6 25-10 MG/5ML SOLN TAKE 5 ML BY MOUTH 3 (THREE) TIMES A DAY AS NEEDED FOR COUGH (Patient not taking: No sig reported) 120 mL 0     No current facility-administered medications for this visit  Allergies   Allergen Reactions    Iodine - Food Allergy Swelling    Shellfish-Derived Products - Food Allergy Hives    Benzonatate Rash    Morphine Rash       Objective   Vitals: Blood pressure 128/76, pulse 63, height 5' 2" (1 575 m), weight 101 kg (223 lb 1 6 oz)  Physical Exam  Constitutional:       Appearance: She is well-developed  HENT:      Head: Normocephalic and atraumatic  Mouth/Throat:      Comments: Tracheostomy +    Eyes:      Conjunctiva/sclera: Conjunctivae normal       Pupils: Pupils are equal, round, and reactive to light  Neck:      Thyroid: No thyromegaly  Cardiovascular:      Rate and Rhythm: Normal rate and regular rhythm  Heart sounds: Normal heart sounds  No murmur heard  Pulmonary:      Effort: Pulmonary effort is normal       Breath sounds: Normal breath sounds  No wheezing  Abdominal:      General: There is no distension  Palpations: Abdomen is soft  Tenderness: There is no abdominal tenderness  Musculoskeletal:      Cervical back: Normal range of motion and neck supple  Comments: right shoulder pain and restricted movements      Skin:     General: Skin is warm and dry  Neurological:      Mental Status: She is alert and oriented to person, place, and time  Comments: Tremors +     Psychiatric:         Behavior: Behavior normal          The history was obtained from the review of the chart, patient and family  Lab Results:   Component      Latest Ref Rng & Units 12/3/2021 6/1/2022 9/2/2022   TSH, POC      0 450 - 4 500 uIU/mL 0 223 (L) 0 446 (L) <0 005 (L)   T3, Total      71 - 180 ng/dL 108 98 178   Free T4      0 82 - 1 77 ng/dL 1 20 1 24 1 89 (H)   Free T3      2 0 - 4 4 pg/mL 3 1     THYROGLOBULIN AB      0 0 - 0 9 IU/mL <1 0     Thyroglobulin-NORM      1 5 - 38 5 ng/mL 106 7 (H)       Component      Latest Ref Rng & Units 12/3/2021 12/3/2021 5/20/2022 5/20/2022          12:00 AM  7:32 AM 12:00 AM  8:01 AM   Hemoglobin A1C      4 8 - 5 6 % 8 0 8 0 (H) 11 8 11 8 (H)   eAG, EST AVG Glucose      mg/dL  183  292       Lab Results   Component Value Date/Time    Free t4 1 89 (H) 09/02/2022 07:40 AM    Free t4 1 24 06/01/2022 07:30 AM    Free t4 1 20 12/03/2021 07:32 AM     Lab Results   Component Value Date    WBC 5 6 05/20/2022    HGB 13 2 05/20/2022    HCT 40 1 05/20/2022    MCV 87 05/20/2022     05/20/2022     Lab Results   Component Value Date    CREATININE 1 21 (H) 05/20/2022    BUN 19 05/20/2022    K 5 1 05/20/2022    CL 97 05/20/2022    CO2 23 05/20/2022     Lab Results   Component Value Date    HGBA1C 11 8 (H) 05/20/2022         Imaging Studies:       I have personally reviewed pertinent reports  Portions of the record may have been created with voice recognition software  Occasional wrong word or "sound a like" substitutions may have occurred due to the inherent limitations of voice recognition software   Read the chart carefully and recognize, using context, where substitutions have occurred

## 2022-09-08 NOTE — PATIENT INSTRUCTIONS
Discontinue methimazole   Start propylthiouracil 50 mg orally twice a day   Repeat thyroid function test in 6-8 weeks   Continue glipizide XL 2 5 mg orally daily   Consistent carb diet, try to include some physical activity   Follow-up in 3 months with repeat labs   Recommend diabetic eye exam

## 2022-09-12 DIAGNOSIS — I10 HYPERTENSION, ESSENTIAL: ICD-10-CM

## 2022-09-12 RX ORDER — FUROSEMIDE 20 MG/1
TABLET ORAL
Qty: 30 TABLET | Refills: 0 | Status: SHIPPED | OUTPATIENT
Start: 2022-09-12 | End: 2022-10-18

## 2022-09-14 DIAGNOSIS — E11.9 TYPE 2 DIABETES MELLITUS WITHOUT COMPLICATION, WITHOUT LONG-TERM CURRENT USE OF INSULIN (HCC): ICD-10-CM

## 2022-09-14 RX ORDER — GLIPIZIDE 2.5 MG/1
TABLET, EXTENDED RELEASE ORAL
Qty: 90 TABLET | Refills: 3 | Status: SHIPPED | OUTPATIENT
Start: 2022-09-14

## 2022-09-15 ENCOUNTER — OFFICE VISIT (OUTPATIENT)
Dept: PULMONOLOGY | Facility: MEDICAL CENTER | Age: 82
End: 2022-09-15
Payer: COMMERCIAL

## 2022-09-15 VITALS
BODY MASS INDEX: 41.22 KG/M2 | SYSTOLIC BLOOD PRESSURE: 138 MMHG | DIASTOLIC BLOOD PRESSURE: 84 MMHG | HEIGHT: 62 IN | HEART RATE: 80 BPM | OXYGEN SATURATION: 98 % | TEMPERATURE: 97.8 F | WEIGHT: 224 LBS | RESPIRATION RATE: 24 BRPM

## 2022-09-15 DIAGNOSIS — J84.89 BOOP (BRONCHIOLITIS OBLITERANS WITH ORGANIZING PNEUMONIA) (HCC): Primary | ICD-10-CM

## 2022-09-15 DIAGNOSIS — R05.3 CHRONIC COUGH: ICD-10-CM

## 2022-09-15 DIAGNOSIS — J45.30 MILD PERSISTENT ASTHMA WITHOUT COMPLICATION: ICD-10-CM

## 2022-09-15 DIAGNOSIS — G47.34 SLEEP-RELATED NONOBSTRUCTIVE ALVEOLAR HYPOVENTILATION: ICD-10-CM

## 2022-09-15 DIAGNOSIS — J38.6 SUBGLOTTIC STENOSIS: ICD-10-CM

## 2022-09-15 PROCEDURE — 99214 OFFICE O/P EST MOD 30 MIN: CPT | Performed by: PHYSICIAN ASSISTANT

## 2022-09-15 PROCEDURE — 1160F RVW MEDS BY RX/DR IN RCRD: CPT | Performed by: PHYSICIAN ASSISTANT

## 2022-09-15 RX ORDER — ALBUTEROL SULFATE 2.5 MG/3ML
2.5 SOLUTION RESPIRATORY (INHALATION) EVERY 6 HOURS PRN
Qty: 1080 ML | Refills: 3 | Status: SHIPPED | OUTPATIENT
Start: 2022-09-15

## 2022-09-15 RX ORDER — BUDESONIDE 0.5 MG/2ML
0.5 INHALANT ORAL 2 TIMES DAILY
Qty: 120 ML | Refills: 11 | Status: SHIPPED | OUTPATIENT
Start: 2022-09-15 | End: 2022-10-15

## 2022-09-15 NOTE — PROGRESS NOTES
Assessment & Plan:      1  BOOP (bronchiolitis obliterans with organizing pneumonia) ([address] )  XR chest pa & lateral    budesonide (PULMICORT) 0 5 mg/2 mL nebulizer solution   2  Subglottic stenosis     3  Sleep-related nonobstructive alveolar hypoventilation     4  Mild persistent asthma without complication  albuterol (2 5 mg/3 mL) 0 083 % nebulizer solution   5  Chronic cough         · Patient presenting for follow-up  · Respiratory symptoms stable  · Continue Pulmicort nebs b i d , albuterol as needed  · Occasionally, she takes prednisone 10 mg  · She continues to use of guaifenesin codeine cough syrup nightly  · Reviewed overnight pulse oximetry which does show some nocturnal hypoxia  Recommended nocturnal oxygen however patient refuses to use her concentrator nocturnally  We did ambulatory pulse oximetry today and patient does not have any exertional hypoxia so she would not qualify for portable concentrator either  · Advised patient to obtain follow-up chest x-ray  · She continues to follow up with ENT for regular trach changes    Patient was accompanied by her sons  Case discussed with Dr Em Ashford  Subjective:     Patient ID: Aga Comment is a 81 y o  female  Chief Complaint:    Ran Alfaro is an 81-year-old female with past medical history including BOOP, subglottic stenosis status post tracheostomy, micropapillary thyroid cancer s/p total thyroidectomy, CAD s/p stent placement presenting for follow-up  Patient says her breathing is the same  Denies increased cough or secretion production  She is not using her nocturnal O2  The following portions of the patient's history were reviewed in this encounter and updated as appropriate:  Past medical, social, surgical, family, allergies    Review of Systems   Constitutional: Negative for chills, diaphoresis and fever  Respiratory: Positive for cough and shortness of breath  All other systems reviewed and are negative          Objective:  Vitals: 09/15/22 1511   BP: 138/84   BP Location: Left arm   Patient Position: Sitting   Cuff Size: Standard   Pulse: 80   Resp: (!) 24   Temp: 97 8 °F (36 6 °C)   TempSrc: Temporal   SpO2: 98%   Weight: 102 kg (224 lb)   Height: 5' 2" (1 575 m)        Physical Exam  Vitals and nursing note reviewed  Constitutional:       General: She is not in acute distress  Appearance: She is well-developed  She is obese  She is not diaphoretic  HENT:      Head: Normocephalic and atraumatic  Right Ear: External ear normal       Left Ear: External ear normal       Nose: Nose normal    Eyes:      General: No scleral icterus  Neck:      Trachea: No tracheal deviation  Comments: +Tracheostomy  Cardiovascular:      Rate and Rhythm: Normal rate and regular rhythm  Heart sounds: Normal heart sounds  No murmur heard  No friction rub  No gallop  Pulmonary:      Effort: Pulmonary effort is normal  No respiratory distress  Breath sounds: No stridor  Rhonchi present  Musculoskeletal:         General: No deformity  Skin:     General: Skin is warm and dry  Neurological:      Mental Status: She is alert and oriented to person, place, and time  Cranial Nerves: No cranial nerve deficit  Psychiatric:         Behavior: Behavior normal          Thought Content:  Thought content normal          Judgment: Judgment normal          Lab Review:   Office Visit on 09/08/2022   Component Date Value    Hemoglobin A1C 09/08/2022 7 9 (A)   Office Visit on 08/10/2022   Component Date Value    Cholesterol, Total 09/02/2022 110     Triglycerides 09/02/2022 87     HDL 09/02/2022 32 (A)    VLDL Cholesterol Calcula* 09/02/2022 17     LDL Calculated 09/02/2022 61     LDl/HDL Ratio 09/02/2022 1 9     Protein, Total 09/02/2022 6 8     Albumin 09/02/2022 4 0     TOTAL BILIRUBIN 09/02/2022 0 3     Bilirubin, Direct 09/02/2022 0 11     Alk Phos Isoenzymes 09/02/2022 94     AST 09/02/2022 19     ALT 09/02/2022 16

## 2022-10-03 ENCOUNTER — OFFICE VISIT (OUTPATIENT)
Dept: URGENT CARE | Facility: CLINIC | Age: 82
End: 2022-10-03
Payer: COMMERCIAL

## 2022-10-03 VITALS — OXYGEN SATURATION: 99 % | TEMPERATURE: 99.6 F | RESPIRATION RATE: 18 BRPM | HEART RATE: 77 BPM

## 2022-10-03 DIAGNOSIS — M79.661 PAIN IN RIGHT LOWER LEG: ICD-10-CM

## 2022-10-03 DIAGNOSIS — S46.911A STRAIN OF RIGHT SHOULDER, INITIAL ENCOUNTER: Primary | ICD-10-CM

## 2022-10-03 PROCEDURE — 99203 OFFICE O/P NEW LOW 30 MIN: CPT | Performed by: PHYSICIAN ASSISTANT

## 2022-10-03 RX ORDER — PREDNISONE 10 MG/1
30 TABLET ORAL DAILY
Qty: 15 TABLET | Refills: 0 | Status: SHIPPED | OUTPATIENT
Start: 2022-10-03 | End: 2022-10-08

## 2022-10-03 NOTE — PATIENT INSTRUCTIONS
Continue to monitor symptoms  Call your doctor tomorrow to discuss further evaluation of right lower leg including possible ultrasound  If new or worsening symptoms develop, go immediately to [facility]  Drink plenty of fluids  Follow up with Family Doctor this week  Muscle Strain   WHAT YOU NEED TO KNOW:   A muscle strain is a twist, pull, or tear of a muscle or tendon  A tendon is a strong elastic tissue that connects a muscle to a bone  Signs of a strained muscle include bruising and swelling over the area, pain with movement, and loss of strength  DISCHARGE INSTRUCTIONS:   Return to the emergency department if:   You suddenly cannot feel or move your injured muscle  Call your doctor if:   Your pain and swelling worsen or do not go away  You have questions or concerns about your condition or care  Medicines: You may need any of the following:  NSAIDs  help decrease swelling and pain or fever  This medicine is available with or without a doctor's order  NSAIDs can cause stomach bleeding or kidney problems in certain people  If you take blood thinner medicine, always ask your healthcare provider if NSAIDs are safe for you  Always read the medicine label and follow directions  Muscle relaxers  help decrease pain and muscle spasms  Take your medicine as directed  Contact your healthcare provider if you think your medicine is not helping or if you have side effects  Tell him of her if you are allergic to any medicine  Keep a list of the medicines, vitamins, and herbs you take  Include the amounts, and when and why you take them  Bring the list or the pill bottles to follow-up visits  Carry your medicine list with you in case of an emergency  Help a muscle strain heal:   3 to 7 days after the injury:  Use Rest, Ice, Compression, and Elevation (RICE) to help stop bruising and decrease pain and swelling      Rest your muscle to allow the injury to heal   When the pain decreases, begin normal, slow movements  For mild and moderate muscle strains, you should rest your muscles for about 2 days  If you have a severe muscle strain, you should rest for 10 to 14 days  You may need to use crutches to walk if your muscle strain is in your legs or lower body  Apply ice on the injured area  Use an ice pack, or put crushed ice in a plastic bag  Cover the bag with a towel before you apply it to your skin  Apply ice for 15 to 20 minutes each hour, or as directed  Use compression to decrease swelling  You can wrap an elastic bandage around the area to create compression  The bandage should be tight enough for you to feel support  Do not wrap it too tightly  Elevate the area above the level of your heart, if possible  Keep the injured muscle raised above your heart if possible  For example, if you have a strain of your lower leg muscle, lie down and prop your leg up on pillows  This helps decrease pain and swelling  3 to 21 days after your injury:  Start to slowly and regularly exercise your strained muscle  This will help it heal  If you feel pain, decrease how hard you are exercising  1 to 6 weeks after your injury:  Stretch the injured muscle  Stretch the muscle for about 30 seconds  Do this 4 times a day  You may stretch the muscle until you feel a slight pull  Stop stretching if you feel pain  2 weeks to 6 months after your injury:  The goal of this phase is to return to the activity you were doing before the injury without hurting the muscle again  3 weeks to 6 months after your injury:  Keep stretching and strengthening your muscles to prevent injury  Slowly increase the time and distance that you exercise  You may still have signs and symptoms of muscle strain 6 months after the injury, even if you do things to help it heal  In this case, you may need surgery on the muscle  Prevent muscle strains:   Always wear proper shoes when you play sports    Replace your old running shoes with new ones often if you are a runner  Use special shoe inserts or arch supports to correct leg or foot problems  Ask your healthcare provider for more information on shoe supports  Do warm up and cool down exercises  Do stretching exercises before you work out or do sports activities  These exercises will help loosen and decrease stress on your muscles  Cool down and stretch after your workout  Do not stop and rest after a workout without cooling down first          Keep your muscles strong with strength training exercises  Exercises such as weight lifting and stretching exercises help keep your muscles flexible and strong  A physical therapist or  may help you with these exercises  Slowly start your exercise or sports training program   Follow your healthcare provider's advice on when to start exercising  Slowly increase time, distance, and how often you train  Sudden increases in how often you train may cause you to injure your muscle again  Follow up with your doctor as directed: Your doctor may suggest that you have a follow-up visit before you go back to your usual activity  Write down your questions so you remember to ask them during your visits  © [employer] 2022 Information is for End User's use only and may not be sold, redistributed or otherwise used for commercial purposes  All illustrations and images included in CareNotes® are the copyrighted property of A D A M , Inc  or [address] [place] [address]  The above information is an  only  It is not intended as medical advice for individual conditions or treatments  Talk to your doctor, nurse or pharmacist before following any medical regimen to see if it is safe and effective for you  Leg Pain   WHAT YOU NEED TO KNOW:   Leg pain may be caused by a variety of health conditions  Your tests did not show any broken bones or blood clots    DISCHARGE INSTRUCTIONS:   Return to the emergency department if:   You have a fever     Your leg starts to swell  Your leg pain gets worse  You have numbness or tingling in your leg or toes  You cannot put any weight on or move your leg  Contact your healthcare provider if:   Your pain does not decrease, even after treatment  You have questions or concerns about your condition or care  Medicines:   NSAIDs , such as ibuprofen, help decrease swelling, pain, and fever  This medicine is available with or without a doctor's order  NSAIDs can cause stomach bleeding or kidney problems in certain people  If you take blood thinner medicine, always ask your healthcare provider if NSAIDs are safe for you  Always read the medicine label and follow directions  Take your medicine as directed  Contact your healthcare provider if you think your medicine is not helping or if you have side effects  Tell him of her if you are allergic to any medicine  Keep a list of the medicines, vitamins, and herbs you take  Include the amounts, and when and why you take them  Bring the list or the pill bottles to follow-up visits  Carry your medicine list with you in case of an emergency  Follow up with your healthcare provider as directed: You may need more tests to find the cause of your leg pain  You may need to see an orthopedic specialist or a physical therapist  Write down your questions so you remember to ask them during your visits  Manage your leg pain:   Rest  your injured leg so that it can heal  You may need an immobilizer, brace, or splint to limit the movement of your leg  You may need to avoid putting any weight on your leg for at least 48 hours  Return to normal activities as directed  Ice  the injury for 20 minutes every 4 hours for up to 24 hours, or as directed  Use an ice pack, or put crushed ice in a plastic bag  Cover it with a towel to protect your skin  Ice helps prevent tissue damage and decreases swelling and pain      Elevate  your injured leg above the level of your heart as often as you can  This will help decrease swelling and pain  If possible, prop your leg on pillows or blankets to keep the area elevated comfortably  Use assistive devices as directed  You may need to use a cane or crutches  Assistive devices help decrease pain and pressure on your leg when you walk  Ask your healthcare provider for more information about assistive devices and how to use them correctly  Maintain a healthy weight  Extra body weight can cause pressure and pain in your hip, knee, and ankle joints  Ask your healthcare provider how much you should weigh  Ask him to help you create a weight loss plan if you are overweight  © Copyright [employer] 2022 Information is for End User's use only and may not be sold, redistributed or otherwise used for commercial purposes  All illustrations and images included in CareNotes® are the copyrighted property of [employer]  or Porsha Galindo  The above information is an  only  It is not intended as medical advice for individual conditions or treatments  Talk to your doctor, nurse or pharmacist before following any medical regimen to see if it is safe and effective for you

## 2022-10-03 NOTE — PROGRESS NOTES
[address]        NAME: Rj Park is a 81 y o  female  : 1940    MRN: [id number]  DATE: October 3, 2022  TIME: 7:44 PM    Assessment and Plan   Strain of right shoulder, initial encounter [X47 [id number]]  1  Strain of right shoulder, initial encounter  predniSONE 10 mg tablet   2  Pain in right lower leg         Right lower extremity has edema, slightly more than the left side  No real tenderness to the calf noted  Dorsalis pedis and posterior tibial intact  Capillary refill less than 2 seconds  Low concern for DVT at this time but with past medical history cannot rule out  Educated patient to call her family doctor tomorrow morning and schedule outpatient testing for this verses going to the ER  She states that she understands and agrees and will call the family doctor tomorrow morning  Patient Instructions       Continue to monitor symptoms  Call your doctor tomorrow to discuss further evaluation of right lower leg including possible ultrasound  If new or worsening symptoms develop, go immediately to Er  Drink plenty of fluids  Follow up with Family Doctor this week  Chief Complaint     Chief Complaint   Patient presents with    Pain     Pt presents with right arm/ leg pain that started two weeks ago of unknown etiology         History of Present Illness       Patient has 2 week history of atraumatic right shoulder pain as well as atraumatic right lower leg pain  The right shoulder pain is in her right trapezius area radiating into her right upper arm  Better with rest, worse with attempted elevation  Patient states that she has been massaging her arm and shoulder every night with only mild relief  Not taking any medications for this  No numbness or tingling distally  No prior injury to this area  No recent increase in activity or heavy lifting  Patient has right lower leg pain for 2 weeks    Patient has a history of decreased circulation to her left remedies bilaterally, she states that it is common for her to have some swelling  It is common for her right leg to be slightly more swollen than the left leg  Patient states that there is nothing out of the ordinary in noted  No color change  Patient states the pain is in her right calf  Patient is able to ambulate  Patient states that she had a blood clot in the past when she was admitted for pneumonia  She is not currently on any blood thinners  She has also had an superficial thrombophlebitis in the past   No recent travel or immobilization noted  Review of Systems   Review of Systems   Constitutional: Negative for chills, diaphoresis, fatigue and fever  HENT: Negative for congestion, ear pain, rhinorrhea, sinus pressure and voice change  Eyes: Negative  Respiratory: Negative for cough, chest tightness, shortness of breath and wheezing  Cardiovascular: Negative for chest pain and palpitations  Gastrointestinal: Negative for abdominal pain, constipation, diarrhea, nausea and vomiting  Endocrine: Negative  Genitourinary: Negative for dysuria  Musculoskeletal: Negative for back pain, gait problem, myalgias and neck pain  Skin: Negative for pallor and rash  Allergic/Immunologic: Negative  Neurological: Negative for dizziness, syncope and headaches  Hematological: Negative  Psychiatric/Behavioral: Negative            Current Medications       Current Outpatient Medications:     acetaminophen (TYLENOL) 650 mg CR tablet, Take 1 tablet (650 mg total) by mouth every 8 (eight) hours as needed for mild pain, Disp: 30 tablet, Rfl: 0    albuterol (2 5 mg/3 mL) 0 083 % nebulizer solution, Take 3 mL (2 5 mg total) by nebulization every 6 (six) hours as needed for wheezing or shortness of breath, Disp: 1080 mL, Rfl: 3    Ascorbic Acid (vitamin C) 1000 MG tablet, Take 1 tablet (1,000 mg total) by mouth daily, Disp: 30 tablet, Rfl: 5    aspirin 81 mg chewable tablet, Chew 1 tablet (81 mg total) daily, Disp: 30 tablet, Rfl: 5    atorvastatin (LIPITOR) 40 mg tablet, TAKE 1 TABLET DAILY, Disp: 90 tablet, Rfl: 2    Blood Glucose Monitoring Suppl (OneTouch Verio) w/Device KIT, Use to test blood sugar 2 times a day, Disp: 1 kit, Rfl: 0    budesonide (PULMICORT) 0 5 mg/2 mL nebulizer solution, Take 2 mL (0 5 mg total) by nebulization 2 (two) times a day, Disp: 120 mL, Rfl: 11    Calcium Carbonate-Vitamin D3 (Calcium 600+D) 600-400 MG-UNIT TABS, Take 1 tablet by mouth daily  , Disp: , Rfl:     furosemide (LASIX) 20 mg tablet, TAKE 1 TABLET DAILY, Disp: 30 tablet, Rfl: 0    glipiZIDE (GLUCOTROL XL) 2 5 mg 24 hr tablet, TAKE 1 TABLET DAILY, Disp: 90 tablet, Rfl: 3    glucose blood (OneTouch Verio) test strip, Use to test blood sugar 2 times daily, Disp: 100 strip, Rfl: 3    guaiFENesin-codeine (ROBITUSSIN AC) 100-10 mg/5 mL oral solution, TAKE 5 ML BY MOUTH 2 (TWO) TIMES A DAY AS NEEDED FOR COUGH, Disp: 300 mL, Rfl: 1    isosorbide mononitrate (IMDUR) 30 mg 24 hr tablet, TAKE 1 TABLET DAILY, Disp: 30 tablet, Rfl: 5    Lancets (onetouch ultrasoft) lancets, Use to test blood sugar 2 times a day, Disp: 100 each, Rfl: 3    lisinopril (ZESTRIL) 20 mg tablet, TAKE 1 TABLET DAILY, Disp: 90 tablet, Rfl: 1    metoprolol tartrate (LOPRESSOR) 25 mg tablet, TAKE 1/2 TAB EVERY 12 HOURS, Disp: 60 tablet, Rfl: 2    Multiple Vitamins-Minerals (CENTRUM SILVER 50+WOMEN PO), Take by mouth, Disp: , Rfl:     pantoprazole (PROTONIX) 40 mg tablet, TAKE ONE TABLET DAILY, Disp: 30 tablet, Rfl: 5    predniSONE 10 mg tablet, Take 3 tabs x 4 days then 2 tabs x 4 days then 1 tab daily x 4 days, Disp: 24 tablet, Rfl: 0    predniSONE 10 mg tablet, Take 3 tablets x4 days then 2 tablets x4 days then 1 tablet x4 days  Take with breakfast (Patient taking differently: Take 3 tablets x4 days then 2 tablets x4 days then 1 tablet x4 days    Take with breakfast), Disp: 30 tablet, Rfl: 0    predniSONE 10 mg tablet, Take 3 tablets (30 mg total) by mouth daily for 5 days, Disp: 15 tablet, Rfl: 0    Promethazine-Codeine 6 25-10 MG/5ML SOLN, TAKE 5 ML BY MOUTH 3 (THREE) TIMES A DAY AS NEEDED FOR COUGH, Disp: 120 mL, Rfl: 0    propylthiouracil 50 mg tablet, Take 1 tablet (50 mg total) by mouth 2 (two) times a day, Disp: 180 tablet, Rfl: 3    sodium chloride 0 9 % nebulizer solution, Take 3 mL by nebulization 2 (two) times a day as needed for wheezing, Disp: 180 mL, Rfl: 6    cholecalciferol (VITAMIN D3) 1,000 units tablet, Take 1 tablet (1,000 Units total) by mouth daily (Patient not taking: No sig reported), Disp: 30 tablet, Rfl: 5    Continuous Blood Gluc  (FreeStyle Rasheeda 2 [place]) JUD, Use to test blood sugar twice a day (Patient not taking: No sig reported), Disp: 1 each, Rfl: 0    Continuous Blood Gluc Sensor (FreeStyle Rasheeda 2 Sensor) MISC, Use to test blood sugar twice a day (Patient not taking: No sig reported), Disp: 2 each, Rfl: 12    Current Allergies     Allergies as of 10/03/2022 - Reviewed 10/03/2022   Allergen Reaction Noted    Iodine - food allergy Swelling 2007    Shellfish-derived products - food allergy Hives 2020    Morphine Rash 2020            The following portions of the patient's history were reviewed and updated as appropriate: allergies, current medications, past family history, past medical history, past social history, past surgical history and problem list      Past Medical History:   Diagnosis Date    BOOP (bronchiolitis obliterans with organizing pneumonia) ([facility] [address] ) 2006    Coronary artery disease     Diabetes mellitus ([facility] [address] )        Past Surgical History:   Procedure Laterality Date    HYSTERECTOMY      REPLACEMENT TOTAL KNEE      TRACHEOSTOMY  2006       Family History   Problem Relation Age of Onset    Heart disease Mother     Hypertension Mother     Heart disease Father     Pneumonia Brother          of COVID    Dementia Brother          Medications have been verified  Objective   Pulse 77   Temp 99 6 °F (37 6 °C)   Resp 18   SpO2 99%        Physical Exam     Physical Exam  Vitals and nursing note reviewed  Constitutional:       General: She is not in acute distress  Appearance: She is well-developed  She is not diaphoretic  HENT:      Head: Normocephalic and atraumatic  Right Ear: External ear normal       Left Ear: External ear normal    Eyes:      General:         Right eye: No discharge  Left eye: No discharge  Conjunctiva/sclera: Conjunctivae normal    Cardiovascular:      Rate and Rhythm: Normal rate and regular rhythm  Heart sounds: Normal heart sounds  Pulmonary:      Effort: Pulmonary effort is normal  No respiratory distress  Breath sounds: Normal breath sounds  No wheezing or rales  Musculoskeletal:      Cervical back: Normal range of motion and neck supple  Comments: Pain with attempted active range of motion of right shoulder but with passive range of motion full motion without any clicking crepitus in only mild pain  No tenderness to palpation to proximal upper arm, patient acutely tender to palpation with a noticeable muscle spasm to right trapezius area  Palpation causes reproducible pain  Sensation circulation intact distally  Patient has 1+ pitting edema to bilateral lower extremities left legs roughly symmetrical   No color change daughter leg  Negative Homans sign  No calf tenderness  Dorsalis pedis intact, posterior tibial intact on right  Capillary refill <2sec to distal R toes, sensation intact   Lymphadenopathy:      Cervical: No cervical adenopathy  Skin:     General: Skin is warm  Findings: No rash

## 2022-10-04 DIAGNOSIS — I10 HYPERTENSION, ESSENTIAL: ICD-10-CM

## 2022-10-04 DIAGNOSIS — R05.3 CHRONIC COUGH: ICD-10-CM

## 2022-10-04 RX ORDER — CODEINE PHOSPHATE AND GUAIFENESIN 10; 100 MG/5ML; MG/5ML
SOLUTION ORAL
Qty: 300 ML | Refills: 1 | Status: SHIPPED | OUTPATIENT
Start: 2022-10-04

## 2022-10-05 RX ORDER — LISINOPRIL 20 MG/1
TABLET ORAL
Qty: 90 TABLET | Refills: 1 | Status: SHIPPED | OUTPATIENT
Start: 2022-10-05

## 2022-10-06 ENCOUNTER — APPOINTMENT (EMERGENCY)
Dept: RADIOLOGY | Facility: HOSPITAL | Age: 82
End: 2022-10-06
Payer: COMMERCIAL

## 2022-10-06 ENCOUNTER — HOSPITAL ENCOUNTER (EMERGENCY)
Facility: HOSPITAL | Age: 82
Discharge: HOME | End: 2022-10-06
Attending: EMERGENCY MEDICINE
Payer: COMMERCIAL

## 2022-10-06 ENCOUNTER — OFFICE VISIT (OUTPATIENT)
Dept: FAMILY MEDICINE CLINIC | Facility: CLINIC | Age: 82
End: 2022-10-06
Payer: COMMERCIAL

## 2022-10-06 VITALS
DIASTOLIC BLOOD PRESSURE: 70 MMHG | TEMPERATURE: 98.1 F | WEIGHT: 215 LBS | OXYGEN SATURATION: 97 % | SYSTOLIC BLOOD PRESSURE: 144 MMHG | HEART RATE: 83 BPM | BODY MASS INDEX: 39.32 KG/M2 | RESPIRATION RATE: 22 BRPM

## 2022-10-06 VITALS
HEIGHT: 62 IN | BODY MASS INDEX: 39.6 KG/M2 | RESPIRATION RATE: 26 BRPM | HEART RATE: 98 BPM | WEIGHT: 215.2 LBS | OXYGEN SATURATION: 96 % | DIASTOLIC BLOOD PRESSURE: 68 MMHG | TEMPERATURE: 99.6 F | SYSTOLIC BLOOD PRESSURE: 140 MMHG

## 2022-10-06 DIAGNOSIS — M79.604 RIGHT LEG PAIN: Primary | ICD-10-CM

## 2022-10-06 DIAGNOSIS — Z23 FLU VACCINE NEED: ICD-10-CM

## 2022-10-06 PROCEDURE — 93971 EXTREMITY STUDY: CPT

## 2022-10-06 PROCEDURE — 99284 EMERGENCY DEPT VISIT MOD MDM: CPT

## 2022-10-06 PROCEDURE — 93971 EXTREMITY STUDY: CPT | Performed by: SURGERY

## 2022-10-06 PROCEDURE — 99214 OFFICE O/P EST MOD 30 MIN: CPT | Performed by: FAMILY MEDICINE

## 2022-10-06 PROCEDURE — 99284 EMERGENCY DEPT VISIT MOD MDM: CPT | Performed by: EMERGENCY MEDICINE

## 2022-10-06 RX ORDER — LIDOCAINE 50 MG/G
1 PATCH TOPICAL DAILY
Qty: 6 PATCH | Refills: 0 | Status: SHIPPED | OUTPATIENT
Start: 2022-10-06

## 2022-10-06 RX ORDER — ACETAMINOPHEN 325 MG/1
650 TABLET ORAL ONCE
Status: COMPLETED | OUTPATIENT
Start: 2022-10-06 | End: 2022-10-06

## 2022-10-06 RX ORDER — LIDOCAINE 50 MG/G
1 PATCH TOPICAL ONCE
Status: DISCONTINUED | OUTPATIENT
Start: 2022-10-06 | End: 2022-10-06 | Stop reason: HOSPADM

## 2022-10-06 RX ADMIN — LIDOCAINE 5% 1 PATCH: 700 PATCH TOPICAL at 18:51

## 2022-10-06 RX ADMIN — ACETAMINOPHEN 650 MG: 325 TABLET ORAL at 18:50

## 2022-10-06 NOTE — ED NOTES
Dr Anu Sheth at bedside updating patient of the vascular study results       Luther Herndon RN  10/06/22 [id number]

## 2022-10-06 NOTE — ED NOTES
RN updated patient regarding the vascular studies that needs to done       Milind Lerner RN  10/06/22 [id number]

## 2022-10-06 NOTE — PROGRESS NOTES
1  Right leg pain  R/o dvt (has h/o dvt in past)  - VAS lower limb venous duplex study, unilateral/limited; Future  Sent to ER -  Discussed with Dr Tila Villaseñor  There, chronic thrombus found, continue aspirin  2  Flu vaccine need      Encouraged to complete her vaccines    Chief concern and HPI: Marisa Juarez is a 81 y o  female went to urgent care on Monday  Chief Complaint   Patient presents with    Leg Pain     Pt reports lower right leg is painful and also numb  Pt reports pain started about a week ago  PT reports pain is 7/10  Pt struggle to give pain a quality  Right lower leg seems mildly edematous compared to left  Does not feel warm to touch or red  Pt reports Hx of clot 16 years ago     HPI  C/o unexplained R foreleg pain for past two or three weeks, no h/o strain, immobility, does not seem related to sciatica in feel - is different      Previous relevant clinical information reviewed from medical, surgical and psychosocial history, medication and allergies and labs/studies      Patient Active Problem List   Diagnosis    BOOP (bronchiolitis obliterans with organizing pneumonia) ([facility] [address] )    Coronary artery disease with angina pectoris, unspecified vessel or lesion type, unspecified whether native or transplanted heart ([facility] [address] )    Hypertension, essential    Post-surgical hypothyroidism    Tracheal stenosis    Tracheostomy present ([address] )    Subglottic stenosis    Chronic cough    Class 3 severe obesity due to excess calories without serious comorbidity with body mass index (BMI) of 40 0 to 44 9 in adult [facility])    Sleep-related nonobstructive alveolar hypoventilation    Mild persistent asthma without complication    Mixed hyperlipidemia    Hypertensive heart disease    Chronic bilateral low back pain without sciatica    Acute tracheobronchitis    Mild persistent asthma with exacerbation    Stage 3a chronic kidney disease ([address] )    Papillary thyroid carcinoma ([facility] [address] )    Hyperthyroidism    Type 2 diabetes mellitus with hyperglycemia, without long-term current use of insulin ([facility])           Review of systems: No new or worsening:   Unexplained fever/chills/sweats/weight loss  HEENT issues such as new ear, mouth, nose or eye problems  Respiratory issues such as new shortness of breath, cough  Heart issues such as new chest pain or pressure, palpitations  Abdominal or digestive issues such as diarrhea, constipation or blood in stool  BM's are normal  Genitourinary issues  Neurological issues such as new numbness or motor weakness  Psychological issues such as depression or anxiety  Skin issues such as new rashes        Exam:  Alert, no acute distress /68   Pulse 98   Temp 99 6 °F (37 6 °C) (Tympanic)   Resp (!) 26   Ht 5' 2" (1 575 m)   Wt 97 6 kg (215 lb 3 2 oz)   SpO2 96%   BMI 39 36 kg/m²   HEENT: Head normocephalic and atraumatic  Lungs: Has baseline respiratory labor with scattered rhonchi and wheezes   Cardiac: Regular rate and rhythm  No obvious murmur  Abdomen: Benign  Extremities: No cyanosis, clubbing or edema  Has some tenderness in right calf but also in right anterior lower leg with some tender varicosities  Negative straight leg raise  Screens of right knee and ankle are unremarkable  Muscle testing is noncontributory  Questionable Homans sign  Neuro screen: No gross deficits           Risks and benefits of therapeutic plan discussed, answered all patient questions and concerns and patient expressed understanding and agreement of therapeutic plan

## 2022-10-06 NOTE — DISCHARGE INSTRUCTIONS
Your ultrasound does not show any acute clot  You do have a chronic non-occlusive clot in the calf  There is nothing to do about this  Just continue your aspirin and use tylenol 650 mg per dose every 6 hours and you can use try the lidoderm patch

## 2022-10-07 NOTE — ED PROVIDER NOTES
History  Chief Complaint   Patient presents with    Leg Pain     Right posterior knee and calf pain for 2 weeks  80 yo female sent from [facility] for doppler study to r/o DVT  Pt  C/o pain in back of right calf x 2 weeks  No injury or recent injury  No rash, fever  she does have chronic cough  vomiting  Pt  Has remote history of DVT about 17 years ago  She is not currently on a blood thinner other than aspirin  History provided by:  Patient   used: No    Leg Pain  Associated symptoms: no fever        Prior to Admission Medications   Prescriptions Last Dose Informant Patient Reported? Taking?    Ascorbic Acid (vitamin C) 1000 MG tablet  Self No No   Sig: Take 1 tablet (1,000 mg total) by mouth daily   Blood Glucose Monitoring Suppl (OneTouch Verio) w/Device KIT  Self No No   Sig: Use to test blood sugar 2 times a day   Calcium Carbonate-Vitamin D3 (Calcium 600+D) 600-400 MG-UNIT TABS  Self Yes No   Sig: Take 1 tablet by mouth daily     Continuous Blood Gluc  (FreeStyle [place] 2 [place]) JUD  Self No No   Sig: Use to test blood sugar twice a day   Patient not taking: No sig reported   Continuous Blood Gluc Sensor (FreeStyle [place] 2 Sensor) MISC  Self No No   Sig: Use to test blood sugar twice a day   Patient not taking: No sig reported   Lancets (onetouch ultrasoft) lancets  Self No No   Sig: Use to test blood sugar 2 times a day   Multiple Vitamins-Minerals (CENTRUM SILVER 50+WOMEN PO)  Self Yes No   Sig: Take by mouth   Promethazine-Codeine 6 25-10 MG/5ML SOLN   No No   Sig: TAKE 5 ML BY MOUTH 3 (THREE) TIMES A DAY AS NEEDED FOR COUGH   acetaminophen (TYLENOL) 650 mg CR tablet  Self No No   Sig: Take 1 tablet (650 mg total) by mouth every 8 (eight) hours as needed for mild pain   albuterol (2 5 mg/3 mL) 0 083 % nebulizer solution  Self No No   Sig: Take 3 mL (2 5 mg total) by nebulization every 6 (six) hours as needed for wheezing or shortness of breath   aspirin 81 mg chewable tablet  Self No No   Sig: Chew 1 tablet (81 mg total) daily   atorvastatin (LIPITOR) 40 mg tablet  Self No No   Sig: TAKE 1 TABLET DAILY   budesonide (PULMICORT) 0 5 mg/2 mL nebulizer solution   No No   Sig: Take 2 mL (0 5 mg total) by nebulization 2 (two) times a day   cholecalciferol (VITAMIN D3) 1,000 units tablet  Self No No   Sig: Take 1 tablet (1,000 Units total) by mouth daily   Patient not taking: No sig reported   furosemide (LASIX) 20 mg tablet  Self No No   Sig: TAKE 1 TABLET DAILY   glipiZIDE (GLUCOTROL XL) 2 5 mg 24 hr tablet  Self No No   Sig: TAKE 1 TABLET DAILY   glucose blood (OneTouch Verio) test strip  Self No No   Sig: Use to test blood sugar 2 times daily   guaiFENesin-codeine (ROBITUSSIN AC) 100-10 mg/5 mL oral solution   No No   Sig: TAKE 5 ML BY MOUTH 2 (TWO) TIMES A DAY AS NEEDED FOR COUGH   isosorbide mononitrate (IMDUR) 30 mg 24 hr tablet  Self No No   Sig: TAKE 1 TABLET DAILY   lisinopril (ZESTRIL) 20 mg tablet   No No   Sig: TAKE 1 TABLET DAILY   metoprolol tartrate (LOPRESSOR) 25 mg tablet  Self No No   Sig: TAKE 1/2 TAB EVERY 12 HOURS   pantoprazole (PROTONIX) 40 mg tablet  Self No No   Sig: TAKE ONE TABLET DAILY   predniSONE 10 mg tablet   No No   Sig: Take 3 tabs x 4 days then 2 tabs x 4 days then 1 tab daily x 4 days   predniSONE 10 mg tablet   No No   Sig: Take 3 tablets x4 days then 2 tablets x4 days then 1 tablet x4 days  Take with breakfast   Patient taking differently: Take 3 tablets x4 days then 2 tablets x4 days then 1 tablet x4 days    Take with breakfast   predniSONE 10 mg tablet   No No   Sig: Take 3 tablets (30 mg total) by mouth daily for 5 days   propylthiouracil 50 mg tablet  Self No No   Sig: Take 1 tablet (50 mg total) by mouth 2 (two) times a day   sodium chloride 0 9 % nebulizer solution  Self No No   Sig: Take 3 mL by nebulization 2 (two) times a day as needed for wheezing      Facility-Administered Medications: None       Past Medical History:   Diagnosis Date  BOOP (bronchiolitis obliterans with organizing pneumonia) ([facility] [address] ) 2006    Coronary artery disease     Diabetes mellitus ([facility] )        Past Surgical History:   Procedure Laterality Date    HYSTERECTOMY      REPLACEMENT TOTAL KNEE      TRACHEOSTOMY  2006       Family History   Problem Relation Age of Onset    Heart disease Mother     Hypertension Mother     Heart disease Father     Pneumonia Brother          of COVID    Dementia Brother      I have reviewed and agree with the history as documented  E-Cigarette/Vaping    E-Cigarette Use Never User      E-Cigarette/Vaping Substances    Nicotine No     THC No     CBD No     Flavoring No     Other No     Unknown No      Social History     Tobacco Use    Smoking status: Never Smoker    Smokeless tobacco: Never Used   Vaping Use    Vaping Use: Never used   Substance Use Topics    Alcohol use: Never    Drug use: Never       Review of Systems   Constitutional: Negative for fever  Respiratory: Positive for cough  Cardiovascular: Negative for chest pain  Gastrointestinal: Negative for diarrhea and vomiting  Skin: Negative for rash and wound  Physical Exam  Physical Exam  Vitals and nursing note reviewed  Constitutional:       General: She is not in acute distress  Appearance: She is not ill-appearing  HENT:      Head: Normocephalic and atraumatic  Neck:      Comments: Has trach  Pulmonary:      Effort: Pulmonary effort is normal  No respiratory distress  Musculoskeletal:         General: Tenderness present  No signs of injury  Normal range of motion  Cervical back: Neck supple  Right lower leg: No edema  Left lower leg: No edema  Comments: + mild ttp proximal upper posterior calf   Skin:     General: Skin is warm and dry  Capillary Refill: Capillary refill takes less than 2 seconds  Coloration: Skin is not pale  Findings: No erythema or rash     Neurological:      General: No focal deficit present  Mental Status: She is alert and oriented to person, place, and time  Psychiatric:         Mood and Affect: Mood normal          Behavior: Behavior normal          Vital Signs  ED Triage Vitals [10/06/22 1722]   Temperature Pulse Respirations Blood Pressure SpO2   98 1 °F (36 7 °C) 83 22 144/70 97 %      Temp src Heart Rate Source Patient Position - Orthostatic VS BP Location FiO2 (%)   -- -- -- -- --      Pain Score       8           Vitals:    10/06/22 1722   BP: 144/70   Pulse: 83         Visual Acuity      ED Medications  Medications   acetaminophen (TYLENOL) tablet 650 mg (650 mg Oral Given 10/6/22 1850)       Diagnostic Studies  Results Reviewed     None                 VAS lower limb venous duplex study, unilateral/limited   Final Result by Brigido Wright MD (10/06 2138)                 Procedures  Procedures         ED Course                               SBIRT 22yo+    Flowsheet Row Most Recent Value   SBIRT (25 yo +)    In order to provide better care to our patients, we are screening all of our patients for alcohol and drug use  Would it be okay to ask you these screening questions? 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  Will discharge, continue aspirin, tylenol and lidoderm prn pain  Disposition  Final diagnoses:   Right leg pain     Time reflects when diagnosis was documented in both MDM as applicable and the Disposition within this note     Time User Action Codes Description Comment    10/2/7403  8:89 PM Meme Randall Add [A81 [id number]] Right leg pain       ED Disposition     ED Disposition   Discharge    Condition   Stable    Date/Time   Thu Oct 6, 2022  6:41 PM    Comment   Renato Shah discharge to home/self care                 Follow-up Information     Follow up With Specialties Details Why Contact Info    Coy Davis MD Family Medicine Schedule an appointment as soon as possible for a visit  As needed [facility]  [facility]  [phone]            Discharge Medication List as of 10/6/2022  6:45 PM      START taking these medications    Details   lidocaine (Lidoderm) 5 % Apply 1 patch topically daily Remove & Discard patch within 12 hours or as directed by MD, Starting Thu 10/6/2022, Normal         CONTINUE these medications which have NOT CHANGED    Details   lisinopril (ZESTRIL) 20 mg tablet TAKE 1 TABLET DAILY, Normal      acetaminophen (TYLENOL) 650 mg CR tablet Take 1 tablet (650 mg total) by mouth every 8 (eight) hours as needed for mild pain, Starting Wed 6/30/2021, Normal      albuterol (2 5 mg/3 mL) 0 083 % nebulizer solution Take 3 mL (2 5 mg total) by nebulization every 6 (six) hours as needed for wheezing or shortness of breath, Starting Thu 9/15/2022, Normal      Ascorbic Acid (vitamin C) 1000 MG tablet Take 1 tablet (1,000 mg total) by mouth daily, Starting Tue 11/10/2020, Normal      aspirin 81 mg chewable tablet Chew 1 tablet (81 mg total) daily, Starting Tue 11/10/2020, Normal      atorvastatin (LIPITOR) 40 mg tablet TAKE 1 TABLET DAILY, Normal      Blood Glucose Monitoring Suppl (OneTouch Verio) w/Device KIT Use to test blood sugar 2 times a day, Normal      budesonide (PULMICORT) 0 5 mg/2 mL nebulizer solution Take 2 mL (0 5 mg total) by nebulization 2 (two) times a day, Starting Thu 9/15/2022, Until Sat 10/15/2022, Normal      Calcium Carbonate-Vitamin D3 (Calcium 600+D) 600-400 MG-UNIT TABS Take 1 tablet by mouth daily  , Historical Med      cholecalciferol (VITAMIN D3) 1,000 units tablet Take 1 tablet (1,000 Units total) by mouth daily, Starting Tue 11/10/2020, Normal      Continuous Blood Gluc  (FreeStyle [place] 2 [place]) JUD Use to test blood sugar twice a day, Normal      Continuous Blood Gluc Sensor (FreeStyle [place] 2 Sensor) MISC Use to test blood sugar twice a day, Normal      furosemide (LASIX) 20 mg tablet TAKE 1 TABLET DAILY, Normal      glipiZIDE (GLUCOTROL XL) 2 5 mg 24 hr tablet TAKE 1 TABLET DAILY, Normal      glucose blood (OneTouch Verio) test strip Use to test blood sugar 2 times daily, Normal      guaiFENesin-codeine (ROBITUSSIN AC) 100-10 mg/5 mL oral solution TAKE 5 ML BY MOUTH 2 (TWO) TIMES A DAY AS NEEDED FOR COUGH, Normal      isosorbide mononitrate (IMDUR) 30 mg 24 hr tablet TAKE 1 TABLET DAILY, Normal      Lancets (onetouch ultrasoft) lancets Use to test blood sugar 2 times a day, Normal      metoprolol tartrate (LOPRESSOR) 25 mg tablet TAKE 1/2 TAB EVERY 12 HOURS, Normal      Multiple Vitamins-Minerals (CENTRUM SILVER 50+WOMEN PO) Take by mouth, Historical Med      pantoprazole (PROTONIX) 40 mg tablet TAKE ONE TABLET DAILY, Normal      !! predniSONE 10 mg tablet Take 3 tabs x 4 days then 2 tabs x 4 days then 1 tab daily x 4 days, Normal      !! predniSONE 10 mg tablet Take 3 tablets x4 days then 2 tablets x4 days then 1 tablet x4 days  Take with breakfast, Normal      !! predniSONE 10 mg tablet Take 3 tablets (30 mg total) by mouth daily for 5 days, Starting Mon 10/3/2022, Until Sat 10/8/2022, Normal      Promethazine-Codeine 6 25-10 MG/5ML SOLN TAKE 5 ML BY MOUTH 3 (THREE) TIMES A DAY AS NEEDED FOR COUGH, Normal      propylthiouracil 50 mg tablet Take 1 tablet (50 mg total) by mouth 2 (two) times a day, Starting Thu 9/8/2022, Normal      sodium chloride 0 9 % nebulizer solution Take 3 mL by nebulization 2 (two) times a day as needed for wheezing, Starting Thu 1/27/2022, Normal       !! - Potential duplicate medications found  Please discuss with provider  No discharge procedures on file      PDMP Review     None          ED Provider  Electronically Signed by           Shira Cook MD  25/49 0854

## 2022-10-14 ENCOUNTER — VBI (OUTPATIENT)
Dept: ADMINISTRATIVE | Facility: OTHER | Age: 82
End: 2022-10-14

## 2022-10-19 DIAGNOSIS — K21.9 GASTROESOPHAGEAL REFLUX DISEASE WITHOUT ESOPHAGITIS: ICD-10-CM

## 2022-10-20 RX ORDER — PANTOPRAZOLE SODIUM 40 MG/1
TABLET, DELAYED RELEASE ORAL
Qty: 30 TABLET | Refills: 5 | Status: SHIPPED | OUTPATIENT
Start: 2022-10-20

## 2022-10-26 PROBLEM — C73 PAPILLARY THYROID CARCINOMA (HCC): Status: RESOLVED | Noted: 2021-11-29 | Resolved: 2022-10-26

## 2022-10-26 PROBLEM — E89.0 POST-SURGICAL HYPOTHYROIDISM: Status: RESOLVED | Noted: 2017-12-05 | Resolved: 2022-10-26

## 2022-11-17 DIAGNOSIS — R05.3 CHRONIC COUGH: ICD-10-CM

## 2022-11-17 RX ORDER — CODEINE PHOSPHATE AND GUAIFENESIN 10; 100 MG/5ML; MG/5ML
SOLUTION ORAL
Qty: 300 ML | Refills: 1 | Status: SHIPPED | OUTPATIENT
Start: 2022-11-17

## 2022-11-21 DIAGNOSIS — I25.119 CORONARY ARTERY DISEASE WITH ANGINA PECTORIS, UNSPECIFIED VESSEL OR LESION TYPE, UNSPECIFIED WHETHER NATIVE OR TRANSPLANTED HEART (HCC): ICD-10-CM

## 2022-11-21 DIAGNOSIS — E78.5 HYPERLIPIDEMIA LDL GOAL <70: ICD-10-CM

## 2022-11-21 RX ORDER — ATORVASTATIN CALCIUM 40 MG/1
TABLET, FILM COATED ORAL
Qty: 90 TABLET | Refills: 2 | Status: SHIPPED | OUTPATIENT
Start: 2022-11-21

## 2022-12-29 DIAGNOSIS — I10 HYPERTENSION, ESSENTIAL: ICD-10-CM

## 2022-12-29 RX ORDER — ISOSORBIDE MONONITRATE 30 MG/1
TABLET, EXTENDED RELEASE ORAL
Qty: 30 TABLET | Refills: 5 | Status: SHIPPED | OUTPATIENT
Start: 2022-12-29

## 2023-01-16 DIAGNOSIS — R05.3 CHRONIC COUGH: ICD-10-CM

## 2023-01-17 RX ORDER — CODEINE PHOSPHATE AND GUAIFENESIN 10; 100 MG/5ML; MG/5ML
SOLUTION ORAL
Qty: 300 ML | Refills: 1 | Status: SHIPPED | OUTPATIENT
Start: 2023-01-17

## 2023-01-25 DIAGNOSIS — I10 HYPERTENSION, ESSENTIAL: ICD-10-CM

## 2023-01-25 LAB
ALBUMIN SERPL-MCNC: 3.9 G/DL (ref 3.6–4.6)
ALBUMIN/GLOB SERPL: 1.4 {RATIO} (ref 1.2–2.2)
ALP SERPL-CCNC: 89 IU/L (ref 44–121)
ALT SERPL-CCNC: 15 IU/L (ref 0–32)
AST SERPL-CCNC: 18 IU/L (ref 0–40)
BILIRUB SERPL-MCNC: 0.5 MG/DL (ref 0–1.2)
BUN SERPL-MCNC: 12 MG/DL (ref 8–27)
BUN SERPL-MCNC: 12 MG/DL (ref 8–27)
BUN/CREAT SERPL: 13 (ref 12–28)
BUN/CREAT SERPL: 14 (ref 12–28)
CALCIUM SERPL-MCNC: 9.5 MG/DL (ref 8.7–10.3)
CALCIUM SERPL-MCNC: 9.5 MG/DL (ref 8.7–10.3)
CHLORIDE SERPL-SCNC: 106 MMOL/L (ref 96–106)
CHLORIDE SERPL-SCNC: 107 MMOL/L (ref 96–106)
CHOLEST SERPL-MCNC: 115 MG/DL (ref 100–199)
CHOLEST/HDLC SERPL: 3.1 RATIO (ref 0–4.4)
CO2 SERPL-SCNC: 23 MMOL/L (ref 20–29)
CO2 SERPL-SCNC: 24 MMOL/L (ref 20–29)
CREAT SERPL-MCNC: 0.83 MG/DL (ref 0.57–1)
CREAT SERPL-MCNC: 0.89 MG/DL (ref 0.57–1)
EGFR: 65 ML/MIN/1.73
EGFR: 71 ML/MIN/1.73
EST. AVERAGE GLUCOSE BLD GHB EST-MCNC: 169 MG/DL
GLOBULIN SER-MCNC: 2.8 G/DL (ref 1.5–4.5)
GLUCOSE SERPL-MCNC: 106 MG/DL (ref 70–99)
GLUCOSE SERPL-MCNC: 109 MG/DL (ref 70–99)
HBA1C MFR BLD: 7.5 % (ref 4.8–5.6)
HDLC SERPL-MCNC: 37 MG/DL
LDLC SERPL CALC-MCNC: 63 MG/DL (ref 0–99)
POTASSIUM SERPL-SCNC: 4.7 MMOL/L (ref 3.5–5.2)
POTASSIUM SERPL-SCNC: 4.7 MMOL/L (ref 3.5–5.2)
PROT SERPL-MCNC: 6.7 G/DL (ref 6–8.5)
SL AMB VLDL CHOLESTEROL CALC: 15 MG/DL (ref 5–40)
SODIUM SERPL-SCNC: 141 MMOL/L (ref 134–144)
SODIUM SERPL-SCNC: 142 MMOL/L (ref 134–144)
T3 SERPL-MCNC: 148 NG/DL (ref 71–180)
T4 FREE SERPL-MCNC: 1.51 NG/DL (ref 0.82–1.77)
TRIGL SERPL-MCNC: 70 MG/DL (ref 0–149)
TSH SERPL DL<=0.005 MIU/L-ACNC: <0.005 UIU/ML (ref 0.45–4.5)

## 2023-01-26 LAB
CHOLEST SERPL-MCNC: 113 MG/DL (ref 100–199)
HDLC SERPL-MCNC: 36 MG/DL
LDLC SERPL CALC-MCNC: 62 MG/DL (ref 0–99)
SL AMB VLDL CHOLESTEROL CALC: 15 MG/DL (ref 5–40)
TRIGL SERPL-MCNC: 74 MG/DL (ref 0–149)

## 2023-01-26 RX ORDER — FUROSEMIDE 20 MG/1
TABLET ORAL
Qty: 90 TABLET | Refills: 1 | Status: SHIPPED | OUTPATIENT
Start: 2023-01-26

## 2023-01-27 DIAGNOSIS — E11.65 TYPE 2 DIABETES MELLITUS WITH HYPERGLYCEMIA, WITHOUT LONG-TERM CURRENT USE OF INSULIN (HCC): ICD-10-CM

## 2023-01-27 DIAGNOSIS — E05.90 HYPERTHYROIDISM: ICD-10-CM

## 2023-01-27 RX ORDER — PROPYLTHIOURACIL 50 MG/1
TABLET ORAL
Qty: 270 TABLET | Refills: 3 | Status: SHIPPED | OUTPATIENT
Start: 2023-01-27

## 2023-02-08 DIAGNOSIS — E05.90 HYPERTHYROIDISM: ICD-10-CM

## 2023-02-08 DIAGNOSIS — E11.65 TYPE 2 DIABETES MELLITUS WITH HYPERGLYCEMIA, WITHOUT LONG-TERM CURRENT USE OF INSULIN (HCC): Primary | ICD-10-CM

## 2023-02-08 LAB
ALBUMIN SERPL-MCNC: 4 G/DL (ref 3.6–4.6)
ALBUMIN/GLOB SERPL: 1.5 {RATIO} (ref 1.2–2.2)
ALP SERPL-CCNC: 90 IU/L (ref 44–121)
ALT SERPL-CCNC: 16 IU/L (ref 0–32)
AST SERPL-CCNC: 21 IU/L (ref 0–40)
BASOPHILS # BLD AUTO: 0 X10E3/UL (ref 0–0.2)
BASOPHILS # BLD AUTO: 0 X10E3/UL (ref 0–0.2)
BASOPHILS NFR BLD AUTO: 0 %
BASOPHILS NFR BLD AUTO: 0 %
BILIRUB SERPL-MCNC: 0.2 MG/DL (ref 0–1.2)
BUN SERPL-MCNC: 14 MG/DL (ref 8–27)
BUN/CREAT SERPL: 17 (ref 12–28)
CALCIUM SERPL-MCNC: 9.4 MG/DL (ref 8.7–10.3)
CHLORIDE SERPL-SCNC: 107 MMOL/L (ref 96–106)
CHOLEST SERPL-MCNC: 106 MG/DL (ref 100–199)
CO2 SERPL-SCNC: 23 MMOL/L (ref 20–29)
CREAT SERPL-MCNC: 0.81 MG/DL (ref 0.57–1)
EGFR: 73 ML/MIN/1.73
EOSINOPHIL # BLD AUTO: 0.1 X10E3/UL (ref 0–0.4)
EOSINOPHIL # BLD AUTO: 0.1 X10E3/UL (ref 0–0.4)
EOSINOPHIL NFR BLD AUTO: 3 %
EOSINOPHIL NFR BLD AUTO: 3 %
ERYTHROCYTE [DISTWIDTH] IN BLOOD BY AUTOMATED COUNT: 13.1 % (ref 11.7–15.4)
ERYTHROCYTE [DISTWIDTH] IN BLOOD BY AUTOMATED COUNT: 13.3 % (ref 11.7–15.4)
EST. AVERAGE GLUCOSE BLD GHB EST-MCNC: 163 MG/DL
GLOBULIN SER-MCNC: 2.6 G/DL (ref 1.5–4.5)
GLUCOSE SERPL-MCNC: 79 MG/DL (ref 70–99)
HBA1C MFR BLD: 7.3 % (ref 4.8–5.6)
HCT VFR BLD AUTO: 37.7 % (ref 34–46.6)
HCT VFR BLD AUTO: 38.3 % (ref 34–46.6)
HDLC SERPL-MCNC: 34 MG/DL
HGB BLD-MCNC: 12.6 G/DL (ref 11.1–15.9)
HGB BLD-MCNC: 12.6 G/DL (ref 11.1–15.9)
IMM GRANULOCYTES # BLD: 0 X10E3/UL (ref 0–0.1)
IMM GRANULOCYTES # BLD: 0 X10E3/UL (ref 0–0.1)
IMM GRANULOCYTES NFR BLD: 0 %
IMM GRANULOCYTES NFR BLD: 0 %
LDLC SERPL CALC-MCNC: 55 MG/DL (ref 0–99)
LYMPHOCYTES # BLD AUTO: 2 X10E3/UL (ref 0.7–3.1)
LYMPHOCYTES # BLD AUTO: 2 X10E3/UL (ref 0.7–3.1)
LYMPHOCYTES NFR BLD AUTO: 51 %
LYMPHOCYTES NFR BLD AUTO: 51 %
MCH RBC QN AUTO: 27.6 PG (ref 26.6–33)
MCH RBC QN AUTO: 27.8 PG (ref 26.6–33)
MCHC RBC AUTO-ENTMCNC: 32.9 G/DL (ref 31.5–35.7)
MCHC RBC AUTO-ENTMCNC: 33.4 G/DL (ref 31.5–35.7)
MCV RBC AUTO: 83 FL (ref 79–97)
MCV RBC AUTO: 84 FL (ref 79–97)
MONOCYTES # BLD AUTO: 0.4 X10E3/UL (ref 0.1–0.9)
MONOCYTES # BLD AUTO: 0.4 X10E3/UL (ref 0.1–0.9)
MONOCYTES NFR BLD AUTO: 10 %
MONOCYTES NFR BLD AUTO: 9 %
NEUTROPHILS # BLD AUTO: 1.4 X10E3/UL (ref 1.4–7)
NEUTROPHILS # BLD AUTO: 1.4 X10E3/UL (ref 1.4–7)
NEUTROPHILS NFR BLD AUTO: 36 %
NEUTROPHILS NFR BLD AUTO: 37 %
PLATELET # BLD AUTO: 154 X10E3/UL (ref 150–450)
PLATELET # BLD AUTO: 161 X10E3/UL (ref 150–450)
POTASSIUM SERPL-SCNC: 4.3 MMOL/L (ref 3.5–5.2)
PROT SERPL-MCNC: 6.6 G/DL (ref 6–8.5)
RBC # BLD AUTO: 4.53 X10E6/UL (ref 3.77–5.28)
RBC # BLD AUTO: 4.56 X10E6/UL (ref 3.77–5.28)
SL AMB VLDL CHOLESTEROL CALC: 17 MG/DL (ref 5–40)
SODIUM SERPL-SCNC: 142 MMOL/L (ref 134–144)
T3FREE SERPL-MCNC: 4.3 PG/ML (ref 2–4.4)
T4 FREE SERPL-MCNC: 1.61 NG/DL (ref 0.82–1.77)
TRIGL SERPL-MCNC: 87 MG/DL (ref 0–149)
TSH SERPL DL<=0.005 MIU/L-ACNC: <0.005 UIU/ML (ref 0.45–4.5)
WBC # BLD AUTO: 3.8 X10E3/UL (ref 3.4–10.8)
WBC # BLD AUTO: 4 X10E3/UL (ref 3.4–10.8)

## 2023-02-22 DIAGNOSIS — J45.30 MILD PERSISTENT ASTHMA WITHOUT COMPLICATION: ICD-10-CM

## 2023-02-23 RX ORDER — SODIUM CHLORIDE FOR INHALATION 0.9 %
3 VIAL, NEBULIZER (ML) INHALATION 2 TIMES DAILY PRN
Qty: 300 ML | Refills: 6 | Status: SHIPPED | OUTPATIENT
Start: 2023-02-23

## 2023-02-24 NOTE — PATIENT INSTRUCTIONS
Can try benzonatate 100 mg 1 capsule twice a day as needed for cough    Use guaifenesin with codeine 1 tsp full twice a day as needed for cough    Can take Tylenol for your chest pain
Implemented All Universal Safety Interventions:  [place] to call system. Call bell, personal items and telephone within reach. Instruct patient to call for assistance. Room bathroom lighting operational. Non-slip footwear when patient is off stretcher. Physically safe environment: no spills, clutter or unnecessary equipment. Stretcher in lowest position, wheels locked, appropriate side rails in place.

## 2023-03-02 LAB
ALBUMIN SERPL-MCNC: 4.2 G/DL (ref 3.6–4.6)
ALBUMIN/GLOB SERPL: 1.4 {RATIO} (ref 1.2–2.2)
ALP SERPL-CCNC: 100 IU/L (ref 44–121)
ALT SERPL-CCNC: 11 IU/L (ref 0–32)
AST SERPL-CCNC: 17 IU/L (ref 0–40)
BASOPHILS # BLD AUTO: 0 X10E3/UL (ref 0–0.2)
BASOPHILS NFR BLD AUTO: 0 %
BILIRUB SERPL-MCNC: 0.4 MG/DL (ref 0–1.2)
BUN SERPL-MCNC: 18 MG/DL (ref 8–27)
BUN/CREAT SERPL: 22 (ref 12–28)
CALCIUM SERPL-MCNC: 9.7 MG/DL (ref 8.7–10.3)
CHLORIDE SERPL-SCNC: 105 MMOL/L (ref 96–106)
CHOLEST SERPL-MCNC: 130 MG/DL (ref 100–199)
CHOLEST/HDLC SERPL: 3.1 RATIO (ref 0–4.4)
CO2 SERPL-SCNC: 22 MMOL/L (ref 20–29)
CREAT SERPL-MCNC: 0.82 MG/DL (ref 0.57–1)
EGFR: 72 ML/MIN/1.73
EOSINOPHIL # BLD AUTO: 0.1 X10E3/UL (ref 0–0.4)
EOSINOPHIL NFR BLD AUTO: 4 %
ERYTHROCYTE [DISTWIDTH] IN BLOOD BY AUTOMATED COUNT: 13.2 % (ref 11.7–15.4)
GLOBULIN SER-MCNC: 3.1 G/DL (ref 1.5–4.5)
GLUCOSE SERPL-MCNC: 109 MG/DL (ref 70–99)
HCT VFR BLD AUTO: 40.6 % (ref 34–46.6)
HDLC SERPL-MCNC: 42 MG/DL
HGB BLD-MCNC: 13 G/DL (ref 11.1–15.9)
IMM GRANULOCYTES # BLD: 0 X10E3/UL (ref 0–0.1)
IMM GRANULOCYTES NFR BLD: 0 %
LDLC SERPL CALC-MCNC: 70 MG/DL (ref 0–99)
LYMPHOCYTES # BLD AUTO: 1.9 X10E3/UL (ref 0.7–3.1)
LYMPHOCYTES NFR BLD AUTO: 52 %
MCH RBC QN AUTO: 26.7 PG (ref 26.6–33)
MCHC RBC AUTO-ENTMCNC: 32 G/DL (ref 31.5–35.7)
MCV RBC AUTO: 83 FL (ref 79–97)
MONOCYTES # BLD AUTO: 0.3 X10E3/UL (ref 0.1–0.9)
MONOCYTES NFR BLD AUTO: 8 %
NEUTROPHILS # BLD AUTO: 1.3 X10E3/UL (ref 1.4–7)
NEUTROPHILS NFR BLD AUTO: 36 %
PLATELET # BLD AUTO: 178 X10E3/UL (ref 150–450)
POTASSIUM SERPL-SCNC: 5 MMOL/L (ref 3.5–5.2)
PROT SERPL-MCNC: 7.3 G/DL (ref 6–8.5)
RBC # BLD AUTO: 4.87 X10E6/UL (ref 3.77–5.28)
SL AMB VLDL CHOLESTEROL CALC: 18 MG/DL (ref 5–40)
SODIUM SERPL-SCNC: 144 MMOL/L (ref 134–144)
T3 SERPL-MCNC: 130 NG/DL (ref 71–180)
T4 FREE SERPL-MCNC: 1.3 NG/DL (ref 0.82–1.77)
TRIGL SERPL-MCNC: 96 MG/DL (ref 0–149)
TSH SERPL DL<=0.005 MIU/L-ACNC: <0.005 UIU/ML (ref 0.45–4.5)
WBC # BLD AUTO: 3.6 X10E3/UL (ref 3.4–10.8)

## 2023-03-07 ENCOUNTER — OFFICE VISIT (OUTPATIENT)
Dept: CARDIOLOGY CLINIC | Facility: CLINIC | Age: 83
End: 2023-03-07

## 2023-03-07 VITALS
WEIGHT: 212 LBS | OXYGEN SATURATION: 99 % | HEART RATE: 61 BPM | BODY MASS INDEX: 39.01 KG/M2 | HEIGHT: 62 IN | SYSTOLIC BLOOD PRESSURE: 118 MMHG | DIASTOLIC BLOOD PRESSURE: 80 MMHG

## 2023-03-07 DIAGNOSIS — I25.119 CORONARY ARTERY DISEASE WITH ANGINA PECTORIS, UNSPECIFIED VESSEL OR LESION TYPE, UNSPECIFIED WHETHER NATIVE OR TRANSPLANTED HEART (HCC): Primary | ICD-10-CM

## 2023-03-07 DIAGNOSIS — E78.2 MIXED HYPERLIPIDEMIA: ICD-10-CM

## 2023-03-07 DIAGNOSIS — I10 HYPERTENSION, ESSENTIAL: ICD-10-CM

## 2023-03-07 NOTE — PROGRESS NOTES
Progress Note - Cardiology Office  [facility]    Alida 81 y o  female MRN: [id number]  : 1940  Encounter: [id number]      ASSESSMENT:   CAD, s/p PCI at [facility], 25 years ago  ASCVD risk score is 34 3%     Patient had declined to undergo cardiac catheterization stating that she does not have chest pain at the [facility] was informed that the purpose of the procedure was to evaluate and revascularize if needed in view of an abnormal stress test   Again re-emphasized to the patient to let us know if she changes her mind and she starts having chest pain again     Echo   Normal left ventricular size  Normal LV systolic function (EF 53 %)  There are no regional wall motion abnormalities  Normal diastolic LV function  Normal RV function  Normal size right ventricle  Left atrium is enlarged  Right atrium is enlarged  Normal mitral valve  Aortic valve tri-leaflet  Normal tricuspid valve  Pulmonary artery systolic pressure is normal (30 mmHg)        Stress echo :10/16: - Assessment: no ischemia-induced wall motion abnormalities (negative stress echo  test)  - At rest, normal global systolic LV-function (EF 58-62%)  - With stress, hypercontractility of the left ventricle (EF 80%)  - In the recovery phase, normal global systolic LV function (EF 83%)  - Assessment: normal LV function     Lexiscan, 04/15/2021    IMPRESSIONS: Abnormal study after pharmacologic vasodilation  There is evidence of partially reversible apical lateral wall defect  Defect was of mild in severity  TID ratio of 1 4 also noted   EF 61% Left ventricular systolic function was  normal      Patient was scheduled for cardiac catheterization at [address] [place] [address] at that time and subsequently a has expressed desire not to undergo cardiac catheterization        Hyperlipidemia  2021:  , normal liver enzymes  2021:  LDL 79, triglyceride 90, HDL 35, normal AST and ALT  10/12/2021:  LDL 62, triglycerides 103, HDL 34, normal AST and ALT  2023: LDL 70, TG 96, HDL 42, normal AST and ALT     Hyperthyroidism     DMT2,      Obesity:  BMI is 40 06     Hypertension  BP in the office today is 118/80 mmHg     History of PE     BOOP, history of tracheal stenosis, s/p tracheostomy     Allergy to iodine, shellfish derived products and morphine        RECOMMENDATIONS:  Continue cardiac medications  Continue cardiovascular exercise as tolerated  Low salt, low carb, low sugar, low-cholesterol diet         Please call [phone] if any questions  HPI :     Julia Farrar is a 81y o  year old female who came for follow up  She denies any new symptoms  She walks around in the house without any chest pain or unusual dyspnea  She had blood test/lipid profile earlier this month which is normal    REVIEW OF SYSTEMS:  Denies any new or acute cardiac symptoms    Denies chest pain, unusual dyspnea, palpitations or syncope    Historical Information   Past Medical History:   Diagnosis Date   • BOOP (bronchiolitis obliterans with organizing pneumonia) ([facility] ) 2006   • Coronary artery disease    • Diabetes mellitus ([facility] )    • Papillary thyroid carcinoma ([facility] ) 2021   • Post-surgical hypothyroidism 2017     Past Surgical History:   Procedure Laterality Date   • HYSTERECTOMY     • REPLACEMENT TOTAL KNEE     • TRACHEOSTOMY  2006     Social History     Substance and Sexual Activity   Alcohol Use Never     Social History     Substance and Sexual Activity   Drug Use Never     Social History     Tobacco Use   Smoking Status Never   Smokeless Tobacco Never     Family History:   Family History   Problem Relation Age of Onset   • Heart disease Mother    • Hypertension Mother    • Heart disease Father    • Pneumonia Brother          of COVID   • Dementia Brother        Meds/Allergies     Allergies   Allergen Reactions   • Iodine - Food Allergy Swelling   • Shellfish-Derived Products - Food Allergy Hives   • Morphine Rash       Current Outpatient Medications:   •  acetaminophen (TYLENOL) 650 mg CR tablet, Take 1 tablet (650 mg total) by mouth every 8 (eight) hours as needed for mild pain, Disp: 30 tablet, Rfl: 0  •  albuterol (2 5 mg/3 mL) 0 083 % nebulizer solution, Take 3 mL (2 5 mg total) by nebulization every 6 (six) hours as needed for wheezing or shortness of breath, Disp: 1080 mL, Rfl: 3  •  Ascorbic Acid (vitamin C) 1000 MG tablet, Take 1 tablet (1,000 mg total) by mouth daily, Disp: 30 tablet, Rfl: 5  •  aspirin 81 mg chewable tablet, Chew 1 tablet (81 mg total) daily, Disp: 30 tablet, Rfl: 5  •  atorvastatin (LIPITOR) 40 mg tablet, TAKE 1 TABLET DAILY, Disp: 90 tablet, Rfl: 2  •  Blood Glucose Monitoring Suppl (OneTouch Verio) w/Device KIT, Use to test blood sugar 2 times a day, Disp: 1 kit, Rfl: 0  •  budesonide (PULMICORT) 0 5 mg/2 mL nebulizer solution, Take 2 mL (0 5 mg total) by nebulization 2 (two) times a day, Disp: 120 mL, Rfl: 11  •  Calcium Carbonate-Vitamin D3 (Calcium 600+D) 600-400 MG-UNIT TABS, Take 1 tablet by mouth daily  , Disp: , Rfl:   •  furosemide (LASIX) 20 mg tablet, TAKE 1 TABLET DAILY, Disp: 90 tablet, Rfl: 1  •  glipiZIDE (GLUCOTROL XL) 2 5 mg 24 hr tablet, TAKE 1 TABLET DAILY, Disp: 90 tablet, Rfl: 3  •  glucose blood (OneTouch Verio) test strip, Use to test blood sugar 2 times daily, Disp: 100 strip, Rfl: 3  •  guaiFENesin-codeine (ROBITUSSIN AC) 100-10 mg/5 mL oral solution, TAKE 5 ML BY MOUTH 2 (TWO) TIMES A DAY AS NEEDED FOR COUGH, Disp: 300 mL, Rfl: 1  •  isosorbide mononitrate (IMDUR) 30 mg 24 hr tablet, TAKE 1 TABLET DAILY, Disp: 30 tablet, Rfl: 5  •  Lancets (onetouch ultrasoft) lancets, Use to test blood sugar 2 times a day, Disp: 100 each, Rfl: 3  •  lisinopril (ZESTRIL) 20 mg tablet, TAKE 1 TABLET DAILY, Disp: 90 tablet, Rfl: 1  •  metoprolol tartrate (LOPRESSOR) 25 mg tablet, TAKE 1/2 TABLET EVERY 12 HOURS, Disp: 60 tablet, Rfl: 2  •  Multiple Vitamins-Minerals (CENTRUM SILVER 50+WOMEN PO), Take by mouth, Disp: , Rfl:   •  pantoprazole (PROTONIX) 40 mg tablet, TAKE ONE TABLET DAILY, Disp: 30 tablet, Rfl: 5  •  predniSONE 10 mg tablet, Take 3 tabs x 4 days then 2 tabs x 4 days then 1 tab daily x 4 days, Disp: 24 tablet, Rfl: 0  •  Promethazine-Codeine 6 25-10 MG/5ML SOLN, TAKE 5 ML BY MOUTH 3 (THREE) TIMES A DAY AS NEEDED FOR COUGH, Disp: 120 mL, Rfl: 0  •  propylthiouracil 50 mg tablet, 1 tab three times daily, Disp: 270 tablet, Rfl: 3  •  sodium chloride 0 9 % nebulizer solution, TAKE 3 ML BY NEBULIZATION 2 (TWO) TIMES A DAY AS NEEDED FOR WHEEZING, Disp: 300 mL, Rfl: 6  •  cholecalciferol (VITAMIN D3) 1,000 units tablet, Take 1 tablet (1,000 Units total) by mouth daily (Patient not taking: Reported on 9/8/2022), Disp: 30 tablet, Rfl: 5  •  Continuous Blood Gluc  (FreeStyle Herminia Dearborn 2 [place]) JUD, Use to test blood sugar twice a day (Patient not taking: Reported on 5/19/2022), Disp: 1 each, Rfl: 0  •  Continuous Blood Gluc Sensor (FreeStyle Rasheeda 2 Sensor) MISC, Use to test blood sugar twice a day (Patient not taking: Reported on 5/19/2022), Disp: 2 each, Rfl: 12  •  lidocaine (Lidoderm) 5 %, Apply 1 patch topically daily Remove & Discard patch within 12 hours or as directed by MD, Disp: 6 patch, Rfl: 0  •  predniSONE 10 mg tablet, Take 3 tablets x4 days then 2 tablets x4 days then 1 tablet x4 days  Take with breakfast (Patient not taking: Reported on 3/7/2023), Disp: 30 tablet, Rfl: 0    Vitals: Blood pressure 118/80, pulse 61, height 5' 2" (1 575 m), weight 96 2 kg (212 lb), SpO2 99 %  ?  Body mass index is 38 78 kg/m²    Vitals:    03/07/23 0836   Weight: 96 2 kg (212 lb)     BP Readings from Last 3 Encounters:   03/07/23 118/80   10/06/22 144/70   10/06/22 140/68       Physical Exam:    Neurologic:  Alert & oriented x 3, no new focal deficits, Not in any acute distress,  Constitutional: Obese  Eyes:  Pupil equal and reacting to light, conjunctiva normal,   HENT:  Atraumatic, oropharynx moist, Neck- normal range of motion, no tenderness,  Neck supple, No JVP, No LNP   Respiratory:  Bilateral air entry, mostly clear to auscultation  Tracheostomy is present  Cardiovascular: S1-S2 regular with a I/VI ejection systolic murmur   GI:  Soft, nondistended, normal bowel sounds, nontender, no hepatosplenomegaly appreciated  Musculoskeletal:   no tenderness, no deformities  Skin:  Well hydrated, no rash   Lymphatic:  No lymphadenopathy noted   Extremities:  No significant edema        Diagnostic Studies Review Cardio:      EKG: Normal sinus rhythm, heart rate 61/min    Cardiac testing:   No results found for this or any previous visit  Results for orders placed during the hospital encounter of 04/15/21    NM myocardial perfusion spect (rx stress and/or rest)    [facility]  [address]  [address] [facility]   [phone]    Rest/Stress Gated SPECT Myocardial Perfusion Imaging After Regadenoson    Patient: Elizabeth Dawson  MR number: [id number]  Account number: [de-identified]  : 1940  Age: 79 years  Gender: Female  Status: Outpatient  Location: Stress lab  Height: 62 in  Weight: 224 lb  BP: 122/ 65 mmHg    Allergies: IODINE - FOOD ALLERGY, SHELLFISH-DERIVED PRODUCTS - FOOD ALLERGY, MORPHINE    Diagnosis: R07 9 - Chest pain, unspecified    Primary Physician:  Casper Chandra  RN:  RISA Payne  Referring Physician:  Carlton Luis MD  Group:  [facility]  Report Prepared By[de-identified]  RISA Payne  Interpreting Physician:  Crystal Wray MD    INDICATIONS: Evaluation of known coronary artery disease  HISTORY: The patient is a 79year old  female  Chest pain status: chest pain  Coronary artery disease risk factors: dyslipidemia, hypertension, family history of premature coronary artery disease, and diabetes mellitus  Cardiovascular history: coronary artery disease  Prior cardiovascular procedures: percutaneous transluminal coronary angioplasty  Co-morbidity: history of lung disease and obesity  Medications: a nitrate, an ACE inhibitor/ARB, a diuretic,  aspirin, and diabetic medications  PHYSICAL EXAM: Baseline physical exam screening: no wheezes audible  REST ECG: Normal sinus rhythm  The ECG showed rare premature ventricular contractions  PROCEDURE: The study was performed in the the Stress lab  A regadenoson infusion pharmacologic stress test was performed  Gated SPECT myocardial perfusion imaging was performed after stress and at rest  Systolic blood pressure was 122  mmHg, at the start of the study  Diastolic blood pressure was 65 mmHg, at the start of the study  The heart rate was 66 bpm, at the start of the study  IV double checked  Regadenoson protocol:  Time HR bpm SBP mmHg DBP mmHg Symptoms Rhythm/conduct  Baseline 09:09 66 122 65 none NSR  Immediate 09:12 86 132 76 none NSR, frequent PVC's  1 min 09:13 836 136 79 none same as above  2 min 09:14 82 140 77 none same as above  3 min 09:15 85 133 74 none --  4 min 09:16 79 134 76 none same as above  No medications or fluids given  STRESS SUMMARY: Duration of pharmacologic stress was 3 min  Maximal heart rate during stress was 88 bpm  The heart rate response to stress was normal  There was normal resting blood pressure with an appropriate response to stress  The  rate-pressure product for the peak heart rate and blood pressure was 26998  There was no chest pain during stress  The stress test was terminated due to protocol completion  Pre oxygen saturation: 97 %  Peak oxygen saturation: 99 %  The  stress ECG was negative for ischemia and normal  Arrhythmia during stress: isolated premature ventricular beats      ISOTOPE ADMINISTRATION:  Resting isotope administration Stress isotope administration  Agent Tetrofosmin Tetrofosmin  Dose 10 5 mCi 33 mCi  Date 04/15/2021 04/15/2021    The radiopharmaceutical was injected at the peak effect of pharmacologic stress  MYOCARDIAL PERFUSION IMAGING:  Rotating projection images reveal mild breast attenuation and moderate subdiaphragmatic activity  Left ventricular size was normal  The TID ratio was 1 4  PERFUSION DEFECTS:  -  There was a small to moderate, mildly severe, partially reversible myocardial perfusion defect of the entire lateral and apical wall  GATED SPECT:  The calculated left ventricular ejection fraction was 61 %  Left ventricular ejection fraction was within normal limits by visual estimate  There was no left ventricular regional abnormality  SUMMARY:  -  Stress results: There was no chest pain during stress  -  ECG conclusions: The stress ECG was negative for ischemia and normal   -  Perfusion imaging: There was a small to moderate, mildly severe, partially reversible myocardial perfusion defect of the entire lateral and apical wall  -  Gated SPECT: The calculated left ventricular ejection fraction was 61 %  Left ventricular ejection fraction was within normal limits by visual estimate  There was no left ventricular regional abnormality   -  Impressions and recommendations: Abnormal study after pharmacologic vasodilation  There is evidence of partially reversible apical lateral wall defect  Defect was of mild severity  T i d  of 1 4 also noted  EF 61%    IMPRESSIONS: Abnormal study after pharmacologic vasodilation  There is evidence of partially reversible apical lateral wall defect  Defect was of mild severity  T i d  of 1 4 also noted  EF 61% Left ventricular systolic function was  normal     Prepared and signed by    Trey Muro MD  Signed 04/15/2021 13:53:15    No results found for this or any previous visit  Imaging:  Chest X-Ray:   No Chest XR results available for this patient  CT-scan of the chest:     No CTA results available for this patient    Lab Review   Lab Results   Component Value Date    WBC 3 6 03/01/2023    HGB 13 0 03/01/2023    HCT 40 6 03/01/2023    MCV 83 03/01/2023    RDW 13 2 03/01/2023     03/01/2023     BMP:  Lab Results   Component Value Date    SODIUM 144 03/01/2023    K 5 0 03/01/2023     03/01/2023    CO2 22 03/01/2023    BUN 18 03/01/2023    CREATININE 0 82 03/01/2023    GLUC 109 (H) 03/01/2023    CALCIUM 8 7 09/13/2020    EGFR 72 03/01/2023     LFT:  Lab Results   Component Value Date    AST 17 03/01/2023    ALT 11 03/01/2023    ALKPHOS 72 09/13/2020    TP 7 3 03/01/2023    ALB 4 2 03/01/2023      No components found for: TSH3  No results found for: [facility]  Lab Results   Component Value Date    HGBA1C 7 3 (H) 02/07/2023     Lipid Profile:   Lab Results   Component Value Date    CHOLESTEROL 130 03/01/2023    HDL 42 03/01/2023    LDLCALC 70 03/01/2023    TRIG 96 03/01/2023     Lab Results   Component Value Date    CHOLESTEROL 130 03/01/2023    CHOLESTEROL 106 02/07/2023     Lab Results   Component Value Date    TROPONINI <0 02 09/13/2020     No results found for: NTBNP   Recent Results (from the past 672 hour(s))   Lipid panel Lab Collect Lab Collect    Collection Time: 03/01/23  7:32 AM   Result Value Ref Range    Cholesterol, Total 130 100 - 199 mg/dL    Triglycerides 96 0 - 149 mg/dL    HDL 42 >39 mg/dL    VLDL Cholesterol Calculated 18 5 - 40 mg/dL    LDL Calculated 70 0 - 99 mg/dL    T   Chol/HDL Ratio 3 1 0 0 - 4 4 ratio   CBC and differential Lab Collect    Collection Time: 03/01/23  7:32 AM   Result Value Ref Range    White Blood Cell Count 3 6 3 4 - 10 8 x10E3/uL    Red Blood Cell Count 4 87 3 77 - 5 28 x10E6/uL    Hemoglobin 13 0 11 1 - 15 9 g/dL    HCT 40 6 34 0 - 46 6 %    MCV 83 79 - 97 fL    MCH 26 7 26 6 - 33 0 pg    MCHC 32 0 31 5 - 35 7 g/dL    RDW 13 2 11 7 - 15 4 %    Platelet Count 826 979 - 450 x10E3/uL    Neutrophils 36 Not Estab  %    Lymphocytes 52 Not Estab  %    Monocytes 8 Not Estab  %    Eosinophils 4 Not Estab  %    Basophils PCT 0 Not Estab  %    Neutrophils (Absolute) 1 3 (L) 1 4 - 7 0 x10E3/uL    Lymphocytes (Absolute) 1 9 0 7 - 3 1 x10E3/uL    Monocytes (Absolute) 0 3 0 1 - 0 9 x10E3/uL    Eosinophils (Absolute) 0 1 0 0 - 0 4 x10E3/uL    Basophils ABS 0 0 0 0 - 0 2 x10E3/uL    Immature Granulocytes 0 Not Estab  %    Immature Granulocytes (Absolute) 0 0 0 0 - 0 1 x10E3/uL   Comprehensive metabolic panel Lab Collect    Collection Time: 03/01/23  7:32 AM   Result Value Ref Range    Glucose, Random 109 (H) 70 - 99 mg/dL    BUN 18 8 - 27 mg/dL    Creatinine 0 82 0 57 - 1 00 mg/dL    eGFR 72 >59 mL/min/1 73    SL AMB BUN/CREATININE RATIO 22 12 - 28    Sodium 144 134 - 144 mmol/L    Potassium 5 0 3 5 - 5 2 mmol/L    Chloride 105 96 - 106 mmol/L    CO2 22 20 - 29 mmol/L    CALCIUM 9 7 8 7 - 10 3 mg/dL    Protein, Total 7 3 6 0 - 8 5 g/dL    Albumin 4 2 3 6 - 4 6 g/dL    Globulin, Total 3 1 1 5 - 4 5 g/dL    Albumin/Globulin Ratio 1 4 1 2 - 2 2    TOTAL BILIRUBIN 0 4 0 0 - 1 2 mg/dL    Alk Phos Isoenzymes 100 44 - 121 IU/L    AST 17 0 - 40 IU/L    ALT 11 0 - 32 IU/L   T4, free Lab Collect    Collection Time: 03/01/23  7:32 AM   Result Value Ref Range    Free t4 1 30 0 82 - 1 77 ng/dL   T3 Lab Collect    Collection Time: 03/01/23  7:32 AM   Result Value Ref Range    Triiodothyronine (T3) 130 71 - 180 ng/dL   TSH, 3rd generation Lab Collect    Collection Time: 03/01/23  7:32 AM   Result Value Ref Range    TSH <0 005 (L) 0 450 - 4 500 uIU/mL             Dr Aleyda Packer MD, [facility]      "This note has been constructed using a voice recognition system  Therefore there may be syntax, spelling, and/or grammatical errors   Please call if you have any questions  "

## 2023-03-13 DIAGNOSIS — R05.3 CHRONIC COUGH: ICD-10-CM

## 2023-03-13 RX ORDER — CODEINE PHOSPHATE AND GUAIFENESIN 10; 100 MG/5ML; MG/5ML
SOLUTION ORAL
Qty: 300 ML | Refills: 1 | Status: SHIPPED | OUTPATIENT
Start: 2023-03-13

## 2023-04-06 ENCOUNTER — OFFICE VISIT (OUTPATIENT)
Dept: OTOLARYNGOLOGY | Facility: CLINIC | Age: 83
End: 2023-04-06

## 2023-04-06 VITALS — WEIGHT: 212 LBS | HEIGHT: 62 IN | TEMPERATURE: 97.4 F | BODY MASS INDEX: 39.01 KG/M2

## 2023-04-06 DIAGNOSIS — J39.8 TRACHEAL STENOSIS: Primary | ICD-10-CM

## 2023-04-06 DIAGNOSIS — J38.6 SUBGLOTTIC STENOSIS: ICD-10-CM

## 2023-04-06 DIAGNOSIS — Z93.0 TRACHEOSTOMY DEPENDENCE (HCC): ICD-10-CM

## 2023-04-25 ENCOUNTER — TELEPHONE (OUTPATIENT)
Age: 83
End: 2023-04-25

## 2023-04-25 NOTE — TELEPHONE ENCOUNTER
----- Message from [employer] sent at 4/20/2023  2:41 PM EDT -----  Please let the patient know that her chest x-ray was normal

## 2023-04-28 ENCOUNTER — OFFICE VISIT (OUTPATIENT)
Dept: ENDOCRINOLOGY | Facility: CLINIC | Age: 83
End: 2023-04-28

## 2023-04-28 VITALS
BODY MASS INDEX: 39.2 KG/M2 | HEART RATE: 61 BPM | HEIGHT: 62 IN | SYSTOLIC BLOOD PRESSURE: 110 MMHG | WEIGHT: 213 LBS | DIASTOLIC BLOOD PRESSURE: 80 MMHG

## 2023-04-28 DIAGNOSIS — N18.31 STAGE 3A CHRONIC KIDNEY DISEASE (HCC): ICD-10-CM

## 2023-04-28 DIAGNOSIS — E66.01 CLASS 3 SEVERE OBESITY DUE TO EXCESS CALORIES WITHOUT SERIOUS COMORBIDITY WITH BODY MASS INDEX (BMI) OF 40.0 TO 44.9 IN ADULT (HCC): ICD-10-CM

## 2023-04-28 DIAGNOSIS — E11.65 TYPE 2 DIABETES MELLITUS WITH HYPERGLYCEMIA, WITHOUT LONG-TERM CURRENT USE OF INSULIN (HCC): ICD-10-CM

## 2023-04-28 DIAGNOSIS — E05.90 HYPERTHYROIDISM: Primary | ICD-10-CM

## 2023-04-28 DIAGNOSIS — C73 PAPILLARY THYROID CARCINOMA (HCC): ICD-10-CM

## 2023-04-28 NOTE — PROGRESS NOTES
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  Thyroid function tests had started improving on propylthiouracil 50 mg orally 3 times a day however patient has reduced dose back to twice a day  -Trial of proplythiouracil 50 mg 2 5 tablets orally daily  -Thyroid uptake scan after holding antithyroid medication for 3 to 4 days  -Repeat labs in 3 months    3  type 2 diabetes mellitus not on long-term insulin therapy  A1c improved 7 3% which is acceptable for this elderly patient  Continue glipizide at current dose    4  [id number] with primary care  Offered consult with gastroenterology    CC: hyperthyroidism, type 2 diabetes mellitus    History of Present Illness     HPI:  Hallie Ogden is a 81 y o  female who is here for a follow-up of papillary thyroid carcinoma, hyperthyroidism, type 2 diabetes mellitus      Last seen in 9/2022  Per my prior notes   \"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  Also history of bronchiolitis obliterans with organizing pneumonia, status post tracheostomy     Taking glipizide XL 2 5 mg orally daily   Developed hyperthyroidism postoperatively and has been on methimazole\"       took methimazole 2 5 mg alternating with 5 mg daily for approximately  1 week - says that she developed breast pain when she takes the full tablet , says it makes \"her feel sick:\"     Patient insists that the documentation is incorrect and that she did not have thyroid carcinoma   Reviewed path report in care everywhere:    Thyroid, right, lobectomy:          1) Minute focal area, suspicous for early papillary carcinoma  \"      propyluthiouracil 50 mg orally 2 times a day - dose increased 1/2023 to 3 times a day but patient decreased dose herself   Says she was " having chest pain and breast pain with it  Patient states that it has improved since she has stopped       Mcallister snot sleep well at night   No there symptoms of hypo or hyperthyroidism     C/o pain on swallowing X 1 month  not getting better or worse   Thinks it is because of the air flow being too high for the tracheostomy     Diabetes mellitus, taking glipizide XL 2 5 mg orally daily  Has not been checking her Bg regularly      All other systems were reviewed and are negative         Review of Systems    Historical Information   Past Medical History:   Diagnosis Date   • BOOP (bronchiolitis obliterans with organizing pneumonia) ([facility] ) 2006   • Coronary artery disease    • Diabetes mellitus ([place] [address] )    • Papillary thyroid carcinoma ([place] [address] ) 2021   • Post-surgical hypothyroidism 2017     Past Surgical History:   Procedure Laterality Date   • HYSTERECTOMY     • REPLACEMENT TOTAL KNEE     • TRACHEOSTOMY  2006     Social History   Social History     Substance and Sexual Activity   Alcohol Use Never     Social History     Substance and Sexual Activity   Drug Use Never     Social History     Tobacco Use   Smoking Status Never   Smokeless Tobacco Never     Family History:   Family History   Problem Relation Age of Onset   • Heart disease Mother    • Hypertension Mother    • Heart disease Father    • Pneumonia Brother          of COVID   • Dementia Brother        Meds/Allergies   Current Outpatient Medications   Medication Sig Dispense Refill   • acetaminophen (TYLENOL) 650 mg CR tablet Take 1 tablet (650 mg total) by mouth every 8 (eight) hours as needed for mild pain 30 tablet 0   • albuterol (2 5 mg/3 mL) 0 083 % nebulizer solution Take 3 mL (2 5 mg total) by nebulization every 6 (six) hours as needed for wheezing or shortness of breath 1080 mL 3   • Ascorbic Acid (vitamin C) 1000 MG tablet Take 1 tablet (1,000 mg total) by mouth daily 30 tablet 5   • aspirin 81 mg chewable tablet Chew 1 tablet (81 mg total) daily 30 tablet 5   • atorvastatin (LIPITOR) 40 mg tablet TAKE 1 TABLET DAILY 90 tablet 2   • Blood Glucose Monitoring Suppl (OneTouch Verio) w/Device KIT Use to test blood sugar 2 times a day 1 kit 0   • budesonide (PULMICORT) 0 5 mg/2 mL nebulizer solution Take 2 mL (0 5 mg total) by nebulization 2 (two) times a day 120 mL 11   • Calcium Carbonate-Vitamin D3 (Calcium 600+D) 600-400 MG-UNIT TABS Take 1 tablet by mouth daily       • cholecalciferol (VITAMIN D3) 1,000 units tablet Take 1 tablet (1,000 Units total) by mouth daily (Patient not taking: Reported on 9/8/2022) 30 tablet 5   • Continuous Blood Gluc  (FreeStyle [place] 2 [place]) JUD Use to test blood sugar twice a day (Patient not taking: Reported on 5/19/2022) 1 each 0   • Continuous Blood Gluc Sensor (FreeStyle [place] 2 Sensor) MISC Use to test blood sugar twice a day (Patient not taking: Reported on 5/19/2022) 2 each 12   • furosemide (LASIX) 20 mg tablet TAKE 1 TABLET DAILY 90 tablet 1   • glipiZIDE (GLUCOTROL XL) 2 5 mg 24 hr tablet TAKE 1 TABLET DAILY 90 tablet 3   • glucose blood (OneTouch Verio) test strip Use to test blood sugar 2 times daily 100 strip 3   • guaiFENesin-codeine (ROBITUSSIN AC) 100-10 mg/5 mL oral solution TAKE 5 ML TWO TIMES A DAY AS NEEDED FOR COUGH 300 mL 1   • isosorbide mononitrate (IMDUR) 30 mg 24 hr tablet TAKE 1 TABLET DAILY 30 tablet 5   • Lancets (onetouch ultrasoft) lancets Use to test blood sugar 2 times a day 100 each 3   • lidocaine (Lidoderm) 5 % Apply 1 patch topically daily Remove & Discard patch within 12 hours or as directed by MD 6 patch 0   • lisinopril (ZESTRIL) 20 mg tablet TAKE 1 TABLET DAILY 90 tablet 1   • metoprolol tartrate (LOPRESSOR) 25 mg tablet TAKE 1/2 TABLET EVERY 12 HOURS 60 tablet 2   • Multiple Vitamins-Minerals (CENTRUM SILVER 50+WOMEN PO) Take by mouth     • pantoprazole (PROTONIX) 40 mg tablet TAKE ONE TABLET DAILY 30 tablet 5   • predniSONE 10 mg tablet Take 3 tabs x 4 days then 2 "tabs x 4 days then 1 tab daily x 4 days 24 tablet 0   • predniSONE 10 mg tablet Take 3 tablets x4 days then 2 tablets x4 days then 1 tablet x4 days  Take with breakfast (Patient not taking: Reported on 3/7/2023) 30 tablet 0   • Promethazine-Codeine 6 25-10 MG/5ML SOLN TAKE 5 ML BY MOUTH 3 (THREE) TIMES A DAY AS NEEDED FOR COUGH 120 mL 0   • propylthiouracil 50 mg tablet 1 tab three times daily 270 tablet 3   • sodium chloride 0 9 % nebulizer solution TAKE 3 ML BY NEBULIZATION 2 (TWO) TIMES A DAY AS NEEDED FOR WHEEZING 300 mL 6     No current facility-administered medications for this visit  Allergies   Allergen Reactions   • Iodine - Food Allergy Swelling   • Shellfish-Derived Products - Food Allergy Hives   • Morphine Rash       Objective   Vitals: Height 5' 2\" (1 575 m), weight 96 6 kg (213 lb)  Physical Exam  Constitutional:       Appearance: She is well-developed  HENT:      Head: Normocephalic and atraumatic  Eyes:      Conjunctiva/sclera: Conjunctivae normal       Pupils: Pupils are equal, round, and reactive to light  Neck:      Thyroid: No thyromegaly  Cardiovascular:      Rate and Rhythm: Normal rate and regular rhythm  Heart sounds: Normal heart sounds  No murmur heard  Pulmonary:      Effort: Pulmonary effort is normal       Breath sounds: Normal breath sounds  No wheezing  Comments: Trach +  Abdominal:      General: There is no distension  Palpations: Abdomen is soft  Tenderness: There is no abdominal tenderness  Musculoskeletal:         General: Normal range of motion  Cervical back: Normal range of motion and neck supple  Skin:     General: Skin is warm and dry  Neurological:      Mental Status: She is alert and oriented to person, place, and time        Deep Tendon Reflexes: Reflexes normal       Comments: No tremors on the outstretched arms      Psychiatric:         Behavior: Behavior normal          The history was obtained from the review of the chart, " "patient  Lab Results:   Lab Results   Component Value Date/Time    Free t4 1 30 03/01/2023 07:32 AM    Free t4 1 61 02/07/2023 07:32 AM    Free t4 1 51 01/24/2023 11:08 AM     Lab Results   Component Value Date    WBC 3 6 03/01/2023    HGB 13 0 03/01/2023    HCT 40 6 03/01/2023    MCV 83 03/01/2023     03/01/2023     Lab Results   Component Value Date    CREATININE 0 82 03/01/2023    BUN 18 03/01/2023    K 5 0 03/01/2023     03/01/2023    CO2 22 03/01/2023     Lab Results   Component Value Date    HGBA1C 7 3 (H) 02/07/2023     Component      Latest Ref Rng & Units 1/24/2023 2/7/2023 3/1/2023   Hemoglobin A1C      4 8 - 5 6 % 7 5 (H) 7 3 (H)    eAG, EST AVG Glucose      mg/dL 169 163    Free T4      0 82 - 1 77 ng/dL 1 51 1 61 1 30   T3, Total      71 - 180 ng/dL 148  130   TSH, POC      0 450 - 4 500 uIU/mL <0 005 (L) <0 005 (L) <0 005 (L)   Free T3      2 0 - 4 4 pg/mL  4 3          Imaging Studies:       I have personally reviewed pertinent reports  Portions of the record may have been created with voice recognition software  Occasional wrong word or \"sound a like\" substitutions may have occurred due to the inherent limitations of voice recognition software  Read the chart carefully and recognize, using context, where substitutions have occurred       "

## 2023-04-28 NOTE — PATIENT INSTRUCTIONS
Being referred for a thyroid uptake scan  Please do not take proplythiouracil  for 3 to 4 days prior to the imaging test   Once it has been done, you may resume medication    Increase proplythiouracil to 50 mg 2 5 tablets daily  Please discuss difficulty in swallowing/pain on swallowing with your primary care physician    Follow up in 3 months

## 2023-05-17 ENCOUNTER — OFFICE VISIT (OUTPATIENT)
Dept: FAMILY MEDICINE CLINIC | Facility: CLINIC | Age: 83
End: 2023-05-17

## 2023-05-17 VITALS
DIASTOLIC BLOOD PRESSURE: 88 MMHG | SYSTOLIC BLOOD PRESSURE: 130 MMHG | WEIGHT: 213.3 LBS | BODY MASS INDEX: 34.28 KG/M2 | HEIGHT: 66 IN | OXYGEN SATURATION: 97 % | HEART RATE: 87 BPM | TEMPERATURE: 98 F | RESPIRATION RATE: 18 BRPM

## 2023-05-17 DIAGNOSIS — G47.34 SLEEP-RELATED NONOBSTRUCTIVE ALVEOLAR HYPOVENTILATION: ICD-10-CM

## 2023-05-17 DIAGNOSIS — E11.65 TYPE 2 DIABETES MELLITUS WITH HYPERGLYCEMIA, WITHOUT LONG-TERM CURRENT USE OF INSULIN (HCC): Primary | ICD-10-CM

## 2023-05-17 DIAGNOSIS — I10 HYPERTENSION, ESSENTIAL: ICD-10-CM

## 2023-05-17 DIAGNOSIS — N18.31 STAGE 3A CHRONIC KIDNEY DISEASE (HCC): ICD-10-CM

## 2023-05-17 DIAGNOSIS — J84.89 BOOP (BRONCHIOLITIS OBLITERANS WITH ORGANIZING PNEUMONIA) (HCC): ICD-10-CM

## 2023-05-17 DIAGNOSIS — I11.9 HYPERTENSIVE HEART DISEASE, UNSPECIFIED WHETHER HEART FAILURE PRESENT: ICD-10-CM

## 2023-05-17 DIAGNOSIS — Z13.820 ENCOUNTER FOR SCREENING FOR OSTEOPOROSIS: ICD-10-CM

## 2023-05-17 DIAGNOSIS — R05.3 CHRONIC COUGH: ICD-10-CM

## 2023-05-17 NOTE — PROGRESS NOTES
ASSESSMENT/PLAN:  1  Type 2 diabetes mellitus with hyperglycemia, without long-term current use of insulin ([facility] )  Healthy lifestyle encouraged  - IRIS Diabetic eye exam  - Albumin / creatinine urine ratio; Future  - Albumin / creatinine urine ratio    2  BOOP (bronchiolitis obliterans with organizing pneumonia) ([facility] )  Clinically stable    3  Hypertensive heart disease, unspecified whether heart failure present  Clinically stable    4  Hypertension, essential  Fair bp control  Same meds    5  Stage 3a chronic kidney disease (HCC)  stable    6  Sleep-related nonobstructive alveolar hypoventilation  Clinically stable    7  Encounter for screening for osteoporosis    - DXA bone density spine hip and pelvis; Future    Practice dexterous toe exercise to reduce cramps    Chief Complaint   Patient presents with   • Follow-up     Chronic disease management           Reviewed and reinforced basics of healthy lifestyle  Risks and benefits of therapeutic plan discussed, answered all patient questions and concerns and patient expressed understanding and agreement of therapeutic plan  Return in about 3 months (around 8/17/2023)  SUBJECTIVE:  Chief complaint and HPI: Junior Rios is a 81 y o  female who presents for follow up of multiple chronic medical problems, as listed below in problem list  All problems are relatively stable except for the following issues: overall doing well  Her breathing is same baseline  NO acute concerns except toes cramp sometimes  Review of systems:  No fever/chills/sweats/unexplained weight loss  No chest pain  Same baseline shortness of breath  No change in digestion or bowel movements  No change in urination  No new musculoskeletal or neurological symptoms      Chart reviewed for relevant medical, surgical and psychosocial history, medications and allergies, labs and studies      Patient Active Problem List   Diagnosis   • BOOP (bronchiolitis obliterans with organizing pneumonia) [facility])   • Coronary artery disease with angina pectoris, unspecified vessel or lesion type, unspecified whether native or transplanted heart ([facility] 75 )   • Hypertension, essential   • Tracheal stenosis   • Tracheostomy present ([facility] )   • Subglottic stenosis   • Chronic cough   • Class 3 severe obesity due to excess calories without serious comorbidity with body mass index (BMI) of 40 0 to 44 9 in adult [facility])   • Sleep-related nonobstructive alveolar hypoventilation   • Mild persistent asthma without complication   • Mixed hyperlipidemia   • Hypertensive heart disease   • Chronic bilateral low back pain without sciatica   • Acute tracheobronchitis   • Mild persistent asthma with exacerbation   • Stage 3a chronic kidney disease (HCC)   • Papillary thyroid carcinoma (HCC)   • Hyperthyroidism   • Type 2 diabetes mellitus with hyperglycemia, without long-term current use of insulin ([facility])       BMI Counseling: Body mass index is 34 43 kg/m²  The BMI is above normal  Nutrition recommendations include encouraging healthy choices of fruits and vegetables  Rationale for BMI follow-up plan is due to patient being overweight or obese  Depression Screening and Follow-up Plan: Patient was screened for depression during today's encounter  They screened negative with a PHQ-2 score of 0  Diabetic Foot Exam    Patient's shoes and socks removed  Right Foot/Ankle   Right Foot Inspection  Skin Exam: skin intact  Toe Exam: right toe deformity  Sensory   Vibration: intact  Monofilament testing: intact    Vascular  Capillary refills: < 3 seconds  The right DP pulse is 1+  Left Foot/Ankle  Left Foot Inspection  Skin Exam: skin intact  Toe Exam: left toe deformity  Sensory   Vibration: intact  Monofilament testing: intact    Vascular  Capillary refills: < 3 seconds  The left DP pulse is 1+       Assign Risk Category  Deformity present  No loss of protective sensation  No weak pulses  Risk: 0      EXAM:    The "patient appears well, in no apparent distress  Alert and oriented times three, pleasant and cooperative  Vital signs are as noted by the nurse  /88   Pulse 87   Temp 98 °F (36 7 °C)   Resp 18   Ht 5' 6\" (1 676 m)   Wt 96 8 kg (213 lb 4 8 oz)   SpO2 97%   BMI 34 43 kg/m²     Head: normocephalic, atraumatic  Eyes: well perfused conjunctiva, not clinically pale, not jaundiced  Ears: external ear: no gross lesions  Nose: no rhinorrhea  Neck: supple, trachea in the midline and no concerning cervical lymphadenopathy  Lungs: Mild baseline respiratory labor  Has Trach  Clear to auscultation  Heart: Regular rate and rhythm, no murmurs, gallops or rubs  Abdomen: Benign: soft, non-tender, non-distended    Normal bowel sounds,   Skin: No pallor  No rashes noted  Extremities: Moves all extremities normally   No pedal edema      "

## 2023-05-18 ENCOUNTER — TELEPHONE (OUTPATIENT)
Dept: ENDOCRINOLOGY | Facility: CLINIC | Age: 83
End: 2023-05-18

## 2023-05-27 DIAGNOSIS — R05.3 CHRONIC COUGH: ICD-10-CM

## 2023-05-28 RX ORDER — CODEINE PHOSPHATE AND GUAIFENESIN 10; 100 MG/5ML; MG/5ML
SOLUTION ORAL
Qty: 300 ML | Refills: 1 | Status: SHIPPED | OUTPATIENT
Start: 2023-05-28

## 2023-06-01 ENCOUNTER — HOSPITAL ENCOUNTER (OUTPATIENT)
Dept: RADIOLOGY | Facility: HOSPITAL | Age: 83
Discharge: HOME | End: 2023-06-01

## 2023-06-01 DIAGNOSIS — C73 PAPILLARY THYROID CARCINOMA (HCC): ICD-10-CM

## 2023-06-01 DIAGNOSIS — E05.90 HYPERTHYROIDISM: ICD-10-CM

## 2023-06-01 DIAGNOSIS — E11.65 TYPE 2 DIABETES MELLITUS WITH HYPERGLYCEMIA, WITHOUT LONG-TERM CURRENT USE OF INSULIN (HCC): ICD-10-CM

## 2023-06-02 ENCOUNTER — TELEPHONE (OUTPATIENT)
Dept: ENDOCRINOLOGY | Facility: CLINIC | Age: 83
End: 2023-06-02

## 2023-06-02 ENCOUNTER — HOSPITAL ENCOUNTER (OUTPATIENT)
Dept: RADIOLOGY | Facility: HOSPITAL | Age: 83
Discharge: HOME | End: 2023-06-02

## 2023-06-02 NOTE — TELEPHONE ENCOUNTER
Please call and inform patient of results  Confirm if patient held proplythiouracil prior to having the thyroid uptake and scan done  Normal uptake however mildly hyperfunctional nodule versus an enlargement of the left lobe cannot be ruled out  There is mention of the possibility of an additional thyroid nodule and ultrasound of the thyroid is recommended for this       If patient is willing-please place an order for ultrasound thyroid  Patient should resume propylthiouracil with repeat labs

## 2023-06-05 DIAGNOSIS — E11.9 TYPE 2 DIABETES MELLITUS WITHOUT COMPLICATION, WITHOUT LONG-TERM CURRENT USE OF INSULIN (HCC): ICD-10-CM

## 2023-06-06 DIAGNOSIS — E05.90 HYPERTHYROIDISM: Primary | ICD-10-CM

## 2023-06-06 RX ORDER — GLIPIZIDE 2.5 MG/1
2.5 TABLET, EXTENDED RELEASE ORAL DAILY
Qty: 90 TABLET | Refills: 3 | Status: SHIPPED | OUTPATIENT
Start: 2023-06-06

## 2023-06-13 ENCOUNTER — TELEPHONE (OUTPATIENT)
Dept: FAMILY MEDICINE CLINIC | Facility: CLINIC | Age: 83
End: 2023-06-13

## 2023-06-13 NOTE — TELEPHONE ENCOUNTER
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

## 2023-06-28 DIAGNOSIS — I10 HYPERTENSION, ESSENTIAL: ICD-10-CM

## 2023-06-28 RX ORDER — ISOSORBIDE MONONITRATE 30 MG/1
TABLET, EXTENDED RELEASE ORAL
Qty: 30 TABLET | Refills: 5 | Status: SHIPPED | OUTPATIENT
Start: 2023-06-28

## 2023-07-05 DIAGNOSIS — R05.3 CHRONIC COUGH: ICD-10-CM

## 2023-07-05 RX ORDER — CODEINE PHOSPHATE AND GUAIFENESIN 10; 100 MG/5ML; MG/5ML
SOLUTION ORAL
Qty: 300 ML | Refills: 1 | Status: SHIPPED | OUTPATIENT
Start: 2023-07-05

## 2023-07-06 ENCOUNTER — TELEPHONE (OUTPATIENT)
Dept: OTOLARYNGOLOGY | Facility: CLINIC | Age: 83
End: 2023-07-06

## 2023-07-06 ENCOUNTER — OFFICE VISIT (OUTPATIENT)
Dept: OTOLARYNGOLOGY | Facility: CLINIC | Age: 83
End: 2023-07-06
Payer: COMMERCIAL

## 2023-07-06 VITALS — BODY MASS INDEX: 34.23 KG/M2 | HEIGHT: 66 IN | TEMPERATURE: 97.2 F | WEIGHT: 213 LBS

## 2023-07-06 DIAGNOSIS — J38.6 SUBGLOTTIC STENOSIS: ICD-10-CM

## 2023-07-06 DIAGNOSIS — Z93.0 TRACHEOSTOMY DEPENDENCE (HCC): Primary | ICD-10-CM

## 2023-07-06 DIAGNOSIS — J39.8 TRACHEAL STENOSIS: ICD-10-CM

## 2023-07-06 PROCEDURE — 99213 OFFICE O/P EST LOW 20 MIN: CPT | Performed by: OTOLARYNGOLOGY

## 2023-07-06 NOTE — TELEPHONE ENCOUNTER
Patient stated that she never received the new trach: Annabella Winslow so she didn't bring it with her for the trach change today. Zainab Perkins could not be changed. Spoke to [facility] at [phone] and they verified that a new trach was sent to the pt. On 4/14/23. Moving forward, there is another trach available for her today as well as the new cannulas #[id number] (#90). Told son, Jayant, that I was told he needs to call to order delivery of these supplies today and he said he never has to call. Supplies just arrive after we FAX the orders. I reiterated that the company stated that he needed to call, but he will watch for a new shipment to arrive and if it does not arrive, then he will call. Told son to let us know when the new trach arrives so patient can get it changed.

## 2023-07-06 NOTE — PROGRESS NOTES
Assessment/Plan: We will contact [facility] and find out what happened to her last order for a fresh trach, and then have her come it to have it inserted. Diagnosis ICD-10-CM Associated Orders   1. 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 Tracheal stenosis  J39.8       3. Subglottic stenosis  J38.6              Subjective:      Patient ID: Clifton Lesch is a 81 y.o. female. 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 She has a Shiley #6 DCFS in place, which was last changed in April. A new trach was ordered at that time, but she reports that she never received it.       The following portions of the patient's history were reviewed and updated as appropriate: allergies, current medications, past family history, past medical history, past social history, past surgical history and problem list.    Review of Systems      Objective:      Temp (!) 97.2 °F (36.2 °C) (Temporal)   Ht 5' 6" (1.676 m)   Wt 96.6 kg (213 lb)   BMI 34.38 kg/m²          Physical Exam  Neck:

## 2023-07-07 LAB
ALBUMIN SERPL-MCNC: 3.8 G/DL (ref 3.6–4.6)
ALBUMIN/GLOB SERPL: 1.3 {RATIO} (ref 1.2–2.2)
ALP SERPL-CCNC: 93 IU/L (ref 44–121)
ALT SERPL-CCNC: 13 IU/L (ref 0–32)
AST SERPL-CCNC: 18 IU/L (ref 0–40)
BILIRUB SERPL-MCNC: 0.3 MG/DL (ref 0–1.2)
BUN SERPL-MCNC: 12 MG/DL (ref 8–27)
BUN/CREAT SERPL: 14 (ref 12–28)
CALCIUM SERPL-MCNC: 9.5 MG/DL (ref 8.7–10.3)
CHLORIDE SERPL-SCNC: 106 MMOL/L (ref 96–106)
CHOLEST SERPL-MCNC: 116 MG/DL (ref 100–199)
CHOLEST/HDLC SERPL: 2.9 RATIO (ref 0–4.4)
CO2 SERPL-SCNC: 24 MMOL/L (ref 20–29)
CREAT SERPL-MCNC: 0.87 MG/DL (ref 0.57–1)
EGFR: 66 ML/MIN/1.73
GLOBULIN SER-MCNC: 3 G/DL (ref 1.5–4.5)
GLUCOSE SERPL-MCNC: 90 MG/DL (ref 70–99)
HDLC SERPL-MCNC: 40 MG/DL
LDLC SERPL CALC-MCNC: 61 MG/DL (ref 0–99)
POTASSIUM SERPL-SCNC: 4.3 MMOL/L (ref 3.5–5.2)
PROT SERPL-MCNC: 6.8 G/DL (ref 6–8.5)
SL AMB VLDL CHOLESTEROL CALC: 15 MG/DL (ref 5–40)
SODIUM SERPL-SCNC: 142 MMOL/L (ref 134–144)
T3FREE SERPL-MCNC: 4.2 PG/ML (ref 2–4.4)
T4 FREE SERPL-MCNC: 1.38 NG/DL (ref 0.82–1.77)
TRIGL SERPL-MCNC: 71 MG/DL (ref 0–149)
TSH SERPL DL<=0.005 MIU/L-ACNC: <0.005 UIU/ML (ref 0.45–4.5)

## 2023-07-08 LAB
ALBUMIN/CREAT UR: <6 MG/G CREAT (ref 0–29)
CREAT UR-MCNC: 47.9 MG/DL
MICROALBUMIN UR-MCNC: <3 UG/ML

## 2023-07-11 ENCOUNTER — APPOINTMENT (EMERGENCY)
Dept: RADIOLOGY | Facility: HOSPITAL | Age: 83
End: 2023-07-11
Payer: COMMERCIAL

## 2023-07-11 ENCOUNTER — HOSPITAL ENCOUNTER (EMERGENCY)
Facility: HOSPITAL | Age: 83
Discharge: HOME | End: 2023-07-11
Attending: EMERGENCY MEDICINE
Payer: COMMERCIAL

## 2023-07-11 VITALS
SYSTOLIC BLOOD PRESSURE: 141 MMHG | BODY MASS INDEX: 34.9 KG/M2 | RESPIRATION RATE: 26 BRPM | OXYGEN SATURATION: 95 % | WEIGHT: 216.2 LBS | TEMPERATURE: 96.5 F | DIASTOLIC BLOOD PRESSURE: 80 MMHG | HEART RATE: 85 BPM

## 2023-07-11 DIAGNOSIS — R11.10 VOMITING: ICD-10-CM

## 2023-07-11 DIAGNOSIS — M54.9 MUSCULOSKELETAL BACK PAIN: Primary | ICD-10-CM

## 2023-07-11 LAB
ALBUMIN SERPL BCP-MCNC: 3.9 G/DL (ref 3.5–5)
ALP SERPL-CCNC: 78 U/L (ref 34–104)
ALT SERPL W P-5'-P-CCNC: 14 U/L (ref 7–52)
ANION GAP SERPL CALCULATED.3IONS-SCNC: 7 MMOL/L
AST SERPL W P-5'-P-CCNC: 17 U/L (ref 13–39)
BASOPHILS # BLD AUTO: 0.01 THOUSANDS/ÂΜL (ref 0–0.1)
BASOPHILS NFR BLD AUTO: 0 % (ref 0–1)
BILIRUB SERPL-MCNC: 0.28 MG/DL (ref 0.2–1)
BUN SERPL-MCNC: 17 MG/DL (ref 5–25)
CALCIUM SERPL-MCNC: 9.4 MG/DL (ref 8.4–10.2)
CARDIAC TROPONIN I PNL SERPL HS: 5 NG/L
CHLORIDE SERPL-SCNC: 106 MMOL/L (ref 96–108)
CO2 SERPL-SCNC: 26 MMOL/L (ref 21–32)
CREAT SERPL-MCNC: 0.86 MG/DL (ref 0.6–1.3)
EOSINOPHIL # BLD AUTO: 0.1 THOUSAND/ÂΜL (ref 0–0.61)
EOSINOPHIL NFR BLD AUTO: 2 % (ref 0–6)
ERYTHROCYTE [DISTWIDTH] IN BLOOD BY AUTOMATED COUNT: 13.3 % (ref 11.6–15.1)
GFR SERPL CREATININE-BSD FRML MDRD: 63 ML/MIN/1.73SQ M
GLUCOSE SERPL-MCNC: 131 MG/DL (ref 65–140)
HCT VFR BLD AUTO: 42 % (ref 34.8–46.1)
HGB BLD-MCNC: 14.1 G/DL (ref 11.5–15.4)
IMM GRANULOCYTES # BLD AUTO: 0 THOUSAND/UL (ref 0–0.2)
IMM GRANULOCYTES NFR BLD AUTO: 0 % (ref 0–2)
LIPASE SERPL-CCNC: 86 U/L (ref 11–82)
LYMPHOCYTES # BLD AUTO: 2.11 THOUSANDS/ÂΜL (ref 0.6–4.47)
LYMPHOCYTES NFR BLD AUTO: 45 % (ref 14–44)
MCH RBC QN AUTO: 28.6 PG (ref 26.8–34.3)
MCHC RBC AUTO-ENTMCNC: 33.6 G/DL (ref 31.4–37.4)
MCV RBC AUTO: 85 FL (ref 82–98)
MONOCYTES # BLD AUTO: 0.35 THOUSAND/ÂΜL (ref 0.17–1.22)
MONOCYTES NFR BLD AUTO: 8 % (ref 4–12)
NEUTROPHILS # BLD AUTO: 2.08 THOUSANDS/ÂΜL (ref 1.85–7.62)
NEUTS SEG NFR BLD AUTO: 45 % (ref 43–75)
NRBC BLD AUTO-RTO: 0 /100 WBCS
PLATELET # BLD AUTO: 182 THOUSANDS/UL (ref 149–390)
PMV BLD AUTO: 11.4 FL (ref 8.9–12.7)
POTASSIUM SERPL-SCNC: 4 MMOL/L (ref 3.5–5.3)
PROT SERPL-MCNC: 7.8 G/DL (ref 6.4–8.4)
RBC # BLD AUTO: 4.93 MILLION/UL (ref 3.81–5.12)
SODIUM SERPL-SCNC: 139 MMOL/L (ref 135–147)
WBC # BLD AUTO: 4.65 THOUSAND/UL (ref 4.31–10.16)

## 2023-07-11 PROCEDURE — 93005 ELECTROCARDIOGRAM TRACING: CPT

## 2023-07-11 PROCEDURE — 80053 COMPREHEN METABOLIC PANEL: CPT | Performed by: EMERGENCY MEDICINE

## 2023-07-11 PROCEDURE — 72100 X-RAY EXAM L-S SPINE 2/3 VWS: CPT

## 2023-07-11 PROCEDURE — 36415 COLL VENOUS BLD VENIPUNCTURE: CPT | Performed by: EMERGENCY MEDICINE

## 2023-07-11 PROCEDURE — 84484 ASSAY OF TROPONIN QUANT: CPT | Performed by: EMERGENCY MEDICINE

## 2023-07-11 PROCEDURE — 85025 COMPLETE CBC W/AUTO DIFF WBC: CPT | Performed by: EMERGENCY MEDICINE

## 2023-07-11 PROCEDURE — 83690 ASSAY OF LIPASE: CPT | Performed by: EMERGENCY MEDICINE

## 2023-07-11 RX ORDER — KETOROLAC TROMETHAMINE 30 MG/ML
15 INJECTION, SOLUTION INTRAMUSCULAR; INTRAVENOUS ONCE
Status: COMPLETED | OUTPATIENT
Start: 2023-07-11 | End: 2023-07-11

## 2023-07-11 RX ORDER — LIDOCAINE 50 MG/G
1 PATCH TOPICAL DAILY
Qty: 15 PATCH | Refills: 0 | Status: SHIPPED | OUTPATIENT
Start: 2023-07-11

## 2023-07-11 RX ORDER — ONDANSETRON 2 MG/ML
4 INJECTION INTRAMUSCULAR; INTRAVENOUS ONCE
Status: COMPLETED | OUTPATIENT
Start: 2023-07-11 | End: 2023-07-11

## 2023-07-11 RX ORDER — ONDANSETRON 4 MG/1
4 TABLET, ORALLY DISINTEGRATING ORAL EVERY 6 HOURS PRN
Qty: 10 TABLET | Refills: 0 | Status: SHIPPED | OUTPATIENT
Start: 2023-07-11

## 2023-07-11 RX ADMIN — ONDANSETRON 4 MG: 2 INJECTION INTRAMUSCULAR; INTRAVENOUS at 19:23

## 2023-07-11 RX ADMIN — KETOROLAC TROMETHAMINE 15 MG: 30 INJECTION, SOLUTION INTRAMUSCULAR at 19:23

## 2023-07-11 NOTE — ED PROVIDER NOTES
History  Chief Complaint   Patient presents with   • Vomiting     Vomited x1. Denies ab pain. Normal bms. Has trach. Patient is an 50-year-old female that presents emergency department with complaint of vomiting x1 episode at approximately 3 PM today. Patient also with complaint of exacerbation of chronic low back pain. She denies trauma. Patient denies sick contacts. Patient took 1 dose of Tylenol earlier in the day for relief for her back pain, without improvement. Differential diagnosis includes but is not limited to ACS, pancreatitis, gastroenteritis. History provided by:  Patient   used: No    Vomiting  Associated symptoms: no abdominal pain, no chills, no cough, no diarrhea and no fever        Prior to Admission Medications   Prescriptions Last Dose Informant Patient Reported? Taking?    Ascorbic Acid (vitamin C) 1000 MG tablet  Self No No   Sig: Take 1 tablet (1,000 mg total) by mouth daily   Blood Glucose Monitoring Suppl (OneTouch Verio) w/Device KIT  Self No No   Sig: Use to test blood sugar 2 times a day   Calcium Carbonate-Vitamin D3 (Calcium 600+D) 600-400 MG-UNIT TABS  Self Yes No   Sig: Take 1 tablet by mouth daily     Lancets (onetouch ultrasoft) lancets  Self No No   Sig: Use to test blood sugar 2 times a day   Multiple Vitamins-Minerals (CENTRUM SILVER 50+WOMEN PO)  Self Yes No   Sig: Take by mouth   Promethazine-Codeine 6.25-10 MG/5ML SOLN   No No   Sig: Take 5 mL by mouth 3 (three) times a day as needed (cough)   acetaminophen (TYLENOL) 650 mg CR tablet  Self No No   Sig: Take 1 tablet (650 mg total) by mouth every 8 (eight) hours as needed for mild pain   albuterol (2.5 mg/3 mL) 0.083 % nebulizer solution  Self No No   Sig: Take 3 mL (2.5 mg total) by nebulization every 6 (six) hours as needed for wheezing or shortness of breath   aspirin 81 mg chewable tablet  Self No No   Sig: Chew 1 tablet (81 mg total) daily   atorvastatin (LIPITOR) 40 mg tablet  Self No No   Sig: TAKE 1 TABLET DAILY   budesonide (PULMICORT) 0.5 mg/2 mL nebulizer solution   No No   Sig: Take 2 mL (0.5 mg total) by nebulization 2 (two) times a day   furosemide (LASIX) 20 mg tablet  Self No No   Sig: TAKE 1 TABLET DAILY   glipiZIDE (GLUCOTROL XL) 2.5 mg 24 hr tablet   No No   Sig: Take 1 tablet (2.5 mg total) by mouth daily   glucose blood (OneTouch Verio) test strip  Self No No   Sig: Use to test blood sugar 2 times daily   guaiFENesin-codeine (ROBITUSSIN AC) 100-10 mg/5 mL oral solution   No No   Sig: TAKE 5 ML TWO TIMES A DAY AS NEEDED FOR COUGH   isosorbide mononitrate (IMDUR) 30 mg 24 hr tablet   No No   Sig: TAKE 1 TABLET DAILY   lisinopril (ZESTRIL) 20 mg tablet   No No   Sig: TAKE 1 TABLET DAILY   metoprolol tartrate (LOPRESSOR) 25 mg tablet  Self No No   Sig: TAKE 1/2 TABLET EVERY 12 HOURS   pantoprazole (PROTONIX) 40 mg tablet   No No   Sig: TAKE ONE TABLET DAILY   propylthiouracil 50 mg tablet  Self No No   Si tab three times daily   sodium chloride 0.9 % nebulizer solution  Self No No   Sig: TAKE 3 ML BY NEBULIZATION 2 (TWO) TIMES A DAY AS NEEDED FOR WHEEZING      Facility-Administered Medications: None       Past Medical History:   Diagnosis Date   • 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     E-Cigarette/Vaping   • E-Cigarette Use Never User      E-Cigarette/Vaping Substances   • Nicotine No    • THC No    • CBD No    • Flavoring No    • Other No • Unknown No      Social History     Tobacco Use   • Smoking status: Never   • Smokeless tobacco: Never   Vaping Use   • Vaping Use: Never used   Substance Use Topics   • Alcohol use: Never   • Drug use: Never       Review of Systems   Constitutional: Negative for chills and fever. Respiratory: Negative for cough, chest tightness and shortness of breath. Gastrointestinal: Positive for vomiting. Negative for abdominal pain, diarrhea and nausea. Genitourinary: Negative for dysuria, frequency, hematuria and urgency. Musculoskeletal: Positive for back pain. Negative for neck pain and neck stiffness. All other systems reviewed and are negative. Physical Exam  Physical Exam  Vitals and nursing note reviewed. Constitutional:       General: She is not in acute distress. Appearance: She is well-developed. She is not diaphoretic. HENT:      Head: Normocephalic and atraumatic. Eyes:      Conjunctiva/sclera: Conjunctivae normal.      Pupils: Pupils are equal, round, and reactive to light. Cardiovascular:      Rate and Rhythm: Normal rate and regular rhythm. Heart sounds: Normal heart sounds. No murmur heard. Pulmonary:      Effort: Pulmonary effort is normal. No respiratory distress. Breath sounds: Normal breath sounds. Abdominal:      General: Bowel sounds are normal. There is no distension. Palpations: Abdomen is soft. Tenderness: There is no abdominal tenderness. Musculoskeletal:         General: No tenderness, deformity or signs of injury. Normal range of motion. Cervical back: Normal range of motion and neck supple. Skin:     General: Skin is warm and dry. Capillary Refill: Capillary refill takes less than 2 seconds. Coloration: Skin is not pale. Findings: No rash. Neurological:      General: No focal deficit present. Mental Status: She is alert and oriented to person, place, and time. Cranial Nerves: No cranial nerve deficit. Psychiatric:         Behavior: Behavior normal.         Vital Signs  ED Triage Vitals [07/11/23 1811]   Temperature Pulse Respirations Blood Pressure SpO2   (!) 96.5 °F (35.8 °C) 85 (!) 26 141/80 95 %      Temp Source Heart Rate Source Patient Position - Orthostatic VS BP Location FiO2 (%)   Tympanic Monitor Sitting Right arm --      Pain Score       No Pain           Vitals:    07/11/23 1811   BP: 141/80   Pulse: 85   Patient Position - Orthostatic VS: Sitting         Visual Acuity      ED Medications  Medications   ondansetron (ZOFRAN) injection 4 mg (4 mg Intravenous Given 7/11/23 1923)   ketorolac (TORADOL) injection 15 mg (15 mg Intravenous Given 7/11/23 1923)       Diagnostic Studies  Results Reviewed     Procedure Component Value Units Date/Time    HS Troponin I 4hr [[id number]]     Lab Status: No result Specimen: Blood     HS Troponin 0hr (reflex protocol) [[id number]]  (Normal) Collected: 07/11/23 1905    Lab Status: Final result Specimen: Blood from Arm, Right Updated: 07/11/23 1936     hs TnI 0hr 5 ng/L     HS Troponin I 2hr [[id number]]     Lab Status: No result Specimen: Blood     Comprehensive metabolic panel [[id number]] Collected: 07/11/23 1905    Lab Status: Final result Specimen: Blood from Arm, Right Updated: 07/11/23 1932     Sodium 139 mmol/L      Potassium 4.0 mmol/L      Chloride 106 mmol/L      CO2 26 mmol/L      ANION GAP 7 mmol/L      BUN 17 mg/dL      Creatinine 0.86 mg/dL      Glucose 131 mg/dL      Calcium 9.4 mg/dL      AST 17 U/L      ALT 14 U/L      Alkaline Phosphatase 78 U/L      Total Protein 7.8 g/dL      Albumin 3.9 g/dL      Total Bilirubin 0.28 mg/dL      eGFR 63 ml/min/1.73sq m     Narrative:      [place] guidelines for Chronic Kidney Disease (CKD):   •  Stage 1 with normal or high GFR (GFR > 90 mL/min/1.73 square meters)  •  Stage 2 Mild CKD (GFR = 60-89 mL/min/1.73 square meters)  •  Stage 3A Moderate CKD (GFR = 45-59 mL/min/1.73 square meters)  • Stage 3B Moderate CKD (GFR = 30-44 mL/min/1.73 square meters)  •  Stage 4 Severe CKD (GFR = 15-29 mL/min/1.73 square meters)  •  Stage 5 End Stage CKD (GFR <15 mL/min/1.73 square meters)  Note: GFR calculation is accurate only with a steady state creatinine    Lipase [[id number]]  (Abnormal) Collected: 07/11/23 1905    Lab Status: Final result Specimen: Blood from Arm, Right Updated: 07/11/23 1932     Lipase 86 u/L     CBC and differential [[id number]]  (Abnormal) Collected: 07/11/23 1905    Lab Status: Final result Specimen: Blood from Arm, Right Updated: 07/11/23 1909     WBC 4.65 Thousand/uL      RBC 4.93 Million/uL      Hemoglobin 14.1 g/dL      Hematocrit 42.0 %      MCV 85 fL      MCH 28.6 pg      MCHC 33.6 g/dL      RDW 13.3 %      MPV 11.4 fL      Platelets 587 Thousands/uL      nRBC 0 /100 WBCs      Neutrophils Relative 45 %      Immat GRANS % 0 %      Lymphocytes Relative 45 %      Monocytes Relative 8 %      Eosinophils Relative 2 %      Basophils Relative 0 %      Neutrophils Absolute 2.08 Thousands/µL      Immature Grans Absolute 0.00 Thousand/uL      Lymphocytes Absolute 2.11 Thousands/µL      Monocytes Absolute 0.35 Thousand/µL      Eosinophils Absolute 0.10 Thousand/µL      Basophils Absolute 0.01 Thousands/µL                  XR spine lumbar 2 or 3 views injury   ED Interpretation by Trino Dodson DO (07/11 2015)   djd                 Procedures  ECG 12 Lead Documentation Only    Date/Time: 7/11/2023 7:08 PM    Performed by: Trino Dodson DO  Authorized by: Trino Dodson DO    Indications / Diagnosis:  Vomiting  ECG reviewed by me, the ED Provider: yes    Patient location:  ED  Previous ECG:     Previous ECG:  Compared to current    Similarity:  No change  Interpretation:     Interpretation: normal    Rate:     ECG rate:  71bpm    ECG rate assessment: normal    Rhythm:     Rhythm: sinus rhythm    Ectopy:     Ectopy: PVCs      PVCs:  Infrequent  QRS:     QRS axis: Normal  Conduction:     Conduction: normal    ST segments:     ST segments:  Normal  T waves:     T waves: flattening               ED Course                                             Medical Decision Making  Back pain is likely musculoskeletal, improved with Toradol. X-ray negative for acute pathology, consistent with DJD which patient has a history of. Labs ordered and reviewed. Patient feels significantly improved at the time of reevaluation. Will discharge to home with a prescription for Lidoderm and Zofran, recommendation to follow-up with PT and primary care physician. Return precautions reviewed with patient. Amount and/or Complexity of Data Reviewed  Labs: ordered. Radiology: ordered and independent interpretation performed. Risk  Prescription drug management. Disposition  Final diagnoses:   Musculoskeletal back pain   Vomiting     Time reflects when diagnosis was documented in both MDM as applicable and the Disposition within this note     Time User Action Codes Description Comment    7/11/2023  9:55 PM Felipa Blanco Add [M54.9] Musculoskeletal back pain     7/11/2023  9:56 PM Felipa Blanco Add [R11.10] Vomiting       ED Disposition     ED Disposition   Discharge    Condition   Stable    Date/Time   Tue Jul 11, 2023  9:55 PM    Comment   New Souza discharge to home/self care.                Follow-up Information     Follow up With Specialties Details Why Contact Info    Colt Savage MD [facility] Medicine Schedule an appointment as soon as possible for a visit in 1 day for follow up [facility]  [address] [place] [id number]            Patient's Medications   Discharge Prescriptions    LIDOCAINE (LIDODERM) 5 %    Apply 1 patch topically over 12 hours daily Remove & Discard patch within 12 hours or as directed by MD       Start Date: 7/11/2023 End Date: --       Order Dose: 1 patch       Quantity: 15 patch    Refills: 0    ONDANSETRON ([place]oud ODT) 4 MG DISINTEGRATING TABLET    Take 1 tablet (4 mg total) by mouth every 6 (six) hours as needed for nausea or vomiting       Start Date: 7/11/2023 End Date: --       Order Dose: 4 mg       Quantity: 10 tablet    Refills: 0           PDMP Review       Value Time User    PDMP Reviewed  Yes 5/17/2023 10:08 AM Gilles Roberts MD          ED Provider  Electronically Signed by           Katie Garcia DO  07/11/23 4781

## 2023-07-12 LAB
ATRIAL RATE: 71 BPM
P AXIS: 40 DEGREES
PR INTERVAL: 132 MS
QRS AXIS: -15 DEGREES
QRSD INTERVAL: 82 MS
QT INTERVAL: 390 MS
QTC INTERVAL: 423 MS
T WAVE AXIS: -5 DEGREES
VENTRICULAR RATE: 71 BPM

## 2023-07-12 PROCEDURE — 93010 ELECTROCARDIOGRAM REPORT: CPT | Performed by: INTERNAL MEDICINE

## 2023-07-14 ENCOUNTER — TELEPHONE (OUTPATIENT)
Dept: ENDOCRINOLOGY | Facility: CLINIC | Age: 83
End: 2023-07-14

## 2023-07-14 NOTE — TELEPHONE ENCOUNTER
Spoke to patient and reviewed lab. She is already taking the medication recommended. Please review.  propylthiouracil 50 mg tablet

## 2023-07-17 ENCOUNTER — HOSPITAL ENCOUNTER (OUTPATIENT)
Dept: RADIOLOGY | Facility: HOSPITAL | Age: 83
Discharge: HOME | End: 2023-07-17
Payer: COMMERCIAL

## 2023-07-17 DIAGNOSIS — E05.90 HYPERTHYROIDISM: ICD-10-CM

## 2023-07-17 PROCEDURE — 76536 US EXAM OF HEAD AND NECK: CPT

## 2023-07-19 NOTE — TELEPHONE ENCOUNTER
Spoke with patient and verified that patient is taking 2.5 tabs daily, she states she is unable to take 3 tabs as it makes her sick.

## 2023-07-28 ENCOUNTER — OFFICE VISIT (OUTPATIENT)
Dept: ENDOCRINOLOGY | Facility: CLINIC | Age: 83
End: 2023-07-28
Payer: COMMERCIAL

## 2023-07-28 VITALS
HEIGHT: 66 IN | SYSTOLIC BLOOD PRESSURE: 132 MMHG | WEIGHT: 215 LBS | HEART RATE: 83 BPM | DIASTOLIC BLOOD PRESSURE: 84 MMHG | BODY MASS INDEX: 34.55 KG/M2

## 2023-07-28 DIAGNOSIS — E11.65 TYPE 2 DIABETES MELLITUS WITH HYPERGLYCEMIA, WITHOUT LONG-TERM CURRENT USE OF INSULIN (HCC): Primary | ICD-10-CM

## 2023-07-28 DIAGNOSIS — I10 HYPERTENSION, ESSENTIAL: ICD-10-CM

## 2023-07-28 DIAGNOSIS — E05.90 HYPERTHYROIDISM: ICD-10-CM

## 2023-07-28 DIAGNOSIS — E78.2 MIXED HYPERLIPIDEMIA: ICD-10-CM

## 2023-07-28 DIAGNOSIS — C73 PAPILLARY THYROID CARCINOMA (HCC): ICD-10-CM

## 2023-07-28 LAB — SL AMB POCT HEMOGLOBIN AIC: 7.3 (ref ?–6.5)

## 2023-07-28 PROCEDURE — 83036 HEMOGLOBIN GLYCOSYLATED A1C: CPT | Performed by: NURSE PRACTITIONER

## 2023-07-28 PROCEDURE — 99214 OFFICE O/P EST MOD 30 MIN: CPT | Performed by: NURSE PRACTITIONER

## 2023-07-28 NOTE — PATIENT INSTRUCTIONS
Hypoglycemia in a Person with Diabetes   WHAT YOU NEED TO KNOW:   Hypoglycemia is a serious condition that happens when your blood glucose (sugar) level drops too low. The blood sugar level is usually too high in a person with diabetes, but the level can also drop too low. It is important to follow your diabetes management plan to keep your blood sugar level steady. DISCHARGE INSTRUCTIONS:   Have someone call your local emergency number (911 in the [address]) if:   You have a seizure or pass out. Your blood sugar is less than 50 mg/dL and does not respond to treatment. You feel you are going to pass out. You have trouble thinking clearly. Call your doctor or diabetes care team provider if:   You have had symptoms of low blood sugar several times. You have questions about the amount of insulin or diabetes medicine you are taking. You have questions or concerns about your condition or care. Medicines:   Insulin or diabetes medicine  help to keep your blood sugar under control. Glucagon  may be needed if you have severe hypoglycemia. Take your medicine as directed. Contact your healthcare provider if you think your medicine is not helping or if you have side effects. Tell your provider if you are allergic to any medicine. Keep a list of the medicines, vitamins, and herbs you take. Include the amounts, and when and why you take them. Bring the list or the pill bottles to follow-up visits. Carry your medicine list with you in case of an emergency. Manage hypoglycemia:   Check your blood sugar level right away if you have symptoms of hypoglycemia. Hypoglycemia usually happens when your blood sugar level is 70 mg/dL or below. Ask your diabetes care team provider what blood sugar level is too low for you. If your blood sugar level is too low, eat or drink 15 grams of fast-acting carbohydrate.   Examples of this amount of fast-acting carbohydrate are 4 ounces (½ cup) of fruit juice or 4 ounces of regular soda. Other examples are 2 tablespoons of raisins or 1 tube of glucose gel. Check your blood sugar level 15 minutes later. Sit still as you wait. If the level is still low (less than 100 mg/dL), have another 15 grams of carbohydrate. When the level returns to 100 mg/dL, eat a meal if it is time. If your meal time is more than 1 hour away, eat a snack. The snack should contain carbohydrates, such as the following:    3/4 cup of cereal    1 cup of skim or low fat milk    6 soda crackers    1/2 of a turkey sandwich    15 fat-free chips  This will help prevent another drop in blood sugar. Always carefully follow your provider's instructions on how to treat low blood sugar levels. Always carry a source of fast-acting carbohydrate. If you have symptoms of hypoglycemia and you do not have a blood glucose meter, have a source of fast-acting carbohydrate anyway. Avoid carbohydrate foods that are high in fat. The fat content may make the carbohydrate take longer to increase your blood sugar level. Ask your provider if you should carry a glucagon kit. Glucagon is a medicine that is injected when you develop severe hypoglycemia and become unconscious. Check the expiration date every month and replace it before it expires. Teach others how to help you if you have symptoms of hypoglycemia. Tell them about the symptoms of hypoglycemia. Ask them to give you a source of fast-acting carbohydrate if you cannot get it yourself. Ask them to give you a glucagon injection if you have signs of hypoglycemia and you become unconscious or have a seizure. Ask them to call the local emergency number (911 in the [facility] [address]) . This is an emergency. Tell them never to try to make you swallow anything if you faint or have a seizure. Wear medical alert jewelry  or carry a card that says you have diabetes. Ask where to get these items. Prevent hypoglycemia:   Take diabetes medicine as directed.   Take your medicine at the right time and in the right amount. Do not  double the amount of medicine you take unless instructed by your diabetes care team provider. You may need oral diabetes medicine, insulin, or both to help control your blood sugar levels. Your healthcare provider will teach you how and when to take oral diabetes medicine. You will also be taught about side effects oral diabetes medicine can cause. Insulin may be added if oral diabetes medicine becomes less effective over time. Insulin may be injected, or given through a pump or pen. You and your care team will discuss which method is best for you. An insulin pump  is an implanted device that gives your insulin 24 hours a day. An insulin pump prevents the need for multiple insulin injections in a day. An insulin pen  is a device prefilled with the right amount of insulin. Eat meals and snacks as directed. Talk to your dietitian or provider about a meal plan that is right for you. Do not skip meals. Check your blood sugar level as directed. Ask your provider what your blood sugar levels should be before and after you eat. Ask when and how often to check your blood sugar level. You may need to check a drop of blood in a glucose test machine. You may need to check at least 3 times each day. Record your blood sugar level results and take the record with you when you see your care team. They may use it to make changes to your medicine, food, or exercise schedules. Your care team provider may recommend a continuous glucose monitor (CGM). A CGM is a device that is worn at all times. The CGM checks your blood sugar every 5 minutes. It sends results to an electronic device such as a smart phone. Check your blood sugar level before you exercise. Physical activity, such as exercise, can decrease your blood sugar level. If your blood sugar level is less than 100 mg/dL, have a carbohydrate snack.  Examples are 4 to 6 crackers, ½ banana, 8 ounces (1 cup) of nonfat or 1% milk, or 4 ounces (½ cup) of juice. If you will be active for more than 1 hour, you may need to check your blood sugar level every 30 minutes. Your provider may also recommend that you check your blood sugar level after your activity. Know the risks if you choose to drink alcohol. Alcohol can cause your blood sugar levels to be low if you use insulin. Alcohol can cause high blood sugar levels and weight gain if you drink too much. Women 21 years or older and men 72 years or older should limit alcohol to 1 drink a day. Men aged 24 to 59 years should limit alcohol to 2 drinks a day. A drink of alcohol is 12 ounces of beer, 5 ounces of wine, or 1½ ounces of liquor. Follow up with your doctor or diabetes care team provider as directed: You may need your insulin or diabetes medicine changed if you continue to have hypoglycemia episodes. Write down your questions so you remember to ask them during your visits. © Copyright [employer] 2022 Information is for End User's use only and may not be sold, redistributed or otherwise used for commercial purposes. The above information is an  only. It is not intended as medical advice for individual conditions or treatments. Talk to your doctor, nurse or pharmacist before following any medical regimen to see if it is safe and effective for you.

## 2023-07-28 NOTE — PROGRESS NOTES
Established Patient Progress Note      CC: Type 2 Diabetes Mellitus  Hyperthyroidism     Impression & Plan:    Problem List Items Addressed This Visit        Endocrine    Hyperthyroidism     Clinically asymptomatic without adverse reaction on PTU 50 mg 2.5 tablets/day. Recheck thyroid function in 3 months before next appointment. Relevant Orders    TSH, 3rd generation    T4, free- Lab Collect    T3 Lab Collect    Type 2 diabetes mellitus with hyperglycemia, without long-term current use of insulin (HCC) - Primary       Lab Results   Component Value Date    HGBA1C 7.3 (A) 07/28/2023     Hemoglobin A1c remains reasonable on Glipizide 2.5 mg daily which is reasonable for age and comorbidity. She is interested in CGM trial as she does not regularly check fingerstick, provided referral to diabetes education. Recommend regular eye and diabetic foot exams. Lab work ordered before next visit in 3 months. Relevant Orders    POCT hemoglobin A1c (Completed)    Ambulatory Referral to Diabetic Education    Hemoglobin A1C    Comprehensive metabolic panel       Cardiovascular and Mediastinum    Hypertension, essential     On ACE inhibitor, lisinopril 20 mg daily            Other    Mixed hyperlipidemia     Continues statin therapy          Papillary thyroid carcinoma ([address])     Had presumed thyroid cancer in 2006 with hemithyroidectomy. Ultrasound of thyroid completed July 2023 without identified nodules. Orders Placed This Encounter   Procedures   • TSH, 3rd generation     This is a patient instruction: This test is non-fasting. Please drink two glasses of water morning of bloodwork.         Standing Status:   Future     Number of Occurrences:   1     Standing Expiration Date:   1/28/2024   • T4, free- Lab Collect     Standing Status:   Future     Number of Occurrences:   1     Standing Expiration Date:   7/28/2024   • T3 Lab Collect     Standing Status:   Future     Number of Occurrences:   1 Standing Expiration Date:   7/28/2024   • Hemoglobin A1C     Standing Status:   Future     Number of Occurrences:   1     Standing Expiration Date:   7/28/2024   • Comprehensive metabolic panel     This is a patient instruction: Patient fasting for 8 hours or longer recommended. Standing Status:   Future     Number of Occurrences:   1     Standing Expiration Date:   7/28/2024   • Ambulatory Referral to Diabetic Education     Standing Status:   Future     Standing Expiration Date:   7/28/2024     Referral Priority:   Routine     Referral Type:   Consult - AMB     Referral Reason:   Specialty Services Required     Requested Specialty:   Diabetes Services     Number of Visits Requested:   1     Expiration Date:   7/28/2024   • POCT hemoglobin A1c       History of Present Illness:   Ej Regalado is a 81 y.o. 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 Denies recent illness or hospitalizations. Denies recent severe hypoglycemic or severe hyperglycemic episodes. Denies any issues with her current regimen. home glucose monitoring: are usually not preformed. Last seen in office on April 28, 2023 by Dr. Paul Moran. Current regimen:   Glipizide XL 2.5 mg daily     Has hyperthyroidism: Taking PTU 50 mg 2.5 tablets daily. Was previously on methimazole 2.5 mg alternating with 5 mg daily for one week and state that she develped breast pain when she took a full tablet and made her feel sick. Denies change in energy level or weight. Denies anxiety, jitteriness, or tremors. Denies chest pain, tachycardia, or palpitations. Denies constipation or hyperdefecation. Denies frequent headaches. Denies temperature intolerances. Has history of partial thyroidectomy in 2006 with possible papillary thyroid carcinoma.   From previous note "Patient insists that the documentation is incorrect and that she did not have thyroid carcinoma   Reviewed path report in care everywhere:    Thyroid, right, lobectomy:          1) Minute focal area, suspicous for early papillary carcinoma."    Patient Active Problem List   Diagnosis   • BOOP (bronchiolitis obliterans with organizing pneumonia) ([address])   • Coronary artery disease with angina pectoris, unspecified vessel or lesion type, unspecified whether native or transplanted heart ([address])   • Hypertension, essential   • Tracheal stenosis   • Tracheostomy present ([address])   • Subglottic stenosis   • Chronic cough   • Class 3 severe obesity due to excess calories without serious comorbidity with body mass index (BMI) of 40.0 to 44.9 in [facility])   • Sleep-related nonobstructive alveolar hypoventilation   • Mild persistent asthma without complication   • Mixed hyperlipidemia   • Hypertensive heart disease   • Chronic bilateral low back pain without sciatica   • Acute tracheobronchitis   • Mild persistent asthma with exacerbation   • Stage 3a chronic kidney disease (HCC)   • Papillary thyroid carcinoma (HCC)   • Hyperthyroidism   • Type 2 diabetes mellitus with hyperglycemia, without long-term current use of insulin ([address])      Past Medical History:   Diagnosis Date   • BOOP (bronchiolitis obliterans with organizing pneumonia) ([address]) 2006   • Coronary artery disease    • Papillary thyroid carcinoma ([address]) 2021   • Post-surgical hypothyroidism 2017      Past Surgical History:   Procedure Laterality Date   • HYSTERECTOMY     • REPLACEMENT TOTAL KNEE     • TRACHEOSTOMY  2006      Family History   Problem Relation Age of Onset   • Heart disease Mother    • Hypertension Mother    • Heart disease Father    • Pneumonia Brother          of COVID   • Dementia Brother      Social History     Tobacco Use   • Smoking status: Never     Passive exposure: Past   • Smokeless tobacco: Never   Substance Use Topics   • Alcohol use: Never     Allergies   Allergen Reactions   • Iodine - Food Allergy Swelling   • Shellfish-Derived Products - Food Allergy Hives   • Morphine Rash         Current Outpatient Medications:   •  acetaminophen (TYLENOL) 650 mg CR tablet, Take 1 tablet (650 mg total) by mouth every 8 (eight) hours as needed for mild pain, Disp: 30 tablet, Rfl: 0  •  albuterol (2.5 mg/3 mL) 0.083 % nebulizer solution, Take 3 mL (2.5 mg total) by nebulization every 6 (six) hours as needed for wheezing or shortness of breath, Disp: 1080 mL, Rfl: 3  •  Ascorbic Acid (vitamin C) 1000 MG tablet, Take 1 tablet (1,000 mg total) by mouth daily, Disp: 30 tablet, Rfl: 5  •  aspirin 81 mg chewable tablet, Chew 1 tablet (81 mg total) daily, Disp: 30 tablet, Rfl: 5  •  atorvastatin (LIPITOR) 40 mg tablet, TAKE 1 TABLET DAILY, Disp: 90 tablet, Rfl: 2  •  Blood Glucose Monitoring Suppl (OneTouch Verio) w/Device KIT, Use to test blood sugar 2 times a day, Disp: 1 kit, Rfl: 0  •  Calcium Carbonate-Vitamin D3 (Calcium 600+D) 600-400 MG-UNIT TABS, Take 1 tablet by mouth daily  , Disp: , Rfl:   •  furosemide (LASIX) 20 mg tablet, TAKE 1 TABLET DAILY, Disp: 90 tablet, Rfl: 1  •  glipiZIDE (GLUCOTROL XL) 2.5 mg 24 hr tablet, Take 1 tablet (2.5 mg total) by mouth daily, Disp: 90 tablet, Rfl: 3  •  glucose blood (OneTouch Verio) test strip, Use to test blood sugar 2 times daily, Disp: 100 strip, Rfl: 3  •  guaiFENesin-codeine (ROBITUSSIN AC) 100-10 mg/5 mL oral solution, TAKE 5 ML TWO TIMES A DAY AS NEEDED FOR COUGH, Disp: 300 mL, Rfl: 1  •  isosorbide mononitrate (IMDUR) 30 mg 24 hr tablet, TAKE 1 TABLET DAILY, Disp: 30 tablet, Rfl: 5  •  Lancets (onetouch ultrasoft) lancets, Use to test blood sugar 2 times a day, Disp: 100 each, Rfl: 3  •  lidocaine (Lidoderm) 5 %, Apply 1 patch topically over 12 hours daily Remove & Discard patch within 12 hours or as directed by MD, Disp: 15 patch, Rfl: 0  •  lisinopril (ZESTRIL) 20 mg tablet, TAKE 1 TABLET DAILY, Disp: 90 tablet, Rfl: 1  •  metoprolol tartrate (LOPRESSOR) 25 mg tablet, TAKE 1/2 TABLET EVERY 12 HOURS, Disp: 60 tablet, Rfl: 2  •  Multiple Vitamins-Minerals (CENTRUM SILVER 50+WOMEN PO), Take by mouth, Disp: , Rfl:   •  ondansetron (Zofran ODT) 4 mg disintegrating tablet, Take 1 tablet (4 mg total) by mouth every 6 (six) hours as needed for nausea or vomiting, Disp: 10 tablet, Rfl: 0  •  pantoprazole (PROTONIX) 40 mg tablet, TAKE ONE TABLET DAILY, Disp: 30 tablet, Rfl: 5  •  Promethazine-Codeine 6.25-10 MG/5ML SOLN, Take 5 mL by mouth 3 (three) times a day as needed (cough), Disp: 120 mL, Rfl: 1  •  propylthiouracil 50 mg tablet, 1 tab three times daily, Disp: 270 tablet, Rfl: 3  •  sodium chloride 0.9 % nebulizer solution, TAKE 3 ML BY NEBULIZATION 2 (TWO) TIMES A DAY AS NEEDED FOR WHEEZING, Disp: 300 mL, Rfl: 6  •  budesonide (PULMICORT) 0.5 mg/2 mL nebulizer solution, Take 2 mL (0.5 mg total) by nebulization 2 (two) times a day, Disp: 120 mL, Rfl: 11    Review of Systems   See HPI. All other systems reviewed and are negative. Physical Exam:  Body mass index is 34.7 kg/m². /84   Pulse 83   Ht 5' 6" (1.676 m)   Wt 97.5 kg (215 lb)   BMI 34.70 kg/m²    Wt Readings from Last 3 Encounters:   07/28/23 97.5 kg (215 lb)   07/11/23 98.1 kg (216 lb 3.2 oz)   07/06/23 96.6 kg (213 lb)     Physical Exam  Vitals reviewed. Constitutional:       Appearance: Normal appearance. Cardiovascular:      Rate and Rhythm: Normal rate and regular rhythm. Pulses: Normal pulses. Heart sounds: Normal heart sounds. Pulmonary:      Effort: Pulmonary effort is normal.      Breath sounds: Normal breath sounds. Skin:     General: Skin is warm and dry. Capillary Refill: Capillary refill takes less than 2 seconds. Neurological:      General: No focal deficit present. Mental Status: She is alert and oriented to person, place, and time.    Psychiatric:         Mood and Affect: Mood normal.         Behavior: Behavior normal.       Labs:   Lab Results   Component Value Date    HGBA1C 7.3 (A) 07/28/2023    HGBA1C 7.3 (H) 02/07/2023    HGBA1C 7.5 (H) 01/24/2023     Lab Results   Component Value Date    CREATININE 0.86 07/11/2023    CREATININE 0.87 07/07/2023    CREATININE 0.82 03/01/2023    BUN 17 07/11/2023    K 4.0 07/11/2023     07/11/2023    CO2 26 07/11/2023     eGFR   Date Value Ref Range Status   07/11/2023 63 ml/min/1.73sq m Final     Lab Results   Component Value Date    HDL 40 07/07/2023    TRIG 71 07/07/2023    CHOLHDL 2.9 07/07/2023     Lab Results   Component Value Date    ALT 14 07/11/2023    AST 17 07/11/2023    ALKPHOS 78 07/11/2023     No results found for: "[id number]"  Lab Results   Component Value Date    FREET4 1.38 07/07/2023             Discussed with the patient and all questioned fully answered. She will call me if any problems arise. Follow-up appointment in 3 months. Counseled patient on diagnostic results, prognosis, risk and benefit of treatment options, instruction for management, importance of treatment compliance, Risk  factor reduction and impressions    Patient Instructions     Hypoglycemia in a Person with Diabetes   WHAT YOU NEED TO KNOW:   Hypoglycemia is a serious condition that happens when your blood glucose (sugar) level drops too low. The blood sugar level is usually too high in a person with diabetes, but the level can also drop too low. It is important to follow your diabetes management plan to keep your blood sugar level steady. DISCHARGE INSTRUCTIONS:   Have someone call your local emergency number (911 in the [address]) if:   • You have a seizure or pass out. • Your blood sugar is less than 50 mg/dL and does not respond to treatment. • You feel you are going to pass out. • You have trouble thinking clearly. Call your doctor or diabetes care team provider if:   • You have had symptoms of low blood sugar several times.     • You have questions about the amount of insulin or diabetes medicine you are taking. • You have questions or concerns about your condition or care. Medicines:   • Insulin or diabetes medicine  help to keep your blood sugar under control. • Glucagon  may be needed if you have severe hypoglycemia. • Take your medicine as directed. Contact your healthcare provider if you think your medicine is not helping or if you have side effects. Tell your provider if you are allergic to any medicine. Keep a list of the medicines, vitamins, and herbs you take. Include the amounts, and when and why you take them. Bring the list or the pill bottles to follow-up visits. Carry your medicine list with you in case of an emergency. Manage hypoglycemia:   • Check your blood sugar level right away if you have symptoms of hypoglycemia. Hypoglycemia usually happens when your blood sugar level is 70 mg/dL or below. Ask your diabetes care team provider what blood sugar level is too low for you. • If your blood sugar level is too low, eat or drink 15 grams of fast-acting carbohydrate. Examples of this amount of fast-acting carbohydrate are 4 ounces (½ cup) of fruit juice or 4 ounces of regular soda. Other examples are 2 tablespoons of raisins or 1 tube of glucose gel. Check your blood sugar level 15 minutes later. Sit still as you wait. If the level is still low (less than 100 mg/dL), have another 15 grams of carbohydrate. When the level returns to 100 mg/dL, eat a meal if it is time. If your meal time is more than 1 hour away, eat a snack. The snack should contain carbohydrates, such as the following:    ? 3/4 cup of cereal    ? 1 cup of skim or low fat milk    ? 6 soda crackers    ? 1/2 of a turkey sandwich    ? 15 fat-free chips  This will help prevent another drop in blood sugar. Always carefully follow your provider's instructions on how to treat low blood sugar levels. • Always carry a source of fast-acting carbohydrate. If you have symptoms of hypoglycemia and you do not have a blood glucose meter, have a source of fast-acting carbohydrate anyway. Avoid carbohydrate foods that are high in fat. The fat content may make the carbohydrate take longer to increase your blood sugar level. Ask your provider if you should carry a glucagon kit. Glucagon is a medicine that is injected when you develop severe hypoglycemia and become unconscious. Check the expiration date every month and replace it before it expires. • Teach others how to help you if you have symptoms of hypoglycemia. Tell them about the symptoms of hypoglycemia. Ask them to give you a source of fast-acting carbohydrate if you cannot get it yourself. Ask them to give you a glucagon injection if you have signs of hypoglycemia and you become unconscious or have a seizure. Ask them to call the local emergency number (911 in the [address]) . This is an emergency. Tell them never to try to make you swallow anything if you faint or have a seizure. • Wear medical alert jewelry  or carry a card that says you have diabetes. Ask where to get these items. Prevent hypoglycemia:   • Take diabetes medicine as directed. Take your medicine at the right time and in the right amount. Do not  double the amount of medicine you take unless instructed by your diabetes care team provider. You may need oral diabetes medicine, insulin, or both to help control your blood sugar levels. Your healthcare provider will teach you how and when to take oral diabetes medicine. You will also be taught about side effects oral diabetes medicine can cause. Insulin may be added if oral diabetes medicine becomes less effective over time. Insulin may be injected, or given through a pump or pen. You and your care team will discuss which method is best for you. ? An insulin pump  is an implanted device that gives your insulin 24 hours a day.  An insulin pump prevents the need for multiple insulin injections in a day.         ? An insulin pen  is a device prefilled with the right amount of insulin. • Eat meals and snacks as directed. Talk to your dietitian or provider about a meal plan that is right for you. Do not skip meals. • Check your blood sugar level as directed. Ask your provider what your blood sugar levels should be before and after you eat. Ask when and how often to check your blood sugar level. You may need to check a drop of blood in a glucose test machine. You may need to check at least 3 times each day. Record your blood sugar level results and take the record with you when you see your care team. They may use it to make changes to your medicine, food, or exercise schedules. Your care team provider may recommend a continuous glucose monitor (CGM). A CGM is a device that is worn at all times. The CGM checks your blood sugar every 5 minutes. It sends results to an electronic device such as a smart phone. • Check your blood sugar level before you exercise. Physical activity, such as exercise, can decrease your blood sugar level. If your blood sugar level is less than 100 mg/dL, have a carbohydrate snack. Examples are 4 to 6 crackers, ½ banana, 8 ounces (1 cup) of nonfat or 1% milk, or 4 ounces (½ cup) of juice. If you will be active for more than 1 hour, you may need to check your blood sugar level every 30 minutes. Your provider may also recommend that you check your blood sugar level after your activity. • Know the risks if you choose to drink alcohol. Alcohol can cause your blood sugar levels to be low if you use insulin. Alcohol can cause high blood sugar levels and weight gain if you drink too much. Women 21 years or older and men 72 years or older should limit alcohol to 1 drink a day. Men aged 24 to 59 years should limit alcohol to 2 drinks a day. A drink of alcohol is 12 ounces of beer, 5 ounces of wine, or 1½ ounces of liquor.     Follow up with your doctor or diabetes care team provider as directed: You may need your insulin or diabetes medicine changed if you continue to have hypoglycemia episodes. Write down your questions so you remember to ask them during your visits. © Copyright Wanda [employer] 2022 Information is for End User's use only and may not be sold, redistributed or otherwise used for commercial purposes. The above information is an  only. It is not intended as medical advice for individual conditions or treatments. Talk to your doctor, nurse or pharmacist before following any medical regimen to see if it is safe and effective for you.       Inessa Goel

## 2023-07-31 ENCOUNTER — TELEPHONE (OUTPATIENT)
Dept: DIABETES SERVICES | Facility: CLINIC | Age: 83
End: 2023-07-31

## 2023-07-31 NOTE — TELEPHONE ENCOUNTER
You referred patient for a Dexcom G7 trial. Your office notes are not finished but I am trying to determine the reason you want patient seen. It does not sound likely that she will be able to download the amarjit on a cell phone.

## 2023-08-02 DIAGNOSIS — I10 HYPERTENSION, ESSENTIAL: ICD-10-CM

## 2023-08-02 DIAGNOSIS — R05.3 CHRONIC COUGH: ICD-10-CM

## 2023-08-02 RX ORDER — CODEINE PHOSPHATE AND GUAIFENESIN 10; 100 MG/5ML; MG/5ML
SOLUTION ORAL
Qty: 300 ML | Refills: 1 | Status: SHIPPED | OUTPATIENT
Start: 2023-08-02

## 2023-08-02 RX ORDER — ISOSORBIDE MONONITRATE 30 MG/1
TABLET, EXTENDED RELEASE ORAL
Qty: 30 TABLET | Refills: 5 | Status: SHIPPED | OUTPATIENT
Start: 2023-08-02

## 2023-08-02 NOTE — ASSESSMENT & PLAN NOTE
Lab Results   Component Value Date    HGBA1C 7.3 (A) 07/28/2023     Hemoglobin A1c remains reasonable on Glipizide 2.5 mg daily which is reasonable for age and comorbidity. She is interested in CGM trial as she does not regularly check fingerstick, provided referral to diabetes education. Recommend regular eye and diabetic foot exams. Lab work ordered before next visit in 3 months.

## 2023-08-02 NOTE — ASSESSMENT & PLAN NOTE
Had presumed thyroid cancer in 2006 with hemithyroidectomy. Ultrasound of thyroid completed July 2023 without identified nodules.

## 2023-08-02 NOTE — ASSESSMENT & PLAN NOTE
Clinically asymptomatic without adverse reaction on PTU 50 mg 2.5 tablets/day. Recheck thyroid function in 3 months before next appointment.

## 2023-08-03 ENCOUNTER — OFFICE VISIT (OUTPATIENT)
Dept: OTOLARYNGOLOGY | Facility: CLINIC | Age: 83
End: 2023-08-03
Payer: COMMERCIAL

## 2023-08-03 VITALS — WEIGHT: 215 LBS | BODY MASS INDEX: 34.55 KG/M2 | HEIGHT: 66 IN

## 2023-08-03 DIAGNOSIS — J38.6 SUBGLOTTIC STENOSIS: ICD-10-CM

## 2023-08-03 DIAGNOSIS — Z93.0 TRACHEOSTOMY DEPENDENCE (HCC): Primary | ICD-10-CM

## 2023-08-03 PROCEDURE — 99213 OFFICE O/P EST LOW 20 MIN: CPT | Performed by: OTOLARYNGOLOGY

## 2023-08-03 RX ORDER — FUROSEMIDE 20 MG/1
TABLET ORAL
Qty: 90 TABLET | Refills: 1 | Status: SHIPPED | OUTPATIENT
Start: 2023-08-03

## 2023-08-03 NOTE — PROGRESS NOTES
Assessment/Plan:  Maira Gibson changed to new style Shiley #6 cuffless. Repeat Q3 months. Diagnosis ICD-10-CM Associated Orders   1. 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 Subglottic stenosis  J38.6              Subjective:      Patient ID: Cornelio Dean is a 81 y.o. female. F/u for trach change. No c/o. The following portions of the patient's history were reviewed and updated as appropriate: allergies, current medications, past family history, past medical history, past social history, past surgical history and problem list.    Review of Systems      Objective:      Ht 5' 6" (1.676 m)   Wt 97.5 kg (215 lb)   BMI 34.70 kg/m²          Physical Exam  Neck:           Flexible Fiberoptic Tracheoscopy Procedure Note:  Indication:  Trach change  Verbal consent obtained. Surgeon: Mendy Franz MD  Scope passed through tracheostomy tube  Tracheostomy tube in place, normal distal trachea.   Patient tolerated procedure well without complications

## 2023-08-10 ENCOUNTER — OFFICE VISIT (OUTPATIENT)
Dept: DIABETES SERVICES | Facility: CLINIC | Age: 83
End: 2023-08-10
Payer: COMMERCIAL

## 2023-08-10 VITALS — BODY MASS INDEX: 34.38 KG/M2 | WEIGHT: 213 LBS

## 2023-08-10 DIAGNOSIS — E11.65 TYPE 2 DIABETES MELLITUS WITH HYPERGLYCEMIA, WITHOUT LONG-TERM CURRENT USE OF INSULIN (HCC): Primary | ICD-10-CM

## 2023-08-10 PROCEDURE — 95250 CONT GLUC MNTR PHYS/QHP EQP: CPT

## 2023-08-10 NOTE — PROGRESS NOTES
Dexcom Professional Training    Met with Sharmin Becerril for [employer]. Training today included:    - Explained procedure to [facility]  - Patient agreement signed  - Checked sensor expiration date; Sensor lot number: [id number], Transmitter Id: [id number]  - Patient has a blood glucose meter and strips  - Blinded study  - Transmitter has been cleaned with alcohol and dried  - Discussed site selection; identified insertion site: right of belly button on abdomen  - Cleansed the skin with alcohol  - Inserted sensor per instructions: smooth tape on the skin, insert sensor, withdraw needle, remove insertion tool  - Snapped in grey transmitter  - Verified transmitter fully snapped in on both sides (2clicks)  - Removed transmitter latch  - Disposed insertion tool in sharps container  - Started Sensor and verified communication with  (antenna symbol)    Patient instructions reviewed with patient:    - Perform 2 start up finger stick BG's 2 hours after insertion and calibrate  - Calibrate  every 12 hours using BG meter readings  - Fill out log sheets, one for each day  - Sensor is waterproof for showering and swimming, remove for hot tub, MRI  -  is not waterproof  - Remove if redness, bleeding, swelling, discomfort at site  - Removal is in 1 week with return instructions. Sensor inserted by: Alaina's son Jayant    [facility] will return in one week for sensor removal and data download.       [address] [id number] [phone]

## 2023-08-24 ENCOUNTER — TELEPHONE (OUTPATIENT)
Dept: ENDOCRINOLOGY | Facility: CLINIC | Age: 83
End: 2023-08-24

## 2023-08-24 NOTE — TELEPHONE ENCOUNTER
Dexcom pro is consistent with hemoglobin A1c. Please let her know there is no change in her regimen and I understand that she does not want to proceed with a personal device.

## 2023-08-25 ENCOUNTER — HOSPITAL ENCOUNTER (EMERGENCY)
Facility: HOSPITAL | Age: 83
Discharge: HOME | End: 2023-08-25
Attending: EMERGENCY MEDICINE
Payer: COMMERCIAL

## 2023-08-25 ENCOUNTER — TELEPHONE (OUTPATIENT)
Dept: FAMILY MEDICINE CLINIC | Facility: CLINIC | Age: 83
End: 2023-08-25

## 2023-08-25 ENCOUNTER — APPOINTMENT (EMERGENCY)
Dept: RADIOLOGY | Facility: HOSPITAL | Age: 83
End: 2023-08-25
Payer: COMMERCIAL

## 2023-08-25 VITALS
DIASTOLIC BLOOD PRESSURE: 73 MMHG | HEIGHT: 66 IN | WEIGHT: 215 LBS | OXYGEN SATURATION: 99 % | HEART RATE: 89 BPM | SYSTOLIC BLOOD PRESSURE: 154 MMHG | RESPIRATION RATE: 22 BRPM | TEMPERATURE: 100.4 F | BODY MASS INDEX: 34.55 KG/M2

## 2023-08-25 DIAGNOSIS — U07.1 COVID-19: Primary | ICD-10-CM

## 2023-08-25 LAB
ALBUMIN SERPL BCP-MCNC: 4.1 G/DL (ref 3.5–5)
ALP SERPL-CCNC: 80 U/L (ref 34–104)
ALT SERPL W P-5'-P-CCNC: 14 U/L (ref 7–52)
ANION GAP SERPL CALCULATED.3IONS-SCNC: 7 MMOL/L
AST SERPL W P-5'-P-CCNC: 36 U/L (ref 13–39)
BASOPHILS # BLD AUTO: 0.01 THOUSANDS/ÂΜL (ref 0–0.1)
BASOPHILS NFR BLD AUTO: 0 % (ref 0–1)
BILIRUB SERPL-MCNC: 0.6 MG/DL (ref 0.2–1)
BUN SERPL-MCNC: 13 MG/DL (ref 5–25)
CALCIUM SERPL-MCNC: 9.4 MG/DL (ref 8.4–10.2)
CHLORIDE SERPL-SCNC: 104 MMOL/L (ref 96–108)
CO2 SERPL-SCNC: 24 MMOL/L (ref 21–32)
CREAT SERPL-MCNC: 0.91 MG/DL (ref 0.6–1.3)
EOSINOPHIL # BLD AUTO: 0.04 THOUSAND/ÂΜL (ref 0–0.61)
EOSINOPHIL NFR BLD AUTO: 1 % (ref 0–6)
ERYTHROCYTE [DISTWIDTH] IN BLOOD BY AUTOMATED COUNT: 13.2 % (ref 11.6–15.1)
GFR SERPL CREATININE-BSD FRML MDRD: 58 ML/MIN/1.73SQ M
GLUCOSE SERPL-MCNC: 117 MG/DL (ref 65–140)
HCT VFR BLD AUTO: 41.5 % (ref 34.8–46.1)
HGB BLD-MCNC: 13.6 G/DL (ref 11.5–15.4)
IMM GRANULOCYTES # BLD AUTO: 0.02 THOUSAND/UL (ref 0–0.2)
IMM GRANULOCYTES NFR BLD AUTO: 0 % (ref 0–2)
LYMPHOCYTES # BLD AUTO: 0.79 THOUSANDS/ÂΜL (ref 0.6–4.47)
LYMPHOCYTES NFR BLD AUTO: 10 % (ref 14–44)
MCH RBC QN AUTO: 28 PG (ref 26.8–34.3)
MCHC RBC AUTO-ENTMCNC: 32.8 G/DL (ref 31.4–37.4)
MCV RBC AUTO: 85 FL (ref 82–98)
MONOCYTES # BLD AUTO: 0.38 THOUSAND/ÂΜL (ref 0.17–1.22)
MONOCYTES NFR BLD AUTO: 5 % (ref 4–12)
NEUTROPHILS # BLD AUTO: 6.79 THOUSANDS/ÂΜL (ref 1.85–7.62)
NEUTS SEG NFR BLD AUTO: 84 % (ref 43–75)
NRBC BLD AUTO-RTO: 0 /100 WBCS
PLATELET # BLD AUTO: 175 THOUSANDS/UL (ref 149–390)
PMV BLD AUTO: 10.9 FL (ref 8.9–12.7)
POTASSIUM SERPL-SCNC: 5.4 MMOL/L (ref 3.5–5.3)
PROT SERPL-MCNC: 7.9 G/DL (ref 6.4–8.4)
RBC # BLD AUTO: 4.86 MILLION/UL (ref 3.81–5.12)
SODIUM SERPL-SCNC: 135 MMOL/L (ref 135–147)
WBC # BLD AUTO: 8.03 THOUSAND/UL (ref 4.31–10.16)

## 2023-08-25 PROCEDURE — 99285 EMERGENCY DEPT VISIT HI MDM: CPT

## 2023-08-25 PROCEDURE — 36415 COLL VENOUS BLD VENIPUNCTURE: CPT | Performed by: EMERGENCY MEDICINE

## 2023-08-25 PROCEDURE — 80053 COMPREHEN METABOLIC PANEL: CPT | Performed by: EMERGENCY MEDICINE

## 2023-08-25 PROCEDURE — 71045 X-RAY EXAM CHEST 1 VIEW: CPT

## 2023-08-25 PROCEDURE — 85025 COMPLETE CBC W/AUTO DIFF WBC: CPT | Performed by: EMERGENCY MEDICINE

## 2023-08-25 PROCEDURE — 99284 EMERGENCY DEPT VISIT MOD MDM: CPT | Performed by: EMERGENCY MEDICINE

## 2023-08-25 RX ORDER — ACETAMINOPHEN 325 MG/1
650 TABLET ORAL ONCE
Status: COMPLETED | OUTPATIENT
Start: 2023-08-25 | End: 2023-08-25

## 2023-08-25 RX ORDER — NIRMATRELVIR AND RITONAVIR 150-100 MG
2 KIT ORAL 2 TIMES DAILY
Qty: 20 TABLET | Refills: 0 | Status: SHIPPED | OUTPATIENT
Start: 2023-08-25 | End: 2023-08-30

## 2023-08-25 RX ADMIN — ACETAMINOPHEN 650 MG: 325 TABLET ORAL at 14:51

## 2023-08-25 NOTE — ED PROVIDER NOTES
History  Chief Complaint   Patient presents with   • Shortness of Breath     Pt reports getting back from [facility] Monday and tested + covid at home. Pt reports feeling slightly more SOB than normal.  Pt noted to have Trach, reportedly for the last 18 years. Patient is an 60-year-old female with a chronic trach, that presents to the emergency department with complaint of shortness of breath and a positive outpatient COVID-19 test. + sick contacts - pt's son. She states that she feels more dyspneic, and moderate conversational dyspnea noted at the time of my initial evaluation. Patient was unaware that she had a fever. History provided by:  Patient   used: No    Shortness of Breath  Associated symptoms: no abdominal pain, no chest pain, no cough, no fever, no neck pain and no vomiting        Prior to Admission Medications   Prescriptions Last Dose Informant Patient Reported? Taking?    Ascorbic Acid (vitamin C) 1000 MG tablet  Self No No   Sig: Take 1 tablet (1,000 mg total) by mouth daily   Blood Glucose Monitoring Suppl (OneTouch Verio) w/Device KIT  Self No No   Sig: Use to test blood sugar 2 times a day   Calcium Carbonate-Vitamin D3 (Calcium 600+D) 600-400 MG-UNIT TABS  Self Yes No   Sig: Take 1 tablet by mouth daily     Lancets (onetouch ultrasoft) lancets  Self No No   Sig: Use to test blood sugar 2 times a day   Multiple Vitamins-Minerals (CENTRUM SILVER 50+WOMEN PO)  Self Yes No   Sig: Take by mouth   Promethazine-Codeine 6.25-10 MG/5ML SOLN  Self No No   Sig: Take 5 mL by mouth 3 (three) times a day as needed (cough)   acetaminophen (TYLENOL) 650 mg CR tablet  Self No No   Sig: Take 1 tablet (650 mg total) by mouth every 8 (eight) hours as needed for mild pain   albuterol (2.5 mg/3 mL) 0.083 % nebulizer solution  Self No No   Sig: Take 3 mL (2.5 mg total) by nebulization every 6 (six) hours as needed for wheezing or shortness of breath   aspirin 81 mg chewable tablet  Self No No   Sig: Chew 1 tablet (81 mg total) daily   atorvastatin (LIPITOR) 40 mg tablet  Self No No   Sig: TAKE 1 TABLET DAILY   budesonide (PULMICORT) 0.5 mg/2 mL nebulizer solution   No No   Sig: Take 2 mL (0.5 mg total) by nebulization 2 (two) times a day   furosemide (LASIX) 20 mg tablet   No No   Sig: TAKE 1 TABLET DAILY   glipiZIDE (GLUCOTROL XL) 2.5 mg 24 hr tablet  Self No No   Sig: Take 1 tablet (2.5 mg total) by mouth daily   glucose blood (OneTouch Verio) test strip  Self No No   Sig: Use to test blood sugar 2 times daily   guaiFENesin-codeine (ROBITUSSIN AC) 100-10 mg/5 mL oral solution   No No   Sig: TAKE 5 ML BY MOUTH 2 (TWO) TIMES A DAY AS NEEDED FOR COUGH   isosorbide mononitrate (IMDUR) 30 mg 24 hr tablet   No No   Sig: TAKE 1 TABLET DAILY   lidocaine (Lidoderm) 5 %  Self No No   Sig: Apply 1 patch topically over 12 hours daily Remove & Discard patch within 12 hours or as directed by MD   lisinopril (ZESTRIL) 20 mg tablet  Self No No   Sig: TAKE 1 TABLET DAILY   metoprolol tartrate (LOPRESSOR) 25 mg tablet  Self No No   Sig: TAKE 1/2 TABLET EVERY 12 HOURS   ondansetron (Zofran ODT) 4 mg disintegrating tablet  Self No No   Sig: Take 1 tablet (4 mg total) by mouth every 6 (six) hours as needed for nausea or vomiting   pantoprazole (PROTONIX) 40 mg tablet  Self No No   Sig: TAKE ONE TABLET DAILY   propylthiouracil 50 mg tablet  Self No No   Si tab three times daily   sodium chloride 0.9 % nebulizer solution  Self No No   Sig: TAKE 3 ML BY NEBULIZATION 2 (TWO) TIMES A DAY AS NEEDED FOR WHEEZING      Facility-Administered Medications: None       Past Medical History:   Diagnosis Date   • BOOP (bronchiolitis obliterans with organizing pneumonia) ([address]) 2006   • Coronary artery disease    • Papillary thyroid carcinoma ([address]) 2021   • Post-surgical hypothyroidism 2017       Past Surgical History:   Procedure Laterality Date   • HYSTERECTOMY     • REPLACEMENT TOTAL KNEE     • TRACHEOSTOMY 2006       Family History   Problem Relation Age of Onset   • Heart disease Mother    • Hypertension Mother    • Heart disease Father    • Pneumonia Brother          of COVID   • Dementia Brother      I have reviewed and agree with the history as documented. E-Cigarette/Vaping   • E-Cigarette Use Never User      E-Cigarette/Vaping Substances   • Nicotine No    • THC No    • CBD No    • Flavoring No    • Other No    • Unknown No      Social History     Tobacco Use   • Smoking status: Never     Passive exposure: Past   • Smokeless tobacco: Never   Vaping Use   • Vaping Use: Never used   Substance Use Topics   • Alcohol use: Never   • Drug use: Never       Review of Systems   Constitutional: Negative for chills and fever. Respiratory: Positive for shortness of breath. Negative for cough and chest tightness. Cardiovascular: Negative for chest pain, palpitations and leg swelling. Gastrointestinal: Negative for abdominal pain, diarrhea, nausea and vomiting. Genitourinary: Negative for dysuria, frequency, hematuria and urgency. Musculoskeletal: Negative for back pain, neck pain and neck stiffness. All other systems reviewed and are negative. Physical Exam  Physical Exam  Vitals and nursing note reviewed. Constitutional:       General: She is not in acute distress. Appearance: She is well-developed. She is not diaphoretic. HENT:      Head: Normocephalic and atraumatic. Eyes:      Conjunctiva/sclera: Conjunctivae normal.      Pupils: Pupils are equal, round, and reactive to light. Cardiovascular:      Rate and Rhythm: Normal rate and regular rhythm. Heart sounds: Normal heart sounds. No murmur heard. Pulmonary:      Effort: Pulmonary effort is normal. Tachypnea present. No respiratory distress. Breath sounds: Normal breath sounds. Abdominal:      General: Bowel sounds are normal. There is no distension. Palpations: Abdomen is soft. Tenderness:  There is no abdominal tenderness. Musculoskeletal:         General: No deformity. Normal range of motion. Cervical back: Normal range of motion and neck supple. Skin:     General: Skin is warm and dry. Capillary Refill: Capillary refill takes less than 2 seconds. Coloration: Skin is not pale. Findings: No rash. Neurological:      General: No focal deficit present. Mental Status: She is alert and oriented to person, place, and time. Cranial Nerves: No cranial nerve deficit. Psychiatric:         Mood and Affect: Mood is anxious.          Behavior: Behavior normal.         Vital Signs  ED Triage Vitals   Temperature Pulse Respirations Blood Pressure SpO2   08/25/23 1404 08/25/23 1404 08/25/23 1404 08/25/23 1537 08/25/23 1404   (!) 100.8 °F (38.2 °C) 89 (!) 28 154/73 99 %      Temp Source Heart Rate Source Patient Position - Orthostatic VS BP Location FiO2 (%)   08/25/23 1404 08/25/23 1404 08/25/23 1404 08/25/23 1404 --   Tympanic Monitor Sitting Right arm       Pain Score       08/25/23 1404       No Pain           Vitals:    08/25/23 1404 08/25/23 1537   BP:  154/73   Pulse: 89 89   Patient Position - Orthostatic VS: Sitting Sitting         Visual Acuity      ED Medications  Medications   acetaminophen (TYLENOL) tablet 650 mg (650 mg Oral Given 8/25/23 1451)       Diagnostic Studies  Results Reviewed     Procedure Component Value Units Date/Time    Comprehensive metabolic panel [[id number]]  (Abnormal) Collected: 08/25/23 1447    Lab Status: Final result Specimen: Blood from Arm, Right Updated: 08/25/23 1510     Sodium 135 mmol/L      Potassium 5.4 mmol/L      Chloride 104 mmol/L      CO2 24 mmol/L      ANION GAP 7 mmol/L      BUN 13 mg/dL      Creatinine 0.91 mg/dL      Glucose 117 mg/dL      Calcium 9.4 mg/dL      AST 36 U/L      ALT 14 U/L      Alkaline Phosphatase 80 U/L      Total Protein 7.9 g/dL      Albumin 4.1 g/dL      Total Bilirubin 0.60 mg/dL      eGFR 58 ml/min/1.73sq m Narrative:      National Kidney Disease Foundation guidelines for Chronic Kidney Disease (CKD):   •  Stage 1 with normal or high GFR (GFR > 90 mL/min/1.73 square meters)  •  Stage 2 Mild CKD (GFR = 60-89 mL/min/1.73 square meters)  •  Stage 3A Moderate CKD (GFR = 45-59 mL/min/1.73 square meters)  •  Stage 3B Moderate CKD (GFR = 30-44 mL/min/1.73 square meters)  •  Stage 4 Severe CKD (GFR = 15-29 mL/min/1.73 square meters)  •  Stage 5 End Stage CKD (GFR <15 mL/min/1.73 square meters)  Note: GFR calculation is accurate only with a steady state creatinine    CBC and differential [[id number]]  (Abnormal) Collected: 08/25/23 1447    Lab Status: Final result Specimen: Blood from Arm, Right Updated: 08/25/23 1452     WBC 8.03 Thousand/uL      RBC 4.86 Million/uL      Hemoglobin 13.6 g/dL      Hematocrit 41.5 %      MCV 85 fL      MCH 28.0 pg      MCHC 32.8 g/dL      RDW 13.2 %      MPV 10.9 fL      Platelets 602 Thousands/uL      nRBC 0 /100 WBCs      Neutrophils Relative 84 %      Immat GRANS % 0 %      Lymphocytes Relative 10 %      Monocytes Relative 5 %      Eosinophils Relative 1 %      Basophils Relative 0 %      Neutrophils Absolute 6.79 Thousands/µL      Immature Grans Absolute 0.02 Thousand/uL      Lymphocytes Absolute 0.79 Thousands/µL      Monocytes Absolute 0.38 Thousand/µL      Eosinophils Absolute 0.04 Thousand/µL      Basophils Absolute 0.01 Thousands/µL                  XR chest 1 view portable   Final Result by Rubens Hong MD (08/25 1638)      No acute cardiopulmonary disease. Workstation performed: [id number]                    Procedures  Procedures         ED Course                               SBIRT 22yo+    Flowsheet Row Most Recent Value   Initial Alcohol Screen: US AUDIT-C     1. How often do you have a drink containing alcohol? 0 Filed at: 08/25/2023 1440   2. How many drinks containing alcohol do you have on a typical day you are drinking?   0 Filed at: 08/25/2023 1440 3a. Male UNDER 65: How often do you have five or more drinks on one occasion? 0 Filed at: 08/25/2023 1440   3b. FEMALE Any Age, or MALE 65+: How often do you have 4 or more drinks on one occassion? 0 Filed at: 08/25/2023 1440   Audit-C Score 0 Filed at: 08/25/2023 1440   SERINA: How many times in the past year have you. .. Used an illegal drug or used a prescription medication for non-medical reasons? Never Filed at: 08/25/2023 1440                    Medical Decision Making  70-year-old female with a recent diagnosis of COVID-19, presents for treatment. Labs and x-ray ordered and reviewed. No consolidation noted on x-ray. Patient will be started on a course of Paxlovid, sent to patient's home pharmacy. I reviewed return precautions with patient, she agrees with discharge. Amount and/or Complexity of Data Reviewed  Labs: ordered. Radiology: ordered. Risk  OTC drugs. Prescription drug management. Disposition  Final diagnoses:   [id number]     Time reflects when diagnosis was documented in both MDM as applicable and the Disposition within this note     Time User Action Codes Description Comment    8/25/2023  3:13 PM Crystal Michel Add [U07.1] COVID-19       ED Disposition     ED Disposition   Discharge    Condition   Stable    Date/Time   Fri Aug 25, 2023  3:13 PM    Comment   Alexia Xavier discharge to home/self care.                Follow-up Information     Follow up With Specialties Details Why Contact Info    Mani Rush MD [facility] Medicine Schedule an appointment as soon as possible for a visit in 1 day for follow up [address] [place] [address]  [phone]            Discharge Medication List as of 8/25/2023  3:20 PM      START taking these medications    Details   nirmatrelvir & ritonavir (Paxlovid, 150/100,) tablet therapy pack Take 2 tablets by mouth 2 (two) times a day for 5 days Take 1 nirmatrelvir tablet + 1 ritonavir tablet together per dose, Starting Fri 8/25/2023, Until Wed 8/30/2023, Normal         CONTINUE these medications which have NOT CHANGED    Details   acetaminophen (TYLENOL) 650 mg CR tablet Take 1 tablet (650 mg total) by mouth every 8 (eight) hours as needed for mild pain, Starting Wed 6/30/2021, Normal      albuterol (2.5 mg/3 mL) 0.083 % nebulizer solution Take 3 mL (2.5 mg total) by nebulization every 6 (six) hours as needed for wheezing or shortness of breath, Starting Thu 9/15/2022, Normal      Ascorbic Acid (vitamin C) 1000 MG tablet Take 1 tablet (1,000 mg total) by mouth daily, Starting Tue 11/10/2020, Normal      aspirin 81 mg chewable tablet Chew 1 tablet (81 mg total) daily, Starting Tue 11/10/2020, Normal      atorvastatin (LIPITOR) 40 mg tablet TAKE 1 TABLET DAILY, Normal      Blood Glucose Monitoring Suppl (OneTouch Verio) w/Device KIT Use to test blood sugar 2 times a day, Normal      budesonide (PULMICORT) 0.5 mg/2 mL nebulizer solution Take 2 mL (0.5 mg total) by nebulization 2 (two) times a day, Starting Thu 9/15/2022, Until Wed 5/17/2023, Normal      Calcium Carbonate-Vitamin D3 (Calcium 600+D) 600-400 MG-UNIT TABS Take 1 tablet by mouth daily  , Historical Med      furosemide (LASIX) 20 mg tablet TAKE 1 TABLET DAILY, Normal      glipiZIDE (GLUCOTROL XL) 2.5 mg 24 hr tablet Take 1 tablet (2.5 mg total) by mouth daily, Starting Tue 6/6/2023, Normal      glucose blood (OneTouch Verio) test strip Use to test blood sugar 2 times daily, Normal      guaiFENesin-codeine (ROBITUSSIN AC) 100-10 mg/5 mL oral solution TAKE 5 ML BY MOUTH 2 (TWO) TIMES A DAY AS NEEDED FOR COUGH, Normal      isosorbide mononitrate (IMDUR) 30 mg 24 hr tablet TAKE 1 TABLET DAILY, Normal      Lancets (onetouch ultrasoft) lancets Use to test blood sugar 2 times a day, Normal      lidocaine (Lidoderm) 5 % Apply 1 patch topically over 12 hours daily Remove & Discard patch within 12 hours or as directed by MD, Starting Tue 7/11/2023, Normal      lisinopril (ZESTRIL) 20 mg tablet TAKE 1 TABLET DAILY, Normal      metoprolol tartrate (LOPRESSOR) 25 mg tablet TAKE 1/2 TABLET EVERY 12 HOURS, Normal      Multiple Vitamins-Minerals (CENTRUM SILVER 50+WOMEN PO) Take by mouth, Historical Med      ondansetron (Zofran ODT) 4 mg disintegrating tablet Take 1 tablet (4 mg total) by mouth every 6 (six) hours as needed for nausea or vomiting, Starting Tue 7/11/2023, Normal      pantoprazole (PROTONIX) 40 mg tablet TAKE ONE TABLET DAILY, Normal      Promethazine-Codeine 6.25-10 MG/5ML SOLN Take 5 mL by mouth 3 (three) times a day as needed (cough), Starting Wed 5/17/2023, Normal      propylthiouracil 50 mg tablet 1 tab three times daily, Normal      sodium chloride 0.9 % nebulizer solution TAKE 3 ML BY NEBULIZATION 2 (TWO) TIMES A DAY AS NEEDED FOR WHEEZING, Starting Thu 2/23/2023, Normal             No discharge procedures on file.     PDMP Review       Value Time User    PDMP Reviewed  Yes 5/17/2023 10:08 AM Trevor Florentino MD          ED Provider  Electronically Signed by           Roger Sanders DO  08/25/23 1142

## 2023-08-25 NOTE — TELEPHONE ENCOUNTER
Upon chart review, patient went to ED. Will review encounter once note by ED physician completed. Thank you for your help.

## 2023-08-29 PROBLEM — C73 PAPILLARY THYROID CARCINOMA (HCC): Status: RESOLVED | Noted: 2021-11-29 | Resolved: 2023-08-29

## 2023-08-29 NOTE — PROGRESS NOTES
ASSESSMENT/PLAN:  1. Type 2 diabetes mellitus with hyperglycemia, without long-term current use of insulin (HCC)  Continue healthy lifestyle and meds  - IRIS Diabetic eye exam    2. Post-surgical hypothyroidism  Clinically stable    3. BOOP (bronchiolitis obliterans with organizing pneumonia) ([address])  Given that has a flare short of breath from [facility] if must can add prednisone to calm down wheeze  - predniSONE 20 mg tablet; Take 2 tablets (40 mg total) by mouth daily for 5 days  Dispense: 10 tablet; Refill: 0    4. Hypertension, essential  Clinically stable    5. Coronary artery disease with angina pectoris, unspecified vessel or lesion type, unspecified whether native or transplanted heart ([address])  Clinically stable    6. Stage 3a chronic kidney disease (HCC)  Clinically stable    7. Chronic bilateral low back pain without sciatica  Can follow-up for acupuncture as needed    8. COVID-19  She is slowly recovering with some short of breath but no other serious symptoms    9. Encounter for immunization  Deferred until fully recovers from [address] [place] [address]   Patient presents with   • COVID-19     Patient is Covid + since Friday. She is very short of breath and chest cough with blood in it. Reviewed and reinforced basics of healthy lifestyle  Risks and benefits of therapeutic plan discussed, answered all patient questions and concerns and patient expressed understanding and agreement of therapeutic plan. Return in about 3 months (around 11/30/2023) for Chronic dis. f/u 1/2 hr..        SUBJECTIVE:  Chief complaint and HPI: Santa Albarran is a 81 y.o. female who presents for follow up of multiple chronic medical problems, as listed below in problem list. All problems are relatively stable except for the following issues: f/u COVID. In ER 8/25, given Paxlovid, 150/100. Ill since Friday (5 days)  Home BS ok.    Get vaccinated once better    Review of systems:  No fever/chills/sweats/unexplained weight loss  No chest pain  No change in digestion or bowel movements  No change in urination  No new musculoskeletal or neurological symptoms      Chart reviewed for relevant medical, surgical and psychosocial history, medications and allergies, labs and studies. Patient Active Problem List   Diagnosis   • BOOP (bronchiolitis obliterans with organizing pneumonia) ([address])   • Coronary artery disease with angina pectoris, unspecified vessel or lesion type, unspecified whether native or transplanted heart ([address])   • Hypertension, essential   • Tracheal stenosis   • Tracheostomy present ([address])   • Subglottic stenosis   • Chronic cough   • Class 3 severe obesity due to excess calories without serious comorbidity with body mass index (BMI) of 40.0 to 44.9 in adult [facility])   • Sleep-related nonobstructive alveolar hypoventilation   • Mild persistent asthma without complication   • Mixed hyperlipidemia   • Hypertensive heart disease   • Chronic bilateral low back pain without sciatica   • Acute tracheobronchitis   • Mild persistent asthma with exacerbation   • Stage 3a chronic kidney disease ([address])   • Hyperthyroidism   • Type 2 diabetes mellitus with hyperglycemia, without long-term current use of insulin ([facility])             EXAM:    The patient appears well, in no apparent distress. Alert and oriented times three, pleasant and cooperative. Vital signs are as noted by the nurse. /74 (BP Location: Left arm, Patient Position: Sitting, Cuff Size: Large)   Pulse 83   Temp 97.6 °F (36.4 °C) (Temporal)   Resp (!) 23   Ht 5' 6" (1.676 m)   Wt 93.5 kg (206 lb 3 oz)   SpO2 95%   BMI 33.28 kg/m²     Head: normocephalic, atraumatic  Eyes: well perfused conjunctiva, not clinically pale, not jaundiced  Ears: external ear: no gross lesions  Nose: no rhinorrhea  Neck: supple, trachea in the midline and no concerning cervical lymphadenopathy  Lungs: Moderate respiratory labor.  Scattered wheezes and rhonchi throughout  Heart: Regular rate and rhythm, no murmurs, gallops or rubs  Abdomen: Benign: soft, non-tender, non-distended. No guarding or rebound. No masses or organomegaly. Normal bowel sounds,   Skin: No pallor. No rashes noted  Extremities: Moves all extremities normally.  No pedal edema  Neuro: Grossly normal

## 2023-08-29 NOTE — TELEPHONE ENCOUNTER
pts son called. Pt has small amount of blood in saliva when she coughs. Offered same day appt. They declined. Wanted tomorrow AM. Scheduled with Remde for tomorrow AM.     Clinical- or Dr Carol Burnett- can someone please triage to see if pt needs to go back to ED prior to being seen tomorrow?

## 2023-08-30 ENCOUNTER — OFFICE VISIT (OUTPATIENT)
Dept: FAMILY MEDICINE CLINIC | Facility: CLINIC | Age: 83
End: 2023-08-30
Payer: COMMERCIAL

## 2023-08-30 VITALS
SYSTOLIC BLOOD PRESSURE: 138 MMHG | HEART RATE: 83 BPM | BODY MASS INDEX: 33.14 KG/M2 | RESPIRATION RATE: 23 BRPM | OXYGEN SATURATION: 95 % | DIASTOLIC BLOOD PRESSURE: 74 MMHG | TEMPERATURE: 97.6 F | WEIGHT: 206.19 LBS | HEIGHT: 66 IN

## 2023-08-30 DIAGNOSIS — E89.0 POST-SURGICAL HYPOTHYROIDISM: ICD-10-CM

## 2023-08-30 DIAGNOSIS — M54.50 CHRONIC BILATERAL LOW BACK PAIN WITHOUT SCIATICA: ICD-10-CM

## 2023-08-30 DIAGNOSIS — G89.29 CHRONIC BILATERAL LOW BACK PAIN WITHOUT SCIATICA: ICD-10-CM

## 2023-08-30 DIAGNOSIS — N18.31 STAGE 3A CHRONIC KIDNEY DISEASE (HCC): ICD-10-CM

## 2023-08-30 DIAGNOSIS — I25.119 CORONARY ARTERY DISEASE WITH ANGINA PECTORIS, UNSPECIFIED VESSEL OR LESION TYPE, UNSPECIFIED WHETHER NATIVE OR TRANSPLANTED HEART (HCC): ICD-10-CM

## 2023-08-30 DIAGNOSIS — J84.89 BOOP (BRONCHIOLITIS OBLITERANS WITH ORGANIZING PNEUMONIA) (HCC): ICD-10-CM

## 2023-08-30 DIAGNOSIS — I10 HYPERTENSION, ESSENTIAL: ICD-10-CM

## 2023-08-30 DIAGNOSIS — Z23 ENCOUNTER FOR IMMUNIZATION: ICD-10-CM

## 2023-08-30 DIAGNOSIS — U07.1 COVID-19: ICD-10-CM

## 2023-08-30 DIAGNOSIS — E11.65 TYPE 2 DIABETES MELLITUS WITH HYPERGLYCEMIA, WITHOUT LONG-TERM CURRENT USE OF INSULIN (HCC): Primary | ICD-10-CM

## 2023-08-30 PROCEDURE — 99214 OFFICE O/P EST MOD 30 MIN: CPT | Performed by: FAMILY MEDICINE

## 2023-08-30 RX ORDER — PREDNISONE 20 MG/1
40 TABLET ORAL DAILY
Qty: 10 TABLET | Refills: 0 | Status: SHIPPED | OUTPATIENT
Start: 2023-08-30 | End: 2023-09-04

## 2023-08-31 NOTE — TELEPHONE ENCOUNTER
I just wanted to confirm if she would like a dexcom like the pro device she used (the [address] [place]) or if she would like the [place] which is very similar (swapnil 3)

## 2023-08-31 NOTE — TELEPHONE ENCOUNTER
Just clarification, I was stating that it was consistent, there were no lows identified. If she would like, I can send an order in for the device.

## 2023-09-05 DIAGNOSIS — E78.5 HYPERLIPIDEMIA LDL GOAL <70: ICD-10-CM

## 2023-09-05 DIAGNOSIS — I25.119 CORONARY ARTERY DISEASE WITH ANGINA PECTORIS, UNSPECIFIED VESSEL OR LESION TYPE, UNSPECIFIED WHETHER NATIVE OR TRANSPLANTED HEART (HCC): ICD-10-CM

## 2023-09-05 RX ORDER — ATORVASTATIN CALCIUM 40 MG/1
TABLET, FILM COATED ORAL
Qty: 90 TABLET | Refills: 0 | Status: SHIPPED | OUTPATIENT
Start: 2023-09-05

## 2023-09-11 DIAGNOSIS — R05.3 CHRONIC COUGH: ICD-10-CM

## 2023-09-11 RX ORDER — CODEINE PHOSPHATE AND GUAIFENESIN 10; 100 MG/5ML; MG/5ML
SOLUTION ORAL
Qty: 300 ML | Refills: 1 | Status: SHIPPED | OUTPATIENT
Start: 2023-09-11

## 2023-10-09 DIAGNOSIS — J84.89 BOOP (BRONCHIOLITIS OBLITERANS WITH ORGANIZING PNEUMONIA) (HCC): ICD-10-CM

## 2023-10-09 RX ORDER — BUDESONIDE 0.5 MG/2ML
INHALANT ORAL
Qty: 120 ML | Refills: 11 | Status: SHIPPED | OUTPATIENT
Start: 2023-10-09

## 2023-10-12 ENCOUNTER — OFFICE VISIT (OUTPATIENT)
Dept: OTOLARYNGOLOGY | Facility: CLINIC | Age: 83
End: 2023-10-12
Payer: COMMERCIAL

## 2023-10-12 VITALS — WEIGHT: 215 LBS | TEMPERATURE: 97.3 F | BODY MASS INDEX: 34.55 KG/M2 | HEIGHT: 66 IN

## 2023-10-12 DIAGNOSIS — Z93.0 TRACHEOSTOMY DEPENDENCE (HCC): Primary | ICD-10-CM

## 2023-10-12 DIAGNOSIS — J38.6 SUBGLOTTIC STENOSIS: ICD-10-CM

## 2023-10-12 PROCEDURE — 99213 OFFICE O/P EST LOW 20 MIN: CPT | Performed by: OTOLARYNGOLOGY

## 2023-10-12 NOTE — PROGRESS NOTES
Assessment/Plan:  Trach changed. F/u in 3 months. Diagnosis ICD-10-CM Associated Orders   1. 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 Subglottic stenosis  J38.6              Subjective:      Patient ID: Mara Duong is a 81 y.o. female. Pt presents for routine trach change. No c/o. The following portions of the patient's history were reviewed and updated as appropriate: allergies, current medications, past family history, past medical history, past social history, past surgical history and problem list.    Review of Systems      Objective:      Temp (!) 97.3 °F (36.3 °C) (Temporal)   Ht 5' 6" (1.676 m)   Wt 97.5 kg (215 lb)   BMI 34.70 kg/m²          Physical Exam  Constitutional:       Appearance: She is well-developed. HENT:      Head: Normocephalic and atraumatic. Right Ear: Tympanic membrane, ear canal and external ear normal. No drainage. No middle ear effusion. Left Ear: Tympanic membrane, ear canal and external ear normal. No drainage. No middle ear effusion. Nose: Nose normal.      Mouth/Throat:      Pharynx: Uvula midline. No oropharyngeal exudate. Tonsils: 2+ on the right. 2+ on the left. Neck:      Thyroid: No thyroid mass or thyromegaly. Trachea: Trachea normal. No tracheal deviation. Lymphadenopathy:      Cervical: No cervical adenopathy. Neurological:      Mental Status: She is alert. Tracheostomy tube changed, and position confirmed with fiberoptic endoscopy. Normal distal trachea.

## 2023-10-20 ENCOUNTER — OFFICE VISIT (OUTPATIENT)
Dept: PULMONOLOGY | Facility: MEDICAL CENTER | Age: 83
End: 2023-10-20

## 2023-10-20 VITALS
HEART RATE: 70 BPM | BODY MASS INDEX: 34.23 KG/M2 | SYSTOLIC BLOOD PRESSURE: 124 MMHG | DIASTOLIC BLOOD PRESSURE: 86 MMHG | HEIGHT: 66 IN | WEIGHT: 213 LBS | OXYGEN SATURATION: 100 % | RESPIRATION RATE: 16 BRPM

## 2023-10-20 DIAGNOSIS — E66.09 CLASS 1 OBESITY DUE TO EXCESS CALORIES WITHOUT SERIOUS COMORBIDITY WITH BODY MASS INDEX (BMI) OF 34.0 TO 34.9 IN ADULT: ICD-10-CM

## 2023-10-20 DIAGNOSIS — G47.34 SLEEP-RELATED NONOBSTRUCTIVE ALVEOLAR HYPOVENTILATION: ICD-10-CM

## 2023-10-20 DIAGNOSIS — J45.30 MILD PERSISTENT ASTHMA WITHOUT COMPLICATION: Primary | ICD-10-CM

## 2023-10-20 DIAGNOSIS — J84.89 BOOP (BRONCHIOLITIS OBLITERANS WITH ORGANIZING PNEUMONIA) (HCC): ICD-10-CM

## 2023-10-20 DIAGNOSIS — R05.3 CHRONIC COUGH: ICD-10-CM

## 2023-10-20 DIAGNOSIS — J39.8 TRACHEAL STENOSIS: ICD-10-CM

## 2023-10-20 PROBLEM — Z93.0 TRACHEOSTOMY PRESENT (HCC): Status: RESOLVED | Noted: 2018-01-03 | Resolved: 2023-10-20

## 2023-10-20 PROBLEM — J20.9 ACUTE TRACHEOBRONCHITIS: Status: RESOLVED | Noted: 2021-10-21 | Resolved: 2023-10-20

## 2023-10-20 PROBLEM — J45.31 MILD PERSISTENT ASTHMA WITH EXACERBATION: Status: RESOLVED | Noted: 2021-10-21 | Resolved: 2023-10-20

## 2023-10-20 PROBLEM — E66.811 CLASS 1 OBESITY DUE TO EXCESS CALORIES WITHOUT SERIOUS COMORBIDITY WITH BODY MASS INDEX (BMI) OF 34.0 TO 34.9 IN ADULT: Status: ACTIVE | Noted: 2021-01-08

## 2023-10-20 RX ORDER — PREDNISONE 10 MG/1
TABLET ORAL
Qty: 30 TABLET | Refills: 0 | Status: SHIPPED | OUTPATIENT
Start: 2023-10-20

## 2023-10-20 NOTE — ASSESSMENT & PLAN NOTE
She will continue with budesonide nebulized twice daily and I advised her to increase albuterol/saline to 2-3 times daily to evaluate for improvement. There is no wheezing today; however, she may have subacute cough from her viral illness 2 months ago. She will increase her nebulizers and continue with her cough syrup. If no improvement, I did send prednisone to her pharmacy to trial.  If no further improvement after that, she will return call to the office and may need updated imaging and/or further testing.

## 2023-10-20 NOTE — ASSESSMENT & PLAN NOTE
She reports this is slightly worsened after a viral illness in August.  Plans as listed. Suspect reflux may be contributing. She continues on PPI.

## 2023-10-20 NOTE — PROGRESS NOTES
Pulmonary Follow-Up Note   Markus Mayo 81 y.o. female MRN: [id number]  10/20/2023      Assessment/Plan:    Problem List Items Addressed This Visit          Respiratory    BOOP (bronchiolitis obliterans with organizing pneumonia) [facility])     She has a history of organizing pneumonia. Chest x-ray was unremarkable 2 months ago. Tracheal stenosis     She follows with ENT and had a trach change last week. Continue with routine changes per their recommendations. Mild persistent asthma without complication - Primary     She will continue with budesonide nebulized twice daily and I advised her to increase albuterol/saline to 2-3 times daily to evaluate for improvement. There is no wheezing today; however, she may have subacute cough from her viral illness 2 months ago. She will increase her nebulizers and continue with her cough syrup. If no improvement, I did send prednisone to her pharmacy to trial.  If no further improvement after that, she will return call to the office and may need updated imaging and/or further testing. Relevant Medications    predniSONE 10 mg tablet       Other    Chronic cough     She reports this is slightly worsened after a viral illness in August.  Plans as listed. Suspect reflux may be contributing. She continues on PPI. Relevant Medications    predniSONE 10 mg tablet    Class 1 obesity due to excess calories without serious comorbidity with body mass index (BMI) of 34.0 to 34.9 in adult     Lifestyle modifications and weight loss as able. Sleep-related nonobstructive alveolar hypoventilation     She previously had an overnight pulse oximetry showing need for 2 L of supplemental oxygen at bedtime. Return in about 1 year (around 10/20/2024), or if symptoms worsen or fail to improve. All of Alaina's questions were answered prior to leaving the office today.  She will follow-up with Dr. Angel Moore in 12 months or sooner should the need arise. She is aware to call our office with any further questions or concerns. History of Present Illness   Reason for Visit: Follow-up  Chief Complaint: "I keep coughing."  HPI: Gaby De Dios is a 81 y.o. female who presents to the office today for follow-up. Samina Motley reports that she had an acute viral bronchitis in August after returning from a visit to [place]. She reports that she had an x-ray at that time, which was unremarkable by my review. She reports that her cough is persisted since that time and has been difficult to manage. She has been using budesonide twice daily and albuterol/saline once daily. It sometimes improves her symptoms, but other times she is not sure. She is coughing up some scant clear/white mucus, but no hemoptysis or purulent sputum production. She has not had fevers or chills. She does not have any new shortness of breath or pain. She has been using guaifenesin with codeine to help with her cough as prescribed by Dr. Baez Early. Aside from the above, no other complaints. She does have an acid reflux diagnosis and uses a PPI. Her symptoms are sometimes uncontrolled. She also has a tracheostomy, which was changed last week by ENT. Review of Systems   All other systems reviewed and are negative. A full 12-point review of systems was completed and is negative except for those outlined in the HPI.     Historical Information   Past Medical History:   Diagnosis Date    BOOP (bronchiolitis obliterans with organizing pneumonia) ([facility]) 2006    Coronary artery disease     Papillary thyroid carcinoma ([facility]) 2021    Post-surgical hypothyroidism 2017     Past Surgical History:   Procedure Laterality Date    HYSTERECTOMY      REPLACEMENT TOTAL KNEE      TRACHEOSTOMY  2006     Family History   Problem Relation Age of Onset    Heart disease Mother     Hypertension Mother     Heart disease Father     Pneumonia Brother          of COVID    Dementia Brother Social History   Social History     Substance and Sexual Activity   Alcohol Use Never     Social History     Substance and Sexual Activity   Drug Use Never     Social History     Tobacco Use   Smoking Status Never    Passive exposure: Past   Smokeless Tobacco Never     E-Cigarette/Vaping    E-Cigarette Use Never User      E-Cigarette/Vaping Substances    Nicotine No     THC No     CBD No     Flavoring No     Other No     Unknown No        Meds/Allergies     Current Outpatient Medications:     acetaminophen (TYLENOL) 650 mg CR tablet, Take 1 tablet (650 mg total) by mouth every 8 (eight) hours as needed for mild pain, Disp: 30 tablet, Rfl: 0    albuterol (2.5 mg/3 mL) 0.083 % nebulizer solution, Take 3 mL (2.5 mg total) by nebulization every 6 (six) hours as needed for wheezing or shortness of breath, Disp: 1080 mL, Rfl: 3    Ascorbic Acid (vitamin C) 1000 MG tablet, Take 1 tablet (1,000 mg total) by mouth daily, Disp: 30 tablet, Rfl: 5    aspirin 81 mg chewable tablet, Chew 1 tablet (81 mg total) daily, Disp: 30 tablet, Rfl: 5    atorvastatin (LIPITOR) 40 mg tablet, TAKE 1 TABLET DAILY, Disp: 90 tablet, Rfl: 0    Blood Glucose Monitoring Suppl (OneTouch Verio) w/Device KIT, Use to test blood sugar 2 times a day, Disp: 1 kit, Rfl: 0    budesonide (PULMICORT) 0.5 mg/2 mL nebulizer solution, TAKE 2 ML BY NEBULIZATION 2 (TWO) TIMES A DAY, Disp: 120 mL, Rfl: 11    Calcium Carbonate-Vitamin D3 (Calcium 600+D) 600-400 MG-UNIT TABS, Take 1 tablet by mouth daily  , Disp: , Rfl:     furosemide (LASIX) 20 mg tablet, TAKE 1 TABLET DAILY, Disp: 90 tablet, Rfl: 1    glipiZIDE (GLUCOTROL XL) 2.5 mg 24 hr tablet, Take 1 tablet (2.5 mg total) by mouth daily, Disp: 90 tablet, Rfl: 3    guaiFENesin-codeine (ROBITUSSIN AC) 100-10 mg/5 mL oral solution, TAKE 5 ML BY MOUTH 2 (TWO) TIMES A DAY AS NEEDED FOR COUGH, Disp: 300 mL, Rfl: 1    isosorbide mononitrate (IMDUR) 30 mg 24 hr tablet, TAKE 1 TABLET DAILY, Disp: 30 tablet, Rfl: 5 Lancets (onetouch ultrasoft) lancets, Use to test blood sugar 2 times a day, Disp: 100 each, Rfl: 3    lidocaine (Lidoderm) 5 %, Apply 1 patch topically over 12 hours daily Remove & Discard patch within 12 hours or as directed by MD, Disp: 15 patch, Rfl: 0    lisinopril (ZESTRIL) 20 mg tablet, TAKE 1 TABLET DAILY, Disp: 90 tablet, Rfl: 1    metoprolol tartrate (LOPRESSOR) 25 mg tablet, TAKE 1/2 TABLET EVERY 12 HOURS, Disp: 60 tablet, Rfl: 2    Multiple Vitamins-Minerals (CENTRUM SILVER 50+WOMEN PO), Take by mouth, Disp: , Rfl:     ondansetron (Zofran ODT) 4 mg disintegrating tablet, Take 1 tablet (4 mg total) by mouth every 6 (six) hours as needed for nausea or vomiting, Disp: 10 tablet, Rfl: 0    pantoprazole (PROTONIX) 40 mg tablet, TAKE ONE TABLET DAILY, Disp: 30 tablet, Rfl: 5    predniSONE 10 mg tablet, Take 40 mg PO daily x 3 days, then 30 mg daily x 3 days, then 20 mg daily x 3 days, then 10 mg daily x 3 days, then stop., Disp: 30 tablet, Rfl: 0    propylthiouracil 50 mg tablet, 1 tab three times daily, Disp: 270 tablet, Rfl: 3    sodium chloride 0.9 % nebulizer solution, TAKE 3 ML BY NEBULIZATION 2 (TWO) TIMES A DAY AS NEEDED FOR WHEEZING, Disp: 300 mL, Rfl: 6    glucose blood (OneTouch Verio) test strip, Use to test blood sugar 2 times daily (Patient not taking: Reported on 10/20/2023), Disp: 100 strip, Rfl: 3  Allergies   Allergen Reactions    Iodine - Food Allergy Swelling    Shellfish-Derived Products - Food Allergy Hives    Morphine Rash       Vitals: Blood pressure 124/86, pulse 70, resp. rate 16, height 5' 6" (1.676 m), weight 96.6 kg (213 lb), SpO2 100 %. Body mass index is 34.38 kg/m². Oxygen Therapy  SpO2: 100 %    Physical Exam:  Physical Exam  Vitals reviewed. Constitutional:       General: She is not in acute distress. Appearance: She is well-developed. She is obese. She is not toxic-appearing or diaphoretic. HENT:      Head: Normocephalic and atraumatic.    Eyes:      General: No scleral icterus. Extraocular Movements: Extraocular movements intact. Neck:      Trachea: Tracheostomy present. Cardiovascular:      Rate and Rhythm: Normal rate and regular rhythm. Heart sounds: S1 normal and S2 normal. No murmur heard. No friction rub. No gallop. Pulmonary:      Effort: Pulmonary effort is normal. No tachypnea, accessory muscle usage or respiratory distress. Breath sounds: Normal breath sounds. No stridor. No decreased breath sounds, wheezing, rhonchi or rales. Chest:      Chest wall: No tenderness. Abdominal:      General: Bowel sounds are normal. There is no distension. Palpations: Abdomen is soft. Tenderness: There is no abdominal tenderness. Musculoskeletal:      Cervical back: Neck supple. Right lower leg: No edema. Left lower leg: No edema. Skin:     General: Skin is warm and dry. Findings: No rash. Neurological:      Mental Status: She is alert and oriented to person, place, and time. GCS: GCS eye subscore is 4. GCS verbal subscore is 5. GCS motor subscore is 6. Psychiatric:         Speech: Speech normal.         Behavior: Behavior normal. Behavior is cooperative. Imaging and other studies: I have personally reviewed pertinent reports. and I have personally reviewed pertinent films in PACS   Chest x-ray done August 25, 2023 shows no acute pulmonary infiltrate, effusion, or mass. Tracheostomy tube was in good position. KIERA Oden  [facility]        Portions of the record may have been created with voice recognition software. Occasional wrong word or "sound a like" substitutions may have occurred due to the inherent limitations of voice recognition software. Read the chart carefully and recognize, using context, where substitutions have occurred or contact the dictating provider.

## 2023-10-20 NOTE — ASSESSMENT & PLAN NOTE
She previously had an overnight pulse oximetry showing need for 2 L of supplemental oxygen at bedtime.

## 2023-10-20 NOTE — ASSESSMENT & PLAN NOTE
She follows with ENT and had a trach change last week. Continue with routine changes per their recommendations.

## 2023-10-24 NOTE — PROGRESS NOTES
"Assessment/Plan:  Assessment:  Trach changed to a new Shiley #6 DCFS (cuffless, non-fenestrated) without difficulty  Plan:  F/u 3 months for trach change again  Diagnosis ICD-10-CM Associated Orders   1  Tracheal stenosis  J39 8       2  Subglottic stenosis  J38 6       3  Tracheostomy dependence (HCC)  Z93 0              Subjective:      Patient ID: Joana Qureshi is a 81 y o  female  Joana Qureshi is a 81y o  year old female who presents with longstanding tracheostomy, for trach change  She tried the Passey-Vandiver valve, but did not tolerate it        The following portions of the patient's history were reviewed and updated as appropriate: allergies, current medications, past family history, past medical history, past social history, past surgical history and problem list     Review of Systems      Objective:      Temp (!) 97 4 °F (36 3 °C) (Temporal)   Ht 5' 2\" (1 575 m)   Wt 96 2 kg (212 lb)   BMI 38 78 kg/m²          Physical Exam  Neck:           " ----- Message from Tia Duran RN sent at 10/24/2023 11:48 AM CDT -----  Regarding: FW: Meds  Can you please assist?   ----- Message -----  From: Lana Srinivasan  Sent: 10/24/2023  10:40 AM CDT  To: Tia Duran RN  Subject: Meds                                             I sent several messages to the staff regarding this patient. His wife calling again, Dr. Eric mentioned to the patient that he can't take eliquis anymore because he was diagnosis with liver disease. Please call back @ [phone]. Thank you Lana

## 2023-10-30 DIAGNOSIS — I10 HYPERTENSION, ESSENTIAL: ICD-10-CM

## 2023-10-31 RX ORDER — LISINOPRIL 20 MG/1
TABLET ORAL
Qty: 90 TABLET | Refills: 1 | Status: SHIPPED | OUTPATIENT
Start: 2023-10-31

## 2023-11-06 NOTE — PROGRESS NOTES
Progress Note - Cardiology Office  [facility]    Lionel Espinal 81 y.o. female MRN: [id number]  : 1940  Encounter: [id number]      ASSESSMENT:   CAD, s/p PCI at [facility], 25 years ago  ASCVD risk score is 34.3%    2023, OV:  Denies any chest pain or other cardiac symptoms     Patient had declined to undergo cardiac catheterization stating that she does not have chest pain at the moment. She was informed that the purpose of the procedure was to evaluate and revascularize if needed in view of an abnormal stress test.  Again re-emphasized to the patient to let us know if she changes her mind and she starts having chest pain again     Echo 2020  Normal left ventricular size. Normal LV systolic function (EF 53 %). There are no regional wall motion abnormalities. Normal diastolic LV function. Normal RV function. Normal size right ventricle. Left atrium is enlarged. Right atrium is enlarged. Normal mitral valve. Aortic valve tri-leaflet. Normal tricuspid valve. Pulmonary artery systolic pressure is normal (30 mmHg). Stress echo :10/16: - Assessment: no ischemia-induced wall motion abnormalities (negative stress echo  test)  - At rest, normal global systolic LV-function (EF 31-54%)  - With stress, hypercontractility of the left ventricle (EF 80%)  - In the recovery phase, normal global systolic LV function (EF 44%)  - Assessment: normal LV function     Lexiscan, 04/15/2021    IMPRESSIONS: Abnormal study after pharmacologic vasodilation. There is evidence of partially reversible apical lateral wall defect. Defect was of mild in severity. TID ratio of 1.4 also noted. EF 61% Left ventricular systolic function was  normal.     Patient was scheduled for cardiac catheterization at [address] [place] [address]. Patient at that time and subsequently a has expressed desire not to undergo cardiac catheterization.        Hyperlipidemia  2021:  LDL 152, normal liver enzymes  2021:  LDL 79, triglyceride 90, HDL 35, normal AST and ALT  10/12/2021:  LDL 62, triglycerides 103, HDL 34, normal AST and ALT  2023: LDL 70, TG 96, HDL 42, normal AST and ALT     Hyperthyroidism     DMT2,      Obesity:  BMI is 33.89     Hypertension  BP in the office today is 134/80 mmHg with heart rate of 54/min     History of PE     BOOP, history of tracheal stenosis, s/p tracheostomy     Allergy to iodine, shellfish derived products and morphine        RECOMMENDATIONS:  Continue cardiac medications  Continue cardiovascular exercise as tolerated  Low salt, low carb, low sugar, low-cholesterol diet  Weight loss strategies      Please call [phone] if any questions. HPI :     Seth Sanchez is a 81y.o. year old female who came for follow up. She generally feels well and denies any symptoms. She has lost little weight and remains physically active. REVIEW OF SYSTEMS:  Denies any new or acute cardiac symptoms.   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    Coronary artery disease     Papillary thyroid carcinoma ([address]) 2021    Post-surgical hypothyroidism 2017     Past Surgical History:   Procedure Laterality Date    HYSTERECTOMY      REPLACEMENT TOTAL KNEE      TRACHEOSTOMY  2006     Social History     Substance and Sexual Activity   Alcohol Use Never     Social History     Substance and Sexual Activity   Drug Use Never     Social History     Tobacco Use   Smoking Status Never    Passive exposure: Past   Smokeless Tobacco Never     Family History:   Family History   Problem Relation Age of Onset    Heart disease Mother     Hypertension Mother     Heart disease Father     Pneumonia Brother          of COVID    Dementia Brother        Meds/Allergies     Allergies   Allergen Reactions Iodine - Food Allergy Swelling    Shellfish-Derived Products - Food Allergy Hives    Morphine Rash       Current Outpatient Medications:     acetaminophen (TYLENOL) 650 mg CR tablet, Take 1 tablet (650 mg total) by mouth every 8 (eight) hours as needed for mild pain, Disp: 30 tablet, Rfl: 0    albuterol (2.5 mg/3 mL) 0.083 % nebulizer solution, Take 3 mL (2.5 mg total) by nebulization every 6 (six) hours as needed for wheezing or shortness of breath, Disp: 1080 mL, Rfl: 3    Ascorbic Acid (vitamin C) 1000 MG tablet, Take 1 tablet (1,000 mg total) by mouth daily, Disp: 30 tablet, Rfl: 5    aspirin 81 mg chewable tablet, Chew 1 tablet (81 mg total) daily, Disp: 30 tablet, Rfl: 5    atorvastatin (LIPITOR) 40 mg tablet, TAKE 1 TABLET DAILY, Disp: 90 tablet, Rfl: 0    Blood Glucose Monitoring Suppl (OneTouch Verio) w/Device KIT, Use to test blood sugar 2 times a day, Disp: 1 kit, Rfl: 0    budesonide (PULMICORT) 0.5 mg/2 mL nebulizer solution, TAKE 2 ML BY NEBULIZATION 2 (TWO) TIMES A DAY, Disp: 120 mL, Rfl: 11    Calcium Carbonate-Vitamin D3 (Calcium 600+D) 600-400 MG-UNIT TABS, Take 1 tablet by mouth daily  , Disp: , Rfl:     furosemide (LASIX) 20 mg tablet, TAKE 1 TABLET DAILY, Disp: 90 tablet, Rfl: 1    glipiZIDE (GLUCOTROL XL) 2.5 mg 24 hr tablet, Take 1 tablet (2.5 mg total) by mouth daily, Disp: 90 tablet, Rfl: 3    guaiFENesin-codeine (ROBITUSSIN AC) 100-10 mg/5 mL oral solution, TAKE 5 ML BY MOUTH 2 (TWO) TIMES A DAY AS NEEDED FOR COUGH, Disp: 300 mL, Rfl: 1    isosorbide mononitrate (IMDUR) 30 mg 24 hr tablet, TAKE 1 TABLET DAILY, Disp: 30 tablet, Rfl: 5    Lancets (onetouch ultrasoft) lancets, Use to test blood sugar 2 times a day, Disp: 100 each, Rfl: 3    lidocaine (Lidoderm) 5 %, Apply 1 patch topically over 12 hours daily Remove & Discard patch within 12 hours or as directed by MD, Disp: 15 patch, Rfl: 0    lisinopril (ZESTRIL) 20 mg tablet, TAKE 1 TABLET DAILY, Disp: 90 tablet, Rfl: 1    metoprolol tartrate (LOPRESSOR) 25 mg tablet, TAKE 1/2 TABLET EVERY 12 HOURS, Disp: 60 tablet, Rfl: 2    Multiple Vitamins-Minerals (CENTRUM SILVER 50+WOMEN PO), Take by mouth, Disp: , Rfl:     ondansetron (Zofran ODT) 4 mg disintegrating tablet, Take 1 tablet (4 mg total) by mouth every 6 (six) hours as needed for nausea or vomiting, Disp: 10 tablet, Rfl: 0    pantoprazole (PROTONIX) 40 mg tablet, TAKE ONE TABLET DAILY, Disp: 30 tablet, Rfl: 5    predniSONE 10 mg tablet, Take 40 mg PO daily x 3 days, then 30 mg daily x 3 days, then 20 mg daily x 3 days, then 10 mg daily x 3 days, then stop., Disp: 30 tablet, Rfl: 0    propylthiouracil 50 mg tablet, 1 tab three times daily, Disp: 270 tablet, Rfl: 3    sodium chloride 0.9 % nebulizer solution, TAKE 3 ML BY NEBULIZATION 2 (TWO) TIMES A DAY AS NEEDED FOR WHEEZING, Disp: 300 mL, Rfl: 6    glucose blood (OneTouch Verio) test strip, Use to test blood sugar 2 times daily (Patient not taking: Reported on 10/20/2023), Disp: 100 strip, Rfl: 3    Vitals: Blood pressure 134/80, pulse (!) 54, height 5' 6" (1.676 m), weight 95.3 kg (210 lb), SpO2 99 %. Body mass index is 33.89 kg/m². Vitals:    11/07/23 0856   Weight: 95.3 kg (210 lb)     BP Readings from Last 3 Encounters:   11/07/23 134/80   10/20/23 124/86   08/30/23 138/74       Physical Exam:  Physical Exam    Neurologic:  Alert & oriented x 3, no new focal deficits, Not in any acute distress,  Constitutional: Moderately obese  Eyes:  Pupil equal and reacting to light, conjunctiva normal,   HENT:  Atraumatic, oropharynx moist, Neck- normal range of motion, no tenderness,  Neck supple, No JVP, No LNP   Respiratory: Tracheostomy present, bilateral air entry, mostly clear to auscultation  Cardiovascular: S1-S2 regular with a I/VI systolic murmur   GI:  Soft, nondistended, normal bowel sounds, nontender, no hepatosplenomegaly appreciated. Musculoskeletal: no tenderness, no deformities.    Skin:  Well hydrated, no rash   Lymphatic:  No lymphadenopathy noted   Extremities:  No edema             Cardiac testing:     Results for orders placed during the hospital encounter of 04/15/21    NM myocardial perfusion spect (rx stress and/or rest)    Narrative  [address] [place] [address].  [place] [address] [address]  [phone]    Rest/Stress Gated SPECT Myocardial Perfusion Imaging After Regadenoson    Patient: Jenny Correa  MR number: [id number]  Account number: [de-identified]  : 1940  Age: 79 years  Gender: Female  Status: Outpatient  Location: Stress lab  Height: 62 in  Weight: 224 lb  BP: 122/ 65 mmHg    Allergies: IODINE - FOOD ALLERGY, SHELLFISH-DERIVED PRODUCTS - FOOD ALLERGY, MORPHINE    Diagnosis: R07.9 - Chest pain, unspecified    Primary Physician:  Shay Padilla  RN:  RISA Duarte  Referring Physician:  Santiago Madrid MD  Group:  [facility]  Report Prepared By[de-identified]  RISA Duarte  Interpreting Physician:  Becky Munoz MD    INDICATIONS: Evaluation of known coronary artery disease. HISTORY: The patient is a 79year old  female. Chest pain status: chest pain. Coronary artery disease risk factors: dyslipidemia, hypertension, family history of premature coronary artery disease, and diabetes mellitus. Cardiovascular history: coronary artery disease. Prior cardiovascular procedures: percutaneous transluminal coronary angioplasty. Co-morbidity: history of lung disease and obesity. Medications: a nitrate, an ACE inhibitor/ARB, a diuretic,  aspirin, and diabetic medications. PHYSICAL EXAM: Baseline physical exam screening: no wheezes audible. REST ECG: Normal sinus rhythm. The ECG showed rare premature ventricular contractions. PROCEDURE: The study was performed in the the Stress lab. A regadenoson infusion pharmacologic stress test was performed.  Gated SPECT myocardial perfusion imaging was performed after stress and at rest. Systolic blood pressure was 122  mmHg, at the start of the study. Diastolic blood pressure was 65 mmHg, at the start of the study. The heart rate was 66 bpm, at the start of the study. IV double checked. Regadenoson protocol:  Time HR bpm SBP mmHg DBP mmHg Symptoms Rhythm/conduct  Baseline 09:09 66 122 65 none NSR  Immediate 09:12 86 132 76 none NSR, frequent PVC's  1 min 09:13 836 136 79 none same as above  2 min 09:14 82 140 77 none same as above  3 min 09:15 85 133 74 none --  4 min 09:16 79 134 76 none same as above  No medications or fluids given. STRESS SUMMARY: Duration of pharmacologic stress was 3 min. Maximal heart rate during stress was 88 bpm. The heart rate response to stress was normal. There was normal resting blood pressure with an appropriate response to stress. The  rate-pressure product for the peak heart rate and blood pressure was 12212. There was no chest pain during stress. The stress test was terminated due to protocol completion. Pre oxygen saturation: 97 %. Peak oxygen saturation: 99 %. The  stress ECG was negative for ischemia and normal. Arrhythmia during stress: isolated premature ventricular beats. ISOTOPE ADMINISTRATION:  Resting isotope administration Stress isotope administration  Agent Tetrofosmin Tetrofosmin  Dose 10.5 mCi 33 mCi  Date 04/15/2021 04/15/2021    The radiopharmaceutical was injected at the peak effect of pharmacologic stress. MYOCARDIAL PERFUSION IMAGING:  Rotating projection images reveal mild breast attenuation and moderate subdiaphragmatic activity. Left ventricular size was normal. The TID ratio was 1.4. PERFUSION DEFECTS:  -  There was a small to moderate, mildly severe, partially reversible myocardial perfusion defect of the entire lateral and apical wall. GATED SPECT:  The calculated left ventricular ejection fraction was 61 %. Left ventricular ejection fraction was within normal limits by visual estimate. There was no left ventricular regional abnormality. SUMMARY:  -  Stress results:  There was no chest pain during stress. -  ECG conclusions: The stress ECG was negative for ischemia and normal.  -  Perfusion imaging: There was a small to moderate, mildly severe, partially reversible myocardial perfusion defect of the entire lateral and apical wall. -  Gated SPECT: The calculated left ventricular ejection fraction was 61 %. Left ventricular ejection fraction was within normal limits by visual estimate. There was no left ventricular regional abnormality.  -  Impressions and recommendations: Abnormal study after pharmacologic vasodilation. There is evidence of partially reversible apical lateral wall defect. Defect was of mild severity. T.i.d. of 1.4 also noted. EF 61%    IMPRESSIONS: Abnormal study after pharmacologic vasodilation. There is evidence of partially reversible apical lateral wall defect. Defect was of mild severity. T.i.d. of 1.4 also noted. EF 61% Left ventricular systolic function was  normal.    Prepared and signed by    Leon Scott MD  Signed 04/15/2021 13:53:15          Imaging:  Chest X-Ray:   XR chest pa & lateral    Result Date: 4/19/2023  Impression No acute cardiopulmonary disease. Workstation performed: [id number]       CT-scan of the chest:     No CTA results available for this patient.   Lab Review   Lab Results   Component Value Date    WBC 8.03 08/25/2023    HGB 13.6 08/25/2023    HCT 41.5 08/25/2023    MCV 85 08/25/2023    RDW 13.2 08/25/2023     08/25/2023     BMP:  Lab Results   Component Value Date    SODIUM 135 08/25/2023    K 5.4 (H) 08/25/2023     08/25/2023    CO2 24 08/25/2023    BUN 13 08/25/2023    CREATININE 0.91 08/25/2023    GLUC 117 08/25/2023    CALCIUM 9.4 08/25/2023    EGFR 58 08/25/2023     LFT:  Lab Results   Component Value Date    AST 36 08/25/2023    ALT 14 08/25/2023    ALKPHOS 80 08/25/2023    TP 7.9 08/25/2023    ALB 4.1 08/25/2023      No components found for: "TSH3"  No results found for: "[id number]"  Lab Results   Component Value Date HGBA1C 7.3 (A) 07/28/2023     Lipid Profile:   Lab Results   Component Value Date    CHOLESTEROL 116 07/07/2023    HDL 40 07/07/2023    LDLCALC 61 07/07/2023    TRIG 71 07/07/2023     Lab Results   Component Value Date    CHOLESTEROL 116 07/07/2023    CHOLESTEROL 130 03/01/2023     Lab Results   Component Value Date    TROPONINI <0.02 09/13/2020     No results found for: "NTBNP"   No results found for this or any previous visit (from the past 672 hour(s)). Dr. Zahida Mahoney MD, [facility] [place]      "This note has been constructed using a voice recognition system. Therefore there may be syntax, spelling, and/or grammatical errors.  Please call if you have any questions. "

## 2023-11-07 ENCOUNTER — OFFICE VISIT (OUTPATIENT)
Dept: CARDIOLOGY CLINIC | Facility: CLINIC | Age: 83
End: 2023-11-07
Payer: COMMERCIAL

## 2023-11-07 VITALS
SYSTOLIC BLOOD PRESSURE: 134 MMHG | OXYGEN SATURATION: 99 % | BODY MASS INDEX: 33.75 KG/M2 | DIASTOLIC BLOOD PRESSURE: 80 MMHG | WEIGHT: 210 LBS | HEART RATE: 54 BPM | HEIGHT: 66 IN

## 2023-11-07 DIAGNOSIS — I25.119 CORONARY ARTERY DISEASE WITH ANGINA PECTORIS, UNSPECIFIED VESSEL OR LESION TYPE, UNSPECIFIED WHETHER NATIVE OR TRANSPLANTED HEART (HCC): ICD-10-CM

## 2023-11-07 DIAGNOSIS — E66.01 CLASS 3 SEVERE OBESITY DUE TO EXCESS CALORIES WITHOUT SERIOUS COMORBIDITY IN ADULT, UNSPECIFIED BMI (HCC): ICD-10-CM

## 2023-11-07 DIAGNOSIS — I10 PRIMARY HYPERTENSION: ICD-10-CM

## 2023-11-07 DIAGNOSIS — E78.00 PURE HYPERCHOLESTEROLEMIA: Primary | ICD-10-CM

## 2023-11-07 PROCEDURE — 99214 OFFICE O/P EST MOD 30 MIN: CPT | Performed by: INTERNAL MEDICINE

## 2023-11-15 DIAGNOSIS — K21.9 GASTROESOPHAGEAL REFLUX DISEASE WITHOUT ESOPHAGITIS: ICD-10-CM

## 2023-11-15 LAB
ALBUMIN SERPL-MCNC: 4 G/DL (ref 3.7–4.7)
ALBUMIN/GLOB SERPL: 1.4 {RATIO} (ref 1.2–2.2)
ALP SERPL-CCNC: 87 IU/L (ref 44–121)
ALT SERPL-CCNC: 10 IU/L (ref 0–32)
AST SERPL-CCNC: 15 IU/L (ref 0–40)
BILIRUB SERPL-MCNC: 0.6 MG/DL (ref 0–1.2)
BUN SERPL-MCNC: 15 MG/DL (ref 8–27)
BUN/CREAT SERPL: 17 (ref 12–28)
CALCIUM SERPL-MCNC: 9 MG/DL (ref 8.7–10.3)
CHLORIDE SERPL-SCNC: 104 MMOL/L (ref 96–106)
CO2 SERPL-SCNC: 23 MMOL/L (ref 20–29)
CREAT SERPL-MCNC: 0.87 MG/DL (ref 0.57–1)
EGFR: 66 ML/MIN/1.73
EST. AVERAGE GLUCOSE BLD GHB EST-MCNC: 169 MG/DL
GLOBULIN SER-MCNC: 2.8 G/DL (ref 1.5–4.5)
GLUCOSE SERPL-MCNC: 114 MG/DL (ref 70–99)
HBA1C MFR BLD: 7.5 % (ref 4.8–5.6)
POTASSIUM SERPL-SCNC: 4.5 MMOL/L (ref 3.5–5.2)
PROT SERPL-MCNC: 6.8 G/DL (ref 6–8.5)
SODIUM SERPL-SCNC: 141 MMOL/L (ref 134–144)
T3 SERPL-MCNC: 92 NG/DL (ref 71–180)
T4 FREE SERPL-MCNC: 0.96 NG/DL (ref 0.82–1.77)
TSH SERPL DL<=0.005 MIU/L-ACNC: 0.28 UIU/ML (ref 0.45–4.5)

## 2023-11-15 RX ORDER — PANTOPRAZOLE SODIUM 40 MG/1
TABLET, DELAYED RELEASE ORAL
Qty: 30 TABLET | Refills: 5 | Status: SHIPPED | OUTPATIENT
Start: 2023-11-15

## 2023-11-20 ENCOUNTER — OFFICE VISIT (OUTPATIENT)
Dept: ENDOCRINOLOGY | Facility: CLINIC | Age: 83
End: 2023-11-20
Payer: COMMERCIAL

## 2023-11-20 VITALS
WEIGHT: 216 LBS | SYSTOLIC BLOOD PRESSURE: 158 MMHG | BODY MASS INDEX: 34.86 KG/M2 | DIASTOLIC BLOOD PRESSURE: 85 MMHG | HEART RATE: 70 BPM

## 2023-11-20 DIAGNOSIS — E11.65 TYPE 2 DIABETES MELLITUS WITH HYPERGLYCEMIA, WITHOUT LONG-TERM CURRENT USE OF INSULIN (HCC): ICD-10-CM

## 2023-11-20 DIAGNOSIS — Z93.0 TRACHEOSTOMY PRESENT (HCC): ICD-10-CM

## 2023-11-20 DIAGNOSIS — I10 HYPERTENSION, ESSENTIAL: ICD-10-CM

## 2023-11-20 DIAGNOSIS — E05.90 HYPERTHYROIDISM: Primary | ICD-10-CM

## 2023-11-20 DIAGNOSIS — E78.2 MIXED HYPERLIPIDEMIA: ICD-10-CM

## 2023-11-20 PROCEDURE — 99214 OFFICE O/P EST MOD 30 MIN: CPT | Performed by: INTERNAL MEDICINE

## 2023-11-20 RX ORDER — PROPYLTHIOURACIL 50 MG/1
TABLET ORAL
Qty: 270 TABLET | Refills: 3 | Status: SHIPPED | OUTPATIENT
Start: 2023-11-20

## 2023-11-20 NOTE — PATIENT INSTRUCTIONS
continue current medications  Follow-up in 3 months with repeat labs  Recommend consistent carb diet, avoid high carbohydrate meals/snacks

## 2023-11-29 ENCOUNTER — OFFICE VISIT (OUTPATIENT)
Dept: FAMILY MEDICINE CLINIC | Facility: CLINIC | Age: 83
End: 2023-11-29
Payer: COMMERCIAL

## 2023-11-29 VITALS
SYSTOLIC BLOOD PRESSURE: 167 MMHG | HEART RATE: 77 BPM | OXYGEN SATURATION: 98 % | WEIGHT: 218 LBS | DIASTOLIC BLOOD PRESSURE: 75 MMHG | BODY MASS INDEX: 35.19 KG/M2 | RESPIRATION RATE: 17 BRPM

## 2023-11-29 DIAGNOSIS — Z00.00 MEDICARE ANNUAL WELLNESS VISIT, SUBSEQUENT: ICD-10-CM

## 2023-11-29 DIAGNOSIS — M54.50 CHRONIC BILATERAL LOW BACK PAIN WITHOUT SCIATICA: ICD-10-CM

## 2023-11-29 DIAGNOSIS — E66.09 CLASS 1 OBESITY DUE TO EXCESS CALORIES WITHOUT SERIOUS COMORBIDITY WITH BODY MASS INDEX (BMI) OF 34.0 TO 34.9 IN ADULT: ICD-10-CM

## 2023-11-29 DIAGNOSIS — E78.2 MIXED HYPERLIPIDEMIA: ICD-10-CM

## 2023-11-29 DIAGNOSIS — E11.65 TYPE 2 DIABETES MELLITUS WITH HYPERGLYCEMIA, WITHOUT LONG-TERM CURRENT USE OF INSULIN (HCC): ICD-10-CM

## 2023-11-29 DIAGNOSIS — I25.119 CORONARY ARTERY DISEASE WITH ANGINA PECTORIS, UNSPECIFIED VESSEL OR LESION TYPE, UNSPECIFIED WHETHER NATIVE OR TRANSPLANTED HEART (HCC): ICD-10-CM

## 2023-11-29 DIAGNOSIS — G89.29 CHRONIC BILATERAL LOW BACK PAIN WITHOUT SCIATICA: ICD-10-CM

## 2023-11-29 DIAGNOSIS — J84.89 BOOP (BRONCHIOLITIS OBLITERANS WITH ORGANIZING PNEUMONIA) (HCC): ICD-10-CM

## 2023-11-29 DIAGNOSIS — J38.6 SUBGLOTTIC STENOSIS: ICD-10-CM

## 2023-11-29 DIAGNOSIS — N18.31 STAGE 3A CHRONIC KIDNEY DISEASE (HCC): ICD-10-CM

## 2023-11-29 DIAGNOSIS — I10 HYPERTENSION, ESSENTIAL: ICD-10-CM

## 2023-11-29 DIAGNOSIS — Z23 ENCOUNTER FOR IMMUNIZATION: ICD-10-CM

## 2023-11-29 DIAGNOSIS — E05.90 HYPERTHYROIDISM: Primary | ICD-10-CM

## 2023-11-29 PROCEDURE — G0439 PPPS, SUBSEQ VISIT: HCPCS | Performed by: FAMILY MEDICINE

## 2023-11-29 PROCEDURE — G0008 ADMIN INFLUENZA VIRUS VAC: HCPCS | Performed by: FAMILY MEDICINE

## 2023-11-29 PROCEDURE — 90662 IIV NO PRSV INCREASED AG IM: CPT | Performed by: FAMILY MEDICINE

## 2023-11-29 NOTE — PATIENT INSTRUCTIONS
Switch to brown (whole grain) rice  1/2 plate veggies of different colors. Kale and brocolli are excellent  Limit animal products to 6 oz (size of pack of cards) of fish, white meat of poultry or other healthy meats. Avoid red meat  Fill out Advanced directive        Medicare Preventive Visit Patient Instructions  Thank you for completing your Welcome to Medicare Visit or Medicare Annual Wellness Visit today. Your next wellness visit will be due in one year (11/29/2024). The screening/preventive services that you may require over the next 5-10 years are detailed below. Some tests may not apply to you based off risk factors and/or age. Screening tests ordered at today's visit but not completed yet may show as past due. Also, please note that scanned in results may not display below. Preventive Screenings:  Service Recommendations Previous Testing/Comments   Colorectal Cancer Screening  * Colonoscopy    * Fecal Occult Blood Test (FOBT)/Fecal Immunochemical Test (FIT)  * Fecal DNA/Cologuard Test  * Flexible Sigmoidoscopy Age: 43-73 years old   Colonoscopy: every 10 years (may be performed more frequently if at higher risk)  OR  FOBT/FIT: every 1 year  OR  Cologuard: every 3 years  OR  Sigmoidoscopy: every 5 years  Screening may be recommended earlier than age 39 if at higher risk for colorectal cancer. Also, an individualized decision between you and your healthcare provider will decide whether screening between the ages of 77-80 would be appropriate. Colonoscopy: Not on file  FOBT/FIT: Not on file  Cologuard: Not on file  Sigmoidoscopy: Not on file          Breast Cancer Screening Age: 36 years old  Frequency: every 1-2 years  Not required if history of left and right mastectomy Mammogram: Not on file        Cervical Cancer Screening Between the ages of 21-29, pap smear recommended once every 3 years. Between the ages of 32-69, can perform pap smear with HPV co-testing every 5 years.    Recommendations may differ for women with a history of total hysterectomy, cervical cancer, or abnormal pap smears in past. Pap Smear: Not on file    Screening Not Indicated   Hepatitis C Screening Once for adults born between 1945 and 1965  More frequently in patients at high risk for Hepatitis C Hep C Antibody: Not on file        Diabetes Screening 1-2 times per year if you're at risk for diabetes or have pre-diabetes Fasting glucose: No results in last 5 years (No results in last 5 years)  A1C: 7.5 % (11/14/2023)  Screening Not Indicated  History Diabetes   Cholesterol Screening Once every 5 years if you don't have a lipid disorder. May order more often based on risk factors. Lipid panel: 07/07/2023    Screening Not Indicated  History Lipid Disorder     Other Preventive Screenings Covered by Medicare:  Abdominal Aortic Aneurysm (AAA) Screening: covered once if your at risk. You're considered to be at risk if you have a family history of AAA. Lung Cancer Screening: covers low dose CT scan once per year if you meet all of the following conditions: (1) Age 48-67; (2) No signs or symptoms of lung cancer; (3) Current smoker or have quit smoking within the last 15 years; (4) You have a tobacco smoking history of at least 20 pack years (packs per day multiplied by number of years you smoked); (5) You get a written order from a healthcare provider.   Glaucoma Screening: covered annually if you're considered high risk: (1) You have diabetes OR (2) Family history of glaucoma OR (3)  aged 48 and older OR (3)  American aged 72 and older  Osteoporosis Screening: covered every 2 years if you meet one of the following conditions: (1) You're estrogen deficient and at risk for osteoporosis based off medical history and other findings; (2) Have a vertebral abnormality; (3) On glucocorticoid therapy for more than 3 months; (4) Have primary hyperparathyroidism; (5) On osteoporosis medications and need to assess response to drug therapy. Last bone density test (DXA Scan): Not on file. HIV Screening: covered annually if you're between the age of 14-79. Also covered annually if you are younger than 13 and older than 72 with risk factors for HIV infection. For pregnant patients, it is covered up to 3 times per pregnancy. Immunizations:  Immunization Recommendations   Influenza Vaccine Annual influenza vaccination during flu season is recommended for all persons aged >= 6 months who do not have contraindications   Pneumococcal Vaccine   * Pneumococcal conjugate vaccine = PCV13 (Prevnar 13), PCV15 (Vaxneuvance), PCV20 (Prevnar 20)  * Pneumococcal polysaccharide vaccine = PPSV23 (Pneumovax) Adults 48-51 yo with certain risk factors or if 69+ yo  If never received any pneumonia vaccine: recommend Prevnar 20 (PCV20)  Give PCV20 if previously received 1 dose of PCV13 or PPSV23   Hepatitis B Vaccine 3 dose series if at intermediate or high risk (ex: diabetes, end stage renal disease, liver disease)   Respiratory syncytial virus (RSV) Vaccine - COVERED BY MEDICARE PART D  * RSVPreF3 (Arexvy) CDC recommends that adults 61years of age and older may receive a single dose of RSV vaccine using shared clinical decision-making (SCDM)   Tetanus (Td) Vaccine - COST NOT COVERED BY MEDICARE PART B Following completion of primary series, a booster dose should be given every 10 years to maintain immunity against tetanus. Td may also be given as tetanus wound prophylaxis. Tdap Vaccine - COST NOT COVERED BY MEDICARE PART B Recommended at least once for all adults. For pregnant patients, recommended with each pregnancy. Shingles Vaccine (Shingrix) - COST NOT COVERED BY MEDICARE PART B  2 shot series recommended in those 19 years and older who have or will have weakened immune systems or those 50 years and older     Health Maintenance Due:  There are no preventive care reminders to display for this patient.   Immunizations Due:      Topic Date Due Pneumococcal Vaccine: 65+ Years (2 - PCV) 09/30/2006    COVID-19 Vaccine (5 - Pfizer series) 11/22/2022    Influenza Vaccine (1) 09/01/2023     Advance Directives   What are advance directives? Advance directives are legal documents that state your wishes and plans for medical care. These plans are made ahead of time in case you lose your ability to make decisions for yourself. Advance directives can apply to any medical decision, such as the treatments you want, and if you want to donate organs. What are the types of advance directives? There are many types of advance directives, and each state has rules about how to use them. You may choose a combination of any of the following:  Living will: This is a written record of the treatment you want. You can also choose which treatments you do not want, which to limit, and which to stop at a certain time. This includes surgery, medicine, IV fluid, and tube feedings. Durable power of  for [facility]): This is a written record that states who you want to make healthcare choices for you when you are unable to make them for yourself. This person, called a proxy, is usually a family member or a friend. You may choose more than 1 proxy. Do not resuscitate (DNR) order:  A DNR order is used in case your heart stops beating or you stop breathing. It is a request not to have certain forms of treatment, such as CPR. A DNR order may be included in other types of advance directives. Medical directive: This covers the care that you want if you are in a coma, near death, or unable to make decisions for yourself. You can list the treatments you want for each condition. Treatment may include pain medicine, surgery, blood transfusions, dialysis, IV or tube feedings, and a ventilator (breathing machine). Values history: This document has questions about your views, beliefs, and how you feel and think about life.  This information can help others choose the care that you would choose. Why are advance directives important? An advance directive helps you control your care. Although spoken wishes may be used, it is better to have your wishes written down. Spoken wishes can be misunderstood, or not followed. Treatments may be given even if you do not want them. An advance directive may make it easier for your family to make difficult choices about your care. Urinary Incontinence   Urinary incontinence (UI)  is when you lose control of your bladder. UI develops because your bladder cannot store or empty urine properly. The 3 most common types of UI are stress incontinence, urge incontinence, or both. Medicines:   May be given to help strengthen your bladder control. Report any side effects of medication to your healthcare provider. Do pelvic muscle exercises often:  Your pelvic muscles help you stop urinating. Squeeze these muscles tight for 5 seconds, then relax for 5 seconds. Gradually work up to squeezing for 10 seconds. Do 3 sets of 15 repetitions a day, or as directed. This will help strengthen your pelvic muscles and improve bladder control. Train your bladder:  Go to the bathroom at set times, such as every 2 hours, even if you do not feel the urge to go. You can also try to hold your urine when you feel the urge to go. For example, hold your urine for 5 minutes when you feel the urge to go. As that becomes easier, hold your urine for 10 minutes. Self-care:   Keep a UI record. Write down how often you leak urine and how much you leak. Make a note of what you were doing when you leaked urine. Drink liquids as directed. You may need to limit the amount of liquid you drink to help control your urine leakage. Do not drink any liquid right before you go to bed. Limit or do not have drinks that contain caffeine or alcohol. Prevent constipation. Eat a variety of high-fiber foods. Good examples are high-fiber cereals, beans, vegetables, and whole-grain breads. Walking is the best way to trigger your intestines to have a bowel movement. Exercise regularly and maintain a healthy weight. Weight loss and exercise will decrease pressure on your bladder and help you control your leakage. Use a catheter as directed  to help empty your bladder. A catheter is a tiny, plastic tube that is put into your bladder to drain your urine. Go to behavior therapy as directed. Behavior therapy may be used to help you learn to control your urge to urinate. Weight Management   Why it is important to manage your weight:  Being overweight increases your risk of health conditions such as heart disease, high blood pressure, type 2 diabetes, and certain types of cancer. It can also increase your risk for osteoarthritis, sleep apnea, and other respiratory problems. Aim for a slow, steady weight loss. Even a small amount of weight loss can lower your risk of health problems. How to lose weight safely:  A safe and healthy way to lose weight is to eat fewer calories and get regular exercise. You can lose up about 1 pound a week by decreasing the number of calories you eat by 500 calories each day. Healthy meal plan for weight management:  A healthy meal plan includes a variety of foods, contains fewer calories, and helps you stay healthy. A healthy meal plan includes the following:  Eat whole-grain foods more often. A healthy meal plan should contain fiber. Fiber is the part of grains, fruits, and vegetables that is not broken down by your body. Whole-grain foods are healthy and provide extra fiber in your diet. Some examples of whole-grain foods are whole-wheat breads and pastas, oatmeal, brown rice, and bulgur. Eat a variety of vegetables every day. Include dark, leafy greens such as spinach, kale, emily greens, and mustard greens. Eat yellow and orange vegetables such as carrots, sweet potatoes, and winter squash. Eat a variety of fruits every day.   Choose fresh or canned fruit (canned in its own juice or light syrup) instead of juice. Fruit juice has very little or no fiber. Eat low-fat dairy foods. Drink fat-free (skim) milk or 1% milk. Eat fat-free yogurt and low-fat cottage cheese. Try low-fat cheeses such as mozzarella and other reduced-fat cheeses. Choose meat and other protein foods that are low in fat. Choose beans or other legumes such as split peas or lentils. Choose fish, skinless poultry (chicken or turkey), or lean cuts of red meat (beef or pork). Before you cook meat or poultry, cut off any visible fat. Use less fat and oil. Try baking foods instead of frying them. Add less fat, such as margarine, sour cream, regular salad dressing and mayonnaise to foods. Eat fewer high-fat foods. Some examples of high-fat foods include french fries, doughnuts, ice cream, and cakes. Eat fewer sweets. Limit foods and drinks that are high in sugar. This includes candy, cookies, regular soda, and sweetened drinks. Exercise:  Exercise at least 30 minutes per day on most days of the week. Some examples of exercise include walking, biking, dancing, and swimming. You can also fit in more physical activity by taking the stairs instead of the elevator or parking farther away from stores. Ask your healthcare provider about the best exercise plan for you. © Copyright [employer] 2018 Information is for End User's use only and may not be sold, redistributed or otherwise used for commercial purposes.  All illustrations and images included in CareNotes® are the copyrighted property of [employer] or  [employer]

## 2023-11-29 NOTE — PROGRESS NOTES
Assessment and Plan:     Problem List Items Addressed This Visit          Endocrine    Hyperthyroidism - Primary    Type 2 diabetes mellitus with hyperglycemia, without long-term current use of insulin (HCC)       Respiratory    BOOP (bronchiolitis obliterans with organizing pneumonia) ([address])    Subglottic stenosis       Cardiovascular and Mediastinum    Coronary artery disease with angina pectoris, unspecified vessel or lesion type, unspecified whether native or transplanted heart (HCC)    Hypertension, essential       Genitourinary    Stage 3a chronic kidney disease ([address])       Other    Chronic bilateral low back pain without sciatica    Class 1 obesity due to excess calories without serious comorbidity with body mass index (BMI) of 34.0 to 34.9 in adult    Mixed hyperlipidemia        Preventive health issues were discussed with patient, and age appropriate screening tests were ordered as noted in patient's After Visit Summary. Personalized health advice and appropriate referrals for health education or preventive services given if needed, as noted in patient's After Visit Summary. History of Present Illness:     Patient presents for a Medicare Wellness Visit    HPI   Patient Care Team:  Rebekah Epps MD as PCP - General (Family Medicine)  Rebekah Epps MD as PCP - PCP-[facility] (RTE)  Rebekah Epps MD as PCP - PCP-[facility] ([facility])  Gracie Estes MD (Endocrinology)  Della Crigler, CRNP (Nurse Practitioner)  Suzi Aragon as Diabetes Educator (Diabetes Services)     Review of Systems:     Review of Systems     Problem List:     Patient Active Problem List   Diagnosis    BOOP (bronchiolitis obliterans with organizing pneumonia) ([address])    Coronary artery disease with angina pectoris, unspecified vessel or lesion type, unspecified whether native or transplanted heart ([address])    Hypertension, essential    Tracheal stenosis    Subglottic stenosis    Chronic cough    Class 1 obesity due to excess calories without serious comorbidity with body mass index (BMI) of 34.0 to 34.9 in adult    Sleep-related nonobstructive alveolar hypoventilation    Mild persistent asthma without complication    Mixed hyperlipidemia    Hypertensive heart disease    Chronic bilateral low back pain without sciatica    Stage 3a chronic kidney disease ([facility])    Hyperthyroidism    Type 2 diabetes mellitus with hyperglycemia, without long-term current use of insulin (HCC)      Past Medical and Surgical History:     Past Medical History:   Diagnosis Date    BOOP (bronchiolitis obliterans with organizing pneumonia) ([address] [place] [address]) 2006    Coronary artery disease     Papillary thyroid carcinoma ([address] [place] [address]) 2021    Post-surgical hypothyroidism 2017     Past Surgical History:   Procedure Laterality Date    HYSTERECTOMY      REPLACEMENT TOTAL KNEE      TRACHEOSTOMY  2006      Family History:     Family History   Problem Relation Age of Onset    Heart disease Mother     Hypertension Mother     Heart disease Father     Pneumonia Brother          of COVID    Dementia Brother       Social History:     Social History     Socioeconomic History    Marital status:      Spouse name: Not on file    Number of children: Not on file    Years of education: Not on file    Highest education level: Not on file   Occupational History    Not on file   Tobacco Use    Smoking status: Never     Passive exposure: Past    Smokeless tobacco: Never   Vaping Use    Vaping Use: Never used   Substance and Sexual Activity    Alcohol use: Never    Drug use: Never    Sexual activity: Not on file   Other Topics Concern    Not on file   Social History Narrative    Lives with son, who transports her to her appointments    She generally stays at home.      Social Determinants of Health     Financial Resource Strain: Not on file   Food Insecurity: No Food Insecurity (2022)    Hunger Vital Sign     Worried About Running Out of Food in the Last Year: Never true     Ran Out of Food in the Last Year: Never true   Transportation Needs: No Transportation Needs (7/27/2022)    PRAPARE - Transportation     Lack of Transportation (Medical): No     Lack of Transportation (Non-Medical):  No   Physical Activity: Not on file   Stress: Not on file   Social Connections: Not on file   Intimate Partner Violence: Not At Risk (7/27/2022)    Humiliation, Afraid, Rape, and Kick questionnaire     Fear of Current or Ex-Partner: No     Emotionally Abused: No     Physically Abused: No     Sexually Abused: No   Housing Stability: Not on file      Medications and Allergies:     Current Outpatient Medications   Medication Sig Dispense Refill    acetaminophen (TYLENOL) 650 mg CR tablet Take 1 tablet (650 mg total) by mouth every 8 (eight) hours as needed for mild pain 30 tablet 0    albuterol (2.5 mg/3 mL) 0.083 % nebulizer solution Take 3 mL (2.5 mg total) by nebulization every 6 (six) hours as needed for wheezing or shortness of breath 1080 mL 3    Ascorbic Acid (vitamin C) 1000 MG tablet Take 1 tablet (1,000 mg total) by mouth daily 30 tablet 5    aspirin 81 mg chewable tablet Chew 1 tablet (81 mg total) daily 30 tablet 5    atorvastatin (LIPITOR) 40 mg tablet TAKE 1 TABLET DAILY 90 tablet 0    Blood Glucose Monitoring Suppl (OneTouch Verio) w/Device KIT Use to test blood sugar 2 times a day 1 kit 0    budesonide (PULMICORT) 0.5 mg/2 mL nebulizer solution TAKE 2 ML BY NEBULIZATION 2 (TWO) TIMES A  mL 11    Calcium Carbonate-Vitamin D3 (Calcium 600+D) 600-400 MG-UNIT TABS Take 1 tablet by mouth daily        furosemide (LASIX) 20 mg tablet TAKE 1 TABLET DAILY 90 tablet 1    glipiZIDE (GLUCOTROL XL) 2.5 mg 24 hr tablet Take 1 tablet (2.5 mg total) by mouth daily 90 tablet 3    glucose blood (OneTouch Verio) test strip Use to test blood sugar 2 times daily (Patient not taking: Reported on 10/20/2023) 100 strip 3    guaiFENesin-codeine (ROBITUSSIN AC) 100-10 mg/5 mL oral solution TAKE 5 ML BY MOUTH 2 (TWO) TIMES A DAY AS NEEDED FOR COUGH 300 mL 1    isosorbide mononitrate (IMDUR) 30 mg 24 hr tablet TAKE 1 TABLET DAILY 30 tablet 5    Lancets (onetouch ultrasoft) lancets Use to test blood sugar 2 times a day 100 each 3    lidocaine (Lidoderm) 5 % Apply 1 patch topically over 12 hours daily Remove & Discard patch within 12 hours or as directed by MD 15 patch 0    lisinopril (ZESTRIL) 20 mg tablet TAKE 1 TABLET DAILY 90 tablet 1    metoprolol tartrate (LOPRESSOR) 25 mg tablet TAKE 1/2 TABLET EVERY 12 HOURS 60 tablet 2    Multiple Vitamins-Minerals (CENTRUM SILVER 50+WOMEN PO) Take by mouth      ondansetron (Zofran ODT) 4 mg disintegrating tablet Take 1 tablet (4 mg total) by mouth every 6 (six) hours as needed for nausea or vomiting 10 tablet 0    pantoprazole (PROTONIX) 40 mg tablet TAKE ONE TABLET DAILY 30 tablet 5    predniSONE 10 mg tablet Take 40 mg PO daily x 3 days, then 30 mg daily x 3 days, then 20 mg daily x 3 days, then 10 mg daily x 3 days, then stop. 30 tablet 0    propylthiouracil 50 mg tablet 2.5 tablets per day 270 tablet 3    sodium chloride 0.9 % nebulizer solution TAKE 3 ML BY NEBULIZATION 2 (TWO) TIMES A DAY AS NEEDED FOR WHEEZING 300 mL 6     No current facility-administered medications for this visit. Allergies   Allergen Reactions    Iodine - Food Allergy Swelling    Shellfish-Derived Products - Food Allergy Hives    Morphine Rash      Immunizations:     Immunization History   Administered Date(s) Administered    COVID-19 PFIZER VACCINE 0.3 ML IM 06/15/2021, 07/06/2021, 01/12/2022, 09/27/2022    INFLUENZA 09/30/2005, 02/23/2008    Influenza, high dose seasonal 0.7 mL 10/28/2021    Pneumococcal Polysaccharide PPV23 09/30/2005      Health Maintenance: There are no preventive care reminders to display for this patient.       Topic Date Due    Pneumococcal Vaccine: 65+ Years (2 - PCV) 09/30/2006    COVID-19 Vaccine (5 - Pfizer series) 11/22/2022    Influenza Vaccine (1) 09/01/2023 Medicare Screening Tests and Risk Assessments:     Flor Morton is here for her Subsequent Wellness visit. Last Medicare Wellness visit information reviewed, patient interviewed and updates made to the record today. Health Risk Assessment:   Patient rates overall health as good. Patient feels that their physical health rating is same. Patient is satisfied with their life. Eyesight was rated as same. Hearing was rated as same. Patient feels that their emotional and mental health rating is same. Patients states they are never, rarely angry. Patient states they are sometimes unusually tired/fatigued. Pain experienced in the last 7 days has been none. Patient states that she has experienced no weight loss or gain in last 6 months. Depression Screening:   PHQ-2 Score: 0      Fall Risk Screening: In the past year, patient has experienced: no history of falling in past year      Urinary Incontinence Screening:   Patient has leaked urine accidently in the last six months. Home Safety:  Patient does not have trouble with stairs inside or outside of their home. Patient has working smoke alarms and has working carbon monoxide detector. Home safety hazards include: none. Nutrition:   Current diet is Low Saturated Fat and No Added Salt. Low sugar    Medications:   Patient is currently taking over-the-counter supplements. Patient is able to manage medications. Patient takes robitussin with codeine    Activities of Daily Living (ADLs)/Instrumental Activities of Daily Living (IADLs):   Walk and transfer into and out of bed and chair?: Yes  Dress and groom yourself?: Yes    Bathe or shower yourself?: Yes    Feed yourself?  Yes  Do your laundry/housekeeping?: Yes  Manage your money, pay your bills and track your expenses?: Yes  Make your own meals?: Yes    Do your own shopping?: Yes    Previous Hospitalizations:   Any hospitalizations or ED visits within the last 12 months?: Yes      Advance Care Planning:   Living will: No    Durable POA for healthcare: Yes    Advanced directive: No      PREVENTIVE SCREENINGS      Cardiovascular Screening:    General: Screening Not Indicated and History Lipid Disorder      Diabetes Screening:     General: Screening Not Indicated and History Diabetes      Cervical Cancer Screening:    General: Screening Not Indicated      Lung Cancer Screening:     General: Screening Not Indicated    Screening, Brief Intervention, and Referral to Treatment (SBIRT)    Screening  Typical number of drinks in a day: 0  Typical number of drinks in a week: 0  Interpretation: Low risk drinking behavior. Single Item Drug Screening:  How often have you used an illegal drug (including marijuana) or a prescription medication for non-medical reasons in the past year? never    Single Item Drug Screen Score: 0  Interpretation: Negative screen for possible drug use disorder    No results found. Physical Exam:     There were no vitals taken for this visit. Physical Exam     Lisette Adrian.  MD Supriya

## 2023-12-01 DIAGNOSIS — R05.3 CHRONIC COUGH: ICD-10-CM

## 2023-12-03 RX ORDER — CODEINE PHOSPHATE AND GUAIFENESIN 10; 100 MG/5ML; MG/5ML
SOLUTION ORAL
Qty: 300 ML | Refills: 1 | Status: SHIPPED | OUTPATIENT
Start: 2023-12-03

## 2023-12-13 NOTE — PROGRESS NOTES
Promise Sweeney 81 y.o. 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 Hypertension -  Bp high today; has not taken medication yet  5. Possible developing folliculitis - follow up with primary care physician     CC:  hyperthyroidism , diabetes mellitus       History of Present Illness     HPI:  Promise Sweeney is a 81 y.o. 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     Taking glipizide XL 2.5 mg orally daily   Developed hyperthyroidism postoperatively and has been on methimazole"       took methimazole 2.5 mg alternating with 5 mg daily for approximately  1 week - says that she developed breast pain when she takes the full tablet , says it makes "her feel sick:"     Patient insists that the documentation is incorrect and that she did not have thyroid carcinoma   Reviewed path report in care everywhere:    Thyroid, right, lobectomy:          1) Minute focal area, suspicous for early papillary carcinoma."    Currently taking propylthiouracil 50 mg 2.5 tablets/day  No symptoms of hyp o or hyperthyroidism     For diabetes mellitus taking glipizide 2.5 mg orally daily  Cannot have a ot of fruits- allergic;  admits to a lot of candy over halloween   Does not check BG ; not felt nay lows   Dexcom pro- comparable to      Noticed a lump in her buttocks - will be seeing primary care physician on     All other systems were reviewed and are negative.        Review of Systems    Historical Information   Past Medical History:   Diagnosis Date   • BOOP (bronchiolitis obliterans with organizing pneumonia) ([address]) 2006   • Coronary artery disease    • Papillary thyroid carcinoma ([facility]) 2021   • Post-surgical hypothyroidism 2017     Past Surgical History:   Procedure Laterality Date   • HYSTERECTOMY     • REPLACEMENT TOTAL KNEE     • TRACHEOSTOMY  2006     Social History   Social History     Substance and Sexual Activity   Alcohol Use Never     Social History     Substance and Sexual Activity   Drug Use Never     Social History     Tobacco Use   Smoking Status Never   • Passive exposure: Past   Smokeless Tobacco Never     Family History:   Family History   Problem Relation Age of Onset   • Heart disease Mother    • Hypertension Mother    • Heart disease Father    • Pneumonia Brother          of COVID   • Dementia Brother        Meds/Allergies   Current Outpatient Medications   Medication Sig Dispense Refill   • acetaminophen (TYLENOL) 650 mg CR tablet Take 1 tablet (650 mg total) by mouth every 8 (eight) hours as needed for mild pain 30 tablet 0   • albuterol (2.5 mg/3 mL) 0.083 % nebulizer solution Take 3 mL (2.5 mg total) by nebulization every 6 (six) hours as needed for wheezing or shortness of breath 1080 mL 3   • Ascorbic Acid (vitamin C) 1000 MG tablet Take 1 tablet (1,000 mg total) by mouth daily 30 tablet 5   • aspirin 81 mg chewable tablet Chew 1 tablet (81 mg total) daily 30 tablet 5   • atorvastatin (LIPITOR) 40 mg tablet TAKE 1 TABLET DAILY 90 tablet 0   • Blood Glucose Monitoring Suppl (OneTouch Verio) w/Device KIT Use to test blood sugar 2 times a day 1 kit 0   • budesonide (PULMICORT) 0.5 mg/2 mL nebulizer solution TAKE 2 ML BY NEBULIZATION 2 (TWO) TIMES A  mL 11   • Calcium Carbonate-Vitamin D3 (Calcium 600+D) 600-400 MG-UNIT TABS Take 1 tablet by mouth daily       • furosemide (LASIX) 20 mg tablet TAKE 1 TABLET DAILY 90 tablet 1   • glipiZIDE (GLUCOTROL XL) 2.5 mg 24 hr tablet Take 1 tablet (2.5 mg total) by mouth daily 90 tablet 3   • glucose blood (OneTouch Verio) test strip Use to test blood sugar 2 times daily (Patient not taking: Reported on 10/20/2023) 100 strip 3   • guaiFENesin-codeine (ROBITUSSIN AC) 100-10 mg/5 mL oral solution TAKE 5 ML BY MOUTH 2 (TWO) TIMES A DAY AS NEEDED FOR COUGH 300 mL 1   • isosorbide mononitrate (IMDUR) 30 mg 24 hr tablet TAKE 1 TABLET DAILY 30 tablet 5   • Lancets (onetouch ultrasoft) lancets Use to test blood sugar 2 times a day 100 each 3   • lidocaine (Lidoderm) 5 % Apply 1 patch topically over 12 hours daily Remove & Discard patch within 12 hours or as directed by MD 15 patch 0   • lisinopril (ZESTRIL) 20 mg tablet TAKE 1 TABLET DAILY 90 tablet 1   • metoprolol tartrate (LOPRESSOR) 25 mg tablet TAKE 1/2 TABLET EVERY 12 HOURS 60 tablet 2   • Multiple Vitamins-Minerals (CENTRUM SILVER 50+WOMEN PO) Take by mouth     • ondansetron (Zofran ODT) 4 mg disintegrating tablet Take 1 tablet (4 mg total) by mouth every 6 (six) hours as needed for nausea or vomiting 10 tablet 0   • pantoprazole (PROTONIX) 40 mg tablet TAKE ONE TABLET DAILY 30 tablet 5   • predniSONE 10 mg tablet Take 40 mg PO daily x 3 days, then 30 mg daily x 3 days, then 20 mg daily x 3 days, then 10 mg daily x 3 days, then stop. 30 tablet 0   • propylthiouracil 50 mg tablet 1 tab three times daily 270 tablet 3   • sodium chloride 0.9 % nebulizer solution TAKE 3 ML BY NEBULIZATION 2 (TWO) TIMES A DAY AS NEEDED FOR WHEEZING 300 mL 6     No current facility-administered medications for this visit.      Allergies   Allergen Reactions   • Iodine - Food Allergy Swelling   • Shellfish-Derived Products - Food Allergy Hives   • Morphine Rash       Objective   Vitals: Blood pressure 158/85, pulse 70, weight 98 kg (216 lb). Physical Exam  Constitutional:       Appearance: She is well-developed. HENT:      Head: Normocephalic and atraumatic. Eyes:      Conjunctiva/sclera: Conjunctivae normal.      Pupils: Pupils are equal, round, and reactive to light. Neck:      Thyroid: No thyromegaly. Comments: Tracheostomy +  Cardiovascular:      Rate and Rhythm: Normal rate and regular rhythm. Heart sounds: Normal heart sounds. No murmur heard. Pulmonary:      Effort: Pulmonary effort is normal.      Breath sounds: Normal breath sounds. No wheezing. Abdominal:      General: There is no distension. Palpations: Abdomen is soft. Tenderness: There is no abdominal tenderness. Musculoskeletal:         General: Normal range of motion. Cervical back: Normal range of motion and neck supple. Skin:     General: Skin is warm and dry. Comments: Left buttock medially,approx 0.5 cm subcutaneous nodule, non tender, firm   Neurological:      Mental Status: She is alert and oriented to person, place, and time. Psychiatric:         Behavior: Behavior normal.       The history was obtained from the review of the chart, patient and family. Lab Results:   Lab Results   Component Value Date/Time    Free t4 0.96 11/14/2023 10:20 AM    Free t4 1.38 07/07/2023 07:57 AM    Free t4 1.30 03/01/2023 07:32 AM     Lab Results   Component Value Date    WBC 8.03 08/25/2023    HGB 13.6 08/25/2023    HCT 41.5 08/25/2023    MCV 85 08/25/2023     08/25/2023     Lab Results   Component Value Date    CREATININE 0.87 11/14/2023    BUN 15 11/14/2023    K 4.5 11/14/2023     11/14/2023    CO2 23 11/14/2023     Lab Results   Component Value Date    HGBA1C 7.5 (H) 11/14/2023         Imaging Studies:   Results for orders placed during the hospital encounter of 07/17/23    US thyroid    Impression  Right thyroidectomy.  No thyroid nodules identified. Reference: ACR Thyroid Imaging, Reporting and Data System (TI-RADS): White Paper of the [employer]. J AM Rivka Radiol 0954;01:269-622. (additional recommendations based on American Thyroid Association 2015 guidelines.)      Workstation performed: [id number]      I have personally reviewed pertinent reports. Portions of the record may have been created with voice recognition software. Occasional wrong word or "sound a like" substitutions may have occurred due to the inherent limitations of voice recognition software. Read the chart carefully and recognize, using context, where substitutions have occurred. NSTEMI (non-ST elevation myocardial infarction)

## 2024-01-11 ENCOUNTER — OFFICE VISIT (OUTPATIENT)
Dept: OTOLARYNGOLOGY | Facility: CLINIC | Age: 84
End: 2024-01-11
Payer: COMMERCIAL

## 2024-01-11 VITALS — WEIGHT: 218 LBS | TEMPERATURE: 97.4 F | BODY MASS INDEX: 35.03 KG/M2 | HEIGHT: 66 IN

## 2024-01-11 DIAGNOSIS — J38.6 SUBGLOTTIC STENOSIS: ICD-10-CM

## 2024-01-11 DIAGNOSIS — Z93.0 TRACHEOSTOMY DEPENDENCE (HCC): Primary | ICD-10-CM

## 2024-01-11 PROCEDURE — 99213 OFFICE O/P EST LOW 20 MIN: CPT | Performed by: OTOLARYNGOLOGY

## 2024-01-11 NOTE — PROGRESS NOTES
"Assessment/Plan:  Trach changed.  F/u in 3 months.      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 female.    Pt presents for routine trach change.  No c/o.        The following portions of the patient's history were reviewed and updated as appropriate: allergies, current medications, past family history, past medical history, past social history, past surgical history and problem list.    Review of Systems      Objective:      Temp (!) 97.4 °F (36.3 °C) (Temporal)   Ht 5' 6\" (1.676 m)   Wt 98.9 kg (218 lb)   BMI 35.19 kg/m²          Physical Exam  Constitutional:       Appearance: She is well-developed.   HENT:      Head: Normocephalic and atraumatic.      Right Ear: Tympanic membrane, ear canal and external ear normal. No drainage. No middle ear effusion.      Left Ear: Tympanic membrane, ear canal and external ear normal. No drainage.  No middle ear effusion.      Nose: Nose normal.      Mouth/Throat:      Pharynx: Uvula midline. No oropharyngeal exudate.      Tonsils: 2+ on the right. 2+ on the left.   Neck:      Thyroid: No thyroid mass or thyromegaly.      Trachea: Trachea normal. No tracheal deviation.     Lymphadenopathy:      Cervical: No cervical adenopathy.   Neurological:      Mental Status: She is alert.         "

## 2024-01-18 DIAGNOSIS — I10 HYPERTENSION, ESSENTIAL: ICD-10-CM

## 2024-01-28 PROBLEM — J84.89 BOOP (BRONCHIOLITIS OBLITERANS WITH ORGANIZING PNEUMONIA) (HCC): Status: RESOLVED | Noted: 2017-07-07 | Resolved: 2024-01-28

## 2024-02-02 DIAGNOSIS — R05.3 CHRONIC COUGH: ICD-10-CM

## 2024-02-02 RX ORDER — CODEINE PHOSPHATE AND GUAIFENESIN 10; 100 MG/5ML; MG/5ML
SOLUTION ORAL
Qty: 300 ML | Refills: 1 | Status: SHIPPED | OUTPATIENT
Start: 2024-02-02

## 2024-02-23 LAB
EST. AVERAGE GLUCOSE BLD GHB EST-MCNC: 266 MG/DL
HBA1C MFR BLD: 10.9 % (ref 4.8–5.6)

## 2024-02-24 LAB
ALBUMIN SERPL-MCNC: 3.9 G/DL (ref 3.7–4.7)
ALBUMIN/GLOB SERPL: 1.6 {RATIO} (ref 1.2–2.2)
ALP SERPL-CCNC: 90 IU/L (ref 44–121)
ALT SERPL-CCNC: 11 IU/L (ref 0–32)
AST SERPL-CCNC: 11 IU/L (ref 0–40)
BILIRUB SERPL-MCNC: 0.6 MG/DL (ref 0–1.2)
BUN SERPL-MCNC: 11 MG/DL (ref 8–27)
BUN/CREAT SERPL: 12 (ref 12–28)
CALCIUM SERPL-MCNC: 9.1 MG/DL (ref 8.7–10.3)
CHLORIDE SERPL-SCNC: 98 MMOL/L (ref 96–106)
CHOLEST SERPL-MCNC: 138 MG/DL (ref 100–199)
CHOLEST/HDLC SERPL: 3.3 RATIO (ref 0–4.4)
CO2 SERPL-SCNC: 25 MMOL/L (ref 20–29)
CREAT SERPL-MCNC: 0.95 MG/DL (ref 0.57–1)
EGFR: 60 ML/MIN/1.73
GLOBULIN SER-MCNC: 2.5 G/DL (ref 1.5–4.5)
GLUCOSE SERPL-MCNC: 335 MG/DL (ref 70–99)
HDLC SERPL-MCNC: 42 MG/DL
LDLC SERPL CALC-MCNC: 76 MG/DL (ref 0–99)
POTASSIUM SERPL-SCNC: 4.5 MMOL/L (ref 3.5–5.2)
PROT SERPL-MCNC: 6.4 G/DL (ref 6–8.5)
SL AMB VLDL CHOLESTEROL CALC: 20 MG/DL (ref 5–40)
SODIUM SERPL-SCNC: 136 MMOL/L (ref 134–144)
T3 SERPL-MCNC: 64 NG/DL (ref 71–180)
T4 FREE SERPL-MCNC: 0.87 NG/DL (ref 0.82–1.77)
TRIGL SERPL-MCNC: 112 MG/DL (ref 0–149)
TSH SERPL DL<=0.005 MIU/L-ACNC: 7 UIU/ML (ref 0.45–4.5)

## 2024-02-28 ENCOUNTER — OFFICE VISIT (OUTPATIENT)
Dept: ENDOCRINOLOGY | Facility: CLINIC | Age: 84
End: 2024-02-28
Payer: COMMERCIAL

## 2024-02-28 VITALS
SYSTOLIC BLOOD PRESSURE: 136 MMHG | BODY MASS INDEX: 35.32 KG/M2 | HEIGHT: 66 IN | WEIGHT: 219.8 LBS | OXYGEN SATURATION: 96 % | DIASTOLIC BLOOD PRESSURE: 80 MMHG | HEART RATE: 73 BPM

## 2024-02-28 DIAGNOSIS — I10 HYPERTENSION, ESSENTIAL: ICD-10-CM

## 2024-02-28 DIAGNOSIS — E66.01 CLASS 3 SEVERE OBESITY DUE TO EXCESS CALORIES WITHOUT SERIOUS COMORBIDITY IN ADULT, UNSPECIFIED BMI (HCC): Primary | ICD-10-CM

## 2024-02-28 DIAGNOSIS — E05.90 HYPERTHYROIDISM: ICD-10-CM

## 2024-02-28 DIAGNOSIS — N18.31 STAGE 3A CHRONIC KIDNEY DISEASE (HCC): ICD-10-CM

## 2024-02-28 DIAGNOSIS — E78.2 MIXED HYPERLIPIDEMIA: ICD-10-CM

## 2024-02-28 DIAGNOSIS — E11.65 TYPE 2 DIABETES MELLITUS WITH HYPERGLYCEMIA, WITHOUT LONG-TERM CURRENT USE OF INSULIN (HCC): ICD-10-CM

## 2024-02-28 DIAGNOSIS — E11.9 TYPE 2 DIABETES MELLITUS WITHOUT COMPLICATION, WITHOUT LONG-TERM CURRENT USE OF INSULIN (HCC): ICD-10-CM

## 2024-02-28 PROBLEM — E66.813 CLASS 3 SEVERE OBESITY DUE TO EXCESS CALORIES WITHOUT SERIOUS COMORBIDITY IN ADULT, UNSPECIFIED BMI (HCC): Status: ACTIVE | Noted: 2024-02-28

## 2024-02-28 PROCEDURE — 99214 OFFICE O/P EST MOD 30 MIN: CPT | Performed by: NURSE PRACTITIONER

## 2024-02-28 RX ORDER — GLIPIZIDE 2.5 MG/1
5 TABLET, EXTENDED RELEASE ORAL DAILY
Qty: 90 TABLET | Refills: 3 | Status: SHIPPED | OUTPATIENT
Start: 2024-02-28

## 2024-02-28 NOTE — PATIENT INSTRUCTIONS
-Check blood sugars twice daily. Office will call by the end of the week for glucose levels.  -For now increase to Glipizide 5 mg daily and recommend reducing sweet foods, as discussed.  -Call for blood sugars less than 70 mg/dL     Will schedule diabetes education for insulin injection training.     Repeat thyroid function in 3 months    Follow up in 1 month.

## 2024-02-28 NOTE — ASSESSMENT & PLAN NOTE
Lab Results   Component Value Date    HGBA1C 10.9 (H) 02/23/2024     HGA1C above goal, patient aware of significant risk for comorbid medical conditions associated with increased glucose levels. Glucose level in the office today was 360 mg/dL. Discussed with patient regarding starting insulin therapy as this would be indicated. She is not comfortable with starting insulin at this point. Up to this point she has been well managed on Glipizide 2.5 mg would like to try to increase to 5 mg daily. Discussed that if she becomes symptomatic with hyperglycemia, she will need to be seen in the ER. Did discuss with patient at least seeing diabetes education for injection training, which she was amenable to. She is not checking glucose levels at home but is willing to check at least 2 times per day for the next week and to call for blood sugars less than 70 mg/dL. If blood sugars remain elevated, patient is amenable to starting insulin therapy next week. She has been on a CGM in the past but this was too expensive. We did discuss revisiting cost if she is on insulin as it may be covered by insurance now.     She is aware of the risks of not starting insulin at this time and is aware of parameters to call the office or proceed to the ER.     Discussed risks/complications associated with uncontrolled diabetes including organ involvement, heart attack, stroke, death.    Recommended referral to diabetes education.  Referral provided.    Advised lifestyle modifications including attention to diet including the amount and types of carbohydrates consumed and regular activity.     Discussed use of CGM to collect additional blood glucose data to reveal patterns that can be used to improve glucose levels and adjust insulin regimen.    Discussed symptoms and treatment of hypoglycemia.  Reviewed risks associated with hypoglycemia. Always carry rapid acting carbohydrates and a glucometer (a way to check your blood  sugar).    Recommendation for medical identification either bracelet, necklace.    Routine follow up for diabetic eye and foot exams.     Ordered blood work to complete prior to next visit.    Send glucose logs/CGM download in 1-2 weeks for review    Follow up in 3 months.

## 2024-02-28 NOTE — ASSESSMENT & PLAN NOTE
Patient self-increased PTU to 3 tablets daily about 2 weeks ago. TSH is elevated with low T3 and normal free T4. Will reduce back to 2.5 tablets that she was taking before and then recheck thyroid function tests in one month.     Component      Latest Ref Rng 2/23/2024   TSH, POC      0.450 - 4.500 uIU/mL 7.000 (H)    FREE T4      0.82 - 1.77 ng/dL 0.87    T3, Total      71 - 180 ng/dL 64 (L)

## 2024-02-28 NOTE — PROGRESS NOTES
Established Patient Progress Note    Chief Complaint:  Diabetes follow up visit    Impression & Plan:    Problem List Items Addressed This Visit          Endocrine    Hyperthyroidism     Patient self-increased PTU to 3 tablets daily about 2 weeks ago. TSH is elevated with low T3 and normal free T4. Will reduce back to 2.5 tablets that she was taking before and then recheck thyroid function tests in one month.     Component      Latest Ref Rng 2/23/2024   TSH, POC      0.450 - 4.500 uIU/mL 7.000 (H)    FREE T4      0.82 - 1.77 ng/dL 0.87    T3, Total      71 - 180 ng/dL 64 (L)                  Relevant Orders    TSH, 3rd generation    T4, free    T3    Type 2 diabetes mellitus with hyperglycemia, without long-term current use of insulin ([facility])       Lab Results   Component Value Date    HGBA1C 10.9 (H) 02/23/2024     HGA1C above goal, patient aware of significant risk for comorbid medical conditions associated with increased glucose levels. Glucose level in the office today was 360 mg/dL. Discussed with patient regarding starting insulin therapy as this would be indicated. She is not comfortable with starting insulin at this point. Up to this point she has been well managed on Glipizide 2.5 mg would like to try to increase to 5 mg daily. Discussed that if she becomes symptomatic with hyperglycemia, she will need to be seen in the ER. Did discuss with patient at least seeing diabetes education for injection training, which she was amenable to. She is not checking glucose levels at home but is willing to check at least 2 times per day for the next week and to call for blood sugars less than 70 mg/dL. If blood sugars remain elevated, patient is amenable to starting insulin therapy next week. She has been on a CGM in the past but this was too expensive. We did discuss revisiting cost if she is on insulin as it may be covered by insurance now.     She is aware of the risks of not starting insulin at this time and is  aware of parameters to call the office or proceed to the ER.     Discussed risks/complications associated with uncontrolled diabetes including organ involvement, heart attack, stroke, death.    Recommended referral to diabetes education.  Referral provided.    Advised lifestyle modifications including attention to diet including the amount and types of carbohydrates consumed and regular activity.     Discussed use of CGM to collect additional blood glucose data to reveal patterns that can be used to improve glucose levels and adjust insulin regimen.    Discussed symptoms and treatment of hypoglycemia.  Reviewed risks associated with hypoglycemia. Always carry rapid acting carbohydrates and a glucometer (a way to check your blood sugar).    Recommendation for medical identification either bracelet, necklace.    Routine follow up for diabetic eye and foot exams.     Ordered blood work to complete prior to next visit.    Send glucose logs/CGM download in 1-2 weeks for review    Follow up in 3 months.            Relevant Medications    glipiZIDE (GLUCOTROL XL) 2.5 mg 24 hr tablet       Cardiovascular and Mediastinum    Hypertension, essential     Continues on lisinopril.             Genitourinary    Stage 3a chronic kidney disease (HCC)       Other    Mixed hyperlipidemia     Continues on statin. LDL close to goal.          Class 3 severe obesity due to excess calories without serious comorbidity in adult, unspecified BMI (HCC) - Primary     Other Visit Diagnoses       Type 2 diabetes mellitus without complication, without long-term current use of insulin (HCC)        Relevant Medications    glipiZIDE (GLUCOTROL XL) 2.5 mg 24 hr tablet    Other Relevant Orders    Ambulatory Referral to Diabetic Education            History of Present Illness:   Alaina Maza is a 83 y.o. 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 Denies recent illness or hospitalizations. Glucose levels have increased significantly since she was on prednisone therapy in October/November 2023 prescribed by pulmonology. Denies any issues with her current regimen. home glucose monitoring: are usually not preformed as she does not like pricking fingers and CGM was not covered and too costly.  Last seen in office on November 20, 2023 by Dr. Watson.     Current regimen:   Glipizide XL 2.5 mg daily      Has hyperthyroidism: Taking PTU 50 mg 3 tablets daily that she self-increased 2 weeks before lab work on February 12th, 2024. She felt that her right breast hurt and that is typically a sign that her thyroid is off. Was previously on methimazole 2.5 mg alternating with 5 mg daily for one week and state that she develped breast pain when she took a full tablet and made her feel sick.  She denies symptoms consistent with hypo/hyperthyroidism at this time.                 Has history of partial thyroidectomy in 2006 with possible papillary thyroid carcinoma.  From previous note \"Patient insists that the documentation is incorrect and that she did not have thyroid carcinoma   Reviewed path report in care everywhere:    Thyroid, right, lobectomy:          1) Minute focal area, suspicous for early papillary carcinoma.\"    Patient Active Problem List   Diagnosis    Coronary artery disease with angina pectoris, unspecified vessel or lesion type, unspecified whether native or transplanted heart (HCC)    Hypertension, essential    Tracheal stenosis    Subglottic stenosis    Chronic cough    Class 1 obesity due to excess calories without serious comorbidity with body mass index (BMI) of 34.0 to 34.9 in adult    Sleep-related nonobstructive alveolar hypoventilation    Mild persistent asthma without complication    Mixed hyperlipidemia    Hypertensive heart disease    Chronic bilateral low " back pain without sciatica    Stage 3a chronic kidney disease (HCC)    Hyperthyroidism    Type 2 diabetes mellitus with hyperglycemia, without long-term current use of insulin (HCC)    Class 3 severe obesity due to excess calories without serious comorbidity in adult, unspecified BMI (HCC)      Past Medical History:   Diagnosis Date    BOOP (bronchiolitis obliterans with organizing pneumonia) (HCC) 2006    Coronary artery disease     Papillary thyroid carcinoma (HCC) 2021    Post-surgical hypothyroidism 2017      Past Surgical History:   Procedure Laterality Date    HYSTERECTOMY      REPLACEMENT TOTAL KNEE      TRACHEOSTOMY  2006      Family History   Problem Relation Age of Onset    Heart disease Mother     Hypertension Mother     Heart disease Father     Pneumonia Brother          of COVID    Dementia Brother      Social History     Tobacco Use    Smoking status: Never     Passive exposure: Past    Smokeless tobacco: Never   Substance Use Topics    Alcohol use: Never     Allergies   Allergen Reactions    Iodine - Food Allergy Swelling    Shellfish-Derived Products - Food Allergy Hives    Morphine Rash         Current Outpatient Medications:     Ascorbic Acid (vitamin C) 1000 MG tablet, Take 1 tablet (1,000 mg total) by mouth daily, Disp: 30 tablet, Rfl: 5    aspirin 81 mg chewable tablet, Chew 1 tablet (81 mg total) daily, Disp: 30 tablet, Rfl: 5    atorvastatin (LIPITOR) 40 mg tablet, TAKE 1 TABLET DAILY, Disp: 90 tablet, Rfl: 0    Calcium Carbonate-Vitamin D3 (Calcium 600+D) 600-400 MG-UNIT TABS, Take 1 tablet by mouth daily  , Disp: , Rfl:     furosemide (LASIX) 20 mg tablet, TAKE 1 TABLET DAILY, Disp: 90 tablet, Rfl: 1    glipiZIDE (GLUCOTROL XL) 2.5 mg 24 hr tablet, Take 2 tablets (5 mg total) by mouth daily, Disp: 90 tablet, Rfl: 3    isosorbide mononitrate (IMDUR) 30 mg 24 hr tablet, TAKE 1 TABLET DAILY, Disp: 30 tablet, Rfl: 5    lisinopril (ZESTRIL) 20 mg tablet, TAKE 1 TABLET  DAILY, Disp: 90 tablet, Rfl: 1    metoprolol tartrate (LOPRESSOR) 25 mg tablet, TAKE 1/2 TABLET EVERY 12 HOURS, Disp: 60 tablet, Rfl: 2    Multiple Vitamins-Minerals (CENTRUM SILVER 50+WOMEN PO), Take by mouth, Disp: , Rfl:     pantoprazole (PROTONIX) 40 mg tablet, TAKE ONE TABLET DAILY, Disp: 30 tablet, Rfl: 5    propylthiouracil 50 mg tablet, 2.5 tablets per day, Disp: 270 tablet, Rfl: 3    acetaminophen (TYLENOL) 650 mg CR tablet, Take 1 tablet (650 mg total) by mouth every 8 (eight) hours as needed for mild pain, Disp: 30 tablet, Rfl: 0    albuterol (2.5 mg/3 mL) 0.083 % nebulizer solution, Take 3 mL (2.5 mg total) by nebulization every 6 (six) hours as needed for wheezing or shortness of breath, Disp: 1080 mL, Rfl: 3    Blood Glucose Monitoring Suppl (OneTouch Verio) w/Device KIT, Use to test blood sugar 2 times a day, Disp: 1 kit, Rfl: 0    budesonide (PULMICORT) 0.5 mg/2 mL nebulizer solution, TAKE 2 ML BY NEBULIZATION 2 (TWO) TIMES A DAY, Disp: 120 mL, Rfl: 11    glucose blood (OneTouch Verio) test strip, Use to test blood sugar 2 times daily (Patient not taking: Reported on 10/20/2023), Disp: 100 strip, Rfl: 3    guaiFENesin-codeine (ROBITUSSIN AC) 100-10 mg/5 mL oral solution, TAKE 5 ML BY MOUTH 2 (TWO) TIMES A DAY AS NEEDED FOR COUGH (Patient not taking: Reported on 2/28/2024), Disp: 300 mL, Rfl: 1    Lancets (onetouch ultrasoft) lancets, Use to test blood sugar 2 times a day, Disp: 100 each, Rfl: 3    lidocaine (Lidoderm) 5 %, Apply 1 patch topically over 12 hours daily Remove & Discard patch within 12 hours or as directed by MD, Disp: 15 patch, Rfl: 0    ondansetron (Zofran ODT) 4 mg disintegrating tablet, Take 1 tablet (4 mg total) by mouth every 6 (six) hours as needed for nausea or vomiting, Disp: 10 tablet, Rfl: 0    predniSONE 10 mg tablet, Take 40 mg PO daily x 3 days, then 30 mg daily x 3 days, then 20 mg daily x 3 days, then 10 mg daily x 3 days, then stop. (Patient not taking: Reported on  "1/11/2024), Disp: 30 tablet, Rfl: 0    sodium chloride 0.9 % nebulizer solution, TAKE 3 ML BY NEBULIZATION 2 (TWO) TIMES A DAY AS NEEDED FOR WHEEZING, Disp: 300 mL, Rfl: 6    Review of Systems  See HPI.   All other systems reviewed and are negative.    Physical Exam:  Body mass index is 35.48 kg/m².  /80 (BP Location: Left arm, Patient Position: Sitting, Cuff Size: Large)   Pulse 73   Ht 5' 6\" (1.676 m)   Wt 99.7 kg (219 lb 12.8 oz)   SpO2 96%   BMI 35.48 kg/m²    Wt Readings from Last 3 Encounters:   02/28/24 99.7 kg (219 lb 12.8 oz)   01/11/24 98.9 kg (218 lb)   11/29/23 98.9 kg (218 lb)        Physical Exam  Vitals reviewed.   Constitutional:       Appearance: Normal appearance.   Cardiovascular:      Rate and Rhythm: Normal rate and regular rhythm.      Pulses: Normal pulses.   Pulmonary:      Effort: Pulmonary effort is normal.      Trach in place.   Skin:     General: Skin is warm and dry.      Capillary Refill: Capillary refill takes less than 2 seconds.   Neurological:      General: No focal deficit present.      Mental Status: She is alert and oriented to person, place, and time.   Psychiatric:         Mood and Affect: Mood normal.         Behavior: Behavior normal.     Labs:   Lab Results   Component Value Date    HGBA1C 10.9 (H) 02/23/2024    HGBA1C 7.5 (H) 11/14/2023    HGBA1C 7.3 (A) 07/28/2023     Lab Results   Component Value Date    CREATININE 0.95 02/23/2024    CREATININE 0.87 11/14/2023    CREATININE 0.91 08/25/2023    BUN 11 02/23/2024    K 4.5 02/23/2024    CL 98 02/23/2024    CO2 25 02/23/2024     eGFR   Date Value Ref Range Status   02/23/2024 60 >59 mL/min/1.73 Final   08/25/2023 58 ml/min/1.73sq m Final     Lab Results   Component Value Date    HDL 42 02/23/2024    TRIG 112 02/23/2024    CHOLHDL 3.3 02/23/2024     Lab Results   Component Value Date    ALT 11 02/23/2024    AST 11 02/23/2024    ALKPHOS 80 08/25/2023     No results found for: \"[id number]\"  Lab Results   Component " Value Date    FREET4 0.87 02/23/2024       Discussed with the patient and all questioned fully answered. She will call me if any problems arise.    Follow-up appointment in 3 months.     Counseled patient on diagnostic results, prognosis, risk and benefit of treatment options, instruction for management, importance of treatment compliance, Risk  factor reduction and impressions    KIERA Sotomayor

## 2024-03-03 ENCOUNTER — HOSPITAL ENCOUNTER (EMERGENCY)
Facility: HOSPITAL | Age: 84
Discharge: HOME | End: 2024-03-03
Attending: EMERGENCY MEDICINE | Admitting: EMERGENCY MEDICINE
Payer: COMMERCIAL

## 2024-03-03 VITALS
DIASTOLIC BLOOD PRESSURE: 88 MMHG | HEART RATE: 66 BPM | SYSTOLIC BLOOD PRESSURE: 179 MMHG | OXYGEN SATURATION: 99 % | RESPIRATION RATE: 24 BRPM | TEMPERATURE: 97.4 F

## 2024-03-03 DIAGNOSIS — E83.42 HYPOMAGNESEMIA: ICD-10-CM

## 2024-03-03 DIAGNOSIS — R73.9 HYPERGLYCEMIA: Primary | ICD-10-CM

## 2024-03-03 LAB
ALBUMIN SERPL BCP-MCNC: 3.7 G/DL (ref 3.5–5)
ALP SERPL-CCNC: 69 U/L (ref 34–104)
ALT SERPL W P-5'-P-CCNC: 17 U/L (ref 7–52)
ANION GAP SERPL CALCULATED.3IONS-SCNC: 7 MMOL/L
AST SERPL W P-5'-P-CCNC: 23 U/L (ref 13–39)
B-OH-BUTYR SERPL-MCNC: <0.05 MMOL/L (ref 0.02–0.27)
BASE EX.OXY STD BLDV CALC-SCNC: 92.6 % (ref 60–80)
BASE EXCESS BLDV CALC-SCNC: 0.9 MMOL/L
BASOPHILS # BLD AUTO: 0.02 THOUSANDS/ÂΜL (ref 0–0.1)
BASOPHILS NFR BLD AUTO: 1 % (ref 0–1)
BILIRUB SERPL-MCNC: 0.56 MG/DL (ref 0.2–1)
BUN SERPL-MCNC: 10 MG/DL (ref 5–25)
CALCIUM SERPL-MCNC: 8.6 MG/DL (ref 8.4–10.2)
CHLORIDE SERPL-SCNC: 100 MMOL/L (ref 96–108)
CO2 SERPL-SCNC: 26 MMOL/L (ref 21–32)
CREAT SERPL-MCNC: 0.93 MG/DL (ref 0.6–1.3)
EOSINOPHIL # BLD AUTO: 0.08 THOUSAND/ÂΜL (ref 0–0.61)
EOSINOPHIL NFR BLD AUTO: 2 % (ref 0–6)
ERYTHROCYTE [DISTWIDTH] IN BLOOD BY AUTOMATED COUNT: 13.3 % (ref 11.6–15.1)
GFR SERPL CREATININE-BSD FRML MDRD: 57 ML/MIN/1.73SQ M
GLUCOSE SERPL-MCNC: 252 MG/DL (ref 65–140)
GLUCOSE SERPL-MCNC: 264 MG/DL (ref 65–140)
GLUCOSE SERPL-MCNC: 285 MG/DL (ref 65–140)
HCO3 BLDV-SCNC: 24.8 MMOL/L (ref 24–30)
HCT VFR BLD AUTO: 38.3 % (ref 34.8–46.1)
HGB BLD-MCNC: 13 G/DL (ref 11.5–15.4)
IMM GRANULOCYTES # BLD AUTO: 0.01 THOUSAND/UL (ref 0–0.2)
IMM GRANULOCYTES NFR BLD AUTO: 0 % (ref 0–2)
LYMPHOCYTES # BLD AUTO: 1.42 THOUSANDS/ÂΜL (ref 0.6–4.47)
LYMPHOCYTES NFR BLD AUTO: 36 % (ref 14–44)
MAGNESIUM SERPL-MCNC: 1.6 MG/DL (ref 1.9–2.7)
MCH RBC QN AUTO: 29.5 PG (ref 26.8–34.3)
MCHC RBC AUTO-ENTMCNC: 33.9 G/DL (ref 31.4–37.4)
MCV RBC AUTO: 87 FL (ref 82–98)
MONOCYTES # BLD AUTO: 0.29 THOUSAND/ÂΜL (ref 0.17–1.22)
MONOCYTES NFR BLD AUTO: 7 % (ref 4–12)
NEUTROPHILS # BLD AUTO: 2.12 THOUSANDS/ÂΜL (ref 1.85–7.62)
NEUTS SEG NFR BLD AUTO: 54 % (ref 43–75)
NRBC BLD AUTO-RTO: 0 /100 WBCS
O2 CT BLDV-SCNC: 18.2 ML/DL
PCO2 BLDV: 37.4 MM HG (ref 42–50)
PH BLDV: 7.44 [PH] (ref 7.3–7.4)
PLATELET # BLD AUTO: 129 THOUSANDS/UL (ref 149–390)
PMV BLD AUTO: 11.5 FL (ref 8.9–12.7)
PO2 BLDV: 66.9 MM HG (ref 35–45)
POTASSIUM SERPL-SCNC: 4.6 MMOL/L (ref 3.5–5.3)
PROT SERPL-MCNC: 6.5 G/DL (ref 6.4–8.4)
RBC # BLD AUTO: 4.41 MILLION/UL (ref 3.81–5.12)
SODIUM SERPL-SCNC: 133 MMOL/L (ref 135–147)
WBC # BLD AUTO: 3.94 THOUSAND/UL (ref 4.31–10.16)

## 2024-03-03 PROCEDURE — 96360 HYDRATION IV INFUSION INIT: CPT

## 2024-03-03 PROCEDURE — 85025 COMPLETE CBC W/AUTO DIFF WBC: CPT | Performed by: EMERGENCY MEDICINE

## 2024-03-03 PROCEDURE — 82948 REAGENT STRIP/BLOOD GLUCOSE: CPT

## 2024-03-03 PROCEDURE — 83735 ASSAY OF MAGNESIUM: CPT | Performed by: EMERGENCY MEDICINE

## 2024-03-03 PROCEDURE — 36415 COLL VENOUS BLD VENIPUNCTURE: CPT | Performed by: EMERGENCY MEDICINE

## 2024-03-03 PROCEDURE — 82010 KETONE BODYS QUAN: CPT | Performed by: EMERGENCY MEDICINE

## 2024-03-03 PROCEDURE — 82805 BLOOD GASES W/O2 SATURATION: CPT | Performed by: EMERGENCY MEDICINE

## 2024-03-03 PROCEDURE — 80053 COMPREHEN METABOLIC PANEL: CPT | Performed by: EMERGENCY MEDICINE

## 2024-03-03 PROCEDURE — 99283 EMERGENCY DEPT VISIT LOW MDM: CPT

## 2024-03-03 RX ORDER — LANOLIN ALCOHOL/MO/W.PET/CERES
800 CREAM (GRAM) TOPICAL ONCE
Status: COMPLETED | OUTPATIENT
Start: 2024-03-03 | End: 2024-03-03

## 2024-03-03 RX ADMIN — Medication 800 MG: at 14:29

## 2024-03-03 RX ADMIN — SODIUM CHLORIDE 500 ML: 0.9 INJECTION, SOLUTION INTRAVENOUS at 13:55

## 2024-03-03 NOTE — ED PROVIDER NOTES
History  Chief Complaint   Patient presents with    Hyperglycemia - no symptoms     C/o blood sugar over 3oo and 4oo today, increase in glipizide recently which is not helping, no other c/o     Patient is an 83-year-old female with a history of Boop, CAD, non-insulin-dependent diabetes presents to the emergency department with complaint of persistent hyperglycemia.  Patient states that she has been monitoring her blood glucose in the past month, and it typically runs in the 200s, but it was in the 300s to 400s today.  Patient admits to eating sweet potato/yams for lunch, prior to checking her blood glucose.  She states she is compliant with her medications as prescribed.      History provided by:  Patient   used: No    Hyperglycemia - no symptoms  Associated symptoms: no abdominal pain, no dysuria, no fever, no nausea, no shortness of breath and no vomiting        Prior to Admission Medications   Prescriptions Last Dose Informant Patient Reported? Taking?   Ascorbic Acid (vitamin C) 1000 MG tablet  Self No No   Sig: Take 1 tablet (1,000 mg total) by mouth daily   Blood Glucose Monitoring Suppl (OneTouch Verio) w/Device KIT  Self No No   Sig: Use to test blood sugar 2 times a day   Calcium Carbonate-Vitamin D3 (Calcium 600+D) 600-400 MG-UNIT TABS  Self Yes No   Sig: Take 1 tablet by mouth daily     Lancets (onetouch ultrasoft) lancets  Self No No   Sig: Use to test blood sugar 2 times a day   Multiple Vitamins-Minerals (CENTRUM SILVER 50+WOMEN PO)  Self Yes No   Sig: Take by mouth   acetaminophen (TYLENOL) 650 mg CR tablet  Self No No   Sig: Take 1 tablet (650 mg total) by mouth every 8 (eight) hours as needed for mild pain   albuterol (2.5 mg/3 mL) 0.083 % nebulizer solution  Self No No   Sig: Take 3 mL (2.5 mg total) by nebulization every 6 (six) hours as needed for wheezing or shortness of breath   aspirin 81 mg chewable tablet  Self No No   Sig: Chew 1 tablet (81 mg total) daily   atorvastatin  (LIPITOR) 40 mg tablet  Self No No   Sig: TAKE 1 TABLET DAILY   budesonide (PULMICORT) 0.5 mg/2 mL nebulizer solution  Self No No   Sig: TAKE 2 ML BY NEBULIZATION 2 (TWO) TIMES A DAY   furosemide (LASIX) 20 mg tablet  Self No No   Sig: TAKE 1 TABLET DAILY   glipiZIDE (GLUCOTROL XL) 2.5 mg 24 hr tablet   No No   Sig: Take 2 tablets (5 mg total) by mouth daily   glucose blood (OneTouch Verio) test strip  Self No No   Sig: Use to test blood sugar 2 times daily   Patient not taking: Reported on 10/20/2023   guaiFENesin-codeine (ROBITUSSIN AC) 100-10 mg/5 mL oral solution   No No   Sig: TAKE 5 ML BY MOUTH 2 (TWO) TIMES A DAY AS NEEDED FOR COUGH   Patient not taking: Reported on 2024   isosorbide mononitrate (IMDUR) 30 mg 24 hr tablet  Self No No   Sig: TAKE 1 TABLET DAILY   lidocaine (Lidoderm) 5 %  Self No No   Sig: Apply 1 patch topically over 12 hours daily Remove & Discard patch within 12 hours or as directed by MD   lisinopril (ZESTRIL) 20 mg tablet   No No   Sig: TAKE 1 TABLET DAILY   metoprolol tartrate (LOPRESSOR) 25 mg tablet   No No   Sig: TAKE 1/2 TABLET EVERY 12 HOURS   ondansetron (Zofran ODT) 4 mg disintegrating tablet  Self No No   Sig: Take 1 tablet (4 mg total) by mouth every 6 (six) hours as needed for nausea or vomiting   pantoprazole (PROTONIX) 40 mg tablet   No No   Sig: TAKE ONE TABLET DAILY   predniSONE 10 mg tablet   No No   Sig: Take 40 mg PO daily x 3 days, then 30 mg daily x 3 days, then 20 mg daily x 3 days, then 10 mg daily x 3 days, then stop.   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    E-Cigarette/Vaping    E-Cigarette Use Never User      E-Cigarette/Vaping Substances    Nicotine No     THC No     CBD No     Flavoring No     Other No     Unknown No      Social History     Tobacco Use    Smoking status: Never     Passive exposure: Past    Smokeless tobacco: Never   Vaping Use    Vaping status: Never Used   Substance Use Topics    Alcohol use: Never    Drug use: Never       Review of Systems   Constitutional:  Negative for chills and fever.   Respiratory:  Negative for cough, chest tightness and shortness of breath.    Gastrointestinal:  Negative for abdominal pain, diarrhea, nausea and vomiting.   Genitourinary:  Negative for dysuria, frequency, hematuria and urgency.   Musculoskeletal:  Negative for back pain, neck pain and neck stiffness.   All other systems reviewed and are negative.      Physical Exam  Physical Exam  Vitals and nursing note reviewed.   Constitutional:       General: She is not in acute distress.     Appearance: Normal appearance. She is well-developed. She is not diaphoretic.   HENT:      Head: Normocephalic and atraumatic.   Eyes:      Conjunctiva/sclera: Conjunctivae normal.      Pupils: Pupils are equal, round, and reactive to light.   Neck:     Cardiovascular:      Rate and Rhythm: Normal rate and regular rhythm.      Heart sounds: Normal heart sounds. No murmur heard.  Pulmonary:      Effort: Pulmonary effort is normal. No respiratory distress.      Breath sounds: Normal breath sounds.   Abdominal:      General: Bowel  sounds are normal. There is no distension.      Palpations: Abdomen is soft.      Tenderness: There is no abdominal tenderness.   Musculoskeletal:         General: No deformity. Normal range of motion.      Cervical back: Normal range of motion and neck supple.   Skin:     General: Skin is warm and dry.      Coloration: Skin is not pale.      Findings: No rash.   Neurological:      Mental Status: She is alert.      Cranial Nerves: No cranial nerve deficit.   Psychiatric:         Behavior: Behavior normal.         Vital Signs  ED Triage Vitals [03/03/24 1319]   Temperature Pulse Respirations Blood Pressure SpO2   (!) 97.4 °F (36.3 °C) 66 (!) 24 (!) 179/88 99 %      Temp Source Heart Rate Source Patient Position - Orthostatic VS BP Location FiO2 (%)   Tympanic Monitor Sitting Right arm --      Pain Score       No Pain           Vitals:    03/03/24 1319   BP: (!) 179/88   Pulse: 66   Patient Position - Orthostatic VS: Sitting         Visual Acuity      ED Medications  Medications   sodium chloride 0.9 % bolus 500 mL (0 mL Intravenous Stopped 3/3/24 1430)   magnesium Oxide (MAG-OX) tablet 800 mg (800 mg Oral Given 3/3/24 1429)       Diagnostic Studies  Results Reviewed       Procedure Component Value Units Date/Time    Fingerstick Glucose (POCT) [[id number]]  (Abnormal) Collected: 03/03/24 1433    Lab Status: Final result Updated: 03/03/24 1434     POC Glucose 252 mg/dl     Comprehensive metabolic panel [[id number]]  (Abnormal) Collected: 03/03/24 1347    Lab Status: Final result Specimen: Blood from Arm, Right Updated: 03/03/24 1413     Sodium 133 mmol/L      Potassium 4.6 mmol/L      Chloride 100 mmol/L      CO2 26 mmol/L      ANION GAP 7 mmol/L      BUN 10 mg/dL      Creatinine 0.93 mg/dL      Glucose 264 mg/dL      Calcium 8.6 mg/dL      AST 23 U/L      ALT 17 U/L      Alkaline Phosphatase 69 U/L      Total Protein 6.5 g/dL      Albumin 3.7 g/dL      Total Bilirubin 0.56 mg/dL      eGFR 57 ml/min/1.73sq m      Narrative:      National Kid[employer] guidelines for Chronic Kidney Disease (CKD):     Stage 1 with normal or high GFR (GFR > 90 mL/min/1.73 square meters)    Stage 2 Mild CKD (GFR = 60-89 mL/min/1.73 square meters)    Stage 3A Moderate CKD (GFR = 45-59 mL/min/1.73 square meters)    Stage 3B Moderate CKD (GFR = 30-44 mL/min/1.73 square meters)    Stage 4 Severe CKD (GFR = 15-29 mL/min/1.73 square meters)    Stage 5 End Stage CKD (GFR <15 mL/min/1.73 square meters)  Note: GFR calculation is accurate only with a steady state creatinine    Beta Hydroxybutyrate [[id number]]  (Normal) Collected: 03/03/24 1347    Lab Status: Final result Specimen: Blood from Arm, Right Updated: 03/03/24 1413     Beta- Hydroxybutyrate <0.05 mmol/L     Magnesium [[id number]]  (Abnormal) Collected: 03/03/24 1347    Lab Status: Final result Specimen: Blood from Arm, Right Updated: 03/03/24 1413     Magnesium 1.6 mg/dL     CBC and differential [[id number]]  (Abnormal) Collected: 03/03/24 1347    Lab Status: Final result Specimen: Blood from Arm, Right Updated: 03/03/24 1357     WBC 3.94 Thousand/uL      RBC 4.41 Million/uL      Hemoglobin 13.0 g/dL      Hematocrit 38.3 %      MCV 87 fL      MCH 29.5 pg      MCHC 33.9 g/dL      RDW 13.3 %      MPV 11.5 fL      Platelets 129 Thousands/uL      nRBC 0 /100 WBCs      Neutrophils Relative 54 %      Immat GRANS % 0 %      Lymphocytes Relative 36 %      Monocytes Relative 7 %      Eosinophils Relative 2 %      Basophils Relative 1 %      Neutrophils Absolute 2.12 Thousands/µL      Immature Grans Absolute 0.01 Thousand/uL      Lymphocytes Absolute 1.42 Thousands/µL      Monocytes Absolute 0.29 Thousand/µL      Eosinophils Absolute 0.08 Thousand/µL      Basophils Absolute 0.02 Thousands/µL     Blood gas, venous [[id number]]  (Abnormal) Collected: 03/03/24 1347    Lab Status: Final result Specimen: Blood from Arm, Right Updated: 03/03/24 1352     pH, Terrence 7.440     pCO2, Terrence 37.4 mm Hg      pO2,  Terrence 66.9 mm Hg      HCO3, Terrence 24.8 mmol/L      Base Excess, Terrence 0.9 mmol/L      O2 Content, Terrence 18.2 ml/dL      O2 HGB, VENOUS 92.6 %     Fingerstick Glucose (POCT) [[id number]]  (Abnormal) Collected: 03/03/24 1329    Lab Status: Final result Updated: 03/03/24 1330     POC Glucose 285 mg/dl                    No orders to display              Procedures  Procedures         ED Course                                             Medical Decision Making  83-year-old female in the ED with complaint of hyperglycemia.  Labs reviewed to evaluate for DKA.  Hyperglycemia without acidosis noted.  Mild hypomagnesemia noted.  Patient treated with IV fluids, repeat blood glucose is 252.  Will discharge patient to home.    Amount and/or Complexity of Data Reviewed  Labs: ordered.    Risk  OTC drugs.             Disposition  Final diagnoses:   Hyperglycemia   Hypomagnesemia     Time reflects when diagnosis was documented in both MDM as applicable and the Disposition within this note       Time User Action Codes Description Comment    3/3/2024  2:23 PM Reyes Gunn [R73.9] Hyperglycemia     3/3/2024  2:23 PM Reyes Gunn [E83.42] Hypomagnesemia           ED Disposition       ED Disposition   Discharge    Condition   Stable    Date/Time   Sun Mar 3, 2024  2:23 PM    Comment   Alaina Maza discharge to home/self care.                   Follow-up Information       Follow up With Specialties Details Why Contact Info    John Epps MD Family Medicine Schedule an appointment as soon as possible for a visit in 1 day for follow up [address] [id number] [phone]              Discharge Medication List as of 3/3/2024  2:34 PM        CONTINUE these medications which have NOT CHANGED    Details   acetaminophen (TYLENOL) 650 mg CR tablet Take 1 tablet (650 mg total) by mouth every 8 (eight) hours as needed for mild pain, Starting Wed 6/30/2021, Normal      albuterol (2.5 mg/3 mL)  0.083 % nebulizer solution Take 3 mL (2.5 mg total) by nebulization every 6 (six) hours as needed for wheezing or shortness of breath, Starting Thu 9/15/2022, Normal      Ascorbic Acid (vitamin C) 1000 MG tablet Take 1 tablet (1,000 mg total) by mouth daily, Starting Tue 11/10/2020, Normal      aspirin 81 mg chewable tablet Chew 1 tablet (81 mg total) daily, Starting Tue 11/10/2020, Normal      atorvastatin (LIPITOR) 40 mg tablet TAKE 1 TABLET DAILY, Normal      Blood Glucose Monitoring Suppl (OneTouch Verio) w/Device KIT Use to test blood sugar 2 times a day, Normal      budesonide (PULMICORT) 0.5 mg/2 mL nebulizer solution TAKE 2 ML BY NEBULIZATION 2 (TWO) TIMES A DAY, Normal      Calcium Carbonate-Vitamin D3 (Calcium 600+D) 600-400 MG-UNIT TABS Take 1 tablet by mouth daily  , Historical Med      furosemide (LASIX) 20 mg tablet TAKE 1 TABLET DAILY, Normal      glipiZIDE (GLUCOTROL XL) 2.5 mg 24 hr tablet Take 2 tablets (5 mg total) by mouth daily, Starting Wed 2/28/2024, Normal      glucose blood (OneTouch Verio) test strip Use to test blood sugar 2 times daily, Normal      guaiFENesin-codeine (ROBITUSSIN AC) 100-10 mg/5 mL oral solution TAKE 5 ML BY MOUTH 2 (TWO) TIMES A DAY AS NEEDED FOR COUGH, Normal      isosorbide mononitrate (IMDUR) 30 mg 24 hr tablet TAKE 1 TABLET DAILY, Normal      Lancets (onetouch ultrasoft) lancets Use to test blood sugar 2 times a day, Normal      lidocaine (Lidoderm) 5 % Apply 1 patch topically over 12 hours daily Remove & Discard patch within 12 hours or as directed by MD, Starting Tue 7/11/2023, Normal      lisinopril (ZESTRIL) 20 mg tablet TAKE 1 TABLET DAILY, Normal      metoprolol tartrate (LOPRESSOR) 25 mg tablet TAKE 1/2 TABLET EVERY 12 HOURS, Normal      Multiple Vitamins-Minerals (CENTRUM SILVER 50+WOMEN PO) Take by mouth, Historical Med      ondansetron (Zofran ODT) 4 mg disintegrating tablet Take 1 tablet (4 mg total) by mouth every 6 (six) hours as needed for nausea or  vomiting, Starting Tue 7/11/2023, Normal      pantoprazole (PROTONIX) 40 mg tablet TAKE ONE TABLET DAILY, Normal      predniSONE 10 mg tablet Take 40 mg PO daily x 3 days, then 30 mg daily x 3 days, then 20 mg daily x 3 days, then 10 mg daily x 3 days, then stop., Normal      propylthiouracil 50 mg tablet 2.5 tablets per day, Normal      sodium chloride 0.9 % nebulizer solution TAKE 3 ML BY NEBULIZATION 2 (TWO) TIMES A DAY AS NEEDED FOR WHEEZING, Starting Thu 2/23/2023, Normal             No discharge procedures on file.    PDMP Review         Value Time User    PDMP Reviewed  Yes 2/2/2024  1:25 PM KIERA Jain            ED Provider  Electronically Signed by             Reyes Gunn DO  03/03/24 [id number]

## 2024-03-06 ENCOUNTER — OFFICE VISIT (OUTPATIENT)
Dept: DIABETES SERVICES | Facility: CLINIC | Age: 84
End: 2024-03-06
Payer: COMMERCIAL

## 2024-03-06 VITALS — BODY MASS INDEX: 34.54 KG/M2 | WEIGHT: 214 LBS

## 2024-03-06 DIAGNOSIS — E11.9 TYPE 2 DIABETES MELLITUS WITHOUT COMPLICATION, WITHOUT LONG-TERM CURRENT USE OF INSULIN (HCC): Primary | ICD-10-CM

## 2024-03-06 DIAGNOSIS — E11.65 TYPE 2 DIABETES MELLITUS WITH HYPERGLYCEMIA, WITHOUT LONG-TERM CURRENT USE OF INSULIN (HCC): Primary | ICD-10-CM

## 2024-03-06 PROCEDURE — 97802 MEDICAL NUTRITION INDIV IN: CPT

## 2024-03-06 NOTE — PROGRESS NOTES
"Medical Nutrition Therapy        Assessment    Visit Type: Initial visit  Chief complaint/Medical Diagnosis/reason for visit E11.9Type 2 Diabetes Mellitus without complication without long term current use of insulin    HPI Alaina came in today with her son, , for her initial Medical Nutrition Therapy appointment.  Alaina stated her goal is to lower her blood sugar and A1C, as she does not want to go on insulin.  Alaina stated her  was on insulin and she knows how to administer it, if she has too. Reviewed insulin pen instructions which patient understands.  Obtained a 24 hour food recall. Problems identified in food recall include inconsistent carbohydrate intake, unbalanced meals, and prior to provider appointment, (Alaina was drinking 8-16 oz REGULAR GINGERALE/DAY). However, Alaina stated she has stopped drinking regular Gingerale and is only drinking water, and her blood sugars are coming down now. Provided patient with a 1500 calorie balanced individualized meal plan to assist with consistency, balance and portion control.  Encouraged the consumption of balanced meals and snacks at appropriate times.  Advised patient to keep carbohydrate intake to 30 grams per meal and 30 g per snack to assist with glycemic control.  My Plate, food models, portion booklet and food labels were used to teach basic carbohydrate counting. Patient stated she will call at a later date if she desires additional Medical Nutrition Therapy education. RD will remain available for further dietary questions/concerns.     Ht Readings from Last 1 Encounters:   02/28/24 5' 6\" (1.676 m)     Wt Readings from Last 3 Encounters:   03/06/24 97.1 kg (214 lb)   02/28/24 99.7 kg (219 lb 12.8 oz)   01/11/24 98.9 kg (218 lb)     Weight Change: Yes lost 5 lbs since last chart encounter.    Barriers to Learning: no barriers    Do you follow any special diet presently?: Yes - was trying to eat healthy.  Who shops: patient and son  Who cooks: " patient    Food Log: Completed via the method of food recall    Breakfast:7:30AM - 2 cups cooked oatmeal + whole banana or orange, possibly some dates, coffee with little milk  Morning Snack:None  Lunch:Noon - 3x/week  - 2 cups homemade soup with 1 chicken drumstick or 2-3x/week -1 chicken drumstick + 1 c cooked rice + kale, water  Afternoon Snack: None  Dinner:4PM - fish + veggies or lamb and veggies, water  Evening Snack:9PM - tea + toast  Beverages: Was drinking 8-16 oz Regular Gingerale/day prior to last provider appointment; now just drinking water, and coffee  Eating out/Take out:seldom  Exercise Activities of Daily Living    Calorie needs 1500kcals/day   Carbs: 30g/meal, 30g/snack      Nutrition Diagnosis:  Food and nutrition related knowledge deficit  related to Lack of prior exposure to accurate nutrition related information as evidenced by Verbalizes inaccurate or incomplete information    Intervention: plate method, label reading, carbohydrate counting, increased protein intake, meal timing, weight/ BMI goals, meal planning, individualized meal plan, and monitoring portion control     Treatment Goals: Patient will consume 3 meals a day, Patient will consume snacks, and Patient will count carbohydrates    Monitoring and evaluation:    Term code indicator  FH 1.3.2 Food Intake Criteria: Consume 3 balanced meals/day at appropriate times.  Term code indicator  FH 1.6.3 Carbohydrate Intake Criteria: 30 grams carbohydrate/meal and 30 grams carbohydrate/snack.  Term code indicator  FH 1.3.2 Food Intake Criteria: Consume at least 1 balanced snack/day at appropriate time.    Materials Provided: CHO counting, individualized meal, portion booklet, Inulin pen instructions, Know your blood sugars    Patient’s Response to Instruction:  Comprehensiongood  Motivationgood  Expected Compliancegood    Begin Time: 7:30 AM  End Time: 8:30 AM  Referring Provider: Nancy QURESHI    Thank you for coming to the [facility] for education today.  Please feel free to call with any questions or concerns.    Batool Rodrigez  [address] [id number] [phone]

## 2024-03-06 NOTE — PATIENT INSTRUCTIONS
Consume 3 balanced meals/day at appropriate times.  30 grams carbohydrate/meal and 30 grams carbohydrate/snack.  Consume at least 1 balanced snack/day at appropriate time.

## 2024-03-08 DIAGNOSIS — I10 HYPERTENSION, ESSENTIAL: ICD-10-CM

## 2024-03-08 RX ORDER — FUROSEMIDE 20 MG/1
20 TABLET ORAL DAILY
Qty: 90 TABLET | Refills: 3 | Status: SHIPPED | OUTPATIENT
Start: 2024-03-08

## 2024-03-15 DIAGNOSIS — E78.5 HYPERLIPIDEMIA LDL GOAL <70: ICD-10-CM

## 2024-03-15 DIAGNOSIS — R05.3 CHRONIC COUGH: ICD-10-CM

## 2024-03-15 DIAGNOSIS — I25.119 CORONARY ARTERY DISEASE WITH ANGINA PECTORIS, UNSPECIFIED VESSEL OR LESION TYPE, UNSPECIFIED WHETHER NATIVE OR TRANSPLANTED HEART (HCC): ICD-10-CM

## 2024-03-15 LAB
T3 SERPL-MCNC: 90 NG/DL (ref 71–180)
T4 FREE SERPL-MCNC: 0.82 NG/DL (ref 0.82–1.77)
TSH SERPL DL<=0.005 MIU/L-ACNC: 8.61 UIU/ML (ref 0.45–4.5)

## 2024-03-15 RX ORDER — ATORVASTATIN CALCIUM 40 MG/1
TABLET, FILM COATED ORAL
Qty: 90 TABLET | Refills: 0 | Status: SHIPPED | OUTPATIENT
Start: 2024-03-15

## 2024-03-15 NOTE — TELEPHONE ENCOUNTER
Please review to see if the refill is appropriate.   Refill must be reviewed and completed by the office or provider. The refill is unable to be approved / refused by the medication management team.    The original prescription was discontinued on 9/10/2021 by Leighton Layne DO. Renewing this prescription may not be appropriate.

## 2024-03-19 DIAGNOSIS — R05.3 CHRONIC COUGH: ICD-10-CM

## 2024-03-20 NOTE — TELEPHONE ENCOUNTER
Patient called to request a refill for their guaifenesin-codeine advised a refill was requested on 3/19/24 and is pending approval. Patient verbalized understanding and is in agreement.

## 2024-03-21 RX ORDER — CODEINE PHOSPHATE AND GUAIFENESIN 10; 100 MG/5ML; MG/5ML
5 SOLUTION ORAL 2 TIMES DAILY PRN
Qty: 300 ML | Refills: 1 | Status: SHIPPED | OUTPATIENT
Start: 2024-03-21

## 2024-03-21 NOTE — TELEPHONE ENCOUNTER
Patient still awaiting medication in discomfort patient advised medication still pending please review.

## 2024-03-22 RX ORDER — CODEINE PHOSPHATE AND GUAIFENESIN 10; 100 MG/5ML; MG/5ML
SOLUTION ORAL
Qty: 300 ML | Refills: 1 | Status: SHIPPED | OUTPATIENT
Start: 2024-03-22

## 2024-03-29 ENCOUNTER — OFFICE VISIT (OUTPATIENT)
Dept: ENDOCRINOLOGY | Facility: CLINIC | Age: 84
End: 2024-03-29
Payer: COMMERCIAL

## 2024-03-29 VITALS
SYSTOLIC BLOOD PRESSURE: 140 MMHG | HEART RATE: 64 BPM | DIASTOLIC BLOOD PRESSURE: 90 MMHG | OXYGEN SATURATION: 95 % | HEIGHT: 66 IN | WEIGHT: 220 LBS | BODY MASS INDEX: 35.36 KG/M2

## 2024-03-29 DIAGNOSIS — E78.2 MIXED HYPERLIPIDEMIA: ICD-10-CM

## 2024-03-29 DIAGNOSIS — E05.90 HYPERTHYROIDISM: Primary | ICD-10-CM

## 2024-03-29 DIAGNOSIS — E11.65 TYPE 2 DIABETES MELLITUS WITH HYPERGLYCEMIA, WITHOUT LONG-TERM CURRENT USE OF INSULIN (HCC): ICD-10-CM

## 2024-03-29 DIAGNOSIS — I10 HYPERTENSION, ESSENTIAL: ICD-10-CM

## 2024-03-29 PROCEDURE — G2211 COMPLEX E/M VISIT ADD ON: HCPCS | Performed by: NURSE PRACTITIONER

## 2024-03-29 PROCEDURE — 99214 OFFICE O/P EST MOD 30 MIN: CPT | Performed by: NURSE PRACTITIONER

## 2024-03-29 NOTE — ASSESSMENT & PLAN NOTE
Lab Results   Component Value Date    HGBA1C 10.9 (H) 02/23/2024     HGA1C close to goal above goal from February 2024, glucose levels improving since. Patient will continue on current regimen. Repeat Hemoglobin A1c in May 2024. Recommend CGM therapy. Reviewed risks of hypoglycemia on sulfonylurea with patient.     BGL Reviewed: Improved over past two weeks ranging from  mg/dL (most blood sugars low 100 mg/dL range)    Treatment regimen: Glipizide 5 mg daily, counseled on risks of hypoglycemia.     Discussed risks/complications associated with uncontrolled diabetes including organ involvement, heart attack, stroke, death.    Advised lifestyle modifications including attention to diet including the amount and types of carbohydrates consumed and regular activity.     Call for blood sugars less than 70 mg/dl or patterns over 250 mg/dl.     Monitor blood glucose levels at least 2 times a day    Discussed use of CGM to collect additional blood glucose data to reveal patterns that can be used to improve glucose levels and adjust insulin regimen. Will see if DME covers. Son to let office know which supplier preferred.     Discussed symptoms and treatment of hypoglycemia.  Reviewed risks associated with hypoglycemia. Always carry rapid acting carbohydrates and a glucometer (a way to check your blood sugar).    Recommendation for medical identification either bracelet, necklace.    Routine follow up for diabetic eye and foot exams.     Ordered blood work to complete prior to next visit.    Send glucose logs/CGM download in 1-2 weeks for review    Follow up in 3 months.

## 2024-03-29 NOTE — ASSESSMENT & PLAN NOTE
Recommend taking propylthiouracil 50 mg (2.5 tablets) daily.   Recheck labs in 3 months.   Clinically euthyroid. TSH elevated.

## 2024-03-29 NOTE — PROGRESS NOTES
Established Patient Progress Note    Chief Complaint:  Diabetes follow up visit    Impression & Plan:    Problem List Items Addressed This Visit          Cardiovascular and Mediastinum    Hypertension, essential     Continues on lisinopril.             Endocrine    Hyperthyroidism - Primary     Recommend taking propylthiouracil 50 mg (2.5 tablets) daily.   Recheck labs in 3 months.   Clinically euthyroid. TSH elevated.          Relevant Orders    TSH, 3rd generation    T4, free    T3    Type 2 diabetes mellitus with hyperglycemia, without long-term current use of insulin (formerly [facility])       Lab Results   Component Value Date    HGBA1C 10.9 (H) 02/23/2024     HGA1C close to goal above goal from February 2024, glucose levels improving since. Patient will continue on current regimen. Repeat Hemoglobin A1c in May 2024. Recommend CGM therapy. Reviewed risks of hypoglycemia on sulfonylurea with patient.     BGL Reviewed: Improved over past two weeks ranging from  mg/dL (most blood sugars low 100 mg/dL range)    Treatment regimen: Glipizide 5 mg daily, counseled on risks of hypoglycemia.     Discussed risks/complications associated with uncontrolled diabetes including organ involvement, heart attack, stroke, death.    Advised lifestyle modifications including attention to diet including the amount and types of carbohydrates consumed and regular activity.     Call for blood sugars less than 70 mg/dl or patterns over 250 mg/dl.     Monitor blood glucose levels at least 2 times a day    Discussed use of CGM to collect additional blood glucose data to reveal patterns that can be used to improve glucose levels and adjust insulin regimen. Will see if DME covers. Son to let office know which supplier preferred.     Discussed symptoms and treatment of hypoglycemia.  Reviewed risks associated with hypoglycemia. Always carry rapid acting carbohydrates and a glucometer (a way to check your blood sugar).    Recommendation for  "medical identification either bracelet, necklace.    Routine follow up for diabetic eye and foot exams.     Ordered blood work to complete prior to next visit.    Send glucose logs/CGM download in 1-2 weeks for review    Follow up in 3 months.            Relevant Orders    Hemoglobin A1C    Comprehensive metabolic panel    Albumin / creatinine urine ratio       Other    Mixed hyperlipidemia     Continues on statin. LDL close to goal.             History of Present Illness:   Alaina Maza is a 83 y.o. 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 Denies recent illness or hospitalizations. Seen by diabetes education since the last visit and has made significant dietary modifications and increased sulfonylurea.  Denies any issues with her current regimen. Home glucose monitoring: are performed twice daily but is having pain in her fingertips and would like CGM. Struggling to find covered eye provider in [place]. Reports UTD diabetic foot exam.     Home blood glucose readings:   Before breakfast:  mg/dL  Before lunch: X  Before dinner:  mg/dL  Bedtime: X     Current regimen:  Glipizide 5 mg daily     Has hypertension: Taking ACE, compliant  Has hyperlipidemia: Taking Statin, toleraing    Thyroid disorders: PTU has been taking 3 tablets daily. (Self-increased from 2.5). Clinically asymptomatic for hypo/hyperthyroidism. Denies side effects of treatment.                 Has history of partial thyroidectomy in 2006 with possible papillary thyroid carcinoma.  From previous note \"Patient insists that the documentation is incorrect and that she did not have thyroid carcinoma   Reviewed path report in care everywhere:    Thyroid, right, lobectomy:          1) Minute focal area, suspicous for early papillary carcinoma.\"   "     Patient Active Problem List   Diagnosis    Coronary artery disease with angina pectoris, unspecified vessel or lesion type, unspecified whether native or transplanted heart ([facility])    Hypertension, essential    Tracheal stenosis    Subglottic stenosis    Chronic cough    Class 1 obesity due to excess calories without serious comorbidity with body mass index (BMI) of 34.0 to 34.9 in adult    Sleep-related nonobstructive alveolar hypoventilation    Mild persistent asthma without complication    Mixed hyperlipidemia    Hypertensive heart disease    Chronic bilateral low back pain without sciatica    Stage 3a chronic kidney disease ([facility])    Hyperthyroidism    Type 2 diabetes mellitus with hyperglycemia, without long-term current use of insulin ([facility])    Class 3 severe obesity due to excess calories without serious comorbidity in adult, unspecified BMI ([facility])      Past Medical History:   Diagnosis Date    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    Coronary artery disease     Papillary thyroid carcinoma ([facility]) 2021    Post-surgical hypothyroidism 2017      Past Surgical History:   Procedure Laterality Date    HYSTERECTOMY      REPLACEMENT TOTAL KNEE      TRACHEOSTOMY  2006      Family History   Problem Relation Age of Onset    Heart disease Mother     Hypertension Mother     Heart disease Father     Pneumonia Brother          of COVID    Dementia Brother      Social History     Tobacco Use    Smoking status: Never     Passive exposure: Past    Smokeless tobacco: Never   Substance Use Topics    Alcohol use: Never     Allergies   Allergen Reactions    Iodine - Food Allergy Swelling    Shellfish-Derived Products - Food Allergy Hives    Morphine Rash         Current Outpatient Medications:     Ascorbic Acid (vitamin C) 1000 MG tablet, Take 1 tablet (1,000 mg total) by mouth daily, Disp: 30 tablet, Rfl: 5    aspirin 81 mg chewable tablet, Chew 1 tablet (81 mg total) daily,  Disp: 30 tablet, Rfl: 5    atorvastatin (LIPITOR) 40 mg tablet, TAKE 1 TABLET DAILY, Disp: 90 tablet, Rfl: 0    Blood Glucose Monitoring Suppl (OneTouch Verio) w/Device KIT, Use to test blood sugar 2 times a day, Disp: 1 kit, Rfl: 0    Calcium Carbonate-Vitamin D3 (Calcium 600+D) 600-400 MG-UNIT TABS, Take 1 tablet by mouth daily  , Disp: , Rfl:     furosemide (LASIX) 20 mg tablet, Take 1 tablet (20 mg total) by mouth daily, Disp: 90 tablet, Rfl: 3    glipiZIDE (GLUCOTROL XL) 2.5 mg 24 hr tablet, Take 2 tablets (5 mg total) by mouth daily, Disp: 90 tablet, Rfl: 3    guaiFENesin-codeine (ROBITUSSIN AC) 100-10 mg/5 mL oral solution, TAKE 5 ML BY MOUTH 2 (TWO) TIMES A DAY AS NEEDED FOR COUGH, Disp: 300 mL, Rfl: 1    isosorbide mononitrate (IMDUR) 30 mg 24 hr tablet, TAKE 1 TABLET DAILY, Disp: 30 tablet, Rfl: 5    Lancets (onetouch ultrasoft) lancets, Use to test blood sugar 2 times a day, Disp: 100 each, Rfl: 3    lidocaine (Lidoderm) 5 %, Apply 1 patch topically over 12 hours daily Remove & Discard patch within 12 hours or as directed by MD, Disp: 15 patch, Rfl: 0    lisinopril (ZESTRIL) 20 mg tablet, TAKE 1 TABLET DAILY, Disp: 90 tablet, Rfl: 1    metoprolol tartrate (LOPRESSOR) 25 mg tablet, TAKE 1/2 TABLET EVERY 12 HOURS, Disp: 60 tablet, Rfl: 2    Multiple Vitamins-Minerals (CENTRUM SILVER 50+WOMEN PO), Take by mouth, Disp: , Rfl:     pantoprazole (PROTONIX) 40 mg tablet, TAKE ONE TABLET DAILY, Disp: 30 tablet, Rfl: 5    propylthiouracil 50 mg tablet, 2.5 tablets per day, Disp: 270 tablet, Rfl: 3    acetaminophen (TYLENOL) 650 mg CR tablet, Take 1 tablet (650 mg total) by mouth every 8 (eight) hours as needed for mild pain, Disp: 30 tablet, Rfl: 0    albuterol (2.5 mg/3 mL) 0.083 % nebulizer solution, Take 3 mL (2.5 mg total) by nebulization every 6 (six) hours as needed for wheezing or shortness of breath, Disp: 1080 mL, Rfl: 3    budesonide (PULMICORT) 0.5 mg/2 mL nebulizer solution, TAKE 2 ML BY NEBULIZATION 2  "(TWO) TIMES A DAY, Disp: 120 mL, Rfl: 11    glucose blood (OneTouch Verio) test strip, Use to test blood sugar 2 times daily (Patient not taking: Reported on 10/20/2023), Disp: 100 strip, Rfl: 3    guaiFENesin-codeine (ROBITUSSIN AC) 100-10 mg/5 mL oral solution, TAKE 5 ML BY MOUTH 2 (TWO) TIMES A DAY AS NEEDED FOR COUGH (Patient not taking: Reported on 3/29/2024), Disp: 300 mL, Rfl: 1    ondansetron (Zofran ODT) 4 mg disintegrating tablet, Take 1 tablet (4 mg total) by mouth every 6 (six) hours as needed for nausea or vomiting, Disp: 10 tablet, Rfl: 0    sodium chloride 0.9 % nebulizer solution, TAKE 3 ML BY NEBULIZATION 2 (TWO) TIMES A DAY AS NEEDED FOR WHEEZING, Disp: 300 mL, Rfl: 6    Review of Systems  See HPI.   All other systems reviewed and are negative.      Physical Exam:  Body mass index is 35.51 kg/m².  /90 (BP Location: Left arm, Patient Position: Sitting, Cuff Size: Large)   Pulse 64   Ht 5' 6\" (1.676 m)   Wt 99.8 kg (220 lb)   SpO2 95%   BMI 35.51 kg/m²    Wt Readings from Last 3 Encounters:   03/29/24 99.8 kg (220 lb)   03/06/24 97.1 kg (214 lb)   02/28/24 99.7 kg (219 lb 12.8 oz)        Physical Exam  Vitals reviewed.   Constitutional:       Appearance: Normal appearance.   Cardiovascular:      Rate and Rhythm: Normal rate and regular rhythm.      Pulses: Normal pulses.      Heart sounds: Normal heart sounds.   Pulmonary:      Effort: Pulmonary effort is normal. Has trach.      Breath sounds: Normal breath sounds.   Skin:     General: Skin is warm and dry.      Capillary Refill: Capillary refill takes less than 2 seconds.   Neurological:      General: No focal deficit present.      Mental Status: She is alert and oriented to person, place, and time.   Psychiatric:         Mood and Affect: Mood normal.         Behavior: Behavior normal.       Labs:   Lab Results   Component Value Date    HGBA1C 10.9 (H) 02/23/2024    HGBA1C 7.5 (H) 11/14/2023    HGBA1C 7.3 (A) 07/28/2023     Lab Results " "  Component Value Date    CREATININE 0.93 03/03/2024    CREATININE 0.95 02/23/2024    CREATININE 0.87 11/14/2023    BUN 10 03/03/2024    K 4.6 03/03/2024     03/03/2024    CO2 26 03/03/2024     eGFR   Date Value Ref Range Status   03/03/2024 57 ml/min/1.73sq m Final     Lab Results   Component Value Date    HDL 42 02/23/2024    TRIG 112 02/23/2024    CHOLHDL 3.3 02/23/2024     Lab Results   Component Value Date    ALT 17 03/03/2024    AST 23 03/03/2024    ALKPHOS 69 03/03/2024     No results found for: \"[id number]\"  Lab Results   Component Value Date    FREET4 0.82 03/14/2024       Discussed with the patient and all questioned fully answered. She will call me if any problems arise.    Follow-up appointment in 3 months.     Counseled patient on diagnostic results, prognosis, risk and benefit of treatment options, instruction for management, importance of treatment compliance, Risk  factor reduction and impressions    KIERA Sotomayor    "

## 2024-03-29 NOTE — PATIENT INSTRUCTIONS
Repeat thyroid function tests in 1 month      Hypoglycemia in a Person with Diabetes   WHAT YOU NEED TO KNOW:   Hypoglycemia is a serious condition that happens when your blood glucose (sugar) level drops too low. The blood sugar level is usually too high in a person with diabetes, but the level can also drop too low. It is important to follow your diabetes management plan to keep your blood sugar level steady.  DISCHARGE INSTRUCTIONS:   Have someone call your local emergency number (911 in the [place]) if:   You have a seizure or pass out.    Your blood sugar is less than 50 mg/dL and does not respond to treatment.    You feel you are going to pass out.    You have trouble thinking clearly.    Call your doctor or diabetes care team provider if:   You have had symptoms of low blood sugar several times.    You have questions about the amount of insulin or diabetes medicine you are taking.    You have questions or concerns about your condition or care.    Medicines:   Insulin or diabetes medicine  help to keep your blood sugar under control.    Glucagon  may be needed if you have severe hypoglycemia.    Take your medicine as directed.  Contact your healthcare provider if you think your medicine is not helping or if you have side effects. Tell your provider if you are allergic to any medicine. Keep a list of the medicines, vitamins, and herbs you take. Include the amounts, and when and why you take them. Bring the list or the pill bottles to follow-up visits. Carry your medicine list with you in case of an emergency.    Manage hypoglycemia:   Check your blood sugar level right away if you have symptoms of hypoglycemia.  Hypoglycemia usually happens when your blood sugar level is 70 mg/dL or below. Ask your diabetes care team provider what blood sugar level is too low for you.    If your blood sugar level is too low, eat or drink 15 grams of fast-acting carbohydrate.  Examples of this amount of fast-acting carbohydrate are 4  ounces (½ cup) of fruit juice or 4 ounces of regular soda. Other examples are 2 tablespoons of raisins or 1 tube of glucose gel.     Check your blood sugar level 15 minutes later. Sit still as you wait. If the level is still low (less than 100 mg/dL), have another 15 grams of carbohydrate. When the level returns to 100 mg/dL, eat a meal if it is time. If your meal time is more than 1 hour away, eat a snack. The snack should contain carbohydrates, such as the following:    3/4 cup of cereal    1 cup of skim or low fat milk    6 soda crackers    1/2 of a turkey sandwich    15 fat-free chips  This will help prevent another drop in blood sugar. Always carefully follow your provider's instructions on how to treat low blood sugar levels.  Always carry a source of fast-acting carbohydrate.  If you have symptoms of hypoglycemia and you do not have a blood glucose meter, have a source of fast-acting carbohydrate anyway. Avoid carbohydrate foods that are high in fat. The fat content may make the carbohydrate take longer to increase your blood sugar level. Ask your provider if you should carry a glucagon kit. Glucagon is a medicine that is injected when you develop severe hypoglycemia and become unconscious. Check the expiration date every month and replace it before it expires.    Teach others how to help you if you have symptoms of hypoglycemia.  Tell them about the symptoms of hypoglycemia. Ask them to give you a source of fast-acting carbohydrate if you cannot get it yourself. Ask them to give you a glucagon injection if you have signs of hypoglycemia and you become unconscious or have a seizure. Ask them to call the local emergency number (911 in the [place]) . This is an emergency. Tell them never to try to make you swallow anything if you faint or have a seizure.    Wear medical alert jewelry  or carry a card that says you have diabetes. Ask where to get these items.       Prevent hypoglycemia:   Take diabetes medicine as  directed.  Take your medicine at the right time and in the right amount. Do not  double the amount of medicine you take unless instructed by your diabetes care team provider. You may need oral diabetes medicine, insulin, or both to help control your blood sugar levels. Your healthcare provider will teach you how and when to take oral diabetes medicine. You will also be taught about side effects oral diabetes medicine can cause. Insulin may be added if oral diabetes medicine becomes less effective over time. Insulin may be injected, or given through a pump or pen. You and your care team will discuss which method is best for you.    An insulin pump  is an implanted device that gives your insulin 24 hours a day. An insulin pump prevents the need for multiple insulin injections in a day.         An insulin pen  is a device prefilled with the right amount of insulin.       Eat meals and snacks as directed.  Talk to your dietitian or provider about a meal plan that is right for you. Do not skip meals.         Check your blood sugar level as directed.  Ask your provider what your blood sugar levels should be before and after you eat. Ask when and how often to check your blood sugar level. You may need to check a drop of blood in a glucose test machine. You may need to check at least 3 times each day. Record your blood sugar level results and take the record with you when you see your care team. They may use it to make changes to your medicine, food, or exercise schedules. Your care team provider may recommend a continuous glucose monitor (CGM). A CGM is a device that is worn at all times. The CGM checks your blood sugar every 5 minutes. It sends results to an electronic device such as a smart phone.            Check your blood sugar level before you exercise.  Physical activity, such as exercise, can decrease your blood sugar level. If your blood sugar level is less than 100 mg/dL, have a carbohydrate snack. Examples are 4  to 6 crackers, ½ banana, 8 ounces (1 cup) of nonfat or 1% milk, or 4 ounces (½ cup) of juice. If you will be active for more than 1 hour, you may need to check your blood sugar level every 30 minutes. Your provider may also recommend that you check your blood sugar level after your activity.    Know the risks if you choose to drink alcohol.  Alcohol can cause your blood sugar levels to be low if you use insulin. Alcohol can cause high blood sugar levels and weight gain if you drink too much. Women 21 years or older and men 65 years or older should limit alcohol to 1 drink a day. Men aged 21 to 64 years should limit alcohol to 2 drinks a day. A drink of alcohol is 12 ounces of beer, 5 ounces of wine, or 1½ ounces of liquor.    Follow up with your doctor or diabetes care team provider as directed:  You may need your insulin or diabetes medicine changed if you continue to have hypoglycemia episodes. Write down your questions so you remember to ask them during your visits.  © Copyright [employer] 2023 Information is for End User's use only and may not be sold, redistributed or otherwise used for commercial purposes.  The above information is an  only. It is not intended as medical advice for individual conditions or treatments. Talk to your doctor, nurse or pharmacist before following any medical regimen to see if it is safe and effective for you.

## 2024-04-11 ENCOUNTER — OFFICE VISIT (OUTPATIENT)
Dept: OTOLARYNGOLOGY | Facility: CLINIC | Age: 84
End: 2024-04-11
Payer: COMMERCIAL

## 2024-04-11 VITALS — WEIGHT: 220 LBS | HEIGHT: 66 IN | BODY MASS INDEX: 35.36 KG/M2

## 2024-04-11 DIAGNOSIS — Z93.0 TRACHEOSTOMY PRESENT (HCC): ICD-10-CM

## 2024-04-11 DIAGNOSIS — J38.6 SUBGLOTTIC STENOSIS: ICD-10-CM

## 2024-04-11 DIAGNOSIS — Z93.0 TRACHEOSTOMY DEPENDENCE (HCC): Primary | ICD-10-CM

## 2024-04-11 PROCEDURE — 99213 OFFICE O/P EST LOW 20 MIN: CPT | Performed by: OTOLARYNGOLOGY

## 2024-04-11 NOTE — PROGRESS NOTES
"Assessment/Plan:  Trach changed.  F/u in 3 months.      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 Tracheostomy present (HCC)  Z93.0              Subjective:      Patient ID: Alaina Maza is a 83 y.o. female.    Pt presents for routine trach change.  No c/o.        The following portions of the patient's history were reviewed and updated as appropriate: allergies, current medications, past family history, past medical history, past social history, past surgical history and problem list.    Review of Systems      Objective:      Ht 5' 6\" (1.676 m)   Wt 99.8 kg (220 lb)   BMI 35.51 kg/m²          Physical Exam  Constitutional:       Appearance: She is well-developed.   HENT:      Head: Normocephalic and atraumatic.      Right Ear: Tympanic membrane, ear canal and external ear normal. No drainage. No middle ear effusion.      Left Ear: Tympanic membrane, ear canal and external ear normal. No drainage.  No middle ear effusion.      Nose: Nose normal.      Mouth/Throat:      Pharynx: Uvula midline. No oropharyngeal exudate.      Tonsils: 2+ on the right. 2+ on the left.   Neck:      Thyroid: No thyroid mass or thyromegaly.      Trachea: Trachea normal. No tracheal deviation.     Lymphadenopathy:      Cervical: No cervical adenopathy.   Neurological:      Mental Status: She is alert.         "

## 2024-05-13 DIAGNOSIS — I10 HYPERTENSION, ESSENTIAL: ICD-10-CM

## 2024-05-13 RX ORDER — LISINOPRIL 20 MG/1
TABLET ORAL
Qty: 90 TABLET | Refills: 1 | Status: SHIPPED | OUTPATIENT
Start: 2024-05-13

## 2024-05-30 DIAGNOSIS — K21.9 GASTROESOPHAGEAL REFLUX DISEASE WITHOUT ESOPHAGITIS: ICD-10-CM

## 2024-05-30 RX ORDER — PANTOPRAZOLE SODIUM 40 MG/1
TABLET, DELAYED RELEASE ORAL
Qty: 30 TABLET | Refills: 5 | Status: SHIPPED | OUTPATIENT
Start: 2024-05-30

## 2024-06-07 DIAGNOSIS — R05.3 CHRONIC COUGH: ICD-10-CM

## 2024-06-07 RX ORDER — CODEINE PHOSPHATE AND GUAIFENESIN 10; 100 MG/5ML; MG/5ML
SOLUTION ORAL
Qty: 300 ML | Refills: 1 | Status: SHIPPED | OUTPATIENT
Start: 2024-06-07

## 2024-06-20 NOTE — PROGRESS NOTES
Progress Note - Cardiology Office  [facility]    Alaina Maza 83 y.o. female MRN: [id number]  : 1940  Encounter: [id number]      ASSESSMENT:   CAD, s/p PCI at [facility], 25 years ago  ASCVD risk score is 34.3%     2023, OV:  Denies any chest pain or other cardiac symptoms     Patient had declined to undergo cardiac catheterization stating that she does not have chest pain at the moment.   She was informed that the purpose of the procedure was to evaluate and revascularize if needed in view of an abnormal stress test.  Again re-emphasized to the patient to let us know if she changes her mind and she starts having chest pain again     Echo 2020  Normal left ventricular size.  Normal LV systolic function (EF 53 %).  There are no regional wall motion abnormalities.  Normal diastolic LV function.  Normal RV function.  Normal size right ventricle.  Left atrium is enlarged.  Right atrium is enlarged.  Normal mitral valve.  Aortic valve tri-leaflet.  Normal tricuspid valve.  Pulmonary artery systolic pressure is normal (30 mmHg).      Stress echo :10/16: - Assessment: no ischemia-induced wall motion abnormalities (negative stress echo  test)  - At rest, normal global systolic LV-function (EF 60-70%)  - With stress, hypercontractility of the left ventricle (EF 80%)  - In the recovery phase, normal global systolic LV function (EF 70%)  - Assessment: normal LV function     Lexiscan, 04/15/2021    IMPRESSIONS: Abnormal study after pharmacologic vasodilation. There is evidence of partially reversible apical lateral wall defect. Defect was of mild in severity. TID ratio of 1.4 also noted. EF 61% Left ventricular systolic function was  normal.     Patient was scheduled for cardiac catheterization at [facility].  Patient at that time and subsequently a has expressed desire not to undergo cardiac catheterization.       Hyperlipidemia  2021:  LDL  152, normal liver enzymes  04/26/2021:  LDL 79, triglyceride 90, HDL 35, normal AST and ALT  10/12/2021:  LDL 62, triglycerides 103, HDL 34, normal AST and ALT  03/01/2023: LDL 70, TG 96, HDL 42, normal AST and ALT     Hyperthyroidism     DMT2,      Obesity:  BMI is 33.89-> 35.8     Hypertension  BP is elevated at 146/80 with heart rate of 62/min     History of PE     BOOP, history of tracheal stenosis, s/p tracheostomy     Allergy to iodine, shellfish derived products and morphine    Lower extremity edema, trace to mild, right > left     RECOMMENDATIONS:  Increase Lasix to 40 mg daily  Patient declined to wear compression stockings  Echocardiogram  Pulmonary follow-up  Continue cardiovascular exercise as tolerated  Low salt, low carb, low sugar, low-cholesterol diet  Weight loss strategies        Please call [phone] if any questions.    HPI :     Alaina Maza is a 83 y.o. year old female who came for follow up.  She complains of cough.  She has a history of BOOP, tracheal stenosis, s/p tracheostomy.  I advised her to follow-up with pulmonary  Her blood pressure is elevated and she has mild lower extremity swelling more so on the right.  She is also gained some weight since her last OV  I advised her to increase her Lasix to 40 mg daily.  She declined to wear compression stockings.  I will also get a repeat echocardiogram  She is recommended to lose weight    REVIEW OF SYSTEMS:  Review of Systems   Respiratory:  Positive for cough.    Musculoskeletal:  Positive for gait problem.   All other systems reviewed and are negative.        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    Coronary artery disease     Papillary thyroid carcinoma (HCC) 11/29/2021    Post-surgical hypothyroidism 12/5/2017     Past Surgical History:   Procedure Laterality Date    HYSTERECTOMY      REPLACEMENT TOTAL KNEE      TRACHEOSTOMY   2006     Social History     Substance and Sexual Activity   Alcohol Use Never     Social History     Substance and Sexual Activity   Drug Use Never     Social History     Tobacco Use   Smoking Status Never    Passive exposure: Past   Smokeless Tobacco Never     Family History:   Family History   Problem Relation Age of Onset    Heart disease Mother     Hypertension Mother     Heart disease Father     Pneumonia Brother          of COVID    Dementia Brother        Meds/Allergies     Allergies   Allergen Reactions    Iodine - Food Allergy Swelling    Shellfish-Derived Products - Food Allergy Hives    Morphine Rash       Current Outpatient Medications:     acetaminophen (TYLENOL) 650 mg CR tablet, Take 1 tablet (650 mg total) by mouth every 8 (eight) hours as needed for mild pain, Disp: 30 tablet, Rfl: 0    albuterol (2.5 mg/3 mL) 0.083 % nebulizer solution, Take 3 mL (2.5 mg total) by nebulization every 6 (six) hours as needed for wheezing or shortness of breath, Disp: 1080 mL, Rfl: 3    Ascorbic Acid (vitamin C) 1000 MG tablet, Take 1 tablet (1,000 mg total) by mouth daily, Disp: 30 tablet, Rfl: 5    aspirin 81 mg chewable tablet, Chew 1 tablet (81 mg total) daily, Disp: 30 tablet, Rfl: 5    atorvastatin (LIPITOR) 40 mg tablet, TAKE 1 TABLET DAILY, Disp: 90 tablet, Rfl: 0    Blood Glucose Monitoring Suppl (OneTouch Verio) w/Device KIT, Use to test blood sugar 2 times a day, Disp: 1 kit, Rfl: 0    budesonide (PULMICORT) 0.5 mg/2 mL nebulizer solution, TAKE 2 ML BY NEBULIZATION 2 (TWO) TIMES A DAY, Disp: 120 mL, Rfl: 11    Calcium Carbonate-Vitamin D3 (Calcium 600+D) 600-400 MG-UNIT TABS, Take 1 tablet by mouth daily  , Disp: , Rfl:     furosemide (LASIX) 40 mg tablet, Take 1 tablet (40 mg total) by mouth daily, Disp: 90 tablet, Rfl: 2    glipiZIDE (GLUCOTROL XL) 2.5 mg 24 hr tablet, Take 2 tablets (5 mg total) by mouth daily, Disp: 90 tablet, Rfl: 3    guaiFENesin-codeine (ROBITUSSIN AC) 100-10 mg/5 mL oral  "solution, TAKE 5 ML BY MOUTH TWO TIMES A DAY AS NEEDED FOR COUGH, Disp: 300 mL, Rfl: 1    isosorbide mononitrate (IMDUR) 30 mg 24 hr tablet, TAKE 1 TABLET DAILY, Disp: 30 tablet, Rfl: 5    Lancets (onetouch ultrasoft) lancets, Use to test blood sugar 2 times a day, Disp: 100 each, Rfl: 3    lidocaine (Lidoderm) 5 %, Apply 1 patch topically over 12 hours daily Remove & Discard patch within 12 hours or as directed by MD, Disp: 15 patch, Rfl: 0    lisinopril (ZESTRIL) 20 mg tablet, TAKE 1 TABLET DAILY, Disp: 90 tablet, Rfl: 1    metoprolol tartrate (LOPRESSOR) 25 mg tablet, TAKE 1/2 TABLET EVERY 12 HOURS, Disp: 60 tablet, Rfl: 2    Multiple Vitamins-Minerals (CENTRUM SILVER 50+WOMEN PO), Take by mouth, Disp: , Rfl:     ondansetron (Zofran ODT) 4 mg disintegrating tablet, Take 1 tablet (4 mg total) by mouth every 6 (six) hours as needed for nausea or vomiting, Disp: 10 tablet, Rfl: 0    pantoprazole (PROTONIX) 40 mg tablet, TAKE ONE TABLET DAILY, Disp: 30 tablet, Rfl: 5    propylthiouracil 50 mg tablet, 2.5 tablets per day, Disp: 270 tablet, Rfl: 3    sodium chloride 0.9 % nebulizer solution, TAKE 3 ML BY NEBULIZATION 2 (TWO) TIMES A DAY AS NEEDED FOR WHEEZING, Disp: 300 mL, Rfl: 6    glucose blood (OneTouch Verio) test strip, Use to test blood sugar 2 times daily (Patient not taking: Reported on 10/20/2023), Disp: 100 strip, Rfl: 3    Vitals: Blood pressure 146/80, pulse 62, height 5' 6\" (1.676 m), weight 101 kg (222 lb), SpO2 96%.    Body mass index is 35.83 kg/m².  Vitals:    06/21/24 0816   Weight: 101 kg (222 lb)     BP Readings from Last 3 Encounters:   06/21/24 146/80   03/29/24 140/90   03/03/24 (!) 179/88       Physical Exam:  Physical Exam    Neurologic:  Alert & oriented x 3, no new focal deficits, Not in any acute distress,  Constitutional: Obese  Eyes:  Pupil equal and reacting to light, conjunctiva normal,   HENT:  Atraumatic, oropharynx moist, Neck- normal range of motion, no tenderness,  Neck supple, No " JVP, No LNP   Respiratory: Slightly decreased air entry.  No significant crepitations or rhonchi  Cardiovascular: S1-S2 regular with a I/VI systolic murmur   GI:  Soft, nondistended, normal bowel sounds, nontender, no hepatosplenomegaly appreciated.  Musculoskeletal:  No tenderness, no deformities.   Skin:  Well hydrated, no rash   Lymphatic:  No lymphadenopathy noted   Extremities: Trace to mild lower extremity edema, right> left        Diagnostic Studies Review Cardio:      EKG: Normal sinus rhythm, heart rate 62/min, moderate voltage criteria for LVH may be normal variant    Cardiac testing:       Results for orders placed during the hospital encounter of 04/15/21    NM myocardial perfusion spect (rx stress and/or rest)    Narrative  [place] [phone]    Rest/Stress Gated SPECT Myocardial Perfusion Imaging After Regadenoson    Patient: ADAL BLOCK  MR number: [id number]  Account number: [id number]  : 1940  Age: 80 years  Gender: Female  Status: Outpatient  Location: Stress lab  Height: 62 in  Weight: 224 lb  BP: 122/ 65 mmHg    Allergies: IODINE - FOOD ALLERGY, SHELLFISH-DERIVED PRODUCTS - FOOD ALLERGY, MORPHINE    Diagnosis: R07.9 - Chest pain, unspecified    Primary Physician:  SIMON SALAS  RN:  RISA Nguyen  Referring Physician:  Krista Rao MD  Group:  [facility] Menard  Report Prepared By::  RISA Nguyen  Interpreting Physician:  Baylee Watson MD    INDICATIONS: Evaluation of known coronary artery disease.    HISTORY: The patient is a 80 year old  female. Chest pain status: chest pain. Coronary artery disease risk factors: dyslipidemia, hypertension, family history of premature coronary artery disease, and diabetes mellitus.  Cardiovascular history: coronary artery disease. Prior cardiovascular procedures: percutaneous transluminal coronary angioplasty. Co-morbidity: history of lung disease and  obesity. Medications: a nitrate, an ACE inhibitor/ARB, a diuretic,  aspirin, and diabetic medications.    PHYSICAL EXAM: Baseline physical exam screening: no wheezes audible.    REST ECG: Normal sinus rhythm. The ECG showed rare premature ventricular contractions.    PROCEDURE: The study was performed in the the Stress lab. A regadenoson infusion pharmacologic stress test was performed. Gated SPECT myocardial perfusion imaging was performed after stress and at rest. Systolic blood pressure was 122  mmHg, at the start of the study. Diastolic blood pressure was 65 mmHg, at the start of the study. The heart rate was 66 bpm, at the start of the study. IV double checked.  Regadenoson protocol:  Time HR bpm SBP mmHg DBP mmHg Symptoms Rhythm/conduct  Baseline 09:09 66 122 65 none NSR  Immediate 09:12 86 132 76 none NSR, frequent PVC's  1 min 09:13 836 136 79 none same as above  2 min 09:14 82 140 77 none same as above  3 min 09:15 85 133 74 none --  4 min 09:16 79 134 76 none same as above  No medications or fluids given.    STRESS SUMMARY: Duration of pharmacologic stress was 3 min. Maximal heart rate during stress was 88 bpm. The heart rate response to stress was normal. There was normal resting blood pressure with an appropriate response to stress. The  rate-pressure product for the peak heart rate and blood pressure was 65415. There was no chest pain during stress. The stress test was terminated due to protocol completion. Pre oxygen saturation: 97 %. Peak oxygen saturation: 99 %. The  stress ECG was negative for ischemia and normal. Arrhythmia during stress: isolated premature ventricular beats.    ISOTOPE ADMINISTRATION:  Resting isotope administration Stress isotope administration  Agent Tetrofosmin Tetrofosmin  Dose 10.5 mCi 33 mCi  Date 04/15/2021 04/15/2021    The radiopharmaceutical was injected at the peak effect of pharmacologic stress.    MYOCARDIAL PERFUSION IMAGING:  Rotating projection images reveal mild  breast attenuation and moderate subdiaphragmatic activity. Left ventricular size was normal. The TID ratio was 1.4.    PERFUSION DEFECTS:  -  There was a small to moderate, mildly severe, partially reversible myocardial perfusion defect of the entire lateral and apical wall.    GATED SPECT:  The calculated left ventricular ejection fraction was 61 %. Left ventricular ejection fraction was within normal limits by visual estimate. There was no left ventricular regional abnormality.    SUMMARY:  -  Stress results: There was no chest pain during stress.  -  ECG conclusions: The stress ECG was negative for ischemia and normal.  -  Perfusion imaging: There was a small to moderate, mildly severe, partially reversible myocardial perfusion defect of the entire lateral and apical wall.  -  Gated SPECT: The calculated left ventricular ejection fraction was 61 %. Left ventricular ejection fraction was within normal limits by visual estimate. There was no left ventricular regional abnormality.  -  Impressions and recommendations: Abnormal study after pharmacologic vasodilation. There is evidence of partially reversible apical lateral wall defect. Defect was of mild severity. T.i.d. of 1.4 also noted. EF 61%    IMPRESSIONS: Abnormal study after pharmacologic vasodilation. There is evidence of partially reversible apical lateral wall defect. Defect was of mild severity. T.i.d. of 1.4 also noted. EF 61% Left ventricular systolic function was  normal.    Prepared and signed by    Baylee Watson MD  Signed 04/15/2021 13:53:15      Imaging:  Chest X-Ray:   No Chest XR results available for this patient.    CT-scan of the chest:     No CTA results available for this patient.  Lab Review   Lab Results   Component Value Date    WBC 3.94 (L) 03/03/2024    HGB 13.0 03/03/2024    HCT 38.3 03/03/2024    MCV 87 03/03/2024    RDW 13.3 03/03/2024     (L) 03/03/2024     BMP:  Lab Results   Component Value Date    SODIUM 133 (L)  "03/03/2024    K 4.6 03/03/2024     03/03/2024    CO2 26 03/03/2024    BUN 10 03/03/2024    CREATININE 0.93 03/03/2024    GLUC 264 (H) 03/03/2024    CALCIUM 8.6 03/03/2024    EGFR 57 03/03/2024    MG 1.6 (L) 03/03/2024     LFT:  Lab Results   Component Value Date    AST 23 03/03/2024    ALT 17 03/03/2024    ALKPHOS 69 03/03/2024    TP 6.5 03/03/2024    ALB 3.7 03/03/2024      No components found for: \"TSH3\"  No results found for: \"[id number]\"  Lab Results   Component Value Date    HGBA1C 10.9 (H) 02/23/2024     Lipid Profile:   Lab Results   Component Value Date    CHOLESTEROL 138 02/23/2024    HDL 42 02/23/2024    LDLCALC 76 02/23/2024    TRIG 112 02/23/2024     Lab Results   Component Value Date    CHOLESTEROL 138 02/23/2024    CHOLESTEROL 116 07/07/2023     Lab Results   Component Value Date    TROPONINI <0.02 09/13/2020     No results found for: \"NTBNP\"   No results found for this or any previous visit (from the past 672 hour(s)).          Dr. Krista Rao MD, FACC      \"This note has been constructed using a voice recognition system.Therefore there may be syntax, spelling, and/or grammatical errors. Please call if you have any questions. \"  "

## 2024-06-21 ENCOUNTER — OFFICE VISIT (OUTPATIENT)
Dept: CARDIOLOGY CLINIC | Facility: CLINIC | Age: 84
End: 2024-06-21
Payer: COMMERCIAL

## 2024-06-21 VITALS
WEIGHT: 222 LBS | HEIGHT: 66 IN | OXYGEN SATURATION: 96 % | DIASTOLIC BLOOD PRESSURE: 80 MMHG | BODY MASS INDEX: 35.68 KG/M2 | SYSTOLIC BLOOD PRESSURE: 146 MMHG | HEART RATE: 62 BPM

## 2024-06-21 DIAGNOSIS — R60.0 EDEMA OF BOTH LEGS: ICD-10-CM

## 2024-06-21 DIAGNOSIS — I10 HYPERTENSION, ESSENTIAL: ICD-10-CM

## 2024-06-21 DIAGNOSIS — E78.2 MIXED HYPERLIPIDEMIA: ICD-10-CM

## 2024-06-21 DIAGNOSIS — I25.119 CORONARY ARTERY DISEASE WITH ANGINA PECTORIS, UNSPECIFIED VESSEL OR LESION TYPE, UNSPECIFIED WHETHER NATIVE OR TRANSPLANTED HEART (HCC): Primary | ICD-10-CM

## 2024-06-21 DIAGNOSIS — I11.9 HYPERTENSIVE HEART DISEASE, UNSPECIFIED WHETHER HEART FAILURE PRESENT: ICD-10-CM

## 2024-06-21 PROCEDURE — 93000 ELECTROCARDIOGRAM COMPLETE: CPT | Performed by: INTERNAL MEDICINE

## 2024-06-21 PROCEDURE — 99214 OFFICE O/P EST MOD 30 MIN: CPT | Performed by: INTERNAL MEDICINE

## 2024-06-21 RX ORDER — FUROSEMIDE 40 MG/1
40 TABLET ORAL DAILY
Qty: 90 TABLET | Refills: 2 | Status: SHIPPED | OUTPATIENT
Start: 2024-06-21

## 2024-06-27 DIAGNOSIS — I25.119 CORONARY ARTERY DISEASE WITH ANGINA PECTORIS, UNSPECIFIED VESSEL OR LESION TYPE, UNSPECIFIED WHETHER NATIVE OR TRANSPLANTED HEART (HCC): ICD-10-CM

## 2024-06-27 DIAGNOSIS — E78.5 HYPERLIPIDEMIA LDL GOAL <70: ICD-10-CM

## 2024-06-27 RX ORDER — ATORVASTATIN CALCIUM 40 MG/1
TABLET, FILM COATED ORAL
Qty: 90 TABLET | Refills: 1 | Status: SHIPPED | OUTPATIENT
Start: 2024-06-27

## 2024-06-28 DIAGNOSIS — J45.30 MILD PERSISTENT ASTHMA WITHOUT COMPLICATION: ICD-10-CM

## 2024-06-29 RX ORDER — ALBUTEROL SULFATE 2.5 MG/3ML
SOLUTION RESPIRATORY (INHALATION)
Qty: 372 ML | Refills: 2 | Status: SHIPPED | OUTPATIENT
Start: 2024-06-29

## 2024-06-29 NOTE — TELEPHONE ENCOUNTER
Patient needs an annual appointment. Please contact the patient to schedule an appointment. 30D with 2 refill provided.

## 2024-07-17 ENCOUNTER — TELEPHONE (OUTPATIENT)
Age: 84
End: 2024-07-17

## 2024-07-17 NOTE — TELEPHONE ENCOUNTER
Patient's son called in concerned about appointment tomorrow stating that they never got an order for a new trach. Called and spoke with Karoline and she advised me he should have enough supplies to get to tomorrow and they will be ordering whatever else is needed tomorrow. Advised Jayant of this and he verbalized understanding.

## 2024-07-25 ENCOUNTER — OFFICE VISIT (OUTPATIENT)
Dept: ENDOCRINOLOGY | Facility: CLINIC | Age: 84
End: 2024-07-25
Payer: COMMERCIAL

## 2024-07-25 VITALS
BODY MASS INDEX: 35.84 KG/M2 | HEIGHT: 66 IN | HEART RATE: 76 BPM | OXYGEN SATURATION: 96 % | DIASTOLIC BLOOD PRESSURE: 80 MMHG | WEIGHT: 223 LBS | SYSTOLIC BLOOD PRESSURE: 118 MMHG

## 2024-07-25 DIAGNOSIS — E78.2 MIXED HYPERLIPIDEMIA: ICD-10-CM

## 2024-07-25 DIAGNOSIS — I10 HYPERTENSION, ESSENTIAL: ICD-10-CM

## 2024-07-25 DIAGNOSIS — E11.65 TYPE 2 DIABETES MELLITUS WITH HYPERGLYCEMIA, WITHOUT LONG-TERM CURRENT USE OF INSULIN (HCC): ICD-10-CM

## 2024-07-25 DIAGNOSIS — E05.90 HYPERTHYROIDISM: Primary | ICD-10-CM

## 2024-07-25 LAB — SL AMB POCT HEMOGLOBIN AIC: 8.1 (ref ?–6.5)

## 2024-07-25 PROCEDURE — 99214 OFFICE O/P EST MOD 30 MIN: CPT | Performed by: NURSE PRACTITIONER

## 2024-07-25 PROCEDURE — 83036 HEMOGLOBIN GLYCOSYLATED A1C: CPT | Performed by: NURSE PRACTITIONER

## 2024-07-25 NOTE — PATIENT INSTRUCTIONS
"Low blood sugar in people with diabetes   The Basics   Written by the doctors and editors at [facility]   What is low blood sugar? -- This is when the level of sugar in a person's blood gets too low. It is also called \"hypoglycemia.\"  Low blood sugar can cause symptoms ranging from sweating and feeling hungry to passing out.  Low blood sugar can happen in people with diabetes who take certain medicines, including insulin, other medicines given as shots, and some types of pills.  When can people with diabetes get low blood sugar? -- People with diabetes can get low blood sugar when they:   Take too much medicine, including insulin, other medicines given as shots, or certain diabetes pills   Do not eat enough food   Exercise too much without eating a snack or reducing their insulin dose   Wait too long between meals   Drink too much alcohol or drink alcohol on an empty stomach  What are the symptoms of low blood sugar? -- The symptoms can be different from person to person, and can change over time. During the early stages of low blood sugar, a person can:   Sweat or tremble   Feel hungry   Feel worried  People who have early symptoms should check their blood sugar level to see if it is low and needs to be treated. If low blood sugar levels are not treated, severe symptoms can occur. These can include:   Trouble walking or feeling weak   Trouble seeing clearly   Being confused or acting in a strange way   Passing out or having a seizure  Some people do not get symptoms during the early stages of low blood sugar. Doctors sometimes call this \"hypoglycemia unawareness.\" People with hypoglycemia unawareness are more likely to have severe symptoms, because they might not know that they have low blood sugar until they have severe symptoms. Hypoglycemia unawareness is more likely in people who:   Have had type 1 diabetes for more than 5 to 10 years   Have frequent episodes of low blood sugar   Use insulin to keep their blood " sugar level tightly managed   Are tired   Drink a lot of alcohol   Take certain medicines for high blood pressure or diabetes  How is low blood sugar treated? -- It can be treated with:   Quick sources of sugar - People can eat or drink quick sources of sugar (table 1). Foods that have fat, such as chocolate or cheese, do not treat low blood sugar as quickly. You and a family member should carry a quick source of sugar at all times.   A dose of glucagon - Glucagon is a hormone that can quickly raise blood sugar levels and stop severe symptoms. It comes as a shot (figure 1) or a nose spray. If your doctor recommends that you carry glucagon with you, they will tell you when and how to use it. If possible, it's also a good idea to have a family member, friend, or roommate learn how to give you glucagon. That way, they can give it to you if you can't do it yourself.  After treating low blood sugar, it is very important to recheck your blood sugar level to make sure that it rises and stays in the normal range. Once your blood sugar is normal, eat a small snack that contains protein, fat, and carbohydrate. This can help keep your blood sugar stable.  What should I do after treatment? -- After treatment for low blood sugar, most people can get back to their usual routine. But your doctor or nurse might recommend that you check your blood sugar level more often during the next 2 to 3 days.  If your low blood sugar was treated with glucagon, call your doctor or nurse. They might change the dose of your diabetes medicine.  How can I prevent low blood sugar? -- The best way is to:   Check your blood sugar levels often - Your doctor or nurse will tell you how and when to check your blood sugar levels at home. They will also tell you what your blood sugar levels should be, and when to treat low blood sugar.   Learn the symptoms of low blood sugar, and be ready to treat it in the early stages. Treating low blood sugar early can  "prevent severe symptoms.  When should I go to a hospital or call for an ambulance? -- A family member or friend should take you to a hospital or call for an ambulance (in the [place] and [place], call 9-1-1) if you:   Are still confused 15 minutes after being treated with a dose of glucagon   Have passed out, and there is no glucagon nearby   Still have low blood sugar after treatment  If you have low blood sugar, do not try to drive yourself to the hospital. Driving with low blood sugar can be dangerous.  All topics are updated as new evidence becomes available and our peer review process is complete.  This topic retrieved from [employer] on: Apr 11, 2024.  Topic 69756 Version 22.0  Release: 32.3.2 - C32.100  © 2024 UpToDate, Inc. and/or its affiliates. All rights reserved.  table 1: Quick sources of sugar to treat low blood sugar  3 or 4 glucose tablets   ½ cup of juice or regular soda (not sugar-free)   2 tablespoons of raisins   4 or 5 saltine crackers   1 tablespoon of sugar   1 tablespoon of honey or corn syrup   6 to 8 hard candies   These sources of sugar act quickly to treat low blood sugar levels. People with diabetes who use insulin or certain other diabetes medicines should carry at least 1 of these items at all times.  Graphic 24331 Version 4.0  figure 1: Glucagon autoinjector     Some people carry glucagon in the form of an autoinjector \"pen.\" This makes it easy to give a dose into the upper arm, thigh, or belly.  Graphic 676125 Version 2.0  Consumer Information Use and Disclaimer   Disclaimer: This generalized information is a limited summary of diagnosis, treatment, and/or medication information. It is not meant to be comprehensive and should be used as a tool to help the user understand and/or assess potential diagnostic and treatment options. It does NOT include all information about conditions, treatments, medications, side effects, or risks that may apply to a specific patient. It is not intended to be " medical advice or a substitute for the medical advice, diagnosis, or treatment of a health care provider based on the health care provider's examination and assessment of a patient's specific and unique circumstances. Patients must speak with a health care provider for complete information about their health, medical questions, and treatment options, including any risks or benefits regarding use of medications. This information does not endorse any treatments or medications as safe, effective, or approved for treating a specific patient. UpToDate, Inc. and its affiliates disclaim any warranty or liability relating to this information or the use thereof.The use of this information is governed by the Terms of Use, available at https://www.[employer].com/en/know/clinical-effectiveness-terms. 2024© UpToDate, Inc. and its affiliates and/or licensors. All rights reserved.  Copyright   © 2024 UpToDate, Inc. and/or its affiliates. All rights reserved.

## 2024-07-25 NOTE — ASSESSMENT & PLAN NOTE
Lab Results   Component Value Date    HGBA1C 8.1 (A) 07/25/2024     HGA1C demonstrated reasonable improvement. Continue Glipizide 5 mg daily, patient aware of hypoglycemia risk. Patient will contact our office with patterns of glucose levels less than 70 mg/dL as we will need to reduce the dose of Glipizide to 2.5 mg daily.     Discussed risks/complications associated with uncontrolled diabetes including organ involvement, heart attack, stroke, death.  Recommended referral to diabetes education.      Advised lifestyle modifications including attention to diet including the amount and types of carbohydrates consumed and regular activity.     Call for blood sugars less than 70 mg/dl or patterns over 250 mg/dl.     Monitor blood glucose levels at least 2 times a day    Discussed symptoms and treatment of hypoglycemia.  Reviewed risks associated with hypoglycemia. Always carry rapid acting carbohydrates and a glucometer (a way to check your blood sugar).    Recommendation for medical identification either bracelet, necklace.    Recommend routine follow up for diabetic eye and foot exams.    Ordered blood work to complete prior to next visit.    Follow up in 3 months.

## 2024-07-26 ENCOUNTER — TELEPHONE (OUTPATIENT)
Dept: CARDIOLOGY CLINIC | Facility: CLINIC | Age: 84
End: 2024-07-26

## 2024-07-26 ENCOUNTER — HOSPITAL ENCOUNTER (OUTPATIENT)
Dept: NON INVASIVE DIAGNOSTICS | Facility: HOSPITAL | Age: 84
Discharge: HOME | End: 2024-07-26
Attending: INTERNAL MEDICINE
Payer: COMMERCIAL

## 2024-07-26 VITALS
WEIGHT: 223 LBS | SYSTOLIC BLOOD PRESSURE: 118 MMHG | HEART RATE: 76 BPM | HEIGHT: 66 IN | BODY MASS INDEX: 35.84 KG/M2 | DIASTOLIC BLOOD PRESSURE: 80 MMHG

## 2024-07-26 DIAGNOSIS — I10 HYPERTENSION, ESSENTIAL: ICD-10-CM

## 2024-07-26 DIAGNOSIS — R60.0 EDEMA OF BOTH LEGS: ICD-10-CM

## 2024-07-26 LAB
AORTIC ROOT: 3.6 CM
APICAL FOUR CHAMBER EJECTION FRACTION: 63 %
AV LVOT PEAK GRADIENT: 3 MMHG
AV PEAK GRADIENT: 6 MMHG
BSA FOR ECHO PROCEDURE: 2.09 M2
DOP CALC LVOT AREA: 3.14 CM2
DOP CALC LVOT DIAMETER: 2 CM
E WAVE DECELERATION TIME: 288 MS
E/A RATIO: 0.76
FRACTIONAL SHORTENING: 29 (ref 28–44)
INTERVENTRICULAR SEPTUM IN DIASTOLE (PARASTERNAL SHORT AXIS VIEW): 1.1 CM
INTERVENTRICULAR SEPTUM: 1.1 CM (ref 0.6–1.1)
LAAS-AP2: 31.7 CM2
LAAS-AP4: 37.6 CM2
LEFT ATRIUM SIZE: 4.1 CM
LEFT ATRIUM VOLUME (MOD BIPLANE): 151 ML
LEFT ATRIUM VOLUME INDEX (MOD BIPLANE): 72.2 ML/M2
LEFT INTERNAL DIMENSION IN SYSTOLE: 3.6 CM (ref 2.1–4)
LEFT VENTRICULAR INTERNAL DIMENSION IN DIASTOLE: 5.1 CM (ref 3.5–6)
LEFT VENTRICULAR POSTERIOR WALL IN END DIASTOLE: 1.1 CM
LEFT VENTRICULAR STROKE VOLUME: 69 ML
LVSV (TEICH): 69 ML
MITRAL REGURGITATION PEAK VELOCITY: 5.89 M/S
MITRAL VALVE MEAN INFLOW VELOCITY: 4.48 M/S
MITRAL VALVE REGURGITANT PEAK GRADIENT: 139 MMHG
MV E'TISSUE VEL-SEP: 7 CM/S
MV PEAK A VEL: 0.88 M/S
MV PEAK E VEL: 67 CM/S
MV STENOSIS PRESSURE HALF TIME: 84 MS
MV VALVE AREA P 1/2 METHOD: 2.62
RIGHT ATRIUM AREA SYSTOLE A4C: 11.8 CM2
RIGHT VENTRICLE ID DIMENSION: 3.7 CM
SL CV DOP CALC MV VTI RETROGRADE: 226.9 CM
SL CV LEFT ATRIUM LENGTH A2C: 6.1 CM
SL CV MV MEAN GRADIENT RETROGRADE: 92 MMHG
SL CV PED ECHO LEFT VENTRICLE DIASTOLIC VOLUME (MOD BIPLANE) 2D: 122 ML
SL CV PED ECHO LEFT VENTRICLE SYSTOLIC VOLUME (MOD BIPLANE) 2D: 53 ML
TR MAX PG: 39 MMHG
TR PEAK VELOCITY: 3.1 M/S
TRICUSPID ANNULAR PLANE SYSTOLIC EXCURSION: 2.1 CM
TRICUSPID VALVE PEAK REGURGITATION VELOCITY: 3.13 M/S

## 2024-07-26 PROCEDURE — 93306 TTE W/DOPPLER COMPLETE: CPT | Performed by: INTERNAL MEDICINE

## 2024-07-26 PROCEDURE — 93306 TTE W/DOPPLER COMPLETE: CPT

## 2024-07-26 NOTE — TELEPHONE ENCOUNTER
----- Message from Krista Rao MD sent at 7/26/2024 12:21 PM EDT -----  Please call and inform the patient that the Echocardiogram showed normal pumping function of the heart.    No significant valve abnormality was seen.

## 2024-07-31 ENCOUNTER — TELEPHONE (OUTPATIENT)
Dept: OTOLARYNGOLOGY | Facility: CLINIC | Age: 84
End: 2024-07-31

## 2024-07-31 DIAGNOSIS — I10 HYPERTENSION, ESSENTIAL: ICD-10-CM

## 2024-07-31 RX ORDER — ISOSORBIDE MONONITRATE 30 MG/1
TABLET, EXTENDED RELEASE ORAL
Qty: 30 TABLET | Refills: 5 | Status: SHIPPED | OUTPATIENT
Start: 2024-07-31

## 2024-07-31 NOTE — TELEPHONE ENCOUNTER
Spoke with  to reschedule Alaina's appointment for 8/1 due to her supplies not being received.  I told him that I will call early next week to the supply company and him to make sure supplies were shipped and received.

## 2024-07-31 NOTE — TELEPHONE ENCOUNTER
Spoke with Pat at supply company, she stated that the order was not done and she submitted while I was on the phone and it would be up to 48 hours before they got insurance approval.  I was told to cancel her appointment due to supplies not being delivered until at least early next week.

## 2024-08-07 ENCOUNTER — TELEPHONE (OUTPATIENT)
Dept: OTOLARYNGOLOGY | Facility: CLINIC | Age: 84
End: 2024-08-07

## 2024-08-07 NOTE — TELEPHONE ENCOUNTER
Spoke with Jayant (erin's son) to confirm her supplies were received.  He said they received them and everything is there for Thursdays appointment.

## 2024-08-08 ENCOUNTER — OFFICE VISIT (OUTPATIENT)
Dept: OTOLARYNGOLOGY | Facility: CLINIC | Age: 84
End: 2024-08-08
Payer: COMMERCIAL

## 2024-08-08 VITALS — HEIGHT: 66 IN | WEIGHT: 223 LBS | BODY MASS INDEX: 35.84 KG/M2

## 2024-08-08 DIAGNOSIS — J38.6 SUBGLOTTIC STENOSIS: ICD-10-CM

## 2024-08-08 DIAGNOSIS — Z93.0 TRACHEOSTOMY DEPENDENCE (HCC): Primary | ICD-10-CM

## 2024-08-08 PROCEDURE — 31611 CONSTJ TRACHESOPHGL FSTL: CPT | Performed by: OTOLARYNGOLOGY

## 2024-08-08 PROCEDURE — 99213 OFFICE O/P EST LOW 20 MIN: CPT | Performed by: OTOLARYNGOLOGY

## 2024-08-08 NOTE — PROGRESS NOTES
"Assessment/Plan:  Trach changed.  F/u in 3 months.      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 female.    F/u for trach change.  No c/o.        The following portions of the patient's history were reviewed and updated as appropriate: allergies, current medications, past family history, past medical history, past social history, past surgical history and problem list.    Review of Systems      Objective:      Ht 5' 6\" (1.676 m)   Wt 101 kg (223 lb)   BMI 35.99 kg/m²          Physical Exam  Constitutional:       Appearance: She is well-developed.   HENT:      Head: Normocephalic and atraumatic.      Right Ear: Tympanic membrane, ear canal and external ear normal. No drainage. No middle ear effusion.      Left Ear: Tympanic membrane, ear canal and external ear normal. No drainage.  No middle ear effusion.      Nose: Nose normal.      Mouth/Throat:      Pharynx: Uvula midline. No oropharyngeal exudate.      Tonsils: 2+ on the right. 2+ on the left.   Neck:      Thyroid: No thyroid mass or thyromegaly.      Trachea: Trachea normal. No tracheal deviation.     Lymphadenopathy:      Cervical: No cervical adenopathy.   Neurological:      Mental Status: She is alert.         "

## 2024-08-15 DIAGNOSIS — E11.9 TYPE 2 DIABETES MELLITUS WITHOUT COMPLICATION, WITHOUT LONG-TERM CURRENT USE OF INSULIN (HCC): ICD-10-CM

## 2024-08-15 RX ORDER — GLIPIZIDE 2.5 MG/1
5 TABLET, EXTENDED RELEASE ORAL DAILY
Qty: 180 TABLET | Refills: 1 | Status: SHIPPED | OUTPATIENT
Start: 2024-08-15

## 2024-08-20 DIAGNOSIS — I10 HYPERTENSION, ESSENTIAL: ICD-10-CM

## 2024-08-20 RX ORDER — METOPROLOL TARTRATE 25 MG/1
TABLET, FILM COATED ORAL
Qty: 60 TABLET | Refills: 5 | Status: SHIPPED | OUTPATIENT
Start: 2024-08-20

## 2024-08-21 ENCOUNTER — OFFICE VISIT (OUTPATIENT)
Dept: PULMONOLOGY | Facility: MEDICAL CENTER | Age: 84
End: 2024-08-21
Payer: COMMERCIAL

## 2024-08-21 VITALS
RESPIRATION RATE: 22 BRPM | HEART RATE: 71 BPM | DIASTOLIC BLOOD PRESSURE: 70 MMHG | SYSTOLIC BLOOD PRESSURE: 120 MMHG | HEIGHT: 66 IN | OXYGEN SATURATION: 97 % | WEIGHT: 222 LBS | TEMPERATURE: 97.8 F | BODY MASS INDEX: 35.68 KG/M2

## 2024-08-21 DIAGNOSIS — J45.30 MILD PERSISTENT ASTHMA WITHOUT COMPLICATION: ICD-10-CM

## 2024-08-21 DIAGNOSIS — G47.34 SLEEP-RELATED NONOBSTRUCTIVE ALVEOLAR HYPOVENTILATION: ICD-10-CM

## 2024-08-21 DIAGNOSIS — J38.6 SUBGLOTTIC STENOSIS: ICD-10-CM

## 2024-08-21 DIAGNOSIS — R05.3 CHRONIC COUGH: Primary | ICD-10-CM

## 2024-08-21 DIAGNOSIS — J20.9 ACUTE BRONCHITIS, UNSPECIFIED ORGANISM: ICD-10-CM

## 2024-08-21 DIAGNOSIS — R06.2 WHEEZE: ICD-10-CM

## 2024-08-21 PROCEDURE — 99214 OFFICE O/P EST MOD 30 MIN: CPT | Performed by: INTERNAL MEDICINE

## 2024-08-21 PROCEDURE — G2211 COMPLEX E/M VISIT ADD ON: HCPCS | Performed by: INTERNAL MEDICINE

## 2024-08-21 RX ORDER — CODEINE PHOSPHATE AND GUAIFENESIN 10; 100 MG/5ML; MG/5ML
5 SOLUTION ORAL 2 TIMES DAILY PRN
Qty: 300 ML | Refills: 1 | Status: SHIPPED | OUTPATIENT
Start: 2024-08-21

## 2024-08-21 RX ORDER — CEFUROXIME AXETIL 500 MG/1
500 TABLET ORAL EVERY 12 HOURS SCHEDULED
Qty: 10 TABLET | Refills: 0 | Status: SHIPPED | OUTPATIENT
Start: 2024-08-21 | End: 2024-08-26

## 2024-08-21 RX ORDER — IPRATROPIUM BROMIDE AND ALBUTEROL SULFATE 2.5; .5 MG/3ML; MG/3ML
3 SOLUTION RESPIRATORY (INHALATION) EVERY 6 HOURS PRN
Qty: 360 ML | Refills: 7 | Status: SHIPPED | COMMUNITY
Start: 2024-08-21

## 2024-08-21 RX ORDER — PREDNISONE 10 MG/1
TABLET ORAL
Qty: 5 TABLET | Refills: 0 | Status: SHIPPED | OUTPATIENT
Start: 2024-08-21

## 2024-08-21 NOTE — PROGRESS NOTES
Assessment & Plan        Problem List Items Addressed This Visit          Respiratory    Subglottic stenosis     She has subglottic stenosis and has had tracheostomy and for several years.  She did have tracheostomy placed around 2005.  Had her cuffless trach change on 8/8/2024 by Dr. Albright         Mild persistent asthma without complication     She is using nebulizer with budesonide 0.5 mg twice daily.  She will continue as.  I told her she can try using ipratropium albuterol nebulizer up to 3 times a day and I gave her samples of this in place of her regular albuterol 2.5 mg         Relevant Medications    ipratropium-albuterol (DUO-NEB) 0.5-2.5 mg/3 mL nebulizer solution    Acute bronchitis     Has had increased cough recently for some discolored mucus.  I did prescribe Ceftin 500 mg twice daily for 5 days         Relevant Medications    cefuroxime (CEFTIN) 500 mg tablet    ipratropium-albuterol (DUO-NEB) 0.5-2.5 mg/3 mL nebulizer solution    guaiFENesin-codeine (ROBITUSSIN AC) 100-10 mg/5 mL oral solution       Neurology/Sleep    Sleep-related nonobstructive alveolar hypoventilation     She does have oxygen she can use 3 L at nighttime does not always use it.  Sometimes have some shortness of breath during the night so she may want to use it during the day as well            Other    Chronic cough - Primary     Provide has chronic cough for years.  Last couple weeks has been worse and she is expectorating small amount of yellow mucus.  She clinically has some acute tracheobronchitis and I did prescribe Ceftin.    Has been on guaifenesin with codeine chronically as this is only thing that helps manage her cough         Relevant Medications    guaiFENesin-codeine (ROBITUSSIN AC) 100-10 mg/5 mL oral solution     Other Visit Diagnoses       Wheeze        Relevant Medications    predniSONE 10 mg tablet              Cc: Has increased cough last couple weeks.  Some slight increase shortness of breath    HPI    Alaina  did have appointment with ENT physician Dr. Albright on 8/8/2024 as she has subglottic stenosis and has had tracheostomy and for several years he did do a tracheostomy change at day and he has her on schedule to have it changed again in 3 months.  She has a #6 cuffless tracheostomy tube in place she is able to occlude tracheostomy tube with the finger and have audible speech.    She complains of increased cough over the last couple weeks.  Normally weighs has chronic cough with some white mucus but she has had increased amount of white mucus indicating some small amount of yellow mucus.  No fever or chills.  She does use Robitussin with codeine which helps control her cough particularly at nighttime and sometimes will take in the morning.  She uses sugar-free menthol Halls lozenges or a day to help with her cough.  Not having any gastric reflux.  She has history of chronic cough for several years.  Last chest x-ray done in August 2023 showed no lung infiltrates.    She does have some mild chronic exertional shortness of breath.  Not have any leg edema.  Echocardiogram done July 26, 2024 showed LV systolic function to be normal with left ventricular ejection fraction of 55% and grade 1 diastolic dysfunction.  Right ventricular systolic function was normal.  There is mild to moderate mitral regurgitation and mild tricuspid regurgitation.  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  She does have a chronic cough for which she does use Robitussin codeine to help with relief of the cough.  She did have tracheostomy placed around 2005 or so if she had acute respiratory failure with prolonged need for ventilation if she was diagnosed with Boop.  She developed subglottic stenosis and thus tracheostomy tube has remained in place.    Also has history of micropapillary thyroid cancer and had total thyroidectomy done September 7, 2006.  Has coronary disease and  had PCI at [facility] is good but there is real hospital.  She does have history of IV contrast allergy.    Has mild persistent asthma and is using nebulized budesonide 0.5 mg twice daily and albuterol 2.5 mg twice a day    She does have history of hypertension, coronary disease and has had angioplasty done 25 years ago she does have history of papillary thyroid carcinoma and has postsurgical hypothyroidism.    She does have history of sleep-related hypoxemia.    Past Medical History:   Diagnosis Date    BOOP (bronchiolitis obliterans with organizing pneumonia) ([facility]) 01/01/2006    Coronary artery disease     Disease of thyroid gland     HL (hearing loss)     Papillary thyroid carcinoma ([facility]) 11/29/2021    Post-surgical hypothyroidism 12/05/2017    Voice disorder        Past Surgical History:   Procedure Laterality Date    HYSTERECTOMY      REPLACEMENT TOTAL KNEE      THYROID SURGERY      TRACHEOSTOMY  01/01/2006         Current Outpatient Medications:     acetaminophen (TYLENOL) 650 mg CR tablet, Take 1 tablet (650 mg total) by mouth every 8 (eight) hours as needed for mild pain, Disp: 30 tablet, Rfl: 0    Ascorbic Acid (vitamin C) 1000 MG tablet, Take 1 tablet (1,000 mg total) by mouth daily, Disp: 30 tablet, Rfl: 5    aspirin 81 mg chewable tablet, Chew 1 tablet (81 mg total) daily, Disp: 30 tablet, Rfl: 5    atorvastatin (LIPITOR) 40 mg tablet, TAKE 1 TABLET DAILY, Disp: 90 tablet, Rfl: 1    Blood Glucose Monitoring Suppl (OneTouch Verio) w/Device KIT, Use to test blood sugar 2 times a day, Disp: 1 kit, Rfl: 0    budesonide (PULMICORT) 0.5 mg/2 mL nebulizer solution, TAKE 2 ML BY NEBULIZATION 2 (TWO) TIMES A DAY, Disp: 120 mL, Rfl: 11    Calcium Carbonate-Vitamin D3 (Calcium 600+D) 600-400 MG-UNIT TABS, Take 1 tablet by mouth daily  , Disp: , Rfl:     cefuroxime (CEFTIN) 500 mg tablet, Take 1 tablet (500 mg total) by mouth every 12 (twelve) hours for 5 days, Disp: 10 tablet, Rfl: 0    furosemide  (LASIX) 40 mg tablet, Take 1 tablet (40 mg total) by mouth daily, Disp: 90 tablet, Rfl: 2    glipiZIDE (GLUCOTROL XL) 2.5 mg 24 hr tablet, TAKE 2 TABLETS BY MOUTH DAILY, Disp: 180 tablet, Rfl: 1    glucose blood (OneTouch Verio) test strip, Use to test blood sugar 2 times daily, Disp: 100 strip, Rfl: 3    guaiFENesin-codeine (ROBITUSSIN AC) 100-10 mg/5 mL oral solution, Take 5 mL by mouth 2 (two) times a day as needed for cough, Disp: 300 mL, Rfl: 1    ipratropium-albuterol (DUO-NEB) 0.5-2.5 mg/3 mL nebulizer solution, Take 3 mL by nebulization every 6 (six) hours as needed for wheezing or shortness of breath, Disp: 360 mL, Rfl: 7    isosorbide mononitrate (IMDUR) 30 mg 24 hr tablet, TAKE 1 TABLET DAILY, Disp: 30 tablet, Rfl: 5    Lancets (onetouch ultrasoft) lancets, Use to test blood sugar 2 times a day, Disp: 100 each, Rfl: 3    lidocaine (Lidoderm) 5 %, Apply 1 patch topically over 12 hours daily Remove & Discard patch within 12 hours or as directed by MD, Disp: 15 patch, Rfl: 0    lisinopril (ZESTRIL) 20 mg tablet, TAKE 1 TABLET DAILY, Disp: 90 tablet, Rfl: 1    metoprolol tartrate (LOPRESSOR) 25 mg tablet, TAKE 1/2 TABLET EVERY 12 HOURS, Disp: 60 tablet, Rfl: 5    Multiple Vitamins-Minerals (CENTRUM SILVER 50+WOMEN PO), Take by mouth, Disp: , Rfl:     ondansetron (Zofran ODT) 4 mg disintegrating tablet, Take 1 tablet (4 mg total) by mouth every 6 (six) hours as needed for nausea or vomiting, Disp: 10 tablet, Rfl: 0    pantoprazole (PROTONIX) 40 mg tablet, TAKE ONE TABLET DAILY, Disp: 30 tablet, Rfl: 5    predniSONE 10 mg tablet, Take 1 tablet daily for 5 days, Disp: 5 tablet, Rfl: 0    propylthiouracil 50 mg tablet, 2.5 tablets per day, Disp: 270 tablet, Rfl: 3    sodium chloride 0.9 % nebulizer solution, TAKE 3 ML BY NEBULIZATION 2 (TWO) TIMES A DAY AS NEEDED FOR WHEEZING, Disp: 300 mL, Rfl: 6    Allergies   Allergen Reactions    Iodine - Food Allergy Swelling    Shellfish-Derived Products - Food Allergy  "Hives    Morphine Rash       Social History     Tobacco Use    Smoking status: Never     Passive exposure: Past    Smokeless tobacco: Never   Substance Use Topics    Alcohol use: Never         Family History   Problem Relation Age of Onset    Heart disease Mother     Hypertension Mother     Heart disease Father     Pneumonia Brother          of COVID    Dementia Brother        Review of Systems   Constitutional:  Negative for chills, fever and unexpected weight change.   HENT:  Negative for congestion, rhinorrhea and sore throat.    Eyes:  Negative for discharge and redness.   Respiratory:  Positive for cough. Negative for shortness of breath.    Cardiovascular:  Negative for chest pain, palpitations and leg swelling.   Gastrointestinal:  Negative for abdominal distention, abdominal pain and nausea.   Endocrine: Negative for polydipsia and polyphagia.   Genitourinary:  Negative for dysuria.   Musculoskeletal:  Negative for joint swelling and myalgias.   Skin:  Negative for rash.   Neurological:  Negative for light-headedness.   Psychiatric/Behavioral:  Negative for decreased concentration.            Vitals:    24 0958   BP: 120/70   Pulse: 71   Resp: 22   Temp: 97.8 °F (36.6 °C)   SpO2: 97%     Height: 5' 6\" (167.6 cm)  IBW (Ideal Body Weight): 59.3 kg  Body mass index is 35.83 kg/m².  Weight (last 2 days)       None                Physical Exam  Constitutional:       General: She is not in acute distress.     Appearance: Normal appearance. She is well-developed. She is obese.   HENT:      Head: Normocephalic.      Right Ear: External ear normal.      Left Ear: External ear normal.      Nose: Nose normal.      Mouth/Throat:      Mouth: Oropharynx is clear and moist.      Pharynx: No oropharyngeal exudate.   Eyes:      Conjunctiva/sclera: Conjunctivae normal.      Pupils: Pupils are equal, round, and reactive to light.   Neck:      Comments: Tracheostomy tube in place.  Site appears clear.  Cardiovascular: "      Rate and Rhythm: Normal rate and regular rhythm.      Heart sounds: Normal heart sounds.   Pulmonary:      Effort: Pulmonary effort is normal.      Comments: Has some expiratory wheezing right lower lobe.  Left lung clear.  No rhonchi or crackles  Abdominal:      General: There is no distension.      Palpations: Abdomen is soft.      Tenderness: There is no abdominal tenderness.   Musculoskeletal:      Cervical back: Neck supple.      Comments: No edema, cyanosis or clubbing   Lymphadenopathy:      Cervical: No cervical adenopathy.   Skin:     General: Skin is warm and dry.   Neurological:      General: No focal deficit present.      Mental Status: She is alert and oriented to person, place, and time.   Psychiatric:         Mood and Affect: Mood and affect normal.

## 2024-08-21 NOTE — PATIENT INSTRUCTIONS
Start prednisone 10 mg 1 tablet daily for 5 days    Start antibiotic cefuroxime 500 mg 1 tablet twice a day until done.  I gave you 10 pills    Try using ipratropium-albuterol in your nebulizer 3 times a day instead of plain albuterol.  Next time we will change prescription to that as I think that is a better drug than plain albuterol    Use budesonide and nebulizer 0.5 mg twice a day    Use your oxygen when you feel short of breath at 3 L/min

## 2024-08-24 NOTE — ASSESSMENT & PLAN NOTE
Has had increased cough recently for some discolored mucus.  I did prescribe Ceftin 500 mg twice daily for 5 days

## 2024-08-24 NOTE — ASSESSMENT & PLAN NOTE
She does have oxygen she can use 3 L at nighttime does not always use it.  Sometimes have some shortness of breath during the night so she may want to use it during the day as well

## 2024-08-24 NOTE — ASSESSMENT & PLAN NOTE
She has subglottic stenosis and has had tracheostomy and for several years.  She did have tracheostomy placed around 2005.  Had her cuffless trach change on 8/8/2024 by Dr. Albright

## 2024-08-24 NOTE — ASSESSMENT & PLAN NOTE
Provide has chronic cough for years.  Last couple weeks has been worse and she is expectorating small amount of yellow mucus.  She clinically has some acute tracheobronchitis and I did prescribe Ceftin.    Has been on guaifenesin with codeine chronically as this is only thing that helps manage her cough

## 2024-08-24 NOTE — ASSESSMENT & PLAN NOTE
She is using nebulizer with budesonide 0.5 mg twice daily.  She will continue as.  I told her she can try using ipratropium albuterol nebulizer up to 3 times a day and I gave her samples of this in place of her regular albuterol 2.5 mg

## 2024-09-24 ENCOUNTER — TELEPHONE (OUTPATIENT)
Age: 84
End: 2024-09-24

## 2024-09-24 NOTE — TELEPHONE ENCOUNTER
She states the Duo-Neb is giving her diarrhea and coughing up a lot and when she coughs in the morning she having blood in phlegm (pink tinge) For 2 weeks. She states that the when she stopped the abx she has been having the symptoms as well. She states that she is also not taking the Robitussin.    Offered pt 1st available sick appt but she states it's too late for her to come in due to her son having to provide transportation.         Pt stated the above and kept saying the medication got her sick. It appears to be the Duo-Neb and she is not having any relief. She would like advice or new script sent to the pharmacy.

## 2024-09-26 DIAGNOSIS — J40 BRONCHITIS: ICD-10-CM

## 2024-09-26 DIAGNOSIS — R05.3 CHRONIC COUGH: Primary | ICD-10-CM

## 2024-09-26 DIAGNOSIS — I25.119 CORONARY ARTERY DISEASE WITH ANGINA PECTORIS, UNSPECIFIED VESSEL OR LESION TYPE, UNSPECIFIED WHETHER NATIVE OR TRANSPLANTED HEART (HCC): ICD-10-CM

## 2024-09-26 DIAGNOSIS — E78.5 HYPERLIPIDEMIA LDL GOAL <70: ICD-10-CM

## 2024-09-26 RX ORDER — BENZONATATE 100 MG/1
100 CAPSULE ORAL 2 TIMES DAILY PRN
Qty: 20 CAPSULE | Refills: 0 | Status: SHIPPED | OUTPATIENT
Start: 2024-09-26

## 2024-09-26 RX ORDER — ATORVASTATIN CALCIUM 40 MG/1
TABLET, FILM COATED ORAL
Qty: 90 TABLET | Refills: 1 | Status: SHIPPED | OUTPATIENT
Start: 2024-09-26

## 2024-09-26 RX ORDER — PREDNISONE 10 MG/1
TABLET ORAL
Qty: 15 TABLET | Refills: 0 | Status: SHIPPED | OUTPATIENT
Start: 2024-09-26

## 2024-09-26 RX ORDER — AZITHROMYCIN 250 MG/1
TABLET, FILM COATED ORAL
Qty: 6 TABLET | Refills: 0 | Status: SHIPPED | OUTPATIENT
Start: 2024-09-26 | End: 2024-09-30

## 2024-09-26 NOTE — PROGRESS NOTES
PERRL complains of cough for some pinkish-white mucus slight yellow in it.  Not having any diarrhea.  Had couple loose stools before.  Uses Robitussin codeine but not helping her cough.  I will order prednisone low-dose 20 mg for 5 days then 10 mg for 5 days.  Also prescribed a 5-day Z-Flaco.  If she starts getting diarrhea again to stop the antibiotic.  Will also prescribe benzonatate she can take 100 mg twice a day as needed for cough.  If cough does not improve she does not get better she will contact my office and I will consider chest x-ray

## 2024-10-29 ENCOUNTER — APPOINTMENT (EMERGENCY)
Dept: RADIOLOGY | Facility: HOSPITAL | Age: 84
End: 2024-10-29
Payer: COMMERCIAL

## 2024-10-29 ENCOUNTER — HOSPITAL ENCOUNTER (EMERGENCY)
Facility: HOSPITAL | Age: 84
Discharge: HOME | End: 2024-10-29
Attending: EMERGENCY MEDICINE
Payer: COMMERCIAL

## 2024-10-29 VITALS
SYSTOLIC BLOOD PRESSURE: 136 MMHG | DIASTOLIC BLOOD PRESSURE: 70 MMHG | HEART RATE: 76 BPM | RESPIRATION RATE: 20 BRPM | TEMPERATURE: 97.9 F | OXYGEN SATURATION: 96 %

## 2024-10-29 DIAGNOSIS — J45.41 MODERATE PERSISTENT ASTHMA WITH EXACERBATION: Primary | ICD-10-CM

## 2024-10-29 LAB
2HR DELTA HS TROPONIN: 1 NG/L
ALBUMIN SERPL BCG-MCNC: 4.1 G/DL (ref 3.5–5)
ALP SERPL-CCNC: 54 U/L (ref 34–104)
ALT SERPL W P-5'-P-CCNC: 11 U/L (ref 7–52)
ANION GAP SERPL CALCULATED.3IONS-SCNC: 7 MMOL/L (ref 4–13)
APTT PPP: 27 SECONDS (ref 23–34)
AST SERPL W P-5'-P-CCNC: 26 U/L (ref 13–39)
BASOPHILS # BLD AUTO: 0.02 THOUSANDS/ΜL (ref 0–0.1)
BASOPHILS NFR BLD AUTO: 0 % (ref 0–1)
BILIRUB SERPL-MCNC: 0.21 MG/DL (ref 0.2–1)
BNP SERPL-MCNC: 59 PG/ML (ref 0–100)
BUN SERPL-MCNC: 25 MG/DL (ref 5–25)
CALCIUM SERPL-MCNC: 9 MG/DL (ref 8.4–10.2)
CARDIAC TROPONIN I PNL SERPL HS: 4 NG/L
CARDIAC TROPONIN I PNL SERPL HS: 5 NG/L
CHLORIDE SERPL-SCNC: 104 MMOL/L (ref 96–108)
CO2 SERPL-SCNC: 23 MMOL/L (ref 21–32)
CREAT SERPL-MCNC: 0.97 MG/DL (ref 0.6–1.3)
EOSINOPHIL # BLD AUTO: 0.3 THOUSAND/ΜL (ref 0–0.61)
EOSINOPHIL NFR BLD AUTO: 6 % (ref 0–6)
ERYTHROCYTE [DISTWIDTH] IN BLOOD BY AUTOMATED COUNT: 13.6 % (ref 11.6–15.1)
GFR SERPL CREATININE-BSD FRML MDRD: 54 ML/MIN/1.73SQ M
GLUCOSE SERPL-MCNC: 79 MG/DL (ref 65–140)
HCT VFR BLD AUTO: 37.3 % (ref 34.8–46.1)
HGB BLD-MCNC: 12 G/DL (ref 11.5–15.4)
IMM GRANULOCYTES # BLD AUTO: 0.02 THOUSAND/UL (ref 0–0.2)
IMM GRANULOCYTES NFR BLD AUTO: 0 % (ref 0–2)
INR PPP: 1.06 (ref 0.85–1.19)
LYMPHOCYTES # BLD AUTO: 1.71 THOUSANDS/ΜL (ref 0.6–4.47)
LYMPHOCYTES NFR BLD AUTO: 34 % (ref 14–44)
MCH RBC QN AUTO: 28.9 PG (ref 26.8–34.3)
MCHC RBC AUTO-ENTMCNC: 32.2 G/DL (ref 31.4–37.4)
MCV RBC AUTO: 90 FL (ref 82–98)
MONOCYTES # BLD AUTO: 0.44 THOUSAND/ΜL (ref 0.17–1.22)
MONOCYTES NFR BLD AUTO: 9 % (ref 4–12)
NEUTROPHILS # BLD AUTO: 2.55 THOUSANDS/ΜL (ref 1.85–7.62)
NEUTS SEG NFR BLD AUTO: 51 % (ref 43–75)
NRBC BLD AUTO-RTO: 0 /100 WBCS
PLATELET # BLD AUTO: 169 THOUSANDS/UL (ref 149–390)
PMV BLD AUTO: 11.6 FL (ref 8.9–12.7)
POTASSIUM SERPL-SCNC: 5.2 MMOL/L (ref 3.5–5.3)
PROT SERPL-MCNC: 7.3 G/DL (ref 6.4–8.4)
PROTHROMBIN TIME: 14.3 SECONDS (ref 12.3–15)
RBC # BLD AUTO: 4.15 MILLION/UL (ref 3.81–5.12)
SODIUM SERPL-SCNC: 134 MMOL/L (ref 135–147)
WBC # BLD AUTO: 5.04 THOUSAND/UL (ref 4.31–10.16)

## 2024-10-29 PROCEDURE — 85610 PROTHROMBIN TIME: CPT | Performed by: EMERGENCY MEDICINE

## 2024-10-29 PROCEDURE — 99284 EMERGENCY DEPT VISIT MOD MDM: CPT | Performed by: EMERGENCY MEDICINE

## 2024-10-29 PROCEDURE — 85730 THROMBOPLASTIN TIME PARTIAL: CPT | Performed by: EMERGENCY MEDICINE

## 2024-10-29 PROCEDURE — 36415 COLL VENOUS BLD VENIPUNCTURE: CPT | Performed by: EMERGENCY MEDICINE

## 2024-10-29 PROCEDURE — 99285 EMERGENCY DEPT VISIT HI MDM: CPT

## 2024-10-29 PROCEDURE — 80053 COMPREHEN METABOLIC PANEL: CPT | Performed by: EMERGENCY MEDICINE

## 2024-10-29 PROCEDURE — 71045 X-RAY EXAM CHEST 1 VIEW: CPT

## 2024-10-29 PROCEDURE — 83880 ASSAY OF NATRIURETIC PEPTIDE: CPT | Performed by: EMERGENCY MEDICINE

## 2024-10-29 PROCEDURE — 84484 ASSAY OF TROPONIN QUANT: CPT | Performed by: EMERGENCY MEDICINE

## 2024-10-29 PROCEDURE — 85025 COMPLETE CBC W/AUTO DIFF WBC: CPT | Performed by: EMERGENCY MEDICINE

## 2024-10-29 PROCEDURE — 96374 THER/PROPH/DIAG INJ IV PUSH: CPT

## 2024-10-29 PROCEDURE — 94760 N-INVAS EAR/PLS OXIMETRY 1: CPT

## 2024-10-29 RX ORDER — METHYLPREDNISOLONE SODIUM SUCCINATE 125 MG/2ML
125 INJECTION, POWDER, LYOPHILIZED, FOR SOLUTION INTRAMUSCULAR; INTRAVENOUS ONCE
Status: COMPLETED | OUTPATIENT
Start: 2024-10-29 | End: 2024-10-29

## 2024-10-29 RX ORDER — IPRATROPIUM BROMIDE AND ALBUTEROL SULFATE 2.5; .5 MG/3ML; MG/3ML
3 SOLUTION RESPIRATORY (INHALATION) ONCE
Status: COMPLETED | OUTPATIENT
Start: 2024-10-29 | End: 2024-10-29

## 2024-10-29 RX ORDER — PREDNISONE 10 MG/1
20 TABLET ORAL DAILY
Qty: 10 TABLET | Refills: 0 | Status: SHIPPED | OUTPATIENT
Start: 2024-10-29 | End: 2024-11-03

## 2024-10-29 RX ADMIN — IPRATROPIUM BROMIDE AND ALBUTEROL SULFATE 3 ML: .5; 3 SOLUTION RESPIRATORY (INHALATION) at 21:50

## 2024-10-29 RX ADMIN — IPRATROPIUM BROMIDE AND ALBUTEROL SULFATE 3 ML: 2.5; .5 SOLUTION RESPIRATORY (INHALATION) at 18:43

## 2024-10-29 RX ADMIN — METHYLPREDNISOLONE SODIUM SUCCINATE 125 MG: 125 INJECTION, POWDER, FOR SOLUTION INTRAMUSCULAR; INTRAVENOUS at 18:43

## 2024-10-29 NOTE — ED PROVIDER NOTES
Time reflects when diagnosis was documented in both MDM as applicable and the Disposition within this note       Time User Action Codes Description Comment    10/29/2024 10:28 PM Bryson Cam Add [J45.41] Moderate persistent asthma with exacerbation           ED Disposition       ED Disposition   Discharge    Condition   Stable    Date/Time   Tue Oct 29, 2024 10:28 PM    Comment   Alaina [place] discharge to home/self care.                   Assessment & Plan       Medical Decision Making  84-year-old female presents history of trach in place states that when she goes into the freezer to get things all the time she ends up getting sick from it.  Patient does have some wheezing    Patient states she feels much better and is ready to go home.    Amount and/or Complexity of Data Reviewed  Labs: ordered.  Radiology: ordered.    Risk  Prescription drug management.             Medications   ipratropium-albuterol (DUO-NEB) 0.5-2.5 mg/3 mL inhalation solution 3 mL (3 mL Nebulization Given 10/29/24 1843)   methylPREDNISolone sodium succinate (Solu-MEDROL) injection 125 mg (125 mg Intravenous Given 10/29/24 1843)   ipratropium-albuterol (DUO-NEB) 0.5-2.5 mg/3 mL inhalation solution 3 mL (3 mL Nebulization Given 10/29/24 2150)       ED Risk Strat Scores                                               History of Present Illness       Chief Complaint   Patient presents with    Shortness of Breath     Pt c/o sob for 3 weeks       Past Medical History:   Diagnosis Date    BOOP (bronchiolitis obliterans with organizing pneumonia) (HCC) 01/01/2006    Coronary artery disease     Disease of thyroid gland     HL (hearing loss)     Papillary thyroid carcinoma (HCC) 11/29/2021    Post-surgical hypothyroidism 12/05/2017    Voice disorder       Past Surgical History:   Procedure Laterality Date    HYSTERECTOMY      REPLACEMENT TOTAL KNEE      THYROID SURGERY      TRACHEOSTOMY  01/01/2006      Family History   Problem Relation Age of  Onset    Heart disease Mother     Hypertension Mother     Heart disease Father     Pneumonia Brother          of COVID    Dementia Brother       Social History     Tobacco Use    Smoking status: Never     Passive exposure: Past    Smokeless tobacco: Never   Vaping Use    Vaping status: Never Used   Substance Use Topics    Alcohol use: Never    Drug use: Never      E-Cigarette/Vaping    E-Cigarette Use Never User       E-Cigarette/Vaping Substances    Nicotine No     THC No     CBD No     Flavoring No     Other No     Unknown No       I have reviewed and agree with the history as documented.     84-year-old female presents with shortness of breath cough congestion no fevers or chills states that she does have some asthma and she gets wheezing.  Has been on prednisone in the past currently not on any she states she did do a albuterol nebulizer at home prior to coming in.  Patient also has some secretions in her trach.        Review of Systems   Constitutional:  Negative for activity change, chills, diaphoresis and fever.   HENT:  Negative for congestion, drooling, ear pain, mouth sores, nosebleeds, rhinorrhea, sore throat, trouble swallowing and voice change.    Eyes:  Negative for pain, discharge and redness.   Respiratory:  Positive for cough, shortness of breath and wheezing. Negative for apnea, choking, chest tightness and stridor.    Cardiovascular:  Negative for chest pain and palpitations.   Gastrointestinal:  Negative for abdominal distention, abdominal pain, constipation, diarrhea, nausea and vomiting.   Endocrine: Negative for polydipsia and polyuria.   Genitourinary:  Negative for difficulty urinating, dysuria, flank pain, frequency, hematuria and urgency.   Musculoskeletal:  Negative for back pain, gait problem, joint swelling, myalgias, neck pain and neck stiffness.   Skin:  Negative for color change, pallor and rash.   Allergic/Immunologic: Negative for food allergies.   Neurological:  Negative for  dizziness, tremors, syncope, speech difficulty, weakness, light-headedness, numbness and headaches.   Hematological:  Negative for adenopathy.   Psychiatric/Behavioral:  Negative for agitation, confusion, hallucinations, self-injury, sleep disturbance and suicidal ideas. The patient is not nervous/anxious and is not hyperactive.            Objective       ED Triage Vitals   Temperature Pulse Blood Pressure Respirations SpO2 Patient Position - Orthostatic VS   10/29/24 1815 10/29/24 1815 10/29/24 1815 10/29/24 1815 10/29/24 1815 10/29/24 1900   97.9 °F (36.6 °C) 68 135/63 20 95 % Lying      Temp Source Heart Rate Source BP Location FiO2 (%) Pain Score    10/29/24 1815 10/29/24 1900 10/29/24 1900 10/29/24 1844 --    Temporal Monitor Right arm 28       Vitals      Date and Time Temp Pulse SpO2 Resp BP Pain Score FACES Pain Rating User   10/29/24 2231 -- 76 96 % 20 136/70 -- --    10/29/24 2200 -- 66 97 % 20 151/76 -- -- SW   10/29/24 2045 -- 65 100 % 20 143/75 -- -- AM   10/29/24 2000 -- 65 97 % 23 139/65 -- -- AM   10/29/24 1930 -- 62 98 % 23 125/58 -- -- AM   10/29/24 1900 -- 69 Simultaneous filing. User may not have seen previous data. 94 % Simultaneous filing. User may not have seen previous data. 22 Simultaneous filing. User may not have seen previous data. 122/60 Simultaneous filing. User may not have seen previous data. -- -- AM   10/29/24 1815 97.9 °F (36.6 °C) 68 95 % 20 135/63 -- -- JEREMÍAS            Physical Exam  Vitals and nursing note reviewed.   Constitutional:       General: She is not in acute distress.     Appearance: She is well-developed. She is not diaphoretic.   HENT:      Head: Normocephalic and atraumatic.      Right Ear: External ear normal.      Left Ear: External ear normal.      Nose: Nose normal.   Eyes:      Conjunctiva/sclera: Conjunctivae normal.      Pupils: Pupils are equal, round, and reactive to light.   Cardiovascular:      Rate and Rhythm: Normal rate and regular rhythm.      Heart  sounds: Normal heart sounds.   Pulmonary:      Effort: Pulmonary effort is normal.      Breath sounds: Normal breath sounds.   Abdominal:      General: Bowel sounds are normal.      Palpations: Abdomen is soft.      Tenderness: There is no abdominal tenderness.   Musculoskeletal:         General: Normal range of motion.      Cervical back: Normal range of motion and neck supple.   Skin:     General: Skin is warm and dry.   Neurological:      Mental Status: She is alert and oriented to person, place, and time.      Deep Tendon Reflexes: Reflexes are normal and symmetric.         Results Reviewed       Procedure Component Value Units Date/Time    HS Troponin I 2hr [[id number]]  (Normal) Collected: 10/29/24 2101    Lab Status: Final result Specimen: Blood from Arm, Left Updated: 10/29/24 2131     hs TnI 2hr 5 ng/L      Delta 2hr hsTnI 1 ng/L     HS Troponin I 4hr [[id number]]     Lab Status: No result Specimen: Blood     HS Troponin 0hr (reflex protocol) [[id number]]  (Normal) Collected: 10/29/24 1842    Lab Status: Final result Specimen: Blood from Arm, Left Updated: 10/29/24 1919     hs TnI 0hr 4 ng/L     Comprehensive metabolic panel [[id number]]  (Abnormal) Collected: 10/29/24 1842    Lab Status: Final result Specimen: Blood from Arm, Left Updated: 10/29/24 1918     Sodium 134 mmol/L      Potassium 5.2 mmol/L      Chloride 104 mmol/L      CO2 23 mmol/L      ANION GAP 7 mmol/L      BUN 25 mg/dL      Creatinine 0.97 mg/dL      Glucose 79 mg/dL      Calcium 9.0 mg/dL      AST 26 U/L      ALT 11 U/L      Alkaline Phosphatase 54 U/L      Total Protein 7.3 g/dL      Albumin 4.1 g/dL      Total Bilirubin 0.21 mg/dL      eGFR 54 ml/min/1.73sq m     Narrative:      [employer] guidelines for Chronic Kidney Disease (CKD):     Stage 1 with normal or high GFR (GFR > 90 mL/min/1.73 square meters)    Stage 2 Mild CKD (GFR = 60-89 mL/min/1.73 square meters)    Stage 3A Moderate CKD (GFR = 45-59 mL/min/1.73  square meters)    Stage 3B Moderate CKD (GFR = 30-44 mL/min/1.73 square meters)    Stage 4 Severe CKD (GFR = 15-29 mL/min/1.73 square meters)    Stage 5 End Stage CKD (GFR <15 mL/min/1.73 square meters)  Note: GFR calculation is accurate only with a steady state creatinine    B-Type Natriuretic Peptide(BNP) [[id number]]  (Normal) Collected: 10/29/24 1842    Lab Status: Final result Specimen: Blood from Arm, Left Updated: 10/29/24 1918     BNP 59 pg/mL     Protime-INR [[id number]]  (Normal) Collected: 10/29/24 1842    Lab Status: Final result Specimen: Blood from Arm, Left Updated: 10/29/24 1916     Protime 14.3 seconds      INR 1.06    Narrative:      INR Therapeutic Range    Indication                                             INR Range      Atrial Fibrillation                                               2.0-3.0  Hypercoagulable State                                    2.0.2.3  Left Ventricular Asist Device                            2.0-3.0  Mechanical Heart Valve                                  -    Aortic(with afib, MI, embolism, HF, LA enlargement,    and/or coagulopathy)                                     2.0-3.0 (2.5-3.5)     Mitral                                                             2.5-3.5  Prosthetic/Bioprosthetic Heart Valve               2.0-3.0  Venous thromboembolism (VTE: VT, PE        2.0-3.0    APTT [[id number]]  (Normal) Collected: 10/29/24 1842    Lab Status: Final result Specimen: Blood from Arm, Left Updated: 10/29/24 1916     PTT 27 seconds     CBC and differential [[id number]] Collected: 10/29/24 1842    Lab Status: Final result Specimen: Blood from Arm, Left Updated: 10/29/24 1854     WBC 5.04 Thousand/uL      RBC 4.15 Million/uL      Hemoglobin 12.0 g/dL      Hematocrit 37.3 %      MCV 90 fL      MCH 28.9 pg      MCHC 32.2 g/dL      RDW 13.6 %      MPV 11.6 fL      Platelets 169 Thousands/uL      nRBC 0 /100 WBCs      Segmented % 51 %      Immature Grans % 0 %      Lymphocytes % 34  %      Monocytes % 9 %      Eosinophils Relative 6 %      Basophils Relative 0 %      Absolute Neutrophils 2.55 Thousands/µL      Absolute Immature Grans 0.02 Thousand/uL      Absolute Lymphocytes 1.71 Thousands/µL      Absolute Monocytes 0.44 Thousand/µL      Eosinophils Absolute 0.30 Thousand/µL      Basophils Absolute 0.02 Thousands/µL             XR chest 1 view portable    (Results Pending)       Procedures    ED Medication and Procedure Management   Prior to Admission Medications   Prescriptions Last Dose Informant Patient Reported? Taking?   Ascorbic Acid (vitamin C) 1000 MG tablet  Self No No   Sig: Take 1 tablet (1,000 mg total) by mouth daily   Blood Glucose Monitoring Suppl (OneTouch Verio) w/Device KIT  Self No No   Sig: Use to test blood sugar 2 times a day   Calcium Carbonate-Vitamin D3 (Calcium 600+D) 600-400 MG-UNIT TABS  Self Yes No   Sig: Take 1 tablet by mouth daily     Lancets (onetouch ultrasoft) lancets  Self No No   Sig: Use to test blood sugar 2 times a day   Multiple Vitamins-Minerals (CENTRUM SILVER 50+WOMEN PO)  Self Yes No   Sig: Take by mouth   acetaminophen (TYLENOL) 650 mg CR tablet  Self No No   Sig: Take 1 tablet (650 mg total) by mouth every 8 (eight) hours as needed for mild pain   aspirin 81 mg chewable tablet  Self No No   Sig: Chew 1 tablet (81 mg total) daily   atorvastatin (LIPITOR) 40 mg tablet   No No   Sig: TAKE 1 TABLET DAILY   benzonatate (TESSALON PERLES) 100 mg capsule   No No   Sig: Take 1 capsule (100 mg total) by mouth 2 (two) times a day as needed for cough   budesonide (PULMICORT) 0.5 mg/2 mL nebulizer solution  Self No No   Sig: TAKE 2 ML BY NEBULIZATION 2 (TWO) TIMES A DAY   furosemide (LASIX) 40 mg tablet  Self No No   Sig: Take 1 tablet (40 mg total) by mouth daily   glipiZIDE (GLUCOTROL XL) 2.5 mg 24 hr tablet  Self No No   Sig: TAKE 2 TABLETS BY MOUTH DAILY   glucose blood (OneTouch Verio) test strip  Self No No   Sig: Use to test blood sugar 2 times daily    guaiFENesin-codeine (ROBITUSSIN AC) 100-10 mg/5 mL oral solution   No No   Sig: Take 5 mL by mouth 2 (two) times a day as needed for cough   ipratropium-albuterol (DUO-NEB) 0.5-2.5 mg/3 mL nebulizer solution   No No   Sig: Take 3 mL by nebulization every 6 (six) hours as needed for wheezing or shortness of breath   isosorbide mononitrate (IMDUR) 30 mg 24 hr tablet  Self No No   Sig: TAKE 1 TABLET DAILY   lidocaine (Lidoderm) 5 %  Self No No   Sig: Apply 1 patch topically over 12 hours daily Remove & Discard patch within 12 hours or as directed by MD   lisinopril (ZESTRIL) 20 mg tablet  Self No No   Sig: TAKE 1 TABLET DAILY   metoprolol tartrate (LOPRESSOR) 25 mg tablet  Self No No   Sig: TAKE 1/2 TABLET EVERY 12 HOURS   ondansetron (Zofran ODT) 4 mg disintegrating tablet  Self No No   Sig: Take 1 tablet (4 mg total) by mouth every 6 (six) hours as needed for nausea or vomiting   pantoprazole (PROTONIX) 40 mg tablet  Self No No   Sig: TAKE ONE TABLET DAILY   predniSONE 10 mg tablet   No No   Sig: Take 1 tablet daily for 5 days   predniSONE 10 mg tablet   No No   Sig: Take 2 tablets for 5 days then 1 tablet for 5 days   propylthiouracil 50 mg tablet  Self No No   Si.5 tablets per day   sodium chloride 0.9 % nebulizer solution  Self No No   Sig: TAKE 3 ML BY NEBULIZATION 2 (TWO) TIMES A DAY AS NEEDED FOR WHEEZING      Facility-Administered Medications: None     Patient's Medications   Discharge Prescriptions    PREDNISONE 10 MG TABLET    Take 2 tablets (20 mg total) by mouth daily for 5 days       Start Date: 10/29/2024End Date: 11/3/2024       Order Dose: 20 mg       Quantity: 10 tablet    Refills: 0     No discharge procedures on file.  ED SEPSIS DOCUMENTATION   Time reflects when diagnosis was documented in both MDM as applicable and the Disposition within this note       Time User Action Codes Description Comment    10/29/2024 10:28 PM Bryson Cam [J45.41] Moderate persistent asthma with exacerbation                   Bryson Cam, DO  10/29/24 2236

## 2024-10-30 DIAGNOSIS — R05.3 CHRONIC COUGH: ICD-10-CM

## 2024-10-30 NOTE — ED NOTES
Patient transported out of the ED on a WC, AAOx4 resp even and unlabored with no S$S of distress.       Thi Hernandez RN  10/29/24 [id number]

## 2024-11-01 RX ORDER — CODEINE PHOSPHATE AND GUAIFENESIN 10; 100 MG/5ML; MG/5ML
5 SOLUTION ORAL 2 TIMES DAILY PRN
Qty: 300 ML | Refills: 1 | Status: SHIPPED | OUTPATIENT
Start: 2024-11-01

## 2024-11-04 LAB
ALBUMIN SERPL-MCNC: 4 G/DL (ref 3.7–4.7)
ALBUMIN/CREAT UR: <8 MG/G CREAT (ref 0–29)
ALP SERPL-CCNC: 71 IU/L (ref 44–121)
ALT SERPL-CCNC: 13 IU/L (ref 0–32)
AST SERPL-CCNC: 16 IU/L (ref 0–40)
BILIRUB SERPL-MCNC: 0.2 MG/DL (ref 0–1.2)
BUN SERPL-MCNC: 24 MG/DL (ref 8–27)
BUN/CREAT SERPL: 24 (ref 12–28)
CALCIUM SERPL-MCNC: 9.7 MG/DL (ref 8.7–10.3)
CHLORIDE SERPL-SCNC: 104 MMOL/L (ref 96–106)
CO2 SERPL-SCNC: 16 MMOL/L (ref 20–29)
CREAT SERPL-MCNC: 0.99 MG/DL (ref 0.57–1)
CREAT UR-MCNC: 39 MG/DL
EGFR: 57 ML/MIN/1.73
GLOBULIN SER-MCNC: 3.2 G/DL (ref 1.5–4.5)
GLUCOSE SERPL-MCNC: 271 MG/DL (ref 70–99)
MICROALBUMIN UR-MCNC: <3 UG/ML
POTASSIUM SERPL-SCNC: 4.6 MMOL/L (ref 3.5–5.2)
PROT SERPL-MCNC: 7.2 G/DL (ref 6–8.5)
SODIUM SERPL-SCNC: 142 MMOL/L (ref 134–144)
T3 SERPL-MCNC: 58 NG/DL (ref 71–180)
T4 FREE SERPL-MCNC: 0.92 NG/DL (ref 0.82–1.77)
TSH SERPL DL<=0.005 MIU/L-ACNC: 1.6 UIU/ML (ref 0.45–4.5)

## 2024-11-07 ENCOUNTER — TELEPHONE (OUTPATIENT)
Dept: OTOLARYNGOLOGY | Facility: CLINIC | Age: 84
End: 2024-11-07

## 2024-11-07 ENCOUNTER — OFFICE VISIT (OUTPATIENT)
Dept: OTOLARYNGOLOGY | Facility: CLINIC | Age: 84
End: 2024-11-07
Payer: COMMERCIAL

## 2024-11-07 ENCOUNTER — OFFICE VISIT (OUTPATIENT)
Dept: ENDOCRINOLOGY | Facility: CLINIC | Age: 84
End: 2024-11-07
Payer: COMMERCIAL

## 2024-11-07 ENCOUNTER — OFFICE VISIT (OUTPATIENT)
Dept: AUDIOLOGY | Facility: CLINIC | Age: 84
End: 2024-11-07
Payer: COMMERCIAL

## 2024-11-07 VITALS
HEIGHT: 66 IN | DIASTOLIC BLOOD PRESSURE: 80 MMHG | WEIGHT: 220 LBS | SYSTOLIC BLOOD PRESSURE: 122 MMHG | BODY MASS INDEX: 35.36 KG/M2 | HEART RATE: 61 BPM | OXYGEN SATURATION: 97 %

## 2024-11-07 VITALS — BODY MASS INDEX: 35.36 KG/M2 | HEIGHT: 66 IN | WEIGHT: 220 LBS

## 2024-11-07 DIAGNOSIS — E11.65 TYPE 2 DIABETES MELLITUS WITH HYPERGLYCEMIA, WITHOUT LONG-TERM CURRENT USE OF INSULIN (HCC): ICD-10-CM

## 2024-11-07 DIAGNOSIS — H90.3 SENSORINEURAL HEARING LOSS (SNHL) OF BOTH EARS: ICD-10-CM

## 2024-11-07 DIAGNOSIS — E78.2 MIXED HYPERLIPIDEMIA: ICD-10-CM

## 2024-11-07 DIAGNOSIS — J38.6 SUBGLOTTIC STENOSIS: ICD-10-CM

## 2024-11-07 DIAGNOSIS — I10 HYPERTENSION, ESSENTIAL: Primary | ICD-10-CM

## 2024-11-07 DIAGNOSIS — Z93.0 TRACHEOSTOMY DEPENDENCE (HCC): Primary | ICD-10-CM

## 2024-11-07 DIAGNOSIS — E05.90 HYPERTHYROIDISM: ICD-10-CM

## 2024-11-07 LAB — SL AMB POCT HEMOGLOBIN AIC: 8.4 (ref ?–6.5)

## 2024-11-07 PROCEDURE — 99214 OFFICE O/P EST MOD 30 MIN: CPT | Performed by: OTOLARYNGOLOGY

## 2024-11-07 PROCEDURE — 83036 HEMOGLOBIN GLYCOSYLATED A1C: CPT | Performed by: NURSE PRACTITIONER

## 2024-11-07 PROCEDURE — 92567 TYMPANOMETRY: CPT | Performed by: AUDIOLOGIST

## 2024-11-07 PROCEDURE — 99204 OFFICE O/P NEW MOD 45 MIN: CPT | Performed by: NURSE PRACTITIONER

## 2024-11-07 PROCEDURE — 92553 AUDIOMETRY AIR & BONE: CPT | Performed by: AUDIOLOGIST

## 2024-11-07 NOTE — PROGRESS NOTES
"Assessment/Plan:  Bilateral SNHL.  Pt referred for Hearing Aid Evaluation.  Pt brought in the wrong tracheostomy tube, but her present one is still OK.  F/u for trach change.      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 Sensorineural hearing loss (SNHL) of both ears  H90.3 Ambulatory Referral to Audiology             Subjective:      Patient ID: Alaina Maza is a 84 y.o. female.    F/u for trach change.  Pt went to the ER last week for an asthma exacerbation.  She also feels like her hearing is getting worse.        The following portions of the patient's history were reviewed and updated as appropriate: allergies, current medications, past family history, past medical history, past social history, past surgical history and problem list.    Review of Systems      Objective:      Ht 5' 6\" (1.676 m)   Wt 99.8 kg (220 lb)   BMI 35.51 kg/m²          Physical Exam  Constitutional:       Appearance: She is well-developed.   HENT:      Head: Normocephalic and atraumatic.      Right Ear: Tympanic membrane, ear canal and external ear normal. No drainage. No middle ear effusion.      Left Ear: Tympanic membrane, ear canal and external ear normal. No drainage.  No middle ear effusion.      Nose: Nose normal.      Mouth/Throat:      Pharynx: Uvula midline. No oropharyngeal exudate.      Tonsils: 2+ on the right. 2+ on the left.   Neck:      Thyroid: No thyroid mass or thyromegaly.      Trachea: Trachea normal. No tracheal deviation.     Lymphadenopathy:      Cervical: No cervical adenopathy.   Neurological:      Mental Status: She is alert.         Flexible Fiberoptic Tracheoscopy Procedure Note:  Indication:  trach  Verbal consent obtained.  Surgeon: Christopher Albright MD  Scope passed through tracheostomy tube  Distal trachea: unremarkable  Yani: unremarkable    Audiogram:  AD:   AS:   Tympanogram:  AD: type A  AS: type A  "

## 2024-11-07 NOTE — PROGRESS NOTES
Ambulatory Visit  Name: Alaina Maza      : 1940      MRN: [id number]  Encounter Provider: KIERA Sotomayor  Encounter Date: 2024   Encounter department: [facility]    Assessment & Plan  Type 2 diabetes mellitus with hyperglycemia, without long-term current use of insulin ([facility])    Lab Results   Component Value Date    HGBA1C 8.4 (A) 2024     HGA1C slightly elevated. Recently had oral steroid course. Will send blood sugar logs in 1-2 weeks for review.     Discussed risks/complications associated with uncontrolled diabetes including organ involvement, heart attack, stroke, death.  Recommended referral to diabetes education.      Advised lifestyle modifications including attention to diet including the amount and types of carbohydrates consumed and regular activity.     Call for blood sugars less than 70 mg/dl or patterns over 250 mg/dl.     Discussed symptoms and treatment of hypoglycemia.  Reviewed risks associated with hypoglycemia. Always carry rapid acting carbohydrates and a glucometer (a way to check your blood sugar).    Recommendation for medical identification either bracelet, necklace.    Recommend routine follow up for diabetic eye and foot exams. Foot exam completed in office today-- recommend podiatry; referral provided.     Ordered blood work to complete prior to next visit.    Send glucose logs/CGM download in 1-2 weeks for review    Follow up in 3 months.       Orders:    Ambulatory referral to Podiatry; Future    POCT hemoglobin A1c    Hemoglobin A1C; Future    Comprehensive metabolic panel; Future    Hemoglobin A1C    Comprehensive metabolic panel    Hypertension, essential  BP under good control.   Continues on ACE.         Mixed hyperlipidemia  Continues on statin.   Orders:    Lipid panel; Future    Lipid panel    Hyperthyroidism  Clinically euthyroid.   Continue current regimen     Component      Latest Ref Rng 10/31/2024  Physical Therapy    Visit Type: treatment    Relevant History/Co-morbidities: Patient admitted with c/o low back pain after injury. Imaging noted mild anterior wedging of T12 and L1; Intervertebral disc height loss most significant at L3-L4, L4-L5, and L5-S1.    PT/OT ordered. Activity as tolerated.    PMHx: hypertension, CHF, atrial fibrillation on Eliquis, PVD, AAA, TIA, chronic lower back pain    SUBJECTIVE  RN in agreement to work with patient for therapy session.  \"I bottom hurts.\"  Patient / Family Goal: maximize function    Pain     Location: bottom    At onset of session (out of 10): 6     OBJECTIVE     Cognitive Status   Orientation    - Oriented to: person, place and situation  Functional Communication   - Forms of Communication: verbal and nods/gestures appropriately        Transfers  Assistive devices: gait belt, 2-wheeled walker  - Sit to stand: contact guard/touching/steadying assist, with verbal cues, with tactile cues  - Stand to sit: with tactile cues, with verbal cues, contact guard/touching/steadying assist  - Stand pivot: contact guard/touching/steadying assist, with verbal cues, with tactile cues  Cues for setup and sequencing to promote ease of task performance. Cues needed for keeping w/c on floor during task.     Ambulation / Gait  - Assistive device: gait belt, 2-wheeled walker and follow with wheelchair  - Distance (feet unless otherwise indicated): 100  Patient able to reach above ranges with increase time due to decrease aerobic capacity.  - Assist Level: contact guard/touching/steadying assist  - Surface: even  - Description: decreased missy/pace, decreased step length left, decreased step length right, lateral trunk sway, narrow base of support, unsteady, loss of balance, bumps objects on left and bumps objects on right  contact guard assist required secondary to Instability left knee buckling during gait ; cues for proper use assistive device; close proximity of rolling walker;       TSH, POC      0.450 - 4.500 uIU/mL 1.600    FREE T4      0.82 - 1.77 ng/dL 0.92    T3, Total      71 - 180 ng/dL 58 (L)         Orders:    TSH, 3rd generation; Future    T4, free; Future    T3; Future    TSH, 3rd generation    T4, free    T3      History of Present Illness     Alaina Maza is a 84 y.o. 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      Denies recent illness or hospitalizations.      Seen in ED for asthma exacerbation end of October 2024 and was given oral steroids which caused elevation in glucose levels.      Home glucose monitoring: are performed once daily      Struggling to find provider for diabetic eye exam.    Blood sugars  mg/dL from past week    Denies symptomatic hypoglycemia.     Denies polyuria, polydipsia, or changes in vision.      Current regimen:  Glipizide 5 mg daily      On ACE and Statin      Thyroid disorders: PTU has been taking 3 tablets daily. Denies symptoms consistent with hypo/hyperthyroidism. Denies change in energy level or weight.  Denies anxiety, jitteriness, or tremors.  Denies tachycardia or palpitations.  Denies constipation or hyperdefecation.  Denies frequent headaches.  Denies temperature intolerances.      Denies side effects of treatments.     History obtained from : patient    Review of Systems  See HPI.   All other systems reviewed and are negative.    Medical History Reviewed by provider this encounter:       Current Outpatient Medications on File Prior to Visit   Medication Sig Dispense Refill    acetaminophen (TYLENOL) 650 mg CR tablet Take 1 tablet (650 mg total) by mouth every 8 (eight) hours as needed for mild pain 30 tablet 0    Ascorbic Acid (vitamin C) 1000 MG tablet Take 1 tablet (1,000 mg total) by mouth daily 30 tablet 5    aspirin 81 mg chewable tablet Chew 1  Interventions     Supine    Lower Extremity: Bilateral: quad sets, gluteus sets and ankle pumps, AROM, 10 reps, 1 sets  Seated    Lower Extremity: Bilateral: knee flexion, toe raises, heel raises, knee extensions and seated hip flexion, AROM, 15 reps, 1 sets  Skilled input: Verbal instruction/cues, tactile instruction/cues and facilitation  Verbal Consent: Writer verbally educated and received verbal consent for hand placement, positioning of patient, and techniques to be performed today from patient for clothing adjustments for techniques and therapist position for techniques as described above and how they are pertinent to the patient's plan of care.         Education:   - Present and ready to learn: patient (Education provide on gait, transfers, body mechanics, safety awareness and energy conservation)  Education provided during session:  - Results of above outlined education: Verbalizes understanding    ASSESSMENT   Progress: slow progress    Discharge needs based on today's assessment:   - Current level of function: slightly below baseline level of function   - Therapy needs at discharge: therapy 1-3 times per week   - Activities of daily living (ADLs) requiring support at discharge: bed mobility, transfers, ambulation and stairs   - Impairments that require further therapy intervention: ROM, strength, balance, safety awareness, vestibular, coordination, activity tolerance, pain, motor planning, executive functioning and visual perception    AM-PAC  - Generalized Prior Level of Function: IND/MOD I ([facility] 22-24)       Key: MOD A=moderate assistance, IND/MOD I=independent/modified independent  - Generalized Current Level of Function     - Current Mobility Score: 17       AM-PAC Scoring Key= >21 Modified Independent; 20-21 Supervision; 18-19 Minimal assist; 13-17 Moderate assist; 9-12 Max assist; <9 Total assist      PLAN (while hospitalized)  Suggestions for next session as indicated: Address aerobic  Capacity    PT Frequency: 3-5 x per week      PT/OT Mobility Equipment for Discharge: Owns electric scooter, RW, and cane  PT/OT ADL Equipment for Discharge: Owns shower chair  Agreement to plan and goals: patient agrees with goals and treatment plan        GOALS  Review Date: 11/8/2024  Long Term Goals: (to be met by time of discharge from hospital)  Sit to supine: Patient will complete sit to supine modified independent.  Status: progressing/ongoing  Supine to sit: Patient will complete supine to sit modified independent.  Status: progressing/ongoing  Sit to stand: Patient will complete sit to stand transfer with least restrictive device, modified independent.   Status: progressing/ongoing  Stand to sit: Patient will complete stand to sit transfer with least restrictive device, modified independent.   Status: progressing/ongoing  Stand pivot: Patient will complete stand pivot transfer with least restrictive device, modified independent.   Status: progressing/ongoing  Ambulation (even): Patient will ambulate on even surface for 100 feet with least restrictive device, modified independent.   Status: progressing/ongoing  3-4 steps: Patient will ambulate 3-4 steps with using 2 rails, modified independent.   Status: progressing/ongoing  Documented in the chart in the following areas: Assessment/Plan.      Patient at End of Session:   Location: in chair  Safety measures: alarm system in place/re-engaged, call light within reach, equipment intact and lines intact  Handoff to: nurse and nurse assistant      Therapy procedure time and total treatment time can be found documented on the Time Entry flowsheet   tablet (81 mg total) daily 30 tablet 5    atorvastatin (LIPITOR) 40 mg tablet TAKE 1 TABLET DAILY 90 tablet 1    benzonatate (TESSALON PERLES) 100 mg capsule Take 1 capsule (100 mg total) by mouth 2 (two) times a day as needed for cough 20 capsule 0    Blood Glucose Monitoring Suppl (OneTouch Verio) w/Device KIT Use to test blood sugar 2 times a day 1 kit 0    budesonide (PULMICORT) 0.5 mg/2 mL nebulizer solution TAKE 2 ML BY NEBULIZATION 2 (TWO) TIMES A  mL 11    Calcium Carbonate-Vitamin D3 (Calcium 600+D) 600-400 MG-UNIT TABS Take 1 tablet by mouth daily        furosemide (LASIX) 40 mg tablet Take 1 tablet (40 mg total) by mouth daily 90 tablet 2    glipiZIDE (GLUCOTROL XL) 2.5 mg 24 hr tablet TAKE 2 TABLETS BY MOUTH DAILY 180 tablet 1    glucose blood (OneTouch Verio) test strip Use to test blood sugar 2 times daily 100 strip 3    guaiFENesin-codeine (ROBITUSSIN AC) 100-10 mg/5 mL oral solution TAKE 5 ML BY MOUTH 2 (TWO) TIMES A DAY AS NEEDED FOR COUGH 300 mL 1    ipratropium-albuterol (DUO-NEB) 0.5-2.5 mg/3 mL nebulizer solution Take 3 mL by nebulization every 6 (six) hours as needed for wheezing or shortness of breath 360 mL 7    isosorbide mononitrate (IMDUR) 30 mg 24 hr tablet TAKE 1 TABLET DAILY 30 tablet 5    Lancets (onetouch ultrasoft) lancets Use to test blood sugar 2 times a day 100 each 3    lidocaine (Lidoderm) 5 % Apply 1 patch topically over 12 hours daily Remove & Discard patch within 12 hours or as directed by MD 15 patch 0    lisinopril (ZESTRIL) 20 mg tablet TAKE 1 TABLET DAILY 90 tablet 1    metoprolol tartrate (LOPRESSOR) 25 mg tablet TAKE 1/2 TABLET EVERY 12 HOURS 60 tablet 5    Multiple Vitamins-Minerals (CENTRUM SILVER 50+WOMEN PO) Take by mouth      ondansetron (Zofran ODT) 4 mg disintegrating tablet Take 1 tablet (4 mg total) by mouth every 6 (six) hours as needed for nausea or vomiting 10 tablet 0    pantoprazole (PROTONIX) 40 mg tablet TAKE ONE TABLET DAILY 30 tablet 5     "predniSONE 10 mg tablet Take 1 tablet daily for 5 days 5 tablet 0    predniSONE 10 mg tablet Take 2 tablets for 5 days then 1 tablet for 5 days 15 tablet 0    propylthiouracil 50 mg tablet 2.5 tablets per day 270 tablet 3    sodium chloride 0.9 % nebulizer solution TAKE 3 ML BY NEBULIZATION 2 (TWO) TIMES A DAY AS NEEDED FOR WHEEZING 300 mL 6     No current facility-administered medications on file prior to visit.          Objective     /80 (BP Location: Left arm, Patient Position: Sitting, Cuff Size: Large)   Pulse 61   Ht 5' 6\" (1.676 m)   Wt 99.8 kg (220 lb)   SpO2 97%   BMI 35.51 kg/m²        Physical Exam  Vitals reviewed.   Constitutional:       Appearance: Normal appearance.   Cardiovascular:      Rate and Rhythm: Normal rate and regular rhythm.   Pulmonary:      Effort: Pulmonary effort is normal. Trach in place.   Skin:     General: Skin is warm and dry.      Capillary Refill: Capillary refill takes less than 2 seconds.   Neurological:      General: No focal deficit present.      Mental Status: She is alert and oriented to person, place, and time.   Psychiatric:         Mood and Affect: Mood normal.         Behavior: Behavior normal.     Physical Exam  Cardiovascular:      Pulses: no weak pulses.           Dorsalis pedis pulses are 2+ on the right side and 2+ on the left side.   Feet:      Right foot:      Skin integrity: No ulcer, skin breakdown, erythema, warmth, callus or dry skin.      Left foot:      Skin integrity: No ulcer, skin breakdown, erythema, warmth, callus or dry skin.       Diabetic Foot Exam    Patient's shoes and socks removed.    Right Foot/Ankle   Right Foot Inspection  Skin Exam: skin normal. Skin not intact, no dry skin, no warmth, no callus, no erythema, no maceration, no abnormal color, no pre-ulcer, no ulcer and no callus.     Toe Exam: swelling and right toe deformity. No tenderness and erythema    Sensory   Vibration: absent  Monofilament testing: " absent    Vascular  Capillary refills: < 3 seconds  The right DP pulse is 2+.     Left Foot/Ankle  Left Foot Inspection  Skin Exam: skin normal. Skin not intact, no dry skin, no warmth, no erythema, no maceration, normal color, no pre-ulcer, no ulcer and no callus.     Toe Exam: swelling and left toe deformity. No tenderness and no erythema.     Sensory   Vibration: diminished  Monofilament testing: diminished    Vascular  Capillary refills: < 3 seconds  The left DP pulse is 2+.     Assign Risk Category  Deformity present  Loss of protective sensation  No weak pulses  Risk: 2    Recommend referral to podiatry    Administrative Statements

## 2024-11-07 NOTE — PROGRESS NOTES
ENT HEARING EVALUATION    Name:  Alaina Maza  :  1940  Age:  84 y.o.   MRN:  [id number]  Date of Evaluation: 24     HISTORY:    Reason for visit: Known Hearing Loss binaurally    Alaina Maza is being seen today for an evaluation of hearing as part of her ENT visit. The patient reports a known hearing loss for which she wears hearing aids purchased through [facility].     EVALUATION:    Otoscopic Evaluation:   Right Ear: Unremarkable, canal clear   Left Ear: Unremarkable, canal clear    Tympanometry:   Right Ear: Type A; normal middle ear pressure and static compliance    Left Ear: Type A; normal middle ear pressure and static compliance     Speech Audiometry:  DNT    Word Recognition Scores (WRS):  DNT    Pure Tone Audiometry:  Conventional pure tone audiometry from 250 - 8000 Hz  was obtained with good reliability and revealed the following:     Right Ear: Moderate sloping to severe sensorineural hearing loss (SNHL)    Left Ear: Moderate sloping to severe sensorineural hearing loss (SNHL)       *see attached audiogram    IMPRESSIONS:   sensorineural hearing loss bilaterally    RECOMMENDATIONS:  Return to [facility] for programming and hearing aid maintenance.   Return to ENT as scheduled to review results    Clayton Garrido, CCC-A  Clinical Audiologist

## 2024-11-07 NOTE — ASSESSMENT & PLAN NOTE
Clinically euthyroid.   Continue current regimen     Component      Latest Ref Rng 10/31/2024   TSH, POC      0.450 - 4.500 uIU/mL 1.600    FREE T4      0.82 - 1.77 ng/dL 0.92    T3, Total      71 - 180 ng/dL 58 (L)         Orders:    TSH, 3rd generation; Future    T4, free; Future    T3; Future    TSH, 3rd generation    T4, free    T3

## 2024-11-07 NOTE — LETTER
"November 7, 2024     No Recipients    Patient: Alaina Maza   YOB: 1940   Date of Visit: 11/7/2024       Dear Dr. Jorgensen Recipients:    Thank you for referring Alaina Maza to me for evaluation. Below are my notes for this consultation.    If you have questions, please do not hesitate to call me. I look forward to following your patient along with you.         Sincerely,        Christopher Albright MD        CC: No Recipients    Christopher Albright MD  11/7/2024  9:45 AM  Sign when Signing Visit  Assessment/Plan:  Bilateral SNHL.  Pt referred for Hearing Aid Evaluation.  Pt brought in the wrong tracheostomy tube, but her present one is still OK.  F/u for trach change.      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 Sensorineural hearing loss (SNHL) of both ears  H90.3 Ambulatory Referral to Audiology             Subjective:      Patient ID: Alaina Maza is a 84 y.o. female.    F/u for trach change.  Pt went to the ER last week for an asthma exacerbation.  She also feels like her hearing is getting worse.        The following portions of the patient's history were reviewed and updated as appropriate: allergies, current medications, past family history, past medical history, past social history, past surgical history and problem list.    Review of Systems      Objective:      Ht 5' 6\" (1.676 m)   Wt 99.8 kg (220 lb)   BMI 35.51 kg/m²          Physical Exam  Constitutional:       Appearance: She is well-developed.   HENT:      Head: Normocephalic and atraumatic.      Right Ear: Tympanic membrane, ear canal and external ear normal. No drainage. No middle ear effusion.      Left Ear: Tympanic membrane, ear canal and external ear normal. No drainage.  No middle ear effusion.      Nose: Nose normal.      Mouth/Throat:      Pharynx: Uvula midline. No oropharyngeal exudate.      Tonsils: 2+ on the right. 2+ on the left. "   Neck:      Thyroid: No thyroid mass or thyromegaly.      Trachea: Trachea normal. No tracheal deviation.     Lymphadenopathy:      Cervical: No cervical adenopathy.   Neurological:      Mental Status: She is alert.         Flexible Fiberoptic Tracheoscopy Procedure Note:  Indication:  trach  Verbal consent obtained.  Surgeon: Christopher Albright MD  Scope passed through tracheostomy tube  Distal trachea: unremarkable  Yani: unremarkable    Audiogram:  AD:   AS:   Tympanogram:  AD: type A  AS: type A

## 2024-11-07 NOTE — TELEPHONE ENCOUNTER
Patient presented us with a bill from the [employer] for trach supplies from March, 2024.  She has been getting past due bills from them and she wanted us to explain it to her.  Spoke with her son, , and he said she had received some supplies that didn't fit her trach and also some supplies she never uses.  He thinks she many have sent those back.  I asked him to contact the supply carrier and see if that is what she is being billed for.  He stated he would look into it.   I also tried to call [employer], but the office was not open for the day yet.  I did take a copy of the invoice and I will try to contact them later, although, I don't have much of the information I would need to navigate this problem for her.  I will try to find out whatever I can.

## 2024-11-07 NOTE — ASSESSMENT & PLAN NOTE
Lab Results   Component Value Date    HGBA1C 8.4 (A) 11/07/2024     HGA1C slightly elevated. Recently had oral steroid course. Will send blood sugar logs in 1-2 weeks for review.     Discussed risks/complications associated with uncontrolled diabetes including organ involvement, heart attack, stroke, death.  Recommended referral to diabetes education.      Advised lifestyle modifications including attention to diet including the amount and types of carbohydrates consumed and regular activity.     Call for blood sugars less than 70 mg/dl or patterns over 250 mg/dl.     Discussed symptoms and treatment of hypoglycemia.  Reviewed risks associated with hypoglycemia. Always carry rapid acting carbohydrates and a glucometer (a way to check your blood sugar).    Recommendation for medical identification either bracelet, necklace.    Recommend routine follow up for diabetic eye and foot exams. Foot exam completed in office today-- recommend podiatry; referral provided.     Ordered blood work to complete prior to next visit.    Send glucose logs/CGM download in 1-2 weeks for review    Follow up in 3 months.       Orders:    Ambulatory referral to Podiatry; Future    POCT hemoglobin A1c    Hemoglobin A1C; Future    Comprehensive metabolic panel; Future    Hemoglobin A1C    Comprehensive metabolic panel     Johnathan SEVERINO PGY-1:  U/S with findings of thickened endometrium 14 mm, no pelvic free fluid, no IUP, ectopic pregnancy cannot be r/o by u/s. Hcg 743 Spoke with OB, will come see patient shortly. Johnathan SEVERINO PGY-1: Pt was re-evaluated at bedside, feeling well overall. Return precautions and follow up plan with PCP and/or specialist were discussed. Time was taken to answer any questions that the patient had before providing them with discharge paperwork.

## 2024-11-07 NOTE — PATIENT INSTRUCTIONS
"In 1-2 weeks send in blood sugar logs for review or sooner if blood sugar patterns are less than 70 or over 250 mg/dl.     Low blood sugar in people with diabetes   The Basics   Written by the doctors and editors at [facility]   What is low blood sugar? -- This is when the level of sugar in a person's blood gets too low. It is also called \"hypoglycemia.\"  Low blood sugar can cause symptoms ranging from sweating and feeling hungry to passing out.  Low blood sugar can happen in people with diabetes who take certain medicines, including insulin, other medicines given as shots, and some types of pills.  When can people with diabetes get low blood sugar? -- People with diabetes can get low blood sugar when they:   Take too much medicine, including insulin, other medicines given as shots, or certain diabetes pills   Do not eat enough food   Exercise too much without eating a snack or reducing their insulin dose   Wait too long between meals   Drink too much alcohol or drink alcohol on an empty stomach  What are the symptoms of low blood sugar? -- The symptoms can be different from person to person, and can change over time. During the early stages of low blood sugar, a person can:   Sweat or tremble   Feel hungry   Feel worried  People who have early symptoms should check their blood sugar level to see if it is low and needs to be treated. If low blood sugar levels are not treated, severe symptoms can occur. These can include:   Trouble walking or feeling weak   Trouble seeing clearly   Being confused or acting in a strange way   Passing out or having a seizure  Some people do not get symptoms during the early stages of low blood sugar. Doctors sometimes call this \"hypoglycemia unawareness.\" People with hypoglycemia unawareness are more likely to have severe symptoms, because they might not know that they have low blood sugar until they have severe symptoms. Hypoglycemia unawareness is more likely in people who:   Have had " type 1 diabetes for more than 5 to 10 years   Have frequent episodes of low blood sugar   Use insulin to keep their blood sugar level tightly managed   Are tired   Drink a lot of alcohol   Take certain medicines for high blood pressure or diabetes  How is low blood sugar treated? -- It can be treated with:   Quick sources of sugar - People can eat or drink quick sources of sugar (table 1). Foods that have fat, such as chocolate or cheese, do not treat low blood sugar as quickly. You and a family member should carry a quick source of sugar at all times.   A dose of glucagon - Glucagon is a hormone that can quickly raise blood sugar levels and stop severe symptoms. It comes as a shot (figure 1) or a nose spray. If your doctor recommends that you carry glucagon with you, they will tell you when and how to use it. If possible, it's also a good idea to have a family member, friend, or roommate learn how to give you glucagon. That way, they can give it to you if you can't do it yourself.  After treating low blood sugar, it is very important to recheck your blood sugar level to make sure that it rises and stays in the normal range. Once your blood sugar is normal, eat a small snack that contains protein, fat, and carbohydrate. This can help keep your blood sugar stable.  What should I do after treatment? -- After treatment for low blood sugar, most people can get back to their usual routine. But your doctor or nurse might recommend that you check your blood sugar level more often during the next 2 to 3 days.  If your low blood sugar was treated with glucagon, call your doctor or nurse. They might change the dose of your diabetes medicine.  How can I prevent low blood sugar? -- The best way is to:   Check your blood sugar levels often - Your doctor or nurse will tell you how and when to check your blood sugar levels at home. They will also tell you what your blood sugar levels should be, and when to treat low blood sugar.    "Learn the symptoms of low blood sugar, and be ready to treat it in the early stages. Treating low blood sugar early can prevent severe symptoms.  When should I go to a hospital or call for an ambulance? -- A family member or friend should take you to a hospital or call for an ambulance (in the [place] and [place], call 9-1-1) if you:   Are still confused 15 minutes after being treated with a dose of glucagon   Have passed out, and there is no glucagon nearby   Still have low blood sugar after treatment  If you have low blood sugar, do not try to drive yourself to the hospital. Driving with low blood sugar can be dangerous.  All topics are updated as new evidence becomes available and our peer review process is complete.  This topic retrieved from [employer] on: Apr 11, 2024.  Topic 07106 Version 22.0  Release: 32.3.2 - C32.100  © 2024 UpToDate, Inc. and/or its affiliates. All rights reserved.  table 1: Quick sources of sugar to treat low blood sugar  3 or 4 glucose tablets   ½ cup of juice or regular soda (not sugar-free)   2 tablespoons of raisins   4 or 5 saltine crackers   1 tablespoon of sugar   1 tablespoon of honey or corn syrup   6 to 8 hard candies   These sources of sugar act quickly to treat low blood sugar levels. People with diabetes who use insulin or certain other diabetes medicines should carry at least 1 of these items at all times.  Graphic 37475 Version 4.0  figure 1: Glucagon autoinjector     Some people carry glucagon in the form of an autoinjector \"pen.\" This makes it easy to give a dose into the upper arm, thigh, or belly.  Graphic 538150 Version 2.0  Consumer Information Use and Disclaimer   Disclaimer: This generalized information is a limited summary of diagnosis, treatment, and/or medication information. It is not meant to be comprehensive and should be used as a tool to help the user understand and/or assess potential diagnostic and treatment options. It does NOT include all information about " conditions, treatments, medications, side effects, or risks that may apply to a specific patient. It is not intended to be medical advice or a substitute for the medical advice, diagnosis, or treatment of a health care provider based on the health care provider's examination and assessment of a patient's specific and unique circumstances. Patients must speak with a health care provider for complete information about their health, medical questions, and treatment options, including any risks or benefits regarding use of medications. This information does not endorse any treatments or medications as safe, effective, or approved for treating a specific patient. UpToDate, Inc. and its affiliates disclaim any warranty or liability relating to this information or the use thereof.The use of this information is governed by the Terms of Use, available at https://www.[employer] Softwareuwer.com/en/know/clinical-effectiveness-terms. 2024© UpToDate, Inc. and its affiliates and/or licensors. All rights reserved.  Copyright   © 2024 UpToDate, Inc. and/or its affiliates. All rights reserved.

## 2024-11-07 NOTE — LETTER
"November 7, 2024     John Epps MD  [address]  [address]  [place] [address]    Patient: Alaina Maza   YOB: 1940   Date of Visit: 11/7/2024       Dear Dr. Epps:    Thank you for referring Alaina Maza to me for evaluation. Below are my notes for this consultation.    If you have questions, please do not hesitate to call me. I look forward to following your patient along with you.         Sincerely,        Christopher Albright MD        CC: No Recipients    Christopher Albright MD  11/7/2024  9:45 AM  Sign when Signing Visit  Assessment/Plan:  Bilateral SNHL.  Pt referred for Hearing Aid Evaluation.  Pt brought in the wrong tracheostomy tube, but her present one is still OK.  F/u for trach change.      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 Sensorineural hearing loss (SNHL) of both ears  H90.3 Ambulatory Referral to Audiology             Subjective:      Patient ID: Alaina Maza is a 84 y.o. female.    F/u for trach change.  Pt went to the ER last week for an asthma exacerbation.  She also feels like her hearing is getting worse.        The following portions of the patient's history were reviewed and updated as appropriate: allergies, current medications, past family history, past medical history, past social history, past surgical history and problem list.    Review of Systems      Objective:      Ht 5' 6\" (1.676 m)   Wt 99.8 kg (220 lb)   BMI 35.51 kg/m²          Physical Exam  Constitutional:       Appearance: She is well-developed.   HENT:      Head: Normocephalic and atraumatic.      Right Ear: Tympanic membrane, ear canal and external ear normal. No drainage. No middle ear effusion.      Left Ear: Tympanic membrane, ear canal and external ear normal. No drainage.  No middle ear effusion.      Nose: Nose normal.      Mouth/Throat:      Pharynx: Uvula midline. No oropharyngeal exudate. "      Tonsils: 2+ on the right. 2+ on the left.   Neck:      Thyroid: No thyroid mass or thyromegaly.      Trachea: Trachea normal. No tracheal deviation.     Lymphadenopathy:      Cervical: No cervical adenopathy.   Neurological:      Mental Status: She is alert.         Flexible Fiberoptic Tracheoscopy Procedure Note:  Indication:  trach  Verbal consent obtained.  Surgeon: Christopher Albright MD  Scope passed through tracheostomy tube  Distal trachea: unremarkable  Yani: unremarkable    Audiogram:  AD:   AS:   Tympanogram:  AD: type {tympanogram:20071}  AS: type {tympanogram:09178}

## 2024-11-12 ENCOUNTER — TELEPHONE (OUTPATIENT)
Dept: OTOLARYNGOLOGY | Facility: CLINIC | Age: 84
End: 2024-11-12

## 2024-11-12 NOTE — TELEPHONE ENCOUNTER
Called Jayant to check on the progress being made on getting Alaina's outstanding bill paid with [employer] so she can get her trach supplies.  She will need a trach soon, but I emphasized that we don't want to get into a critical situation and not have a replacement trach for her.  Jayant indicated that Alaina might have received some supplies she didn't need and she might have returned some of them, but he is not sure.  She threw away the original shipping carton.  Apparently, [facility] will not send any supplies until this bill is satisfied.  He said they will hopefully work on dealing with this situation soon.

## 2024-11-14 ENCOUNTER — NURSE TRIAGE (OUTPATIENT)
Age: 84
End: 2024-11-14

## 2024-11-14 ENCOUNTER — APPOINTMENT (EMERGENCY)
Dept: RADIOLOGY | Facility: HOSPITAL | Age: 84
End: 2024-11-14
Payer: COMMERCIAL

## 2024-11-14 ENCOUNTER — OFFICE VISIT (OUTPATIENT)
Dept: PULMONOLOGY | Facility: MEDICAL CENTER | Age: 84
End: 2024-11-14
Payer: COMMERCIAL

## 2024-11-14 ENCOUNTER — HOSPITAL ENCOUNTER (EMERGENCY)
Facility: HOSPITAL | Age: 84
Discharge: HOME | End: 2024-11-14
Payer: COMMERCIAL

## 2024-11-14 VITALS
TEMPERATURE: 98.4 F | WEIGHT: 227.07 LBS | HEART RATE: 62 BPM | OXYGEN SATURATION: 99 % | BODY MASS INDEX: 36.65 KG/M2 | SYSTOLIC BLOOD PRESSURE: 138 MMHG | DIASTOLIC BLOOD PRESSURE: 82 MMHG | RESPIRATION RATE: 20 BRPM

## 2024-11-14 VITALS
HEIGHT: 66 IN | WEIGHT: 220 LBS | TEMPERATURE: 97.8 F | BODY MASS INDEX: 35.36 KG/M2 | SYSTOLIC BLOOD PRESSURE: 118 MMHG | DIASTOLIC BLOOD PRESSURE: 70 MMHG | HEART RATE: 61 BPM | OXYGEN SATURATION: 98 % | RESPIRATION RATE: 24 BRPM

## 2024-11-14 DIAGNOSIS — J45.31 MILD PERSISTENT ASTHMA WITH ACUTE EXACERBATION: ICD-10-CM

## 2024-11-14 DIAGNOSIS — E66.9 NOCTURNAL HYPOXEMIA DUE TO OBESITY: ICD-10-CM

## 2024-11-14 DIAGNOSIS — R05.3 CHRONIC COUGH: ICD-10-CM

## 2024-11-14 DIAGNOSIS — J38.6 SUBGLOTTIC STENOSIS: ICD-10-CM

## 2024-11-14 DIAGNOSIS — J20.9 ACUTE BRONCHITIS, UNSPECIFIED ORGANISM: Primary | ICD-10-CM

## 2024-11-14 DIAGNOSIS — J40 BRONCHITIS: ICD-10-CM

## 2024-11-14 DIAGNOSIS — G47.36 NOCTURNAL HYPOXEMIA DUE TO OBESITY: ICD-10-CM

## 2024-11-14 DIAGNOSIS — R06.00 DYSPNEA: Primary | ICD-10-CM

## 2024-11-14 LAB
ALBUMIN SERPL BCG-MCNC: 4 G/DL (ref 3.5–5)
ALP SERPL-CCNC: 53 U/L (ref 34–104)
ALT SERPL W P-5'-P-CCNC: 11 U/L (ref 7–52)
ANION GAP SERPL CALCULATED.3IONS-SCNC: 5 MMOL/L (ref 4–13)
APTT PPP: 34 SECONDS (ref 23–34)
AST SERPL W P-5'-P-CCNC: 14 U/L (ref 13–39)
ATRIAL RATE: 60 BPM
BASOPHILS # BLD AUTO: 0.02 THOUSANDS/ÂΜL (ref 0–0.1)
BASOPHILS NFR BLD AUTO: 1 % (ref 0–1)
BILIRUB SERPL-MCNC: 0.42 MG/DL (ref 0.2–1)
BUN SERPL-MCNC: 21 MG/DL (ref 5–25)
CALCIUM SERPL-MCNC: 9.5 MG/DL (ref 8.4–10.2)
CHLORIDE SERPL-SCNC: 103 MMOL/L (ref 96–108)
CO2 SERPL-SCNC: 27 MMOL/L (ref 21–32)
CREAT SERPL-MCNC: 0.98 MG/DL (ref 0.6–1.3)
EOSINOPHIL # BLD AUTO: 0.16 THOUSAND/ÂΜL (ref 0–0.61)
EOSINOPHIL NFR BLD AUTO: 4 % (ref 0–6)
ERYTHROCYTE [DISTWIDTH] IN BLOOD BY AUTOMATED COUNT: 13.8 % (ref 11.6–15.1)
FLUAV AG UPPER RESP QL IA.RAPID: NEGATIVE
FLUBV AG UPPER RESP QL IA.RAPID: NEGATIVE
GFR SERPL CREATININE-BSD FRML MDRD: 53 ML/MIN/1.73SQ M
GLUCOSE SERPL-MCNC: 220 MG/DL (ref 65–140)
HCT VFR BLD AUTO: 38.4 % (ref 34.8–46.1)
HGB BLD-MCNC: 12.3 G/DL (ref 11.5–15.4)
IMM GRANULOCYTES # BLD AUTO: 0.01 THOUSAND/UL (ref 0–0.2)
IMM GRANULOCYTES NFR BLD AUTO: 0 % (ref 0–2)
INR PPP: 0.97 (ref 0.85–1.19)
LACTATE SERPL-SCNC: 1.5 MMOL/L (ref 0.5–2)
LYMPHOCYTES # BLD AUTO: 1.1 THOUSANDS/ÂΜL (ref 0.6–4.47)
LYMPHOCYTES NFR BLD AUTO: 29 % (ref 14–44)
MCH RBC QN AUTO: 28.9 PG (ref 26.8–34.3)
MCHC RBC AUTO-ENTMCNC: 32 G/DL (ref 31.4–37.4)
MCV RBC AUTO: 90 FL (ref 82–98)
MONOCYTES # BLD AUTO: 0.32 THOUSAND/ÂΜL (ref 0.17–1.22)
MONOCYTES NFR BLD AUTO: 9 % (ref 4–12)
NEUTROPHILS # BLD AUTO: 2.16 THOUSANDS/ÂΜL (ref 1.85–7.62)
NEUTS SEG NFR BLD AUTO: 57 % (ref 43–75)
NRBC BLD AUTO-RTO: 0 /100 WBCS
P AXIS: 50 DEGREES
PLATELET # BLD AUTO: 158 THOUSANDS/UL (ref 149–390)
PMV BLD AUTO: 11.1 FL (ref 8.9–12.7)
POTASSIUM SERPL-SCNC: 4 MMOL/L (ref 3.5–5.3)
PR INTERVAL: 138 MS
PROCALCITONIN SERPL-MCNC: <0.05 NG/ML
PROT SERPL-MCNC: 7.1 G/DL (ref 6.4–8.4)
PROTHROMBIN TIME: 13.4 SECONDS (ref 12.3–15)
QRS AXIS: -12 DEGREES
QRSD INTERVAL: 80 MS
QT INTERVAL: 406 MS
QTC INTERVAL: 406 MS
RBC # BLD AUTO: 4.26 MILLION/UL (ref 3.81–5.12)
SARS-COV+SARS-COV-2 AG RESP QL IA.RAPID: NEGATIVE
SODIUM SERPL-SCNC: 135 MMOL/L (ref 135–147)
T WAVE AXIS: 6 DEGREES
VENTRICULAR RATE: 60 BPM
WBC # BLD AUTO: 3.77 THOUSAND/UL (ref 4.31–10.16)

## 2024-11-14 PROCEDURE — 85025 COMPLETE CBC W/AUTO DIFF WBC: CPT

## 2024-11-14 PROCEDURE — 99214 OFFICE O/P EST MOD 30 MIN: CPT | Performed by: INTERNAL MEDICINE

## 2024-11-14 PROCEDURE — 71250 CT THORAX DX C-: CPT

## 2024-11-14 PROCEDURE — 87811 SARS-COV-2 COVID19 W/OPTIC: CPT

## 2024-11-14 PROCEDURE — 93005 ELECTROCARDIOGRAM TRACING: CPT

## 2024-11-14 PROCEDURE — 99285 EMERGENCY DEPT VISIT HI MDM: CPT

## 2024-11-14 PROCEDURE — 85610 PROTHROMBIN TIME: CPT

## 2024-11-14 PROCEDURE — 83605 ASSAY OF LACTIC ACID: CPT

## 2024-11-14 PROCEDURE — 80053 COMPREHEN METABOLIC PANEL: CPT

## 2024-11-14 PROCEDURE — 87804 INFLUENZA ASSAY W/OPTIC: CPT

## 2024-11-14 PROCEDURE — 84145 PROCALCITONIN (PCT): CPT

## 2024-11-14 PROCEDURE — 93010 ELECTROCARDIOGRAM REPORT: CPT | Performed by: INTERNAL MEDICINE

## 2024-11-14 PROCEDURE — 71045 X-RAY EXAM CHEST 1 VIEW: CPT

## 2024-11-14 PROCEDURE — 96365 THER/PROPH/DIAG IV INF INIT: CPT

## 2024-11-14 PROCEDURE — 36415 COLL VENOUS BLD VENIPUNCTURE: CPT

## 2024-11-14 PROCEDURE — 87040 BLOOD CULTURE FOR BACTERIA: CPT

## 2024-11-14 PROCEDURE — 85730 THROMBOPLASTIN TIME PARTIAL: CPT

## 2024-11-14 RX ORDER — BENZONATATE 100 MG/1
100 CAPSULE ORAL EVERY 8 HOURS
Qty: 21 CAPSULE | Refills: 0 | Status: SHIPPED | OUTPATIENT
Start: 2024-11-14

## 2024-11-14 RX ORDER — PREDNISONE 20 MG/1
TABLET ORAL
Qty: 11 TABLET | Refills: 0 | Status: SHIPPED | OUTPATIENT
Start: 2024-11-14 | End: 2024-11-25

## 2024-11-14 RX ORDER — CEFUROXIME AXETIL 500 MG/1
500 TABLET ORAL EVERY 12 HOURS SCHEDULED
Qty: 20 TABLET | Refills: 0 | Status: SHIPPED | OUTPATIENT
Start: 2024-11-14 | End: 2024-11-24

## 2024-11-14 RX ORDER — CEFEPIME HYDROCHLORIDE 2 G/50ML
2000 INJECTION, SOLUTION INTRAVENOUS ONCE
Status: COMPLETED | OUTPATIENT
Start: 2024-11-14 | End: 2024-11-14

## 2024-11-14 RX ADMIN — CEFEPIME HYDROCHLORIDE 2000 MG: 2 INJECTION, SOLUTION INTRAVENOUS at 16:05

## 2024-11-14 RX ADMIN — PREDNISONE 30 MG: 20 TABLET ORAL at 17:45

## 2024-11-14 NOTE — TELEPHONE ENCOUNTER
"Incoming call::    Patient reports ongoing wheezing and coughing.  Speaking in short sentences and incredibly SOB and difficult to understand on call.     \"I want to come in and see Dr. Layne\". Informed patient that best disposition would be ER at this time based on assessment.   Patient not interested and emphasized  wanting to see Dr. Layne. Offered sooner appointment (1pm vs the current 1:40pm). Mentioned that her son is trying to get off work to take her to her current appointment today.    Again advised ER and if I could call 911. Patient told me to leave her alone and to not call 911.  Terminated call.       "

## 2024-11-14 NOTE — Clinical Note
Case was discussed with [facility] and the patient's admission status was agreed to be Admission Status: inpatient status to the service of [facility] .

## 2024-11-14 NOTE — TELEPHONE ENCOUNTER
Patient called in requesting to speak to the provider ,. Patient stated the medication the doctor prescribe is not working for her she end up in the ER , she was given steroids and that is not working , patient is wheezing / coughing/ SOB , warm transfer to [facility]

## 2024-11-14 NOTE — ED PROVIDER NOTES
Time reflects when diagnosis was documented in both MDM as applicable and the Disposition within this note       Time User Action Codes Description Comment    11/14/2024  3:23 PM Yokubaitis, Andrea Add [R06.00] Dyspnea     11/14/2024  5:28 PM Yokubaitis, Andrea Add [J40] Bronchitis           ED Disposition       ED Disposition   Discharge    Condition   Stable    Date/Time   Thu Nov 14, 2024  5:28 PM    Comment   Alaina Lansing discharge to home/self care.                   Assessment & Plan       Medical Decision Making  84F presenting to the ED due to progressive cough. May be pna vs bronchitis vs other RAD. Labs/imaging obtained. Labs reassuring. CT w/o acute findings to suggest pna. Patient maintained saturations >94% on ambulatory pulse ox. Discussed case with patient's pulmonologist. Given reassuring workup at this time. Offered admission for treatment vs d/c with medications as discussed with pulmonologist. Patient requesting trial of home care with abx and steroid taper. Recommend close f/u w/ pulm and PCP for further management. Return to the ED with any further concerns, trouble breathing, treatment failure, fevers.     Amount and/or Complexity of Data Reviewed  Labs: ordered.  Radiology: ordered.    Risk  Prescription drug management.        ED Course as of 11/15/24 1910   Thu Nov 14, 2024   1703 Ceftin bidx7d   1703 30 mg taper x4 taper.        Medications   cefepime (MAXIPIME) IVPB (premix in dextrose) 2,000 mg 50 mL (0 mg Intravenous Stopped 11/14/24 1706)   predniSONE tablet 30 mg (30 mg Oral Given 11/14/24 1745)       ED Risk Strat Scores                           SBIRT 20yo+      Flowsheet Row Most Recent Value   Initial Alcohol Screen: US AUDIT-C     1. How often do you have a drink containing alcohol? 0 Filed at: 11/14/2024 151   2. How many drinks containing alcohol do you have on a typical day you are drinking?  0 Filed at: 11/14/2024 1515   3b. FEMALE Any Age, or MALE 65+: How often do you have 4  or more drinks on one occassion? 0 Filed at: 2024 1518   Audit-C Score 0 Filed at: 2024 1518   SERINA: How many times in the past year have you...    Used an illegal drug or used a prescription medication for non-medical reasons? Never Filed at: 2024 1518                            History of Present Illness       Chief Complaint   Patient presents with    Shortness of Breath     Pt. comes to the ER saying she was sent by her PCP. Pt. Has a trach and reports feeling more short of breath than normal. Pt. Reports no pain, pulse ox in triage reading 100% on RA.        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    Coronary artery disease     Disease of thyroid gland     HL (hearing loss)     Papillary thyroid carcinoma ([facility]) 2021    Post-surgical hypothyroidism 2017    Voice disorder       Past Surgical History:   Procedure Laterality Date    HYSTERECTOMY      REPLACEMENT TOTAL KNEE      THYROID SURGERY      TRACHEOSTOMY  2006      Family History   Problem Relation Age of Onset    Heart disease Mother     Hypertension Mother     Heart disease Father     Pneumonia Brother          of COVID    Dementia Brother       Social History     Tobacco Use    Smoking status: Never     Passive exposure: Past    Smokeless tobacco: Never   Vaping Use    Vaping status: Never Used   Substance Use Topics    Alcohol use: Never    Drug use: Never      E-Cigarette/Vaping    E-Cigarette Use Never User       E-Cigarette/Vaping Substances    Nicotine No     THC No     CBD No     Flavoring No     Other No     Unknown No       I have reviewed and agree with the history as documented.     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 Has had ongoing cough and shortness of breath for about 3-4 weeks. Has had persistent cough that leads to some  dyspnea. Has had some secretions from the trach when coughing but they haven't changed color. Denies f/c, n/v, cp, or other associated complaints.         Review of Systems   All other systems reviewed and are negative.          Objective       ED Triage Vitals [11/14/24 1447]   Temperature Pulse Blood Pressure Respirations SpO2 Patient Position - Orthostatic VS   98.4 °F (36.9 °C) 67 135/70 (!) 24 100 % Sitting      Temp Source Heart Rate Source BP Location FiO2 (%) Pain Score    Oral Monitor Right arm -- No Pain      Vitals      Date and Time Temp Pulse SpO2 Resp BP Pain Score FACES Pain Rating User   11/14/24 1715 -- 62 99 % -- -- -- -- AG   11/14/24 1700 -- 70 99 % 20 138/82 -- -- AG   11/14/24 1630 -- 55 98 % 20 105/54 -- -- AG   11/14/24 1615 -- 57 100 % 20 136/81 -- -- AG   11/14/24 1530 -- 62 99 % 22 114/68 -- --    11/14/24 1447 98.4 °F (36.9 °C) 67 100 % 24 135/70 No Pain -- DD            Physical Exam  Constitutional:       General: She is not in acute distress.     Appearance: Normal appearance. She is not ill-appearing or toxic-appearing.   HENT:      Head: Normocephalic and atraumatic.      Mouth/Throat:      Mouth: Mucous membranes are moist.   Eyes:      Extraocular Movements: Extraocular movements intact.   Cardiovascular:      Rate and Rhythm: Normal rate and regular rhythm.   Pulmonary:      Effort: Pulmonary effort is normal.      Breath sounds: Wheezing (mild, bilateral upper.) present. No rhonchi or rales.   Abdominal:      Palpations: Abdomen is soft.   Musculoskeletal:      Right lower leg: Edema (tr) present.      Left lower leg: Edema (tr) present.   Skin:     General: Skin is warm.   Neurological:      General: No focal deficit present.      Mental Status: She is alert.   Psychiatric:         Mood and Affect: Mood normal.         Results Reviewed       Procedure Component Value Units Date/Time    Blood culture #2 [[id number]] Collected: 11/14/24 1508    Lab Status: Preliminary result  Specimen: Blood from Arm, Left Updated: 11/15/24 0055     Blood Culture Received in Microbiology Lab. Culture in Progress.    Blood culture #1 [[id number]] Collected: 11/14/24 1514    Lab Status: Preliminary result Specimen: Blood from Arm, Right Updated: 11/15/24 0001     Blood Culture Received in Microbiology Lab. Culture in Progress.    Procalcitonin [[id number]]  (Normal) Collected: 11/14/24 1508    Lab Status: Final result Specimen: Blood from Arm, Left Updated: 11/14/24 1546     Procalcitonin <0.05 ng/ml     FLU/COVID Rapid Antigen (30 min. TAT) - Preferred screening test in ED [[id number]]  (Normal) Collected: 11/14/24 1508    Lab Status: Final result Specimen: Nares from Nose Updated: 11/14/24 1537     SARS COV Rapid Antigen Negative     Influenza A Rapid Antigen Negative     Influenza B Rapid Antigen Negative    Narrative:      This test has been performed using the Acustreamidel Virginie 2 FLU+SARS Antigen test under the Emergency Use Authorization (EUA). This test has been validated by the  and verified by the performing laboratory. The Virginie uses lateral flow immunofluorescent sandwich assay to detect SARS-COV, Influenza A and Influenza B Antigen.     The Quidel Virginie 2 SARS Antigen test does not differentiate between SARS-CoV and SARS-CoV-2.     Negative results are presumptive and may be confirmed with a molecular assay, if necessary, for patient management. Negative results do not rule out SARS-CoV-2 or influenza infection and should not be used as the sole basis for treatment or patient management decisions. A negative test result may occur if the level of antigen in a sample is below the limit of detection of this test.     Positive results are indicative of the presence of viral antigens, but do not rule out bacterial infection or co-infection with other viruses.     All test results should be used as an adjunct to clinical observations and other information available to the provider.    FOR  PEDIATRIC PATIENTS - copy/paste COVID Guidelines URL to browser: [email or web address]    Comprehensive metabolic panel [[id number]]  (Abnormal) Collected: 11/14/24 1508    Lab Status: Final result Specimen: Blood from Arm, Left Updated: 11/14/24 1536     Sodium 135 mmol/L      Potassium 4.0 mmol/L      Chloride 103 mmol/L      CO2 27 mmol/L      ANION GAP 5 mmol/L      BUN 21 mg/dL      Creatinine 0.98 mg/dL      Glucose 220 mg/dL      Calcium 9.5 mg/dL      AST 14 U/L      ALT 11 U/L      Alkaline Phosphatase 53 U/L      Total Protein 7.1 g/dL      Albumin 4.0 g/dL      Total Bilirubin 0.42 mg/dL      eGFR 53 ml/min/1.73sq m     Narrative:      National Kidney Disease Foundation guidelines for Chronic Kidney Disease (CKD):     Stage 1 with normal or high GFR (GFR > 90 mL/min/1.73 square meters)    Stage 2 Mild CKD (GFR = 60-89 mL/min/1.73 square meters)    Stage 3A Moderate CKD (GFR = 45-59 mL/min/1.73 square meters)    Stage 3B Moderate CKD (GFR = 30-44 mL/min/1.73 square meters)    Stage 4 Severe CKD (GFR = 15-29 mL/min/1.73 square meters)    Stage 5 End Stage CKD (GFR <15 mL/min/1.73 square meters)  Note: GFR calculation is accurate only with a steady state creatinine    Lactic acid [[id number]]  (Normal) Collected: 11/14/24 1508    Lab Status: Final result Specimen: Blood from Arm, Left Updated: 11/14/24 1536     LACTIC ACID 1.5 mmol/L     Narrative:      Result may be elevated if tourniquet was used during collection.    Protime-INR [[id number]]  (Normal) Collected: 11/14/24 1508    Lab Status: Final result Specimen: Blood from Arm, Left Updated: 11/14/24 1533     Protime 13.4 seconds      INR 0.97    Narrative:      INR Therapeutic Range    Indication                                             INR Range      Atrial Fibrillation                                               2.0-3.0  Hypercoagulable State                                    2.0.2.3  Left  Ventricular Asist Device                            2.0-3.0  Mechanical Heart Valve                                  -    Aortic(with afib, MI, embolism, HF, LA enlargement,    and/or coagulopathy)                                     2.0-3.0 (2.5-3.5)     Mitral                                                             2.5-3.5  Prosthetic/Bioprosthetic Heart Valve               2.0-3.0  Venous thromboembolism (VTE: VT, PE        2.0-3.0    APTT [[id number]]  (Normal) Collected: 11/14/24 1508    Lab Status: Final result Specimen: Blood from Arm, Left Updated: 11/14/24 1533     PTT 34 seconds     CBC and differential [[id number]]  (Abnormal) Collected: 11/14/24 1508    Lab Status: Final result Specimen: Blood from Arm, Left Updated: 11/14/24 1520     WBC 3.77 Thousand/uL      RBC 4.26 Million/uL      Hemoglobin 12.3 g/dL      Hematocrit 38.4 %      MCV 90 fL      MCH 28.9 pg      MCHC 32.0 g/dL      RDW 13.8 %      MPV 11.1 fL      Platelets 158 Thousands/uL      nRBC 0 /100 WBCs      Segmented % 57 %      Immature Grans % 0 %      Lymphocytes % 29 %      Monocytes % 9 %      Eosinophils Relative 4 %      Basophils Relative 1 %      Absolute Neutrophils 2.16 Thousands/µL      Absolute Immature Grans 0.01 Thousand/uL      Absolute Lymphocytes 1.10 Thousands/µL      Absolute Monocytes 0.32 Thousand/µL      Eosinophils Absolute 0.16 Thousand/µL      Basophils Absolute 0.02 Thousands/µL             CT chest without contrast   Final Interpretation by Black Rodrigez MD (11/14 1630)      No acute thoracic abnormality.      Tracheostomy tube in the trachea.      Linear subsegmental atelectasis/scarring in bilateral upper (left worse than right) and bilateral lower lobes (right worse than left).      Incidental thyroid nodule(s) for which nonemergent thyroid ultrasound is recommended.      Additional chronic/incidental findings as detailed above.      The study was marked in EPIC for immediate notification.       Workstation performed: [id number]         XR chest 1 view portable   Final Interpretation by Bianca Petit MD (11/14 1612)      No acute cardiopulmonary disease.            Workstation performed: [id number]             Procedures    ED Medication and Procedure Management   Prior to Admission Medications   Prescriptions Last Dose Informant Patient Reported? Taking?   Ascorbic Acid (vitamin C) 1000 MG tablet  Self No No   Sig: Take 1 tablet (1,000 mg total) by mouth daily   Blood Glucose Monitoring Suppl (OneTouch Verio) w/Device KIT  Self No No   Sig: Use to test blood sugar 2 times a day   Calcium Carbonate-Vitamin D3 (Calcium 600+D) 600-400 MG-UNIT TABS  Self Yes No   Sig: Take 1 tablet by mouth daily     Lancets (onetouch ultrasoft) lancets  Self No No   Sig: Use to test blood sugar 2 times a day   Multiple Vitamins-Minerals (CENTRUM SILVER 50+WOMEN PO)  Self Yes No   Sig: Take by mouth   acetaminophen (TYLENOL) 650 mg CR tablet  Self No No   Sig: Take 1 tablet (650 mg total) by mouth every 8 (eight) hours as needed for mild pain   aspirin 81 mg chewable tablet  Self No No   Sig: Chew 1 tablet (81 mg total) daily   atorvastatin (LIPITOR) 40 mg tablet   No No   Sig: TAKE 1 TABLET DAILY   benzonatate (TESSALON PERLES) 100 mg capsule   No No   Sig: Take 1 capsule (100 mg total) by mouth 2 (two) times a day as needed for cough   budesonide (PULMICORT) 0.5 mg/2 mL nebulizer solution  Self No No   Sig: TAKE 2 ML BY NEBULIZATION 2 (TWO) TIMES A DAY   furosemide (LASIX) 40 mg tablet  Self No No   Sig: Take 1 tablet (40 mg total) by mouth daily   glipiZIDE (GLUCOTROL XL) 2.5 mg 24 hr tablet  Self No No   Sig: TAKE 2 TABLETS BY MOUTH DAILY   glucose blood (OneTouch Verio) test strip  Self No No   Sig: Use to test blood sugar 2 times daily   guaiFENesin-codeine (ROBITUSSIN AC) 100-10 mg/5 mL oral solution   No No   Sig: TAKE 5 ML BY MOUTH 2 (TWO) TIMES A DAY AS NEEDED FOR COUGH   ipratropium-albuterol (DUO-NEB)  0.5-2.5 mg/3 mL nebulizer solution   No No   Sig: Take 3 mL by nebulization every 6 (six) hours as needed for wheezing or shortness of breath   isosorbide mononitrate (IMDUR) 30 mg 24 hr tablet  Self No No   Sig: TAKE 1 TABLET DAILY   lidocaine (Lidoderm) 5 %  Self No No   Sig: Apply 1 patch topically over 12 hours daily Remove & Discard patch within 12 hours or as directed by MD   lisinopril (ZESTRIL) 20 mg tablet  Self No No   Sig: TAKE 1 TABLET DAILY   metoprolol tartrate (LOPRESSOR) 25 mg tablet  Self No No   Sig: TAKE 1/2 TABLET EVERY 12 HOURS   ondansetron (Zofran ODT) 4 mg disintegrating tablet  Self No No   Sig: Take 1 tablet (4 mg total) by mouth every 6 (six) hours as needed for nausea or vomiting   pantoprazole (PROTONIX) 40 mg tablet  Self No No   Sig: TAKE ONE TABLET DAILY   predniSONE 10 mg tablet   No No   Sig: Take 1 tablet daily for 5 days   predniSONE 10 mg tablet   No No   Sig: Take 2 tablets for 5 days then 1 tablet for 5 days   propylthiouracil 50 mg tablet  Self No No   Si.5 tablets per day   sodium chloride 0.9 % nebulizer solution  Self No No   Sig: TAKE 3 ML BY NEBULIZATION 2 (TWO) TIMES A DAY AS NEEDED FOR WHEEZING      Facility-Administered Medications: None     Discharge Medication List as of 2024  5:35 PM        START taking these medications    Details   !! benzonatate (TESSALON PERLES) 100 mg capsule Take 1 capsule (100 mg total) by mouth every 8 (eight) hours, Starting u 2024, Normal      cefuroxime (CEFTIN) 500 mg tablet Take 1 tablet (500 mg total) by mouth every 12 (twelve) hours for 10 days, Starting u 2024, Until Sun 2024, Normal      !! predniSONE 20 mg tablet Multiple Dosages:Starting u 2024, Until Sat 2024 at 2359, THEN Starting Sun 2024, Until Wed 2024 at 2359, THEN Starting Thu 2024, Until Sun 2024 at 2359Take 1.5 tablets (30 mg total) by mouth daily for 3 days,  EN 1 tablet (20 mg total) daily for 4  days, THEN 0.5 tablets (10 mg total) daily for 4 days., Normal       !! - Potential duplicate medications found. Please discuss with provider.        CONTINUE these medications which have NOT CHANGED    Details   acetaminophen (TYLENOL) 650 mg CR tablet Take 1 tablet (650 mg total) by mouth every 8 (eight) hours as needed for mild pain, Starting Wed 6/30/2021, Normal      Ascorbic Acid (vitamin C) 1000 MG tablet Take 1 tablet (1,000 mg total) by mouth daily, Starting Tue 11/10/2020, Normal      aspirin 81 mg chewable tablet Chew 1 tablet (81 mg total) daily, Starting Tue 11/10/2020, Normal      atorvastatin (LIPITOR) 40 mg tablet TAKE 1 TABLET DAILY, Normal      !! benzonatate (TESSALON PERLES) 100 mg capsule Take 1 capsule (100 mg total) by mouth 2 (two) times a day as needed for cough, Starting Thu 9/26/2024, Normal      Blood Glucose Monitoring Suppl (OneTouch Verio) w/Device KIT Use to test blood sugar 2 times a day, Normal      budesonide (PULMICORT) 0.5 mg/2 mL nebulizer solution TAKE 2 ML BY NEBULIZATION 2 (TWO) TIMES A DAY, Normal      Calcium Carbonate-Vitamin D3 (Calcium 600+D) 600-400 MG-UNIT TABS Take 1 tablet by mouth daily  , Historical Med      furosemide (LASIX) 40 mg tablet Take 1 tablet (40 mg total) by mouth daily, Starting Fri 6/21/2024, Normal      glipiZIDE (GLUCOTROL XL) 2.5 mg 24 hr tablet TAKE 2 TABLETS BY MOUTH DAILY, Starting Thu 8/15/2024, Normal      glucose blood (OneTouch Verio) test strip Use to test blood sugar 2 times daily, Normal      guaiFENesin-codeine (ROBITUSSIN AC) 100-10 mg/5 mL oral solution TAKE 5 ML BY MOUTH 2 (TWO) TIMES A DAY AS NEEDED FOR COUGH, Starting Fri 11/1/2024, Normal      ipratropium-albuterol (DUO-NEB) 0.5-2.5 mg/3 mL nebulizer solution Take 3 mL by nebulization every 6 (six) hours as needed for wheezing or shortness of breath, Starting Wed 8/21/2024, Sample      isosorbide mononitrate (IMDUR) 30 mg 24 hr tablet TAKE 1 TABLET DAILY, Normal      Lancets  (onetouch ultrasoft) lancets Use to test blood sugar 2 times a day, Normal      lidocaine (Lidoderm) 5 % Apply 1 patch topically over 12 hours daily Remove & Discard patch within 12 hours or as directed by MD, Starting Tue 7/11/2023, Normal      lisinopril (ZESTRIL) 20 mg tablet TAKE 1 TABLET DAILY, Normal      metoprolol tartrate (LOPRESSOR) 25 mg tablet TAKE 1/2 TABLET EVERY 12 HOURS, Normal      Multiple Vitamins-Minerals (CENTRUM SILVER 50+WOMEN PO) Take by mouth, Historical Med      ondansetron (Zofran ODT) 4 mg disintegrating tablet Take 1 tablet (4 mg total) by mouth every 6 (six) hours as needed for nausea or vomiting, Starting Tue 7/11/2023, Normal      pantoprazole (PROTONIX) 40 mg tablet TAKE ONE TABLET DAILY, Normal      !! predniSONE 10 mg tablet Take 1 tablet daily for 5 days, Normal      !! predniSONE 10 mg tablet Take 2 tablets for 5 days then 1 tablet for 5 days, Normal      propylthiouracil 50 mg tablet 2.5 tablets per day, Normal      sodium chloride 0.9 % nebulizer solution TAKE 3 ML BY NEBULIZATION 2 (TWO) TIMES A DAY AS NEEDED FOR WHEEZING, Starting Thu 2/23/2023, Normal       !! - Potential duplicate medications found. Please discuss with provider.        No discharge procedures on file.  ED SEPSIS DOCUMENTATION   Time reflects when diagnosis was documented in both MDM as applicable and the Disposition within this note       Time User Action Codes Description Comment    11/14/2024  3:23 PM Andrea Johnston [R06.00] Dyspnea     11/14/2024  5:28 PM Andrea Johnston [J40] Bronchitis                  Andrea Johnston MD  11/15/24 1910

## 2024-11-14 NOTE — TELEPHONE ENCOUNTER
"Pt stated Pulm Provider:  Dr. Layne    Actionable item: Appt today, 11/14/24 1:40PM, declined ER.    What is the reason for the call/chief complaint? Wheezing, Moderate SOB, nebulizers not helping per patients. Pt requested medication, advised ER due to severity of wheeze/SOB, refused. Reviewed nebulizers, pt states taking as directed. Pt unable to provide SpO2 at this time. Agreeable to appt.     Priority: HIGH    Reason for Disposition   MODERATE difficulty breathing (e.g., speaks in phrases, SOB even at rest, pulse 100-120) of new-onset or worse than normal    Answer Assessment - Initial Assessment Questions  1. RESPIRATORY STATUS: \"Describe your breathing?\" (e.g., wheezing, shortness of breath, unable to speak, severe coughing)       Wheezing, SOB  2. ONSET: \"When did this breathing problem begin?\"       11/14/24  3. PATTERN \"Does the difficult breathing come and go, or has it been constant since it started?\"       Constant  4. SEVERITY: \"How bad is your breathing?\" (e.g., mild, moderate, severe)       MOD  5. RECURRENT SYMPTOM: \"Have you had difficulty breathing before?\" If Yes, ask: \"When was the last time?\" and \"What happened that time?\"       August to ER  6. CARDIAC HISTORY: \"Do you have any history of heart disease?\" (e.g., heart attack, angina, bypass surgery, angioplasty)       See chart  7. LUNG HISTORY: \"Do you have any history of lung disease?\"  (e.g., pulmonary embolus, asthma, emphysema)      Trach, Asthma  8. CAUSE: \"What do you think is causing the breathing problem?\"       Unknown  9. OTHER SYMPTOMS: \"Do you have any other symptoms?\" (e.g., chest pain, cough, dizziness, fever, runny nose)      None  10. O2 SATURATION MONITOR:  \"Do you use an oxygen saturation monitor (pulse oximeter) at home?\" If Yes, ask: \"What is your reading (oxygen level) today?\" \"What is your usual oxygen saturation reading?\" (e.g., 95%)        States doesn't have    Protocols used: Breathing Difficulty-Adult-[place]    "

## 2024-11-14 NOTE — TELEPHONE ENCOUNTER
Called patient back.    Pt was to return call after she spoke with   son to confirm he can bring her for appt.    Call went to voicemail, mailbox full.

## 2024-11-14 NOTE — DISCHARGE INSTRUCTIONS
I have sent antibiotics along with steroids and a medication to try and help with the cough.     Please return to the ED if you develop fevers, worsening breathing, or other concerns.     Please follow up with your family doctor or Dr Layne for reevaluation in the next week.

## 2024-11-14 NOTE — ED NOTES
Patient ambulated with assistance, pulse ox above 95% with ambulation. MD made aware          Darshana Beasley RN  11/14/24 7845

## 2024-11-16 DIAGNOSIS — J84.89 BOOP (BRONCHIOLITIS OBLITERANS WITH ORGANIZING PNEUMONIA) (HCC): ICD-10-CM

## 2024-11-16 PROBLEM — E66.9 NOCTURNAL HYPOXEMIA DUE TO OBESITY: Status: ACTIVE | Noted: 2024-11-16

## 2024-11-16 PROBLEM — G47.36 NOCTURNAL HYPOXEMIA DUE TO OBESITY: Status: ACTIVE | Noted: 2024-11-16

## 2024-11-16 NOTE — ASSESSMENT & PLAN NOTE
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  Her BOOP did resolve with steroid therapy and and has not reoccurred.  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  She does follow with  ENT Dr. Albright for tracheostomy tube exchange.  She does have a cuffless #6 tracheostomy tube

## 2024-11-16 NOTE — ASSESSMENT & PLAN NOTE
She does have chronic cough which particular bothers her at night and does use Robitussin codeine to keep this under control.  Recently as 2 weeks has increased cough now productive for discolored mucus consistent with acute infection

## 2024-11-16 NOTE — ASSESSMENT & PLAN NOTE
Pleural does have mild persistent asthma and has wheezing diffusely and scattered rhonchi.  He appeared to have some respiratory distress and labored breathing at times I did refer her to ER.  I do spoke to ER physician.  I reviewed case later in the afternoon with him as well.  CT scan of chest showed no evidence of pneumonia.  She did improve bronchodilator therapy and was discharged home on antibiotics Ceftin and tapering dose of prednisone as I discussed with the ER doctor.  She will use her nebulizer DuoNeb every 6 hours.    She will continue nebulizer treatment with budesonide 0.5 mg twice daily.

## 2024-11-16 NOTE — ASSESSMENT & PLAN NOTE
Alaina has evidence of acute bronchitis.  She is expectorating thick yellow and green mucus.  Not having any fever.  She will need antibiotic therapy but I did refer to ER for further evaluation as she has cough that is causing her distress and appears short of breath at this office visit.  I discussed case with ER physician.

## 2024-11-16 NOTE — PROGRESS NOTES
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  Her BOOP did resolve with steroid therapy and and has not reoccurred.  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  She does follow with Dr. MONET Albright for tracheostomy tube exchange.  She does have a cuffless #6 tracheostomy tube         Acute bronchitis - Primary    Alaina has evidence of acute bronchitis.  She is expectorating thick yellow and green mucus.  Not having any fever.  She will need antibiotic therapy but I did refer to ER for further evaluation as she has cough that is causing her distress and appears short of breath at this office visit.  I discussed case with ER physician.         Mild persistent asthma with acute exacerbation    Pleural does have mild persistent asthma and has wheezing diffusely and scattered rhonchi.  He appeared to have some respiratory distress and labored breathing at times I did refer her to ER.  I do spoke to ER physician.  I reviewed case later in the afternoon with him as well.  CT scan of chest showed no evidence of pneumonia.  She did improve bronchodilator therapy and was discharged home on antibiotics Ceftin and tapering dose of prednisone as I discussed with the ER doctor.  She will use her nebulizer DuoNeb every 6 hours.    She will continue nebulizer treatment with budesonide 0.5 mg twice daily.            Other    Chronic cough    She does have chronic cough which particular bothers her at night and does use Robitussin codeine to keep this under control.  Recently as 2 weeks has increased cough now productive for discolored mucus consistent with acute infection         Nocturnal hypoxemia due to obesity         Cc;  cough, shortness of breath, wheezing      HPI    Alaina has had persistent  cough now for over a week and appears to be getting worse.  She is expecting some thick yellow-white and yellow mucus.  Not having any shortness of breath.  Not having any fever or chills.  Is having shortness of breath with activity.  She recently had been seen in ER October 29 complaining of shortness of breath and wheezing.  Was given nebulizer treatment DuoNeb and given dose of IV methylprednisolone.  Been complaining of shortness of breath for 2 to 3 weeks prior to going to ER.  He was prescribed prednisone 20 mg for 5 days.  White blood cell count that time was 5.0 with hemoglobin of 12.0 platelet count 169.    Alaina does have mild persistent asthma and also has subglottic stenosis.  She has had tracheostomy for many years.  Tracheostomy tube was placed in 2005.  She has a cuffless trach and last change was 8/8/2024.  She did see Dr. Albright ENT specialist on November 7 for trach change but correct trach tube replacement was not available at office visit so tube was not exchanged.  Tube appears to be in good shape still.      She also has sleep-related hypoxemia and uses 3 L of oxygen at bedtime.  She does have chronic cough but this is significantly worse and now productive for mucus.  Does have to take through what is recommended to keep her cough under control.  He does have a #6 cuffless tracheostomy tube in place.    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  Has coronary disease and had PCI done 25 years ago.  She has history of intravenous contrast allergy.  She also has history of hypertension and postsurgical hypothyroidism.  He does have diabetes mellitus type 2.  Last hemoglobin A1c done November 7 was 8.4    Last echocardiogram done July 2024 showed LV systolic function be normal with left ventricular ejection fraction 55%.  There is mild to moderate mitral  regurgitation and right ventricular systolic pressure was mildly elevated at 42 mmHg.    She was accompanied by her son today    Past Medical History:   Diagnosis Date    Asthma 11/2024    BOOP (bronchiolitis obliterans with organizing pneumonia) ([facility]) 01/01/2006    Coronary artery disease     Disease of thyroid gland     HL (hearing loss)     Papillary thyroid carcinoma ([facility]) 11/29/2021    Post-surgical hypothyroidism 12/05/2017    Voice disorder        Past Surgical History:   Procedure Laterality Date    HYSTERECTOMY      REPLACEMENT TOTAL KNEE      THYROID SURGERY      TRACHEOSTOMY  01/01/2006         Current Outpatient Medications:     Ascorbic Acid (vitamin C) 1000 MG tablet, Take 1 tablet (1,000 mg total) by mouth daily, Disp: 30 tablet, Rfl: 5    aspirin 81 mg chewable tablet, Chew 1 tablet (81 mg total) daily, Disp: 30 tablet, Rfl: 5    atorvastatin (LIPITOR) 40 mg tablet, TAKE 1 TABLET DAILY, Disp: 90 tablet, Rfl: 1    benzonatate (TESSALON PERLES) 100 mg capsule, Take 1 capsule (100 mg total) by mouth 2 (two) times a day as needed for cough, Disp: 20 capsule, Rfl: 0    Blood Glucose Monitoring Suppl (OneTouch Verio) w/Device KIT, Use to test blood sugar 2 times a day, Disp: 1 kit, Rfl: 0    Calcium Carbonate-Vitamin D3 (Calcium 600+D) 600-400 MG-UNIT TABS, Take 1 tablet by mouth daily  , Disp: , Rfl:     furosemide (LASIX) 40 mg tablet, Take 1 tablet (40 mg total) by mouth daily, Disp: 90 tablet, Rfl: 2    glipiZIDE (GLUCOTROL XL) 2.5 mg 24 hr tablet, TAKE 2 TABLETS BY MOUTH DAILY, Disp: 180 tablet, Rfl: 1    glucose blood (OneTouch Verio) test strip, Use to test blood sugar 2 times daily, Disp: 100 strip, Rfl: 3    guaiFENesin-codeine (ROBITUSSIN AC) 100-10 mg/5 mL oral solution, TAKE 5 ML BY MOUTH 2 (TWO) TIMES A DAY AS NEEDED FOR COUGH, Disp: 300 mL, Rfl: 1    isosorbide mononitrate (IMDUR) 30 mg 24 hr tablet, TAKE 1 TABLET DAILY, Disp: 30 tablet, Rfl: 5    Lancets (onetouch ultrasoft) lancets, Use to  test blood sugar 2 times a day, Disp: 100 each, Rfl: 3    lidocaine (Lidoderm) 5 %, Apply 1 patch topically over 12 hours daily Remove & Discard patch within 12 hours or as directed by MD, Disp: 15 patch, Rfl: 0    lisinopril (ZESTRIL) 20 mg tablet, TAKE 1 TABLET DAILY, Disp: 90 tablet, Rfl: 1    metoprolol tartrate (LOPRESSOR) 25 mg tablet, TAKE 1/2 TABLET EVERY 12 HOURS, Disp: 60 tablet, Rfl: 5    Multiple Vitamins-Minerals (CENTRUM SILVER 50+WOMEN PO), Take by mouth, Disp: , Rfl:     ondansetron (Zofran ODT) 4 mg disintegrating tablet, Take 1 tablet (4 mg total) by mouth every 6 (six) hours as needed for nausea or vomiting, Disp: 10 tablet, Rfl: 0    acetaminophen (TYLENOL) 650 mg CR tablet, Take 1 tablet (650 mg total) by mouth every 8 (eight) hours as needed for mild pain, Disp: 30 tablet, Rfl: 0    benzonatate (TESSALON PERLES) 100 mg capsule, Take 1 capsule (100 mg total) by mouth every 8 (eight) hours, Disp: 21 capsule, Rfl: 0    budesonide (PULMICORT) 0.5 mg/2 mL nebulizer solution, TAKE 2 ML BY NEBULIZATION 2 (TWO) TIMES A DAY, Disp: 120 mL, Rfl: 11    cefuroxime (CEFTIN) 500 mg tablet, Take 1 tablet (500 mg total) by mouth every 12 (twelve) hours for 10 days, Disp: 20 tablet, Rfl: 0    ipratropium-albuterol (DUO-NEB) 0.5-2.5 mg/3 mL nebulizer solution, Take 3 mL by nebulization every 6 (six) hours as needed for wheezing or shortness of breath, Disp: 360 mL, Rfl: 7    pantoprazole (PROTONIX) 40 mg tablet, TAKE ONE TABLET DAILY, Disp: 30 tablet, Rfl: 5    predniSONE 10 mg tablet, Take 1 tablet daily for 5 days, Disp: 5 tablet, Rfl: 0    predniSONE 10 mg tablet, Take 2 tablets for 5 days then 1 tablet for 5 days, Disp: 15 tablet, Rfl: 0    predniSONE 20 mg tablet, Take 1.5 tablets (30 mg total) by mouth daily for 3 days, THEN 1 tablet (20 mg total) daily for 4 days, THEN 0.5 tablets (10 mg total) daily for 4 days., Disp: 11 tablet, Rfl: 0    propylthiouracil 50 mg tablet, 2.5 tablets per day, Disp: 270  "tablet, Rfl: 3    sodium chloride 0.9 % nebulizer solution, TAKE 3 ML BY NEBULIZATION 2 (TWO) TIMES A DAY AS NEEDED FOR WHEEZING, Disp: 300 mL, Rfl: 6    Allergies   Allergen Reactions    Iodine - Food Allergy Swelling    Shellfish-Derived Products - Food Allergy Hives    Morphine Rash       Social History     Tobacco Use    Smoking status: Never     Passive exposure: Past    Smokeless tobacco: Never   Substance Use Topics    Alcohol use: Never         Family History   Problem Relation Age of Onset    Heart disease Mother     Hypertension Mother     Heart disease Father     Pneumonia Brother          of COVID    Dementia Brother        Review of Systems   Constitutional:  Negative for chills, fever and unexpected weight change.   HENT:  Negative for congestion, rhinorrhea and sore throat.    Eyes:  Negative for discharge and redness.   Respiratory:  Positive for cough, shortness of breath and wheezing.    Cardiovascular:  Negative for chest pain, palpitations and leg swelling.   Gastrointestinal:  Negative for abdominal distention, abdominal pain and nausea.   Endocrine: Negative for polydipsia and polyphagia.   Genitourinary:  Negative for dysuria.   Musculoskeletal:  Negative for joint swelling and myalgias.   Skin:  Negative for rash.   Neurological:  Negative for light-headedness.   Psychiatric/Behavioral:  Negative for decreased concentration.            Vitals:    24 1411   BP: 118/70   Pulse: 61   Resp: (!) 24   Temp: 97.8 °F (36.6 °C)   SpO2: 98%     Height: 5' 6\" (167.6 cm)  IBW (Ideal Body Weight): 59.3 kg  Body mass index is 35.51 kg/m².  Weight (last 2 days)       Date/Time Weight    24 1411 99.8 (220)                Physical Exam  Vitals reviewed.   Constitutional:       Appearance: Normal appearance. She is well-developed. She is obese.      Comments: She does appear distressed due to her cough and shortness of breath.  Has to be talk because of her cough   HENT:      Head: " Normocephalic and atraumatic.      Right Ear: External ear normal.      Left Ear: External ear normal.      Nose: Nose normal.      Mouth/Throat:      Mouth: Mucous membranes are moist.      Pharynx: Oropharynx is clear.   Eyes:      General: No scleral icterus.        Right eye: Discharge present.      Conjunctiva/sclera: Conjunctivae normal.      Pupils: Pupils are equal, round, and reactive to light.   Neck:      Comments: Has #6 cuffless tracheostomy tube in place.  No erythema or discharge around stoma  Cardiovascular:      Rate and Rhythm: Normal rate and regular rhythm.      Heart sounds: Normal heart sounds.   Pulmonary:      Effort: Pulmonary effort is normal.      Breath sounds: Normal breath sounds.      Comments: There are expiratory wheezes and scattered rhonchi bilaterally  Abdominal:      General: Bowel sounds are normal.      Palpations: Abdomen is soft.   Musculoskeletal:         General: Normal range of motion.      Cervical back: Neck supple.   Skin:     General: Skin is warm and dry.   Neurological:      General: No focal deficit present.      Mental Status: She is alert and oriented to person, place, and time.   Psychiatric:         Mood and Affect: Mood normal.         Speech: Speech normal.         Behavior: Behavior normal.         Thought Content: Thought content normal.

## 2024-11-17 LAB
BACTERIA BLD CULT: NORMAL
BACTERIA BLD CULT: NORMAL

## 2024-11-18 RX ORDER — BUDESONIDE 0.5 MG/2ML
INHALANT ORAL
Qty: 120 ML | Refills: 5 | Status: SHIPPED | OUTPATIENT
Start: 2024-11-18

## 2024-11-19 LAB
BACTERIA BLD CULT: NORMAL
BACTERIA BLD CULT: NORMAL

## 2024-11-22 DIAGNOSIS — I10 HYPERTENSION, ESSENTIAL: ICD-10-CM

## 2024-11-22 RX ORDER — LISINOPRIL 20 MG/1
20 TABLET ORAL DAILY
Qty: 30 TABLET | Refills: 0 | Status: SHIPPED | OUTPATIENT
Start: 2024-11-22

## 2024-12-06 ENCOUNTER — TELEPHONE (OUTPATIENT)
Age: 84
End: 2024-12-06

## 2024-12-06 DIAGNOSIS — K21.9 GASTROESOPHAGEAL REFLUX DISEASE WITHOUT ESOPHAGITIS: ICD-10-CM

## 2024-12-06 RX ORDER — PANTOPRAZOLE SODIUM 40 MG/1
40 TABLET, DELAYED RELEASE ORAL DAILY
Qty: 30 TABLET | Refills: 0 | Status: SHIPPED | OUTPATIENT
Start: 2024-12-06

## 2024-12-06 NOTE — TELEPHONE ENCOUNTER
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      Thanks, Dr. Epps

## 2024-12-11 ENCOUNTER — OFFICE VISIT (OUTPATIENT)
Dept: PULMONOLOGY | Facility: MEDICAL CENTER | Age: 84
End: 2024-12-11
Payer: COMMERCIAL

## 2024-12-11 VITALS
WEIGHT: 217.6 LBS | HEART RATE: 78 BPM | OXYGEN SATURATION: 91 % | BODY MASS INDEX: 34.97 KG/M2 | HEIGHT: 66 IN | RESPIRATION RATE: 12 BRPM | TEMPERATURE: 98.4 F | DIASTOLIC BLOOD PRESSURE: 64 MMHG | SYSTOLIC BLOOD PRESSURE: 122 MMHG

## 2024-12-11 DIAGNOSIS — J45.30 MILD PERSISTENT ASTHMA WITHOUT COMPLICATION: Primary | ICD-10-CM

## 2024-12-11 DIAGNOSIS — J39.8 TRACHEAL STENOSIS: ICD-10-CM

## 2024-12-11 DIAGNOSIS — G47.34 SLEEP-RELATED NONOBSTRUCTIVE ALVEOLAR HYPOVENTILATION: ICD-10-CM

## 2024-12-11 DIAGNOSIS — R05.3 CHRONIC COUGH: Chronic | ICD-10-CM

## 2024-12-11 PROBLEM — J20.9 ACUTE BRONCHITIS: Status: RESOLVED | Noted: 2021-10-21 | Resolved: 2024-12-11

## 2024-12-11 PROCEDURE — 99214 OFFICE O/P EST MOD 30 MIN: CPT | Performed by: NURSE PRACTITIONER

## 2024-12-11 RX ORDER — CODEINE PHOSPHATE AND GUAIFENESIN 10; 100 MG/5ML; MG/5ML
5 SOLUTION ORAL 2 TIMES DAILY PRN
Qty: 160 ML | Refills: 1 | Status: SHIPPED | OUTPATIENT
Start: 2024-12-11

## 2024-12-11 NOTE — PROGRESS NOTES
Assessment/Plan:     Problem List Items Addressed This Visit          Respiratory    Mild persistent asthma without complication - Primary (Chronic)    Alaina was seen by Dr. Leighton Layne.  This was in November 2024.  She had been in the emergency department twice in 2 months and was treated for asthma exacerbation.  She does have budesonide that she uses in her nebulizer.  She has only been using this once daily and I told her to increase this to twice daily.  She also is using now her albuterol 3 times daily.  She overall has been doing very well and although she had used the DuoNebs and is back to using albuterol and finds this just is helpful         Tracheal stenosis    Alaina follows  follow with ENT Dr. Albright.   She got tracheal stenosis status post intubation and during pneumonia she also is following and has care from ENT         Relevant Medications    guaiFENesin-codeine (ROBITUSSIN AC) 100-10 mg/5 mL oral solution       Neurology/Sleep    Sleep-related nonobstructive alveolar hypoventilation    Does have a concentrator at home.  She is instructed to use 3 L of supplemental oxygen at nighttime she uses this on occasion but does continue to use and benefit she particularly uses this when she feels as though she is having an asthma exacerbation            Other    Chronic cough (Chronic)    Alaina does have a chronic cough this bothers her at night.  She was recently seen in the ER for asthma exacerbation.  I will try to reorder for her but did inform her that this can be habit-forming.         Relevant Medications    guaiFENesin-codeine (ROBITUSSIN AC) 100-10 mg/5 mL oral solution         Return in about 6 months (around 6/11/2025).  All questions are answered to the patient's satisfaction and understanding.  She verbalizes understanding.  She is encouraged to call with any further questions or concerns.    Portions of the record may have been created with voice recognition software.  Occasional wrong  "word or \"sound a like\" substitutions may have occurred due to the inherent limitations of voice recognition software.  Read the chart carefully and recognize, using context, where substitutions have occurred.    Electronically Signed by KIERA Ruth    ______________________________________________________________________    Chief Complaint: No chief complaint on file.      Patient ID: Alaina is a 84 y.o. y.o. female has a past medical history of Asthma (11/2024), BOOP (bronchiolitis obliterans with organizing pneumonia) ([facility]) (01/01/2006), Coronary artery disease, Disease of thyroid gland, HL (hearing loss), Papillary thyroid carcinoma ([facility]) (11/29/2021), Post-surgical hypothyroidism (12/05/2017), and Voice disorder.    12/11/2024  Patient presents today for follow-up visit.  KIMBERLEE Foley is a 84-year-old female who presented to [facility]'s emergency department twice.  Her first presentation since her last visit was October 29, 2024.  She was discharged in stable condition.  This patient was seen essentially for moderate persistent asthma with exacerbation and was given predniso 5 mgand then proceeded by 20 mg for 5 days  true   10 mg for 5 days.  She had a chest x-ray October 29, 2024 that showed lung volumes that were low with vascular crowding as well is mild cardiomegaly.  Is also noted that this patient had a tracheostomy.  Then proceeded to a second emergency department presentation on the November 14, 2024.  She had shortness of breath and was started on Ceftin 500 mg every 12 hours and also tapering dose of prednisone.  She was to follow-up with Dr. Leighton Layne.  ET of chest was done without IV contrast during that visit.  Tracheostomy tube was in the trachea as she had linear subsegmental atelectasis or scarring in the bilateral upper and bilateral lower lobes right worse than last with no focal consolidation or groundglass opacity.  JANIS does follow with ENT Dr. ART.  Her last visit " was November 7, 2024 and she is dependent on tracheostomy.  I will does use a nebulizer with budesonide 0.5 mg.  She also uses her nebulizer with DuoNeb and has also use regular albuterol in the past.  She has sleep related nonobstructive alveolar hypoventilation.  Patient does have shortness of breath and can use 3 L of oxygen at nighttime  She was seen by Dr. 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  Her ALT does also have a diagnosis of mild persistent asthma.  She does use bronchodilator therapy  Review of Systems   Constitutional: Negative.    HENT: Negative.     Eyes: Negative.    Respiratory:  Positive for cough.         Cough on occasion sometimes productive   Cardiovascular: Negative.    Gastrointestinal: Negative.    Endocrine: Negative.    Genitourinary: Negative.    Musculoskeletal: Negative.    Allergic/Immunologic: Negative.    Neurological: Negative.    Hematological: Negative.    Psychiatric/Behavioral: Negative.         Smoking history: She reports that she has never smoked. She has been exposed to tobacco smoke. She has never used smokeless tobacco.    The following portions of the patient's history were reviewed and updated as appropriate: allergies, current medications, past family history, past medical history, past social history, past surgical history, and problem list.    Immunization History   Administered Date(s) Administered    COVID-19 PFIZER VACCINE 0.3 ML IM 06/15/2021, 07/06/2021, 01/12/2022, 09/27/2022    INFLUENZA 09/30/2005, 02/23/2008    Influenza, high dose seasonal 0.7 mL 10/28/2021, 11/29/2023    Pneumococcal Polysaccharide PPV23 09/30/2005     Current Outpatient Medications   Medication Sig Dispense Refill    acetaminophen (TYLENOL) 650 mg CR tablet Take 1 tablet (650 mg total) by mouth  every 8 (eight) hours as needed for mild pain 30 tablet 0    Ascorbic Acid (vitamin C) 1000 MG tablet Take 1 tablet (1,000 mg total) by mouth daily 30 tablet 5    aspirin 81 mg chewable tablet Chew 1 tablet (81 mg total) daily 30 tablet 5    atorvastatin (LIPITOR) 40 mg tablet TAKE 1 TABLET DAILY 90 tablet 1    Blood Glucose Monitoring Suppl (OneTouch Verio) w/Device KIT Use to test blood sugar 2 times a day 1 kit 0    budesonide (PULMICORT) 0.5 mg/2 mL nebulizer solution TAKE 2 ML BY NEBULIZATION 2 (TWO) TIMES A  mL 5    Calcium Carbonate-Vitamin D3 (Calcium 600+D) 600-400 MG-UNIT TABS Take 1 tablet by mouth daily        furosemide (LASIX) 40 mg tablet Take 1 tablet (40 mg total) by mouth daily 90 tablet 2    glipiZIDE (GLUCOTROL XL) 2.5 mg 24 hr tablet TAKE 2 TABLETS BY MOUTH DAILY 180 tablet 1    glucose blood (OneTouch Verio) test strip Use to test blood sugar 2 times daily 100 strip 3    guaiFENesin-codeine (ROBITUSSIN AC) 100-10 mg/5 mL oral solution Take 5 mL by mouth 2 (two) times a day as needed for cough 160 mL 1    ipratropium-albuterol (DUO-NEB) 0.5-2.5 mg/3 mL nebulizer solution Take 3 mL by nebulization every 6 (six) hours as needed for wheezing or shortness of breath 360 mL 7    isosorbide mononitrate (IMDUR) 30 mg 24 hr tablet TAKE 1 TABLET DAILY 30 tablet 5    Lancets (onetouch ultrasoft) lancets Use to test blood sugar 2 times a day 100 each 3    lidocaine (Lidoderm) 5 % Apply 1 patch topically over 12 hours daily Remove & Discard patch within 12 hours or as directed by MD 15 patch 0    lisinopril (ZESTRIL) 20 mg tablet TAKE 1 TABLET DAILY 30 tablet 0    metoprolol tartrate (LOPRESSOR) 25 mg tablet TAKE 1/2 TABLET EVERY 12 HOURS 60 tablet 5    Multiple Vitamins-Minerals (CENTRUM SILVER 50+WOMEN PO) Take by mouth      ondansetron (Zofran ODT) 4 mg disintegrating tablet Take 1 tablet (4 mg total) by mouth every 6 (six) hours as needed for nausea or vomiting 10 tablet 0    pantoprazole  "(PROTONIX) 40 mg tablet TAKE ONE TABLET DAILY 30 tablet 0    propylthiouracil 50 mg tablet 2.5 tablets per day 270 tablet 3    sodium chloride 0.9 % nebulizer solution TAKE 3 ML BY NEBULIZATION 2 (TWO) TIMES A DAY AS NEEDED FOR WHEEZING 300 mL 6    benzonatate (TESSALON PERLES) 100 mg capsule Take 1 capsule (100 mg total) by mouth 2 (two) times a day as needed for cough (Patient not taking: Reported on 12/11/2024) 20 capsule 0    benzonatate (TESSALON PERLES) 100 mg capsule Take 1 capsule (100 mg total) by mouth every 8 (eight) hours (Patient not taking: Reported on 12/11/2024) 21 capsule 0    predniSONE 10 mg tablet Take 1 tablet daily for 5 days (Patient not taking: Reported on 12/11/2024) 5 tablet 0    predniSONE 10 mg tablet Take 2 tablets for 5 days then 1 tablet for 5 days (Patient not taking: Reported on 12/11/2024) 15 tablet 0     No current facility-administered medications for this visit.     Allergies: Iodine - food allergy, Shellfish-derived products - food allergy, and Morphine    Objective:  Vitals:    12/11/24 1537   BP: 122/64   BP Location: Left arm   Patient Position: Sitting   Cuff Size: Extra-Large   Pulse: 78   Resp: 12   Temp: 98.4 °F (36.9 °C)   TempSrc: Temporal   SpO2: 91%   Weight: 98.7 kg (217 lb 9.6 oz)   Height: 5' 6\" (1.676 m)   Oxygen Therapy  SpO2: 91 %  .  Wt Readings from Last 3 Encounters:   12/11/24 98.7 kg (217 lb 9.6 oz)   11/14/24 103 kg (227 lb 1.2 oz)   11/14/24 99.8 kg (220 lb)     Body mass index is 35.12 kg/m².    Physical Exam  Constitutional:       Appearance: She is normal weight.   HENT:      Head: Normocephalic and atraumatic.      Nose: Nose normal.      Mouth/Throat:      Mouth: Mucous membranes are moist.      Pharynx: Oropharynx is clear.   Eyes:      Conjunctiva/sclera: Conjunctivae normal.      Pupils: Pupils are equal, round, and reactive to light.   Neck:      Comments: Tracheostomy and tube are in place  Pulmonary:      Effort: Pulmonary effort is normal.     "  Breath sounds: Normal breath sounds.   Musculoskeletal:         General: Normal range of motion.      Cervical back: Normal range of motion and neck supple.   Skin:     General: Skin is warm.      Capillary Refill: Capillary refill takes less than 2 seconds.   Neurological:      General: No focal deficit present.      Mental Status: She is alert and oriented to person, place, and time.   Psychiatric:         Mood and Affect: Mood normal.         Behavior: Behavior normal.         Lab Review:   Admission on 11/14/2024, Discharged on 11/14/2024   Component Date Value    WBC 11/14/2024 3.77 (L)     RBC 11/14/2024 4.26     Hemoglobin 11/14/2024 12.3     Hematocrit 11/14/2024 38.4     MCV 11/14/2024 90     MCH 11/14/2024 28.9     MCHC 11/14/2024 32.0     RDW 11/14/2024 13.8     MPV 11/14/2024 11.1     Platelets 11/14/2024 158     nRBC 11/14/2024 0     Segmented % 11/14/2024 57     Immature Grans % 11/14/2024 0     Lymphocytes % 11/14/2024 29     Monocytes % 11/14/2024 9     Eosinophils Relative 11/14/2024 4     Basophils Relative 11/14/2024 1     Absolute Neutrophils 11/14/2024 2.16     Absolute Immature Grans 11/14/2024 0.01     Absolute Lymphocytes 11/14/2024 1.10     Absolute Monocytes 11/14/2024 0.32     Eosinophils Absolute 11/14/2024 0.16     Basophils Absolute 11/14/2024 0.02     Sodium 11/14/2024 135     Potassium 11/14/2024 4.0     Chloride 11/14/2024 103     CO2 11/14/2024 27     ANION GAP 11/14/2024 5     BUN 11/14/2024 21     Creatinine 11/14/2024 0.98     Glucose 11/14/2024 220 (H)     Calcium 11/14/2024 9.5     AST 11/14/2024 14     ALT 11/14/2024 11     Alkaline Phosphatase 11/14/2024 53     Total Protein 11/14/2024 7.1     Albumin 11/14/2024 4.0     Total Bilirubin 11/14/2024 0.42     eGFR 11/14/2024 53     LACTIC ACID 11/14/2024 1.5     Procalcitonin 11/14/2024 <0.05     Protime 11/14/2024 13.4     INR 11/14/2024 0.97     PTT 11/14/2024 34     Blood Culture 11/14/2024 No Growth After 5 Days.     Blood  Culture 11/14/2024 No Growth After 5 Days.     SARS COV Rapid Antigen 11/14/2024 Negative     Influenza A Rapid Antigen 11/14/2024 Negative     Influenza B Rapid Antigen 11/14/2024 Negative     Ventricular Rate 11/14/2024 60     Atrial Rate 11/14/2024 60     MS Interval 11/14/2024 138     QRSD Interval 11/14/2024 80     QT Interval 11/14/2024 406     QTC Interval 11/14/2024 406     P Axis 11/14/2024 50     QRS [place] 11/14/2024 -12     T Wave [place] 11/14/2024 6    Office Visit on 11/07/2024   Component Date Value    Hemoglobin A1C 11/07/2024 8.4 (A)    Admission on 10/29/2024, Discharged on 10/29/2024   Component Date Value    WBC 10/29/2024 5.04     RBC 10/29/2024 4.15     Hemoglobin 10/29/2024 12.0     Hematocrit 10/29/2024 37.3     MCV 10/29/2024 90     MCH 10/29/2024 28.9     MCHC 10/29/2024 32.2     RDW 10/29/2024 13.6     MPV 10/29/2024 11.6     Platelets 10/29/2024 169     nRBC 10/29/2024 0     Segmented % 10/29/2024 51     Immature Grans % 10/29/2024 0     Lymphocytes % 10/29/2024 34     Monocytes % 10/29/2024 9     Eosinophils Relative 10/29/2024 6     Basophils Relative 10/29/2024 0     Absolute Neutrophils 10/29/2024 2.55     Absolute Immature Grans 10/29/2024 0.02     Absolute Lymphocytes 10/29/2024 1.71     Absolute Monocytes 10/29/2024 0.44     Eosinophils Absolute 10/29/2024 0.30     Basophils Absolute 10/29/2024 0.02     Protime 10/29/2024 14.3     INR 10/29/2024 1.06     PTT 10/29/2024 27     Sodium 10/29/2024 134 (L)     Potassium 10/29/2024 5.2     Chloride 10/29/2024 104     CO2 10/29/2024 23     ANION GAP 10/29/2024 7     BUN 10/29/2024 25     Creatinine 10/29/2024 0.97     Glucose 10/29/2024 79     Calcium 10/29/2024 9.0     AST 10/29/2024 26     ALT 10/29/2024 11     Alkaline Phosphatase 10/29/2024 54     Total Protein 10/29/2024 7.3     Albumin 10/29/2024 4.1     Total Bilirubin 10/29/2024 0.21     eGFR 10/29/2024 54     hs TnI 0hr 10/29/2024 4     BNP 10/29/2024 59     hs TnI 2hr 10/29/2024 5      Delta 2hr hsTnI 10/29/2024 1    Hospital Outpatient Visit on 07/26/2024   Component Date Value    MV mean gradient retrogr* 07/26/2024 92     Triscuspid Valve Regurgi* 07/26/2024 39.0     RAA A4C 07/26/2024 11.8     MR PG 07/26/2024 139     LA Volume Index (BP) 07/26/2024 72.2     MV Peak A Abran 07/26/2024 0.88     MV stenosis pressure 1/2* 07/26/2024 84     MV Peak E Abran 07/26/2024 67     AV peak gradient 07/26/2024 6     LVOT diameter 07/26/2024 2.0     E wave deceleration time 07/26/2024 288     E/A ratio 07/26/2024 0.76     MV valve area p 1/2 meth* 07/26/2024 2.62     AV LVOT peak gradient 07/26/2024 3     TR Peak Abran 07/26/2024 3.1     RVID d 07/26/2024 3.7     A4C EF 07/26/2024 63     Mitral regurgitation pea* 07/26/2024 5.89     Mitral valve mean inflow* 07/26/2024 4.48     Tricuspid valve peak reg* 07/26/2024 3.13     Left ventricular stroke * 07/26/2024 69.00     IVSd 07/26/2024 1.10     Tricuspid annular plane * 07/26/2024 2.10     Ao root 07/26/2024 3.60     LVPWd 07/26/2024 1.10     LA size 07/26/2024 4.1     LA volume (BP) 07/26/2024 151     FS 07/26/2024 29     LVIDS 07/26/2024 3.60     IVS 07/26/2024 1.1     LVIDd 07/26/2024 5.10     LA length (A2C) 07/26/2024 6.10     MV VTI RETROGRADE 07/26/2024 226.9     LEFT VENTRICLE SYSTOLIC * 07/26/2024 53     LV DIASTOLIC VOLUME (MOD* 07/26/2024 122     Left Atrium Area-systoli* 07/26/2024 37.6     Left Atrium Area-systoli* 07/26/2024 31.7     MV E' Tissue Velocity Se* 07/26/2024 7     LVSV, 2D 07/26/2024 69     BSA 07/26/2024 2.09     LVOT area 07/26/2024 3.14    Office Visit on 07/25/2024   Component Date Value    Hemoglobin A1C 07/25/2024 8.1 (A)     TSH 10/31/2024 1.600     Free t4 10/31/2024 0.92     Triiodothyronine (T3) 10/31/2024 58 (L)     Glucose, Random 10/31/2024 271 (H)     BUN 10/31/2024 24     Creatinine 10/31/2024 0.99     eGFR 10/31/2024 57 (L)     SL AMB BUN/CREATININE RA* 10/31/2024 24     Sodium 10/31/2024 142     Potassium 10/31/2024  4.6     Chloride 10/31/2024 104     CO2 10/31/2024 16 (L)     CALCIUM 10/31/2024 9.7     Protein, Total 10/31/2024 7.2     Albumin 10/31/2024 4.0     Globulin, Total 10/31/2024 3.2     TOTAL BILIRUBIN 10/31/2024 0.2     Alk Phos Isoenzymes 10/31/2024 71     AST 10/31/2024 16     ALT 10/31/2024 13     Creatinine, Urine 10/31/2024 39.0     Albumin,U,Random 10/31/2024 <3.0     Microalb/Creat Ratio 10/31/2024 <8        Past Surgical History:   Procedure Laterality Date    HYSTERECTOMY      REPLACEMENT TOTAL KNEE      THYROID SURGERY      TRACHEOSTOMY  2006        Family History   Problem Relation Age of Onset    Heart disease Mother     Hypertension Mother     Heart disease Father     Pneumonia Brother          of COVID    Dementia Brother         Diagnostics:  Results Review Statement: No pertinent imaging studies reviewed.  none pertinent  Office Spirometry Results:     ESS:    CT chest without contrast  Result Date: 2024  Narrative: CT CHEST WITHOUT IV CONTRAST INDICATION: persistent dyspnea x weeks.  Pt. comes to the ER saying she was sent by her PCP. Pt. Has a trach and reports feeling more short of breath than normal. Pt. Reports no pain, pulse ox in triage reading 100% on RA. COMPARISON: Portable chest radiograph 2024. CT lumbar spine without contrast 2021. TECHNIQUE: CT examination of the chest was performed without intravenous contrast. Multiplanar 2D reformatted images were created from the source data. This examination, like all CT scans performed in the [facility], was performed utilizing techniques to minimize radiation dose exposure, including the use of iterative reconstruction and automated exposure control. Radiation dose length product (DLP) for this visit: 691.69 mGy-cm FINDINGS: LUNGS: Tracheostomy tube in trachea. Linear subsegmental atelectasis/scarring in bilateral upper (left worse than right) and bilateral lower lobes (right worse than left). No focal  consolidation or groundglass opacity. Simple pulmonary cyst in right lower lobe. PLEURA: Unremarkable. HEART/GREAT VESSELS: Normal heart size. No pericardial effusion. No thoracic aortic aneurysm. Mild scattered calcified atherosclerotic disease. MEDIASTINUM AND JAYLAN: Unremarkable. CHEST WALL AND LOWER NECK: Heterogeneous thyroid gland extending into the anterior mediastinum with a nodule that appears larger than 1.5 cm. Incidental discovery of one or more thyroid nodule(s) measuring more than 1.5 cm and without suspicious features  is noted in this patient who is above 35 years old; according to guidelines published in the February 2015 white paper on incidental thyroid nodules in the Journal of the American College of Radiology (JACR), further characterization with thyroid ultrasound is recommended. VISUALIZED STRUCTURES IN THE UPPER ABDOMEN: Partially imaged cholelithiasis, small extension of the gastric body near the small bony osseous protuberance in left lower costochondral cartilage region, likely sequela of prior G-tube. OSSEOUS STRUCTURES: No acute fracture or destructive osseous lesion. Small bony osseous protuberance in left lower costochondral cartilage region. Multilevel spine degenerative changes with evidence of diffuse idiopathic skeletal hyperostosis (DISH).     Impression: No acute thoracic abnormality. Tracheostomy tube in the trachea. Linear subsegmental atelectasis/scarring in bilateral upper (left worse than right) and bilateral lower lobes (right worse than left). Incidental thyroid nodule(s) for which nonemergent thyroid ultrasound is recommended. Additional chronic/incidental findings as detailed above. The study was marked in EPIC for immediate notification. Workstation performed: [id number]     XR chest 1 view portable  Result Date: 11/14/2024  Narrative: XR CHEST PORTABLE INDICATION: pna. Shortness of breath and coughing. COMPARISON: CXR 10/29/2024, chest CT 11/14/2024. FINDINGS:  Tracheostomy. Clear lungs. No pneumothorax or pleural effusion. Mild cardiomegaly. Bones are unremarkable for age. Normal upper abdomen.     Impression: No acute cardiopulmonary disease. Workstation performed: [id number]

## 2024-12-11 NOTE — ASSESSMENT & PLAN NOTE
Alaina follows  follow with ENT Dr. Albright.   She got tracheal stenosis status post intubation and during pneumonia she also is following and has care from ENT

## 2024-12-11 NOTE — ASSESSMENT & PLAN NOTE
Does have a concentrator at home.  She is instructed to use 3 L of supplemental oxygen at nighttime she uses this on occasion but does continue to use and benefit she particularly uses this when she feels as though she is having an asthma exacerbation

## 2024-12-11 NOTE — ASSESSMENT & PLAN NOTE
Alaina does have a chronic cough this bothers her at night.  She was recently seen in the ER for asthma exacerbation.  I will try to reorder for her but did inform her that this can be habit-forming.

## 2024-12-11 NOTE — ASSESSMENT & PLAN NOTE
Alaina was seen by Dr. Leighton Layne.  This was in November 2024.  She had been in the emergency department twice in 2 months and was treated for asthma exacerbation.  She does have budesonide that she uses in her nebulizer.  She has only been using this once daily and I told her to increase this to twice daily.  She also is using now her albuterol 3 times daily.  She overall has been doing very well and although she had used the DuoNebs and is back to using albuterol and finds this just is helpful

## 2024-12-23 ENCOUNTER — OFFICE VISIT (OUTPATIENT)
Age: 84
End: 2024-12-23
Payer: COMMERCIAL

## 2024-12-23 VITALS — BODY MASS INDEX: 34.87 KG/M2 | HEIGHT: 66 IN | RESPIRATION RATE: 20 BRPM | WEIGHT: 217 LBS

## 2024-12-23 DIAGNOSIS — M77.41 METATARSALGIA OF BOTH FEET: Primary | ICD-10-CM

## 2024-12-23 DIAGNOSIS — B35.1 ONYCHOMYCOSIS: ICD-10-CM

## 2024-12-23 DIAGNOSIS — M77.42 METATARSALGIA OF BOTH FEET: Primary | ICD-10-CM

## 2024-12-23 DIAGNOSIS — M54.16 RADICULOPATHY OF LUMBAR REGION: ICD-10-CM

## 2024-12-23 DIAGNOSIS — E11.42 DIABETIC POLYNEUROPATHY ASSOCIATED WITH TYPE 2 DIABETES MELLITUS (HCC): ICD-10-CM

## 2024-12-23 PROCEDURE — 99203 OFFICE O/P NEW LOW 30 MIN: CPT | Performed by: PODIATRIST

## 2024-12-23 RX ORDER — GABAPENTIN 100 MG/1
100 CAPSULE ORAL
Qty: 30 CAPSULE | Refills: 1 | Status: SHIPPED | OUTPATIENT
Start: 2024-12-23 | End: 2025-02-21

## 2024-12-23 NOTE — PROGRESS NOTES
Assessment/Plan: Metatarsalgia secondary to diabetic neuropathy.  Radiculopathy.  Mycosis of nail.  Pain upon ambulation.    Plan.  Chart reviewed.  PCP notes reviewed.  Diabetic foot exam performed.  Patient educated on care of the diabetic foot.  Patient given nail cutting instruction.  At this time in order to help with metatarsalgia we will consider low-dose gabapentin.  This will be 100 mg daily at bedtime.  Aftercare instruction given.  Watch for signs of infection.         Diagnoses and all orders for this visit:    Metatarsalgia of both feet  -     gabapentin (NEURONTIN) 100 mg capsule; Take 1 capsule (100 mg total) by mouth daily at bedtime    Diabetic polyneuropathy associated with type 2 diabetes mellitus (HCC)  -     gabapentin (NEURONTIN) 100 mg capsule; Take 1 capsule (100 mg total) by mouth daily at bedtime    Radiculopathy of lumbar region  -     gabapentin (NEURONTIN) 100 mg capsule; Take 1 capsule (100 mg total) by mouth daily at bedtime    Onychomycosis          Subjective:   Patient presents for evaluation.  Patient has numb feet.  She says they feel like plastic.  Patient is diabetic with low back pain.          Allergies   Allergen Reactions    Iodine - Food Allergy Swelling    Shellfish-Derived Products - Food Allergy Hives    Morphine Rash         Current Outpatient Medications:     acetaminophen (TYLENOL) 650 mg CR tablet, Take 1 tablet (650 mg total) by mouth every 8 (eight) hours as needed for mild pain, Disp: 30 tablet, Rfl: 0    Ascorbic Acid (vitamin C) 1000 MG tablet, Take 1 tablet (1,000 mg total) by mouth daily, Disp: 30 tablet, Rfl: 5    aspirin 81 mg chewable tablet, Chew 1 tablet (81 mg total) daily, Disp: 30 tablet, Rfl: 5    atorvastatin (LIPITOR) 40 mg tablet, TAKE 1 TABLET DAILY, Disp: 90 tablet, Rfl: 1    Blood Glucose Monitoring Suppl (OneTouch Verio) w/Device KIT, Use to test blood sugar 2 times a day, Disp: 1 kit, Rfl: 0    budesonide (PULMICORT) 0.5 mg/2 mL nebulizer  solution, TAKE 2 ML BY NEBULIZATION 2 (TWO) TIMES A DAY, Disp: 120 mL, Rfl: 5    Calcium Carbonate-Vitamin D3 (Calcium 600+D) 600-400 MG-UNIT TABS, Take 1 tablet by mouth daily  , Disp: , Rfl:     furosemide (LASIX) 40 mg tablet, Take 1 tablet (40 mg total) by mouth daily, Disp: 90 tablet, Rfl: 2    gabapentin (NEURONTIN) 100 mg capsule, Take 1 capsule (100 mg total) by mouth daily at bedtime, Disp: 30 capsule, Rfl: 1    glipiZIDE (GLUCOTROL XL) 2.5 mg 24 hr tablet, TAKE 2 TABLETS BY MOUTH DAILY, Disp: 180 tablet, Rfl: 1    glucose blood (OneTouch Verio) test strip, Use to test blood sugar 2 times daily, Disp: 100 strip, Rfl: 3    guaiFENesin-codeine (ROBITUSSIN AC) 100-10 mg/5 mL oral solution, Take 5 mL by mouth 2 (two) times a day as needed for cough, Disp: 160 mL, Rfl: 1    ipratropium-albuterol (DUO-NEB) 0.5-2.5 mg/3 mL nebulizer solution, Take 3 mL by nebulization every 6 (six) hours as needed for wheezing or shortness of breath, Disp: 360 mL, Rfl: 7    isosorbide mononitrate (IMDUR) 30 mg 24 hr tablet, TAKE 1 TABLET DAILY, Disp: 30 tablet, Rfl: 5    Lancets (onetouch ultrasoft) lancets, Use to test blood sugar 2 times a day, Disp: 100 each, Rfl: 3    lidocaine (Lidoderm) 5 %, Apply 1 patch topically over 12 hours daily Remove & Discard patch within 12 hours or as directed by MD, Disp: 15 patch, Rfl: 0    lisinopril (ZESTRIL) 20 mg tablet, TAKE 1 TABLET DAILY, Disp: 30 tablet, Rfl: 0    metoprolol tartrate (LOPRESSOR) 25 mg tablet, TAKE 1/2 TABLET EVERY 12 HOURS, Disp: 60 tablet, Rfl: 5    Multiple Vitamins-Minerals (CENTRUM SILVER 50+WOMEN PO), Take by mouth, Disp: , Rfl:     ondansetron (Zofran ODT) 4 mg disintegrating tablet, Take 1 tablet (4 mg total) by mouth every 6 (six) hours as needed for nausea or vomiting, Disp: 10 tablet, Rfl: 0    pantoprazole (PROTONIX) 40 mg tablet, TAKE ONE TABLET DAILY, Disp: 30 tablet, Rfl: 0    propylthiouracil 50 mg tablet, 2.5 tablets per day, Disp: 270 tablet, Rfl: 3     sodium chloride 0.9 % nebulizer solution, TAKE 3 ML BY NEBULIZATION 2 (TWO) TIMES A DAY AS NEEDED FOR WHEEZING, Disp: 300 mL, Rfl: 6    benzonatate (TESSALON PERLES) 100 mg capsule, Take 1 capsule (100 mg total) by mouth 2 (two) times a day as needed for cough (Patient not taking: Reported on 12/11/2024), Disp: 20 capsule, Rfl: 0    benzonatate (TESSALON PERLES) 100 mg capsule, Take 1 capsule (100 mg total) by mouth every 8 (eight) hours (Patient not taking: Reported on 12/11/2024), Disp: 21 capsule, Rfl: 0    predniSONE 10 mg tablet, Take 1 tablet daily for 5 days (Patient not taking: Reported on 12/11/2024), Disp: 5 tablet, Rfl: 0    predniSONE 10 mg tablet, Take 2 tablets for 5 days then 1 tablet for 5 days (Patient not taking: Reported on 12/11/2024), Disp: 15 tablet, Rfl: 0    Patient Active Problem List   Diagnosis    Coronary artery disease with angina pectoris, unspecified vessel or lesion type, unspecified whether native or transplanted heart ([facility])    Hypertension, essential    Tracheal stenosis    Subglottic stenosis    Chronic cough    Class 1 obesity due to excess calories without serious comorbidity with body mass index (BMI) of 34.0 to 34.9 in adult    Sleep-related nonobstructive alveolar hypoventilation    Mild persistent asthma without complication    Mixed hyperlipidemia    Hypertensive heart disease    Chronic bilateral low back pain without sciatica    Mild persistent asthma with acute exacerbation    Stage 3a chronic kidney disease ([facility])    Hyperthyroidism    Type 2 diabetes mellitus with hyperglycemia, without long-term current use of insulin ([facility])    Class 3 severe obesity due to excess calories without serious comorbidity in adult, unspecified BMI ([facility])    Nocturnal hypoxemia due to obesity          Patient ID: Alaina Maza is a 84 y.o. female.    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  She   Patient Active Problem List    Diagnosis Date Noted    Nocturnal hypoxemia due to obesity 11/16/2024    Class 3 severe obesity due to excess calories without serious comorbidity in adult, unspecified BMI ([facility]) 02/28/2024    Stage 3a chronic kidney disease ([facility]) 11/29/2021    Hyperthyroidism 11/29/2021    Type 2 diabetes mellitus with hyperglycemia, without long-term current use of insulin ([facility]) 11/29/2021    Mild persistent asthma with acute exacerbation 10/21/2021    Chronic bilateral low back pain without sciatica 07/21/2021    Subglottic stenosis 01/08/2021    Chronic cough 01/08/2021    Class 1 obesity due to excess calories without serious comorbidity with body mass index (BMI) of 34.0 to 34.9 in adult 01/08/2021    Sleep-related nonobstructive alveolar hypoventilation 01/08/2021    Mild persistent asthma without complication 01/08/2021    Coronary artery disease with angina pectoris, unspecified vessel or lesion type, unspecified whether native or transplanted heart ([facility])     Hypertension, essential 07/05/2017    Mixed hyperlipidemia 07/05/2017    Hypertensive heart disease 07/05/2017    Tracheal stenosis 05/02/2013     She  has a past surgical history that includes Tracheostomy (01/01/2006); Replacement total knee; Hysterectomy; and Thyroid surgery.  Her family history includes Dementia in her brother; Heart disease in her father and mother; Hypertension in her mother; Pneumonia in her brother.  She  reports that she has never smoked. She has been exposed to tobacco smoke. She has never used smokeless tobacco. She reports that she does not drink alcohol and does not use drugs.  Current Outpatient Medications   Medication Sig Dispense  Refill    acetaminophen (TYLENOL) 650 mg CR tablet Take 1 tablet (650 mg total) by mouth every 8 (eight) hours as needed for mild pain 30 tablet 0    Ascorbic Acid (vitamin C) 1000 MG tablet Take 1 tablet (1,000 mg total) by mouth daily 30 tablet 5    aspirin 81 mg chewable tablet Chew 1 tablet (81 mg total) daily 30 tablet 5    atorvastatin (LIPITOR) 40 mg tablet TAKE 1 TABLET DAILY 90 tablet 1    Blood Glucose Monitoring Suppl (OneTouch Verio) w/Device KIT Use to test blood sugar 2 times a day 1 kit 0    budesonide (PULMICORT) 0.5 mg/2 mL nebulizer solution TAKE 2 ML BY NEBULIZATION 2 (TWO) TIMES A  mL 5    Calcium Carbonate-Vitamin D3 (Calcium 600+D) 600-400 MG-UNIT TABS Take 1 tablet by mouth daily        furosemide (LASIX) 40 mg tablet Take 1 tablet (40 mg total) by mouth daily 90 tablet 2    gabapentin (NEURONTIN) 100 mg capsule Take 1 capsule (100 mg total) by mouth daily at bedtime 30 capsule 1    glipiZIDE (GLUCOTROL XL) 2.5 mg 24 hr tablet TAKE 2 TABLETS BY MOUTH DAILY 180 tablet 1    glucose blood (OneTouch Verio) test strip Use to test blood sugar 2 times daily 100 strip 3    guaiFENesin-codeine (ROBITUSSIN AC) 100-10 mg/5 mL oral solution Take 5 mL by mouth 2 (two) times a day as needed for cough 160 mL 1    ipratropium-albuterol (DUO-NEB) 0.5-2.5 mg/3 mL nebulizer solution Take 3 mL by nebulization every 6 (six) hours as needed for wheezing or shortness of breath 360 mL 7    isosorbide mononitrate (IMDUR) 30 mg 24 hr tablet TAKE 1 TABLET DAILY 30 tablet 5    Lancets (onetouch ultrasoft) lancets Use to test blood sugar 2 times a day 100 each 3    lidocaine (Lidoderm) 5 % Apply 1 patch topically over 12 hours daily Remove & Discard patch within 12 hours or as directed by MD 15 patch 0    lisinopril (ZESTRIL) 20 mg tablet TAKE 1 TABLET DAILY 30 tablet 0    metoprolol tartrate (LOPRESSOR) 25 mg tablet TAKE 1/2 TABLET EVERY 12 HOURS 60 tablet 5    Multiple Vitamins-Minerals (CENTRUM SILVER 50+WOMEN  PO) Take by mouth      ondansetron (Zofran ODT) 4 mg disintegrating tablet Take 1 tablet (4 mg total) by mouth every 6 (six) hours as needed for nausea or vomiting 10 tablet 0    pantoprazole (PROTONIX) 40 mg tablet TAKE ONE TABLET DAILY 30 tablet 0    propylthiouracil 50 mg tablet 2.5 tablets per day 270 tablet 3    sodium chloride 0.9 % nebulizer solution TAKE 3 ML BY NEBULIZATION 2 (TWO) TIMES A DAY AS NEEDED FOR WHEEZING 300 mL 6    benzonatate (TESSALON PERLES) 100 mg capsule Take 1 capsule (100 mg total) by mouth 2 (two) times a day as needed for cough (Patient not taking: Reported on 12/11/2024) 20 capsule 0    benzonatate (TESSALON PERLES) 100 mg capsule Take 1 capsule (100 mg total) by mouth every 8 (eight) hours (Patient not taking: Reported on 12/11/2024) 21 capsule 0    predniSONE 10 mg tablet Take 1 tablet daily for 5 days (Patient not taking: Reported on 12/11/2024) 5 tablet 0    predniSONE 10 mg tablet Take 2 tablets for 5 days then 1 tablet for 5 days (Patient not taking: Reported on 12/11/2024) 15 tablet 0     No current facility-administered medications for this visit.     Current Outpatient Medications on File Prior to Visit   Medication Sig    acetaminophen (TYLENOL) 650 mg CR tablet Take 1 tablet (650 mg total) by mouth every 8 (eight) hours as needed for mild pain    Ascorbic Acid (vitamin C) 1000 MG tablet Take 1 tablet (1,000 mg total) by mouth daily    aspirin 81 mg chewable tablet Chew 1 tablet (81 mg total) daily    atorvastatin (LIPITOR) 40 mg tablet TAKE 1 TABLET DAILY    Blood Glucose Monitoring Suppl (OneTouch Verio) w/Device KIT Use to test blood sugar 2 times a day    budesonide (PULMICORT) 0.5 mg/2 mL nebulizer solution TAKE 2 ML BY NEBULIZATION 2 (TWO) TIMES A DAY    Calcium Carbonate-Vitamin D3 (Calcium 600+D) 600-400 MG-UNIT TABS Take 1 tablet by mouth daily      furosemide (LASIX) 40 mg tablet Take 1 tablet (40 mg total) by mouth daily    glipiZIDE (GLUCOTROL XL) 2.5 mg 24 hr  tablet TAKE 2 TABLETS BY MOUTH DAILY    glucose blood (OneTouch Verio) test strip Use to test blood sugar 2 times daily    guaiFENesin-codeine (ROBITUSSIN AC) 100-10 mg/5 mL oral solution Take 5 mL by mouth 2 (two) times a day as needed for cough    ipratropium-albuterol (DUO-NEB) 0.5-2.5 mg/3 mL nebulizer solution Take 3 mL by nebulization every 6 (six) hours as needed for wheezing or shortness of breath    isosorbide mononitrate (IMDUR) 30 mg 24 hr tablet TAKE 1 TABLET DAILY    Lancets (onetouch ultrasoft) lancets Use to test blood sugar 2 times a day    lidocaine (Lidoderm) 5 % Apply 1 patch topically over 12 hours daily Remove & Discard patch within 12 hours or as directed by MD    lisinopril (ZESTRIL) 20 mg tablet TAKE 1 TABLET DAILY    metoprolol tartrate (LOPRESSOR) 25 mg tablet TAKE 1/2 TABLET EVERY 12 HOURS    Multiple Vitamins-Minerals (CENTRUM SILVER 50+WOMEN PO) Take by mouth    ondansetron (Zofran ODT) 4 mg disintegrating tablet Take 1 tablet (4 mg total) by mouth every 6 (six) hours as needed for nausea or vomiting    pantoprazole (PROTONIX) 40 mg tablet TAKE ONE TABLET DAILY    propylthiouracil 50 mg tablet 2.5 tablets per day    sodium chloride 0.9 % nebulizer solution TAKE 3 ML BY NEBULIZATION 2 (TWO) TIMES A DAY AS NEEDED FOR WHEEZING    benzonatate (TESSALON PERLES) 100 mg capsule Take 1 capsule (100 mg total) by mouth 2 (two) times a day as needed for cough (Patient not taking: Reported on 12/11/2024)    benzonatate (TESSALON PERLES) 100 mg capsule Take 1 capsule (100 mg total) by mouth every 8 (eight) hours (Patient not taking: Reported on 12/11/2024)    predniSONE 10 mg tablet Take 1 tablet daily for 5 days (Patient not taking: Reported on 12/11/2024)    predniSONE 10 mg tablet Take 2 tablets for 5 days then 1 tablet for 5 days (Patient not taking: Reported on 12/11/2024)     No current facility-administered medications on file prior to visit.     She is allergic to iodine - food allergy,  shellfish-derived products - food allergy, and morphine..    Vitals:    12/23/24 1653   Resp: 20       Review of Systems      Objective:  Patient's shoes and socks removed.   Foot Exam    General  General Appearance: appears stated age and healthy   Orientation: alert and oriented to person, place, and time   Affect: appropriate   Gait: antalgic       Right Foot/Ankle     Inspection and Palpation  Tenderness: metatarsals   Swelling: dorsum   Arch: pes planus  Hammertoes: fifth toe  Skin Exam: dry skin;     Neurovascular  Dorsalis pedis: 2+  Posterior tibial: 1+  Saphenous nerve sensation: absent  Tibial nerve sensation: absent  Superficial peroneal nerve sensation: absent  Deep peroneal nerve sensation: absent  Sural nerve sensation: absent      Left Foot/Ankle      Inspection and Palpation  Tenderness: metatarsals   Swelling: dorsum   Arch: pes planus  Hammertoes: fifth toe  Skin Exam: dry skin;     Neurovascular  Dorsalis pedis: 2+  Posterior tibial: 1+  Saphenous nerve sensation: absent  Tibial nerve sensation: absent  Superficial peroneal nerve sensation: absent  Deep peroneal nerve sensation: absent  Sural nerve sensation: absent      Physical Exam  Vitals and nursing note reviewed.   Constitutional:       Appearance: Normal appearance.   Cardiovascular:      Rate and Rhythm: Normal rate and regular rhythm.      Pulses: no weak pulses.           Dorsalis pedis pulses are 2+ on the right side and 2+ on the left side.        Posterior tibial pulses are 1+ on the right side and 1+ on the left side.   Feet:      Right foot:      Skin integrity: Dry skin present.      Left foot:      Skin integrity: Dry skin present.   Skin:     Capillary Refill: Capillary refill takes less than 2 seconds.   Neurological:      Mental Status: She is alert.   Psychiatric:         Mood and Affect: Mood normal.         Behavior: Behavior normal.         Thought Content: Thought content normal.         Judgment: Judgment normal.      Patient's shoes and socks removed.    Right Foot/Ankle   Right Foot Inspection  Skin Exam: dry skin.     Toe Exam: right toe deformity.     Sensory   Vibration: absent  Proprioception: diminished  Monofilament testing: diminished    Vascular  Capillary refills: < 3 seconds  The right DP pulse is 2+. The right PT pulse is 1+.     Right Toe  - Comprehensive Exam  Arch: pes planus  Hammertoes: fifth toe  Swelling: dorsum   Tenderness: metatarsals       Left Foot/Ankle  Left Foot Inspection  Skin Exam: dry skin.     Toe Exam: left toe deformity.     Sensory   Vibration: absent  Proprioception: diminished  Monofilament testing: diminished    Vascular  Capillary refills: < 3 seconds  The left DP pulse is 2+. The left PT pulse is 1+.     Left Toe  - Comprehensive Exam  Arch: pes planus  Hammertoes: fifth toe  Swelling: dorsum   Tenderness: metatarsals       Assign Risk Category  Deformity present  Loss of protective sensation  No weak pulses  Risk: 2

## 2024-12-30 ENCOUNTER — TELEPHONE (OUTPATIENT)
Age: 84
End: 2024-12-30

## 2024-12-30 DIAGNOSIS — R05.3 CHRONIC COUGH: Chronic | ICD-10-CM

## 2024-12-30 RX ORDER — CODEINE PHOSPHATE AND GUAIFENESIN 10; 100 MG/5ML; MG/5ML
5 SOLUTION ORAL 2 TIMES DAILY PRN
Qty: 118 ML | Refills: 0 | Status: SHIPPED | OUTPATIENT
Start: 2024-12-30

## 2024-12-30 NOTE — TELEPHONE ENCOUNTER
Patient called in asking to speak with Dr. Layne. She states that at her last visit, the codeine syrup that she was prescribed was ordered incorrectly and she was given less medication so she is already out. She said that she still has a cough and needs more. Patient asked for this to be corrected and sent in to the [place] pharmacy on file. Please advise.

## 2024-12-30 NOTE — TELEPHONE ENCOUNTER
Called to speak with pt. Pt refused to verify name and  so therefore I was unable to complete call. If pt calls back, please relay provider message.

## 2024-12-30 NOTE — TELEPHONE ENCOUNTER
Medication reordered-please remind patient that this is only medication that is used for an as-needed basis and it is also a scheduled medication

## 2025-01-02 DIAGNOSIS — I10 HYPERTENSION, ESSENTIAL: ICD-10-CM

## 2025-01-02 DIAGNOSIS — E11.9 TYPE 2 DIABETES MELLITUS WITHOUT COMPLICATION, WITHOUT LONG-TERM CURRENT USE OF INSULIN (HCC): ICD-10-CM

## 2025-01-02 DIAGNOSIS — E05.90 HYPERTHYROIDISM: ICD-10-CM

## 2025-01-02 DIAGNOSIS — E11.65 TYPE 2 DIABETES MELLITUS WITH HYPERGLYCEMIA, WITHOUT LONG-TERM CURRENT USE OF INSULIN (HCC): ICD-10-CM

## 2025-01-03 RX ORDER — GLIPIZIDE 2.5 MG/1
5 TABLET, EXTENDED RELEASE ORAL DAILY
Qty: 180 TABLET | Refills: 1 | Status: SHIPPED | OUTPATIENT
Start: 2025-01-03

## 2025-01-03 RX ORDER — FUROSEMIDE 40 MG/1
40 TABLET ORAL DAILY
Qty: 90 TABLET | Refills: 1 | Status: SHIPPED | OUTPATIENT
Start: 2025-01-03

## 2025-01-07 RX ORDER — PROPYLTHIOURACIL 50 MG/1
TABLET ORAL
Qty: 270 TABLET | Refills: 3 | Status: SHIPPED | OUTPATIENT
Start: 2025-01-07

## 2025-01-15 ENCOUNTER — OFFICE VISIT (OUTPATIENT)
Age: 85
End: 2025-01-15

## 2025-01-15 ENCOUNTER — TELEPHONE (OUTPATIENT)
Age: 85
End: 2025-01-15

## 2025-01-15 VITALS
SYSTOLIC BLOOD PRESSURE: 117 MMHG | RESPIRATION RATE: 20 BRPM | BODY MASS INDEX: 35.51 KG/M2 | HEART RATE: 67 BPM | OXYGEN SATURATION: 97 % | DIASTOLIC BLOOD PRESSURE: 80 MMHG | TEMPERATURE: 98.3 F | WEIGHT: 220 LBS

## 2025-01-15 DIAGNOSIS — E11.42 DIABETIC POLYNEUROPATHY ASSOCIATED WITH TYPE 2 DIABETES MELLITUS (HCC): ICD-10-CM

## 2025-01-15 DIAGNOSIS — Z13.820 ENCOUNTER FOR SCREENING FOR OSTEOPOROSIS: ICD-10-CM

## 2025-01-15 DIAGNOSIS — E66.813 CLASS 3 SEVERE OBESITY DUE TO EXCESS CALORIES WITHOUT SERIOUS COMORBIDITY IN ADULT, UNSPECIFIED BMI (HCC): ICD-10-CM

## 2025-01-15 DIAGNOSIS — I10 HYPERTENSION, ESSENTIAL: ICD-10-CM

## 2025-01-15 DIAGNOSIS — N18.31 STAGE 3A CHRONIC KIDNEY DISEASE (HCC): ICD-10-CM

## 2025-01-15 DIAGNOSIS — Z93.0 TRACHEOSTOMY STATUS (HCC): ICD-10-CM

## 2025-01-15 DIAGNOSIS — Z23 NEED FOR COVID-19 VACCINE: ICD-10-CM

## 2025-01-15 DIAGNOSIS — Z00.00 MEDICARE ANNUAL WELLNESS VISIT, INITIAL: Primary | ICD-10-CM

## 2025-01-15 DIAGNOSIS — I25.119 CORONARY ARTERY DISEASE WITH ANGINA PECTORIS, UNSPECIFIED VESSEL OR LESION TYPE, UNSPECIFIED WHETHER NATIVE OR TRANSPLANTED HEART (HCC): ICD-10-CM

## 2025-01-15 DIAGNOSIS — E66.01 CLASS 3 SEVERE OBESITY DUE TO EXCESS CALORIES WITHOUT SERIOUS COMORBIDITY IN ADULT, UNSPECIFIED BMI (HCC): ICD-10-CM

## 2025-01-15 DIAGNOSIS — Z23 ENCOUNTER FOR IMMUNIZATION: ICD-10-CM

## 2025-01-15 DIAGNOSIS — Z00.00 MEDICARE ANNUAL WELLNESS VISIT, SUBSEQUENT: ICD-10-CM

## 2025-01-15 LAB
LEFT EYE DIABETIC RETINOPATHY: ABNORMAL
LEFT EYE IMAGE QUALITY: ABNORMAL
LEFT EYE MACULAR EDEMA: ABNORMAL
LEFT EYE OTHER RETINOPATHY: ABNORMAL
RIGHT EYE DIABETIC RETINOPATHY: ABNORMAL
RIGHT EYE IMAGE QUALITY: ABNORMAL
RIGHT EYE MACULAR EDEMA: ABNORMAL
RIGHT EYE OTHER RETINOPATHY: ABNORMAL
SEVERITY (EYE EXAM): ABNORMAL

## 2025-01-15 PROCEDURE — 91320 SARSCV2 VAC 30MCG TRS-SUC IM: CPT

## 2025-01-15 PROCEDURE — 90662 IIV NO PRSV INCREASED AG IM: CPT

## 2025-01-15 PROCEDURE — G0439 PPPS, SUBSEQ VISIT: HCPCS | Performed by: FAMILY MEDICINE

## 2025-01-15 PROCEDURE — G0008 ADMIN INFLUENZA VIRUS VAC: HCPCS

## 2025-01-15 PROCEDURE — 90480 ADMN SARSCOV2 VAC 1/ONLY CMP: CPT

## 2025-01-15 NOTE — PROGRESS NOTES
Name: Alaina Maza      : 1940      MRN: [id number]  Encounter Provider: John Epps MD  Encounter Date: 1/15/2025   Encounter department: [facility]    Assessment & Plan       Preventive health issues were discussed with patient, and age appropriate screening tests were ordered as noted in patient's After Visit Summary. Personalized health advice and appropriate referrals for health education or preventive services given if needed, as noted in patient's After Visit Summary.    History of Present Illness     HPI   Patient Care Team:  John Epps MD as PCP - General (Family Medicine)  John Epps MD as PCP - PCP-[facility] ([facility])  John Epps MD as PCP - PCP-[facility] ([facility])  Coral Watson MD (Endocrinology)  KIERA Azul (Nurse Practitioner)  Batool Rodrigez RD as Diabetes Educator (Diabetes Services)    Review of Systems  Medical History Reviewed by provider this encounter:       Annual Wellness Visit Questionnaire   Alaina is here for her Subsequent Wellness visit. Last Medicare Wellness visit information reviewed, patient interviewed and updates made to the record today.      Health Risk Assessment:   Patient rates overall health as fair. Patient feels that their physical health rating is same. Patient is satisfied with their life. Eyesight was rated as slightly worse. Hearing was rated as slightly worse. Patient feels that their emotional and mental health rating is same. Patients states they are never, rarely angry. Patient states they are sometimes unusually tired/fatigued. Pain experienced in the last 7 days has been some. Patient's pain rating has been 4/10. Patient states that she has experienced no weight loss or gain in last 6 months. Pain related to thyroid medication    Depression Screening:   PHQ-2 Score: 2      Fall Risk Screening:   In the past year, patient has experienced: no history of falling in past year      Urinary  Incontinence Screening:   Patient has leaked urine accidently in the last six months. coughing cause leaks    Home Safety:  Patient does not have trouble with stairs inside or outside of their home. Patient has working smoke alarms and has working carbon monoxide detector. Home safety hazards include: none.     Nutrition:   Current diet is Regular and Diabetic.     Medications:   Patient is currently taking over-the-counter supplements. OTC medications include: see medication list. Patient is able to manage medications. Robitussin, Benadryl, Tylenol    Activities of Daily Living (ADLs)/Instrumental Activities of Daily Living (IADLs):   Walk and transfer into and out of bed and chair?: Yes  Dress and groom yourself?: Yes    Bathe or shower yourself?: Yes    Feed yourself? Yes  Do your laundry/housekeeping?: Yes  Manage your money, pay your bills and track your expenses?: Yes  Make your own meals?: Yes    Do your own shopping?: Yes    ADL comments: I have help from my children    Durable Medical Equipment Suppliers  [facility]    Previous Hospitalizations:   Any hospitalizations or ED visits within the last 12 months?: Yes    How many hospitalizations have you had in the last year?: 3-4    Hospitalization Comments: [facility]    Advance Care Planning:   Living will: No    Durable POA for healthcare: Yes    Advanced directive: No      PREVENTIVE SCREENINGS      Cardiovascular Screening:    General: Screening Not Indicated and History Lipid Disorder      Diabetes Screening:     General: Screening Not Indicated and History Diabetes      Cervical Cancer Screening:    General: Screening Not Indicated      Lung Cancer Screening:     General: Screening Not Indicated    Screening, Brief Intervention, and Referral to Treatment (SBIRT)    Screening  Typical number of drinks in a day: 0  Typical number of drinks in a week: 0  Interpretation: Low risk drinking behavior.    AUDIT-C Screenin) How often did you have a drink  containing alcohol in the past year? never  2) How many drinks did you have on a typical day when you were drinking in the past year? 0  3) How often did you have 6 or more drinks on one occasion in the past year? never    AUDIT-C Score: 0  Interpretation: Score 0-2 (female): Negative screen for alcohol misuse    Single Item Drug Screening:  How often have you used an illegal drug (including marijuana) or a prescription medication for non-medical reasons in the past year? never    Single Item Drug Screen Score: 0  Interpretation: Negative screen for possible drug use disorder    Social Drivers of Health     Financial Resource Strain: Low Risk  (1/14/2025)    Overall Financial Resource Strain (CARDIA)    • Difficulty of Paying Living Expenses: Not very hard   Food Insecurity: No Food Insecurity (1/14/2025)    Hunger Vital Sign    • Worried About Running Out of Food in the Last Year: Never true    • Ran Out of Food in the Last Year: Never true   Transportation Needs: No Transportation Needs (1/14/2025)    PRAPARE - Transportation    • Lack of Transportation (Medical): No    • Lack of Transportation (Non-Medical): No   Housing Stability: Low Risk  (1/14/2025)    Housing Stability Vital Sign    • Unable to Pay for Housing in the Last Year: No    • Number of Times Moved in the Last Year: 0    • Homeless in the Last Year: No   Utilities: Not At Risk (1/14/2025)    [facility] Utilities    • Threatened with loss of utilities: No     No results found.    Objective   Pulse 81   Temp 98.3 °F (36.8 °C)   Resp 22 Comment: after walking  Wt 99.8 kg (220 lb)   SpO2 95% Comment: after walking  BMI 35.51 kg/m²     Physical Exam

## 2025-01-15 NOTE — ASSESSMENT & PLAN NOTE
NO CP now. Did have some breast pain yesterday,l she attributes this to her 'thyroid being off', but states is taking her thyroid med regularly. She states these episodes are infrequent, and she feels ok no. She is to see her cardiologist if they start happening more frequently.

## 2025-01-15 NOTE — PATIENT INSTRUCTIONS

## 2025-01-15 NOTE — TELEPHONE ENCOUNTER
Request for [place] Leave  Scanned into encounter  Placed in pcps folder  call: [phone]

## 2025-01-15 NOTE — ASSESSMENT & PLAN NOTE
Lab Results   Component Value Date    EGFR 53 11/14/2024    EGFR 57 (L) 10/31/2024    EGFR 54 10/29/2024    CREATININE 0.98 11/14/2024    CREATININE 0.99 10/31/2024    CREATININE 0.97 10/29/2024

## 2025-01-15 NOTE — PROGRESS NOTES
Name: Alaina Maza      : 1940      MRN: [id number]  Encounter Provider: John Epps MD  Encounter Date: 1/15/2025   Encounter department: [facility]    Assessment & Plan  Medicare annual wellness visit, initial  Given Advanced directive forms  She doesn't want Pneumococcal vaccine because last time got it got sick.  Last Covid booster 23   Discussed healthy diet and exercise       Diabetic polyneuropathy associated with type 2 diabetes mellitus (HCC)    Lab Results   Component Value Date    HGBA1C 8.4 (A) 2024     Orders:    IRIS Diabetic eye exam    Encounter for immunization  Covid booster, Flu vaccine and Zoster.        Class 3 severe obesity due to excess calories without serious comorbidity in adult, unspecified BMI ([facility])  Healthy lifestyle reviewed       Coronary artery disease with angina pectoris, unspecified vessel or lesion type, unspecified whether native or transplanted heart (HCC)  NO CP now. Did have some breast pain yesterday,l she attributes this to her 'thyroid being off', but states is taking her thyroid med regularly. She states these episodes are infrequent, and she feels ok no. She is to see her cardiologist if they start happening more frequently.        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 The BMI is above normal. Nutrition recommendations include encouraging healthy choices of fruits and vegetables, limiting drinks that contain sugar and moderation in carbohydrate intake. Exercise  recommendations include moderate physical activity 150 minutes/week. Rationale for BMI follow-up plan is due to patient being overweight or obese.     Depression Screening and Follow-up Plan: Patient was screened for depression during today's encounter. They screened negative with a PHQ-2 score of 2.    Lump ~ 2 cm diam L upper arm is chronic, no sx, unchanged. Feels like a lipoma. Reassurance, report change.   Preventive health issues were discussed with patient, and age appropriate screening tests were ordered as noted in patient's After Visit Summary. Personalized health advice and appropriate referrals for health education or preventive services given if needed, as noted in patient's After Visit Summary.    History of Present Illness     HPI   Patient Care Team:  John Epps MD as PCP - General (Family Medicine)  John Epps MD as PCP - PCP-[facility] ([facility])  John Epps MD as PCP - PCP-[facility] ([facility])  Coral Watson MD (Endocrinology)  KIERA Azul (Nurse Practitioner)  Batool Rodrigez RD as Diabetes Educator (Diabetes Services)    Review of Systems  Medical History Reviewed by provider this encounter:       Annual Wellness Visit Questionnaire       Health Risk Assessment:   Patient rates overall health as fair. Patient feels that their physical health rating is same. Patient is satisfied with their life. Eyesight was rated as slightly worse. Hearing was rated as slightly worse. Patient feels that their emotional and mental health rating is same. Patients states they are never, rarely angry. Patient states they are sometimes unusually tired/fatigued. Pain experienced in the last 7 days has been some. Patient's pain rating has been 4/10. Patient states that she has experienced no weight loss or gain in last 6 months. Pain related to thyroid medication    Depression Screening:   PHQ-2 Score: 2      Fall Risk Screening:   In the past year, patient has experienced: no history of falling in  past year      Urinary Incontinence Screening:   Patient has leaked urine accidently in the last six months. coughing cause leaks    Home Safety:  Patient does not have trouble with stairs inside or outside of their home. Patient has working smoke alarms and has working carbon monoxide detector. Home safety hazards include: none.     Nutrition:   Current diet is Regular and Diabetic.     Medications:   Patient is currently taking over-the-counter supplements. OTC medications include: robutusin. Patient is able to manage medications. Robitussin, Benadryl, Tylenol    Activities of Daily Living (ADLs)/Instrumental Activities of Daily Living (IADLs):   Walk and transfer into and out of bed and chair?: Yes  Dress and groom yourself?: Yes    Bathe or shower yourself?: Yes    Feed yourself? Yes  Do your laundry/housekeeping?: Yes  Manage your money, pay your bills and track your expenses?: Yes  Make your own meals?: Yes    Do your own shopping?: Yes    ADL comments: I have help from my children    Durable Medical Equipment SupplFloop  [facility]    Previous Hospitalizations:   Any hospitalizations or ED visits within the last 12 months?: Yes    How many hospitalizations have you had in the last year?: 3-4    Hospitalization Comments: [facility]    Advance Care Planning:   Living will: No    Durable POA for healthcare: Yes    Advanced directive: No      Screening, Brief Intervention, and Referral to Treatment (SBIRT)    Screening  Typical number of drinks in a day: 0  Typical number of drinks in a week: 0  Interpretation: Low risk drinking behavior.    AUDIT-C Screenin) How often did you have a drink containing alcohol in the past year? never  2) How many drinks did you have on a typical day when you were drinking in the past year? 0  3) How often did you have 6 or more drinks on one occasion in the past year? never    AUDIT-C Score: 0  Interpretation: Score 0-2 (female): Negative screen for alcohol misuse    Single  Item Drug Screening:  How often have you used an illegal drug (including marijuana) or a prescription medication for non-medical reasons in the past year? never    Single Item Drug Screen Score: 0  Interpretation: Negative screen for possible drug use disorder    Social Drivers of Health     Financial Resource Strain: Low Risk  (1/15/2025)    Overall Financial Resource Strain (CARDIA)     Difficulty of Paying Living Expenses: Not very hard   Food Insecurity: No Food Insecurity (1/15/2025)    Hunger Vital Sign     Worried About Running Out of Food in the Last Year: Never true     Ran Out of Food in the Last Year: Never true   Transportation Needs: No Transportation Needs (1/15/2025)    PRAPARE - Transportation     Lack of Transportation (Medical): No     Lack of Transportation (Non-Medical): No   Housing Stability: Low Risk  (1/15/2025)    Housing Stability Vital Sign     Unable to Pay for Housing in the Last Year: No     Number of Times Moved in the Last Year: 0     Homeless in the Last Year: No   Utilities: Not At Risk (1/15/2025)    [facility] Utilities     Threatened with loss of utilities: No     No results found.    Objective   /80 (BP Location: Right arm, Patient Position: Sitting, Cuff Size: Large)   Pulse 67   Temp 98.3 °F (36.8 °C)   Resp 20   Wt 99.8 kg (220 lb)   SpO2 97%   BMI 35.51 kg/m²     Physical Exam

## 2025-01-17 ENCOUNTER — TELEPHONE (OUTPATIENT)
Dept: ENDOCRINOLOGY | Facility: CLINIC | Age: 85
End: 2025-01-17

## 2025-01-17 ENCOUNTER — RESULTS FOLLOW-UP (OUTPATIENT)
Age: 85
End: 2025-01-17

## 2025-01-17 DIAGNOSIS — E11.65 TYPE 2 DIABETES MELLITUS WITH HYPERGLYCEMIA, WITHOUT LONG-TERM CURRENT USE OF INSULIN (HCC): ICD-10-CM

## 2025-01-17 DIAGNOSIS — H35.379 EPIRETINAL MEMBRANE, UNSPECIFIED LATERALITY: Primary | ICD-10-CM

## 2025-01-17 DIAGNOSIS — N18.31 STAGE 3A CHRONIC KIDNEY DISEASE (HCC): ICD-10-CM

## 2025-01-17 DIAGNOSIS — I10 HYPERTENSION, ESSENTIAL: ICD-10-CM

## 2025-01-17 NOTE — TELEPHONE ENCOUNTER
VM Box full when attempted to reach patient in regards to rescheduling 3/5/25 appointment, letter sent.

## 2025-01-17 NOTE — TELEPHONE ENCOUNTER
Attempted to call PT, no answer and was unable to leave VM. Scanned completed form into this encounter and placed up front for p/u

## 2025-01-22 DIAGNOSIS — R05.3 CHRONIC COUGH: Chronic | ICD-10-CM

## 2025-01-23 RX ORDER — CODEINE PHOSPHATE AND GUAIFENESIN 10; 100 MG/5ML; MG/5ML
5 SOLUTION ORAL 3 TIMES DAILY PRN
Qty: 118 ML | Refills: 0 | Status: SHIPPED | OUTPATIENT
Start: 2025-01-23

## 2025-01-24 NOTE — TELEPHONE ENCOUNTER
Called patients son and he will call back to reschedule, he has a tight work schedule and can only do early mornings or late evenings. I let him know at this time we only have 3PM or 11:30 on Fridays available.

## 2025-02-04 DIAGNOSIS — K21.9 GASTROESOPHAGEAL REFLUX DISEASE WITHOUT ESOPHAGITIS: ICD-10-CM

## 2025-02-04 DIAGNOSIS — I10 HYPERTENSION, ESSENTIAL: ICD-10-CM

## 2025-02-04 RX ORDER — PANTOPRAZOLE SODIUM 40 MG/1
40 TABLET, DELAYED RELEASE ORAL DAILY
Qty: 30 TABLET | Refills: 0 | Status: SHIPPED | OUTPATIENT
Start: 2025-02-04

## 2025-02-04 RX ORDER — ISOSORBIDE MONONITRATE 30 MG/1
30 TABLET, EXTENDED RELEASE ORAL DAILY
Qty: 30 TABLET | Refills: 5 | Status: SHIPPED | OUTPATIENT
Start: 2025-02-04

## 2025-02-06 DIAGNOSIS — R05.3 CHRONIC COUGH: Chronic | ICD-10-CM

## 2025-02-07 RX ORDER — CODEINE PHOSPHATE AND GUAIFENESIN 10; 100 MG/5ML; MG/5ML
5 SOLUTION ORAL 3 TIMES DAILY PRN
Qty: 118 ML | Refills: 0 | OUTPATIENT
Start: 2025-02-07

## 2025-02-13 DIAGNOSIS — R05.3 CHRONIC COUGH: Chronic | ICD-10-CM

## 2025-02-13 RX ORDER — CODEINE PHOSPHATE AND GUAIFENESIN 10; 100 MG/5ML; MG/5ML
5 SOLUTION ORAL 3 TIMES DAILY PRN
Qty: 300 ML | Refills: 0 | Status: SHIPPED | OUTPATIENT
Start: 2025-02-13

## 2025-02-13 NOTE — TELEPHONE ENCOUNTER
Patient calling again for refill for cough medication. States She is out of medication because Dr only gave her 1/3 the quantity she usually gets. States she is wheezing and was up all night coughing because she has not medication.

## 2025-02-13 NOTE — TELEPHONE ENCOUNTER
I placed refill for this for 300 mL.  She uses this as she always has chronic cough.  Has subglottic stenosis and tracheostomy.  This is only thing that has helped her cough ,she has been this for a long time Glycopyrrolate Counseling:  I discussed with the patient the risks of glycopyrrolate including but not limited to skin rash, drowsiness, dry mouth, difficulty urinating, and blurred vision.

## 2025-02-13 NOTE — PROGRESS NOTES
Patient has had chronic cough.  She has tracheostomy tube in place Robitussin with codeine is only medication helps control her cough

## 2025-02-24 ENCOUNTER — OFFICE VISIT (OUTPATIENT)
Age: 85
End: 2025-02-24
Payer: COMMERCIAL

## 2025-02-24 VITALS — WEIGHT: 220 LBS | BODY MASS INDEX: 35.36 KG/M2 | RESPIRATION RATE: 17 BRPM | HEIGHT: 66 IN

## 2025-02-24 DIAGNOSIS — M77.41 METATARSALGIA OF BOTH FEET: Primary | ICD-10-CM

## 2025-02-24 DIAGNOSIS — M77.42 METATARSALGIA OF BOTH FEET: Primary | ICD-10-CM

## 2025-02-24 DIAGNOSIS — E11.42 DIABETIC POLYNEUROPATHY ASSOCIATED WITH TYPE 2 DIABETES MELLITUS (HCC): ICD-10-CM

## 2025-02-24 DIAGNOSIS — M54.16 RADICULOPATHY OF LUMBAR REGION: ICD-10-CM

## 2025-02-24 DIAGNOSIS — B35.1 ONYCHOMYCOSIS: ICD-10-CM

## 2025-02-24 PROCEDURE — 99213 OFFICE O/P EST LOW 20 MIN: CPT | Performed by: PODIATRIST

## 2025-02-24 RX ORDER — GABAPENTIN 300 MG/1
300 CAPSULE ORAL 2 TIMES DAILY
Qty: 180 CAPSULE | Refills: 0 | Status: SHIPPED | OUTPATIENT
Start: 2025-02-24 | End: 2025-05-25

## 2025-02-24 NOTE — PROGRESS NOTES
Assessment/Plan: Metatarsalgia secondary to diabetic neuropathy and radiculopathy.  Pain upon ambulation.  Mycosis of nail.    Plan.  Chart reviewed.  PCP notes reviewed.  Lab work reviewed.  Patient evaluated.  At this time because patient has continued night neuropathic pain we will increase gabapentin dosing to 300 mg twice daily.  Patient advised on proper foot hygiene and nail cutting techniques.  Shoe recommendations made.  Aftercare instruction given.  Return for follow-up.         Diagnoses and all orders for this visit:    Metatarsalgia of both feet  -     gabapentin (NEURONTIN) 300 mg capsule; Take 1 capsule (300 mg total) by mouth 2 (two) times a day    Diabetic polyneuropathy associated with type 2 diabetes mellitus (HCC)  -     gabapentin (NEURONTIN) 300 mg capsule; Take 1 capsule (300 mg total) by mouth 2 (two) times a day    Radiculopathy of lumbar region  -     gabapentin (NEURONTIN) 300 mg capsule; Take 1 capsule (300 mg total) by mouth 2 (two) times a day    Onychomycosis          Subjective: Patient is diabetic.  She has low back pain.  She is taking gabapentin.  Gives her minimal relief.  She still gets pain in her feet at night.  No history of trauma    Allergies   Allergen Reactions    Iodine - Food Allergy Swelling    Shellfish-Derived Products - Food Allergy Hives    Morphine Rash         Current Outpatient Medications:     gabapentin (NEURONTIN) 300 mg capsule, Take 1 capsule (300 mg total) by mouth 2 (two) times a day, Disp: 180 capsule, Rfl: 0    acetaminophen (TYLENOL) 650 mg CR tablet, Take 1 tablet (650 mg total) by mouth every 8 (eight) hours as needed for mild pain, Disp: 30 tablet, Rfl: 0    Ascorbic Acid (vitamin C) 1000 MG tablet, Take 1 tablet (1,000 mg total) by mouth daily, Disp: 30 tablet, Rfl: 5    aspirin 81 mg chewable tablet, Chew 1 tablet (81 mg total) daily, Disp: 30 tablet, Rfl: 5    atorvastatin (LIPITOR) 40 mg tablet, TAKE 1 TABLET DAILY, Disp: 90 tablet, Rfl: 1     benzonatate (TESSALON PERLES) 100 mg capsule, Take 1 capsule (100 mg total) by mouth 2 (two) times a day as needed for cough (Patient not taking: Reported on 12/11/2024), Disp: 20 capsule, Rfl: 0    benzonatate (TESSALON PERLES) 100 mg capsule, Take 1 capsule (100 mg total) by mouth every 8 (eight) hours (Patient not taking: Reported on 12/11/2024), Disp: 21 capsule, Rfl: 0    Blood Glucose Monitoring Suppl (OneTouch Verio) w/Device KIT, Use to test blood sugar 2 times a day, Disp: 1 kit, Rfl: 0    budesonide (PULMICORT) 0.5 mg/2 mL nebulizer solution, TAKE 2 ML BY NEBULIZATION 2 (TWO) TIMES A DAY, Disp: 120 mL, Rfl: 5    Calcium Carbonate-Vitamin D3 (Calcium 600+D) 600-400 MG-UNIT TABS, Take 1 tablet by mouth daily  , Disp: , Rfl:     furosemide (LASIX) 40 mg tablet, TAKE 1 TABLET BY MOUTH DAILY, Disp: 90 tablet, Rfl: 1    gabapentin (NEURONTIN) 100 mg capsule, Take 1 capsule (100 mg total) by mouth daily at bedtime, Disp: 30 capsule, Rfl: 1    glipiZIDE (GLUCOTROL XL) 2.5 mg 24 hr tablet, TAKE 2 TABLETS BY MOUTH DAILY, Disp: 180 tablet, Rfl: 1    glucose blood (OneTouch Verio) test strip, Use to test blood sugar 2 times daily, Disp: 100 strip, Rfl: 3    guaiFENesin-codeine (ROBITUSSIN AC) 100-10 mg/5 mL oral solution, Take 5 mL by mouth 3 (three) times a day as needed for cough, Disp: 300 mL, Rfl: 0    ipratropium-albuterol (DUO-NEB) 0.5-2.5 mg/3 mL nebulizer solution, Take 3 mL by nebulization every 6 (six) hours as needed for wheezing or shortness of breath, Disp: 360 mL, Rfl: 7    isosorbide mononitrate (IMDUR) 30 mg 24 hr tablet, TAKE 1 TABLET DAILY, Disp: 30 tablet, Rfl: 5    Lancets (onetouch ultrasoft) lancets, Use to test blood sugar 2 times a day, Disp: 100 each, Rfl: 3    lidocaine (Lidoderm) 5 %, Apply 1 patch topically over 12 hours daily Remove & Discard patch within 12 hours or as directed by MD, Disp: 15 patch, Rfl: 0    lisinopril (ZESTRIL) 20 mg tablet, TAKE 1 TABLET DAILY, Disp: 30 tablet, Rfl:  0    metoprolol tartrate (LOPRESSOR) 25 mg tablet, TAKE 1/2 TABLET EVERY 12 HOURS, Disp: 60 tablet, Rfl: 5    Multiple Vitamins-Minerals (CENTRUM SILVER 50+WOMEN PO), Take by mouth, Disp: , Rfl:     ondansetron (Zofran ODT) 4 mg disintegrating tablet, Take 1 tablet (4 mg total) by mouth every 6 (six) hours as needed for nausea or vomiting, Disp: 10 tablet, Rfl: 0    pantoprazole (PROTONIX) 40 mg tablet, TAKE ONE TABLET DAILY, Disp: 30 tablet, Rfl: 0    predniSONE 10 mg tablet, Take 1 tablet daily for 5 days (Patient not taking: Reported on 12/11/2024), Disp: 5 tablet, Rfl: 0    predniSONE 10 mg tablet, Take 2 tablets for 5 days then 1 tablet for 5 days (Patient not taking: Reported on 12/11/2024), Disp: 15 tablet, Rfl: 0    propylthiouracil 50 mg tablet, TAKE 2-1/2 TABS DAILY, Disp: 270 tablet, Rfl: 3    sodium chloride 0.9 % nebulizer solution, TAKE 3 ML BY NEBULIZATION 2 (TWO) TIMES A DAY AS NEEDED FOR WHEEZING, Disp: 300 mL, Rfl: 6    Patient Active Problem List   Diagnosis    Coronary artery disease with angina pectoris, unspecified vessel or lesion type, unspecified whether native or transplanted heart ([facility])    Hypertension, essential    Tracheal stenosis    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[facility])    Subglottic stenosis    Chronic cough    Class 1 obesity due to excess calories without serious comorbidity with body mass index (BMI) of 34.0 to 34.9 in adult    Sleep-related nonobstructive alveolar hypoventilation    Mild persistent asthma without complication    Mixed hyperlipidemia    Hypertensive heart disease    Chronic bilateral low back pain without sciatica    Mild persistent asthma with acute exacerbation    Stage 3a chronic kidney disease ([facility])    Hyperthyroidism    Type 2 diabetes mellitus with hyperglycemia, without long-term current use of insulin ([facility])    Class 3 severe obesity due to excess calories without serious comorbidity in adult, unspecified BMI ([facility])    Nocturnal hypoxemia due to obesity          Patient  ID: Alaina Maza is a 84 y.o. female.    HPI    The following portions of the patient's history were reviewed and updated as appropriate:     family history includes Dementia in her brother; Heart disease in her father and mother; Hypertension in her mother; Pneumonia in her brother.      reports that she has never smoked. She has been exposed to tobacco smoke. She has never used smokeless tobacco. She reports that she does not drink alcohol and does not use drugs.    Vitals:    02/24/25 1621   Resp: 17       Review of Systems      Objective:  Patient's shoes and socks removed.   Foot ExamPhysical Exam         General  General Appearance: appears stated age and healthy   Orientation: alert and oriented to person, place, and time   Affect: appropriate   Gait: antalgic         Right Foot/Ankle      Inspection and Palpation  Tenderness: metatarsals   Swelling: dorsum   Arch: pes planus  Hammertoes: fifth toe  Skin Exam: dry skin;      Neurovascular  Dorsalis pedis: 2+  Posterior tibial: 1+  Saphenous nerve sensation: absent  Tibial nerve sensation: absent  Superficial peroneal nerve sensation: absent  Deep peroneal nerve sensation: absent  Sural nerve sensation: absent        Left Foot/Ankle       Inspection and Palpation  Tenderness: metatarsals   Swelling: dorsum   Arch: pes planus  Hammertoes: fifth toe  Skin Exam: dry skin;      Neurovascular  Dorsalis pedis: 2+  Posterior tibial: 1+  Saphenous nerve sensation: absent  Tibial nerve sensation: absent  Superficial peroneal nerve sensation: absent  Deep peroneal nerve sensation: absent  Sural nerve sensation: absent        Physical Exam  Vitals and nursing note reviewed.   Constitutional:       Appearance: Normal appearance.   Cardiovascular:      Rate and Rhythm: Normal rate and regular rhythm.      Pulses: no weak pulses.           Dorsalis pedis pulses are 2+ on the right side and 2+ on the left side.        Posterior tibial pulses are 1+ on the right side and 1+  on the left side.   Feet:      Right foot:      Skin integrity: Dry skin present.      Left foot:      Skin integrity: Dry skin present.   Skin:     Capillary Refill: Capillary refill takes less than 2 seconds.   Neurological:      Mental Status: She is alert.   Psychiatric:         Mood and Affect: Mood normal.         Behavior: Behavior normal.         Thought Content: Thought content normal.         Judgment: Judgment normal.      Patient's shoes and socks removed.     Right Foot/Ankle   Right Foot Inspection  Skin Exam: dry skin.      Toe Exam: right toe deformity.      Sensory   Vibration: absent  Proprioception: diminished  Monofilament testing: diminished     Vascular  Capillary refills: < 3 seconds  The right DP pulse is 2+. The right PT pulse is 1+.      Right Toe  - Comprehensive Exam  Arch: pes planus  Hammertoes: fifth toe  Swelling: dorsum   Tenderness: metatarsals         Left Foot/Ankle  Left Foot Inspection  Skin Exam: dry skin.      Toe Exam: left toe deformity.      Sensory   Vibration: absent  Proprioception: diminished  Monofilament testing: diminished     Vascular  Capillary refills: < 3 seconds  The left DP pulse is 2+. The left PT pulse is 1+.      Left Toe  - Comprehensive Exam  Arch: pes planus  Hammertoes: fifth toe  Swelling: dorsum   Tenderness: metatarsals         Assign Risk Category  Deformity present  Loss of protective sensation  No weak pulses  Risk: 2

## 2025-02-26 ENCOUNTER — HOSPITAL ENCOUNTER (EMERGENCY)
Facility: HOSPITAL | Age: 85
Discharge: HOME | End: 2025-02-26
Attending: EMERGENCY MEDICINE
Payer: COMMERCIAL

## 2025-02-26 ENCOUNTER — APPOINTMENT (EMERGENCY)
Dept: RADIOLOGY | Facility: HOSPITAL | Age: 85
End: 2025-02-26
Payer: COMMERCIAL

## 2025-02-26 VITALS
WEIGHT: 220 LBS | HEART RATE: 69 BPM | OXYGEN SATURATION: 98 % | BODY MASS INDEX: 35.36 KG/M2 | TEMPERATURE: 98.2 F | RESPIRATION RATE: 16 BRPM | DIASTOLIC BLOOD PRESSURE: 71 MMHG | HEIGHT: 66 IN | SYSTOLIC BLOOD PRESSURE: 132 MMHG

## 2025-02-26 DIAGNOSIS — J45.901 ASTHMA EXACERBATION: Primary | ICD-10-CM

## 2025-02-26 LAB
ALBUMIN SERPL BCG-MCNC: 4.1 G/DL (ref 3.5–5)
ALP SERPL-CCNC: 54 U/L (ref 34–104)
ALT SERPL W P-5'-P-CCNC: 13 U/L (ref 7–52)
ANION GAP SERPL CALCULATED.3IONS-SCNC: 9 MMOL/L (ref 4–13)
APTT PPP: 34 SECONDS (ref 23–34)
AST SERPL W P-5'-P-CCNC: 21 U/L (ref 13–39)
BASOPHILS # BLD AUTO: 0.03 THOUSANDS/ÂΜL (ref 0–0.1)
BASOPHILS NFR BLD AUTO: 1 % (ref 0–1)
BILIRUB SERPL-MCNC: 0.3 MG/DL (ref 0.2–1)
BUN SERPL-MCNC: 23 MG/DL (ref 5–25)
CALCIUM SERPL-MCNC: 8.9 MG/DL (ref 8.4–10.2)
CARDIAC TROPONIN I PNL SERPL HS: 5 NG/L (ref ?–50)
CHLORIDE SERPL-SCNC: 105 MMOL/L (ref 96–108)
CO2 SERPL-SCNC: 22 MMOL/L (ref 21–32)
CREAT SERPL-MCNC: 1.01 MG/DL (ref 0.6–1.3)
EOSINOPHIL # BLD AUTO: 0.45 THOUSAND/ÂΜL (ref 0–0.61)
EOSINOPHIL NFR BLD AUTO: 10 % (ref 0–6)
ERYTHROCYTE [DISTWIDTH] IN BLOOD BY AUTOMATED COUNT: 13.2 % (ref 11.6–15.1)
FLUAV AG UPPER RESP QL IA.RAPID: NEGATIVE
FLUBV AG UPPER RESP QL IA.RAPID: NEGATIVE
GFR SERPL CREATININE-BSD FRML MDRD: 51 ML/MIN/1.73SQ M
GLUCOSE SERPL-MCNC: 169 MG/DL (ref 65–140)
HCT VFR BLD AUTO: 39.2 % (ref 34.8–46.1)
HGB BLD-MCNC: 12.4 G/DL (ref 11.5–15.4)
IMM GRANULOCYTES # BLD AUTO: 0.01 THOUSAND/UL (ref 0–0.2)
IMM GRANULOCYTES NFR BLD AUTO: 0 % (ref 0–2)
INR PPP: 0.95 (ref 0.85–1.19)
LYMPHOCYTES # BLD AUTO: 1.39 THOUSANDS/ÂΜL (ref 0.6–4.47)
LYMPHOCYTES NFR BLD AUTO: 31 % (ref 14–44)
MCH RBC QN AUTO: 29.1 PG (ref 26.8–34.3)
MCHC RBC AUTO-ENTMCNC: 31.6 G/DL (ref 31.4–37.4)
MCV RBC AUTO: 92 FL (ref 82–98)
MONOCYTES # BLD AUTO: 0.4 THOUSAND/ÂΜL (ref 0.17–1.22)
MONOCYTES NFR BLD AUTO: 9 % (ref 4–12)
NEUTROPHILS # BLD AUTO: 2.23 THOUSANDS/ÂΜL (ref 1.85–7.62)
NEUTS SEG NFR BLD AUTO: 49 % (ref 43–75)
NRBC BLD AUTO-RTO: 0 /100 WBCS
PLATELET # BLD AUTO: 170 THOUSANDS/UL (ref 149–390)
PMV BLD AUTO: 10.7 FL (ref 8.9–12.7)
POTASSIUM SERPL-SCNC: 4 MMOL/L (ref 3.5–5.3)
PROCALCITONIN SERPL-MCNC: <0.05 NG/ML
PROT SERPL-MCNC: 7.3 G/DL (ref 6.4–8.4)
PROTHROMBIN TIME: 13.2 SECONDS (ref 12.3–15)
RBC # BLD AUTO: 4.26 MILLION/UL (ref 3.81–5.12)
SARS-COV+SARS-COV-2 AG RESP QL IA.RAPID: NEGATIVE
SODIUM SERPL-SCNC: 136 MMOL/L (ref 135–147)
WBC # BLD AUTO: 4.51 THOUSAND/UL (ref 4.31–10.16)

## 2025-02-26 PROCEDURE — 84484 ASSAY OF TROPONIN QUANT: CPT | Performed by: EMERGENCY MEDICINE

## 2025-02-26 PROCEDURE — 85025 COMPLETE CBC W/AUTO DIFF WBC: CPT | Performed by: EMERGENCY MEDICINE

## 2025-02-26 PROCEDURE — 94664 DEMO&/EVAL PT USE INHALER: CPT

## 2025-02-26 PROCEDURE — 94644 CONT INHLJ TX 1ST HOUR: CPT

## 2025-02-26 PROCEDURE — 71045 X-RAY EXAM CHEST 1 VIEW: CPT

## 2025-02-26 PROCEDURE — 87811 SARS-COV-2 COVID19 W/OPTIC: CPT | Performed by: EMERGENCY MEDICINE

## 2025-02-26 PROCEDURE — 93005 ELECTROCARDIOGRAM TRACING: CPT

## 2025-02-26 PROCEDURE — 96374 THER/PROPH/DIAG INJ IV PUSH: CPT

## 2025-02-26 PROCEDURE — 87804 INFLUENZA ASSAY W/OPTIC: CPT | Performed by: EMERGENCY MEDICINE

## 2025-02-26 PROCEDURE — 94640 AIRWAY INHALATION TREATMENT: CPT

## 2025-02-26 PROCEDURE — 84145 PROCALCITONIN (PCT): CPT | Performed by: EMERGENCY MEDICINE

## 2025-02-26 PROCEDURE — 36415 COLL VENOUS BLD VENIPUNCTURE: CPT | Performed by: EMERGENCY MEDICINE

## 2025-02-26 PROCEDURE — 85610 PROTHROMBIN TIME: CPT | Performed by: EMERGENCY MEDICINE

## 2025-02-26 PROCEDURE — 99284 EMERGENCY DEPT VISIT MOD MDM: CPT | Performed by: EMERGENCY MEDICINE

## 2025-02-26 PROCEDURE — 94760 N-INVAS EAR/PLS OXIMETRY 1: CPT

## 2025-02-26 PROCEDURE — 99285 EMERGENCY DEPT VISIT HI MDM: CPT

## 2025-02-26 PROCEDURE — 85730 THROMBOPLASTIN TIME PARTIAL: CPT | Performed by: EMERGENCY MEDICINE

## 2025-02-26 PROCEDURE — 80053 COMPREHEN METABOLIC PANEL: CPT | Performed by: EMERGENCY MEDICINE

## 2025-02-26 RX ORDER — METHYLPREDNISOLONE SODIUM SUCCINATE 125 MG/2ML
60 INJECTION, POWDER, LYOPHILIZED, FOR SOLUTION INTRAMUSCULAR; INTRAVENOUS ONCE
Status: COMPLETED | OUTPATIENT
Start: 2025-02-26 | End: 2025-02-26

## 2025-02-26 RX ORDER — PREDNISONE 20 MG/1
40 TABLET ORAL DAILY
Qty: 10 TABLET | Refills: 0 | Status: SHIPPED | OUTPATIENT
Start: 2025-02-26 | End: 2025-03-03

## 2025-02-26 RX ORDER — SODIUM CHLORIDE FOR INHALATION 0.9 %
12 VIAL, NEBULIZER (ML) INHALATION ONCE
Status: COMPLETED | OUTPATIENT
Start: 2025-02-26 | End: 2025-02-26

## 2025-02-26 RX ORDER — ALBUTEROL SULFATE 5 MG/ML
10 SOLUTION RESPIRATORY (INHALATION) ONCE
Status: COMPLETED | OUTPATIENT
Start: 2025-02-26 | End: 2025-02-26

## 2025-02-26 RX ADMIN — ALBUTEROL SULFATE 10 MG: 2.5 SOLUTION RESPIRATORY (INHALATION) at 15:41

## 2025-02-26 RX ADMIN — IPRATROPIUM BROMIDE 1 MG: 0.5 SOLUTION RESPIRATORY (INHALATION) at 15:41

## 2025-02-26 RX ADMIN — ISODIUM CHLORIDE 12 ML: 0.03 SOLUTION RESPIRATORY (INHALATION) at 15:41

## 2025-02-26 RX ADMIN — METHYLPREDNISOLONE SODIUM SUCCINATE 60 MG: 125 INJECTION, POWDER, FOR SOLUTION INTRAMUSCULAR; INTRAVENOUS at 15:44

## 2025-02-26 NOTE — ED PROVIDER NOTES
ED Disposition       None          Assessment & Plan   {Hyperlinks  Risk Stratification - NIHSS - HEART SCORE - Fill out sepsis note and make sure you call [phone] if severe or septic shock:[id number]}    Medical Decision Making  Amount and/or Complexity of Data Reviewed  Labs: ordered.  Radiology: ordered.    Risk  Prescription drug management.             Medications   albuterol inhalation solution 10 mg (has no administration in time range)   ipratropium (ATROVENT) 0.02 % inhalation solution 1 mg (has no administration in time range)   sodium chloride 0.9 % inhalation solution 12 mL (has no administration in time range)   methylPREDNISolone sodium succinate (Solu-MEDROL) injection 60 mg (has no administration in time range)       ED Risk Strat Scores                            SBIRT 20yo+      Flowsheet Row Most Recent Value   Initial Alcohol Screen: US AUDIT-C     1. How often do you have a drink containing alcohol? 0 Filed at: 02/26/2025 1417   2. How many drinks containing alcohol do you have on a typical day you are drinking?  0 Filed at: 02/26/2025 1417   3a. Male UNDER 65: How often do you have five or more drinks on one occasion? 0 Filed at: 02/26/2025 1417   3b. FEMALE Any Age, or MALE 65+: How often do you have 4 or more drinks on one occassion? 0 Filed at: 02/26/2025 1417   Audit-C Score 0 Filed at: 02/26/2025 1417   SERINA: How many times in the past year have you...    Used an illegal drug or used a prescription medication for non-medical reasons? Never Filed at: 02/26/2025 1417                            History of Present Illness   {Hyperlinks  History (Med, Surg, Fam, Social) - Current Medications - Allergies  :[id number]}    Chief Complaint   Patient presents with    Wheezing     Pt states she has been wheezing for 3 weeks and it's progressively getting worse. Pt has trach        Past Medical History:   Diagnosis Date    Asthma 11/2024    BOOP (bronchiolitis obliterans with organizing pneumonia)  (HCC) 2006    Coronary artery disease     Disease of thyroid gland     HL (hearing loss)     Papillary thyroid carcinoma (HCC) 2021    Post-surgical hypothyroidism 2017    Voice disorder       Past Surgical History:   Procedure Laterality Date    HYSTERECTOMY      REPLACEMENT TOTAL KNEE      THYROID SURGERY      TRACHEOSTOMY  2006      Family History   Problem Relation Age of Onset    Heart disease Mother     Hypertension Mother     Heart disease Father     Pneumonia Brother          of COVID    Dementia Brother       Social History     Tobacco Use    Smoking status: Never     Passive exposure: Past    Smokeless tobacco: Never   Vaping Use    Vaping status: Never Used   Substance Use Topics    Alcohol use: Never    Drug use: Never      E-Cigarette/Vaping    E-Cigarette Use Never User       E-Cigarette/Vaping Substances    Nicotine No     THC No     CBD No     Flavoring No     Other No     Unknown No       I have reviewed and agree with the history as documented.       Wheezing      Review of Systems   Respiratory:  Positive for wheezing.            Objective   {Hyperlinks  Historical Vitals - Historical Labs - Chart Review/Microbiology - Last Echo - Code Status  :[id number]}    ED Triage Vitals [25 1417]   Temperature Pulse Blood Pressure Respirations SpO2 Patient Position - Orthostatic VS   98.2 °F (36.8 °C) 74 155/74 (!) 28 95 % --      Temp src Heart Rate Source BP Location FiO2 (%) Pain Score    -- -- -- -- 3      Vitals      Date and Time Temp Pulse SpO2 Resp BP Pain Score FACES Pain Rating User   25 1417 98.2 °F (36.8 °C) 74 95 % 28 155/74 3 -- JOSE DE JESUS            Physical Exam    Results Reviewed       Procedure Component Value Units Date/Time    FLU/COVID Rapid Antigen (30 min. TAT) - Preferred screening test in ED [[id number]]  (Normal) Collected: 25 1507    Lab Status: Final result Specimen: Nares from Nose Updated: 25 1529     SARS COV Rapid Antigen Negative      Influenza A Rapid Antigen Negative     Influenza B Rapid Antigen Negative    Narrative:      This test has been performed using the [employer] [facility]ie 2 FLU+SARS Antigen test under the Emergency Use Authorization (EUA). This test has been validated by the  and verified by the performing laboratory. The Virginie uses lateral flow immunofluorescent sandwich assay to detect SARS-COV, Influenza A and Influenza B Antigen.     The Quidel Virginie 2 SARS Antigen test does not differentiate between SARS-CoV and SARS-CoV-2.     Negative results are presumptive and may be confirmed with a molecular assay, if necessary, for patient management. Negative results do not rule out SARS-CoV-2 or influenza infection and should not be used as the sole basis for treatment or patient management decisions. A negative test result may occur if the level of antigen in a sample is below the limit of detection of this test.     Positive results are indicative of the presence of viral antigens, but do not rule out bacterial infection or co-infection with other viruses.     All test results should be used as an adjunct to clinical observations and other information available to the provider.    FOR PEDIATRIC PATIENTS - copy/paste COVID Guidelines URL to browser: [email or web address]    CBC and differential [[id number]]     Lab Status: No result Specimen: Blood     Comprehensive metabolic panel [[id number]]     Lab Status: No result Specimen: Blood     Protime-INR [[id number]]     Lab Status: No result Specimen: Blood     APTT [[id number]]     Lab Status: No result Specimen: Blood     Procalcitonin [[id number]]     Lab Status: No result Specimen: Blood     HS Troponin 0hr (reflex protocol) [[id number]]     Lab Status: No result Specimen: Blood             XR chest 1 view portable   Final Interpretation by Keyona Blas MD (02/26 1218)      No acute cardiopulmonary disease.      In the setting  of clinically suspected/proven COVID-19, this plain film appearance  does not contain findings that raise concern for viral pneumonia such as COVID-19, but does not rule out this diagnosis.            Workstation performed: [id number]             ECG 12 Lead Documentation Only    Date/Time: 2/26/2025 3:31 PM    Performed by: Ruiz Berrios DO  Authorized by: Ruiz Berrios DO    ECG reviewed by me, the ED Provider: yes    Patient location:  ED  Interpretation:     Interpretation: abnormal    Rate:     ECG rate:  68    ECG rate assessment: normal    Rhythm:     Rhythm: sinus rhythm    Ectopy:     Ectopy: none    ST segments:     ST segments:  Normal  T waves:     T waves: normal    Other findings:     Other findings: LVH        ED Medication and Procedure Management   Prior to Admission Medications   Prescriptions Last Dose Informant Patient Reported? Taking?   Ascorbic Acid (vitamin C) 1000 MG tablet  Self No No   Sig: Take 1 tablet (1,000 mg total) by mouth daily   Blood Glucose Monitoring Suppl (OneTouch Verio) w/Device KIT  Self No No   Sig: Use to test blood sugar 2 times a day   Calcium Carbonate-Vitamin D3 (Calcium 600+D) 600-400 MG-UNIT TABS  Self Yes No   Sig: Take 1 tablet by mouth daily     Lancets (onetouch ultrasoft) lancets  Self No No   Sig: Use to test blood sugar 2 times a day   Multiple Vitamins-Minerals (CENTRUM SILVER 50+WOMEN PO)  Self Yes No   Sig: Take by mouth   acetaminophen (TYLENOL) 650 mg CR tablet  Self No No   Sig: Take 1 tablet (650 mg total) by mouth every 8 (eight) hours as needed for mild pain   aspirin 81 mg chewable tablet  Self No No   Sig: Chew 1 tablet (81 mg total) daily   atorvastatin (LIPITOR) 40 mg tablet   No No   Sig: TAKE 1 TABLET DAILY   benzonatate (TESSALON PERLES) 100 mg capsule   No No   Sig: Take 1 capsule (100 mg total) by mouth 2 (two) times a day as needed for cough   Patient not taking: Reported on 12/11/2024   benzonatate (TESSALON PERLES) 100 mg capsule   No No    Sig: Take 1 capsule (100 mg total) by mouth every 8 (eight) hours   Patient not taking: Reported on 12/11/2024   budesonide (PULMICORT) 0.5 mg/2 mL nebulizer solution   No No   Sig: TAKE 2 ML BY NEBULIZATION 2 (TWO) TIMES A DAY   furosemide (LASIX) 40 mg tablet   No No   Sig: TAKE 1 TABLET BY MOUTH DAILY   gabapentin (NEURONTIN) 100 mg capsule   No No   Sig: Take 1 capsule (100 mg total) by mouth daily at bedtime   gabapentin (NEURONTIN) 300 mg capsule   No No   Sig: Take 1 capsule (300 mg total) by mouth 2 (two) times a day   glipiZIDE (GLUCOTROL XL) 2.5 mg 24 hr tablet   No No   Sig: TAKE 2 TABLETS BY MOUTH DAILY   glucose blood (OneTouch Verio) test strip  Self No No   Sig: Use to test blood sugar 2 times daily   guaiFENesin-codeine (ROBITUSSIN AC) 100-10 mg/5 mL oral solution   No No   Sig: Take 5 mL by mouth 3 (three) times a day as needed for cough   ipratropium-albuterol (DUO-NEB) 0.5-2.5 mg/3 mL nebulizer solution   No No   Sig: Take 3 mL by nebulization every 6 (six) hours as needed for wheezing or shortness of breath   isosorbide mononitrate (IMDUR) 30 mg 24 hr tablet   No No   Sig: TAKE 1 TABLET DAILY   lidocaine (Lidoderm) 5 %  Self No No   Sig: Apply 1 patch topically over 12 hours daily Remove & Discard patch within 12 hours or as directed by MD   lisinopril (ZESTRIL) 20 mg tablet   No No   Sig: TAKE 1 TABLET DAILY   metoprolol tartrate (LOPRESSOR) 25 mg tablet  Self No No   Sig: TAKE 1/2 TABLET EVERY 12 HOURS   ondansetron (Zofran ODT) 4 mg disintegrating tablet  Self No No   Sig: Take 1 tablet (4 mg total) by mouth every 6 (six) hours as needed for nausea or vomiting   pantoprazole (PROTONIX) 40 mg tablet   No No   Sig: TAKE ONE TABLET DAILY   predniSONE 10 mg tablet   No No   Sig: Take 1 tablet daily for 5 days   Patient not taking: Reported on 12/11/2024   predniSONE 10 mg tablet   No No   Sig: Take 2 tablets for 5 days then 1 tablet for 5 days   Patient not taking: Reported on 12/11/2024    propylthiouracil 50 mg tablet   No No   Sig: TAKE 2-1/2 TABS DAILY   sodium chloride 0.9 % nebulizer solution  Self No No   Sig: TAKE 3 ML BY NEBULIZATION 2 (TWO) TIMES A DAY AS NEEDED FOR WHEEZING      Facility-Administered Medications: None     Patient's Medications   Discharge Prescriptions    No medications on file     No discharge procedures on file.  ED SEPSIS DOCUMENTATION          1 tablet (650 mg total) by mouth every 8 (eight) hours as needed for mild pain   aspirin 81 mg chewable tablet  Self No No   Sig: Chew 1 tablet (81 mg total) daily   atorvastatin (LIPITOR) 40 mg tablet   No No   Sig: TAKE 1 TABLET DAILY   benzonatate (TESSALON PERLES) 100 mg capsule   No No   Sig: Take 1 capsule (100 mg total) by mouth 2 (two) times a day as needed for cough   Patient not taking: Reported on 12/11/2024   benzonatate (TESSALON PERLES) 100 mg capsule   No No   Sig: Take 1 capsule (100 mg total) by mouth every 8 (eight) hours   Patient not taking: Reported on 12/11/2024   budesonide (PULMICORT) 0.5 mg/2 mL nebulizer solution   No No   Sig: TAKE 2 ML BY NEBULIZATION 2 (TWO) TIMES A DAY   furosemide (LASIX) 40 mg tablet   No No   Sig: TAKE 1 TABLET BY MOUTH DAILY   gabapentin (NEURONTIN) 100 mg capsule   No No   Sig: Take 1 capsule (100 mg total) by mouth daily at bedtime   gabapentin (NEURONTIN) 300 mg capsule   No No   Sig: Take 1 capsule (300 mg total) by mouth 2 (two) times a day   glipiZIDE (GLUCOTROL XL) 2.5 mg 24 hr tablet   No No   Sig: TAKE 2 TABLETS BY MOUTH DAILY   glucose blood (OneTouch Verio) test strip  Self No No   Sig: Use to test blood sugar 2 times daily   guaiFENesin-codeine (ROBITUSSIN AC) 100-10 mg/5 mL oral solution   No No   Sig: Take 5 mL by mouth 3 (three) times a day as needed for cough   ipratropium-albuterol (DUO-NEB) 0.5-2.5 mg/3 mL nebulizer solution   No No   Sig: Take 3 mL by nebulization every 6 (six) hours as needed for wheezing or shortness of breath   isosorbide mononitrate (IMDUR) 30 mg 24 hr tablet   No No   Sig: TAKE 1 TABLET DAILY   lidocaine (Lidoderm) 5 %  Self No No   Sig: Apply 1 patch topically over 12 hours daily Remove & Discard patch within 12 hours or as directed by MD   lisinopril (ZESTRIL) 20 mg tablet   No No   Sig: TAKE 1 TABLET DAILY   metoprolol tartrate (LOPRESSOR) 25 mg tablet  Self No No   Sig: TAKE 1/2 TABLET EVERY 12 HOURS   ondansetron (Zofran  ODT) 4 mg disintegrating tablet  Self No No   Sig: Take 1 tablet (4 mg total) by mouth every 6 (six) hours as needed for nausea or vomiting   pantoprazole (PROTONIX) 40 mg tablet   No No   Sig: TAKE ONE TABLET DAILY   predniSONE 10 mg tablet   No No   Sig: Take 1 tablet daily for 5 days   Patient not taking: Reported on 12/11/2024   predniSONE 10 mg tablet   No No   Sig: Take 2 tablets for 5 days then 1 tablet for 5 days   Patient not taking: Reported on 12/11/2024   propylthiouracil 50 mg tablet   No No   Sig: TAKE 2-1/2 TABS DAILY   sodium chloride 0.9 % nebulizer solution  Self No No   Sig: TAKE 3 ML BY NEBULIZATION 2 (TWO) TIMES A DAY AS NEEDED FOR WHEEZING      Facility-Administered Medications: None     Discharge Medication List as of 2/26/2025  4:35 PM        START taking these medications    Details   !! predniSONE 20 mg tablet Take 2 tablets (40 mg total) by mouth daily for 5 days, Starting Wed 2/26/2025, Until Mon 3/3/2025, Normal       !! - Potential duplicate medications found. Please discuss with provider.        CONTINUE these medications which have NOT CHANGED    Details   acetaminophen (TYLENOL) 650 mg CR tablet Take 1 tablet (650 mg total) by mouth every 8 (eight) hours as needed for mild pain, Starting Wed 6/30/2021, Normal      Ascorbic Acid (vitamin C) 1000 MG tablet Take 1 tablet (1,000 mg total) by mouth daily, Starting Tue 11/10/2020, Normal      aspirin 81 mg chewable tablet Chew 1 tablet (81 mg total) daily, Starting Tue 11/10/2020, Normal      atorvastatin (LIPITOR) 40 mg tablet TAKE 1 TABLET DAILY, Normal      !! benzonatate (TESSALON PERLES) 100 mg capsule Take 1 capsule (100 mg total) by mouth 2 (two) times a day as needed for cough, Starting Thu 9/26/2024, Normal      !! benzonatate (TESSALON PERLES) 100 mg capsule Take 1 capsule (100 mg total) by mouth every 8 (eight) hours, Starting Thu 11/14/2024, Normal      Blood Glucose Monitoring Suppl (OneTouch Verio) w/Device KIT Use to test  blood sugar 2 times a day, Normal      budesonide (PULMICORT) 0.5 mg/2 mL nebulizer solution TAKE 2 ML BY NEBULIZATION 2 (TWO) TIMES A DAY, Normal      Calcium Carbonate-Vitamin D3 (Calcium 600+D) 600-400 MG-UNIT TABS Take 1 tablet by mouth daily  , Historical Med      furosemide (LASIX) 40 mg tablet TAKE 1 TABLET BY MOUTH DAILY, Starting Fri 1/3/2025, Normal      gabapentin (NEURONTIN) 300 mg capsule Take 1 capsule (300 mg total) by mouth 2 (two) times a day, Starting Mon 2/24/2025, Until Sun 5/25/2025, Normal      glipiZIDE (GLUCOTROL XL) 2.5 mg 24 hr tablet TAKE 2 TABLETS BY MOUTH DAILY, Starting Fri 1/3/2025, Normal      glucose blood (OneTouch Verio) test strip Use to test blood sugar 2 times daily, Normal      guaiFENesin-codeine (ROBITUSSIN AC) 100-10 mg/5 mL oral solution Take 5 mL by mouth 3 (three) times a day as needed for cough, Starting Thu 2/13/2025, Normal      ipratropium-albuterol (DUO-NEB) 0.5-2.5 mg/3 mL nebulizer solution Take 3 mL by nebulization every 6 (six) hours as needed for wheezing or shortness of breath, Starting Wed 8/21/2024, Sample      isosorbide mononitrate (IMDUR) 30 mg 24 hr tablet TAKE 1 TABLET DAILY, Starting Tue 2/4/2025, Normal      Lancets (onetouch ultrasoft) lancets Use to test blood sugar 2 times a day, Normal      lidocaine (Lidoderm) 5 % Apply 1 patch topically over 12 hours daily Remove & Discard patch within 12 hours or as directed by MD, Starting Tue 7/11/2023, Normal      lisinopril (ZESTRIL) 20 mg tablet TAKE 1 TABLET DAILY, Starting Fri 11/22/2024, Normal      metoprolol tartrate (LOPRESSOR) 25 mg tablet TAKE 1/2 TABLET EVERY 12 HOURS, Normal      Multiple Vitamins-Minerals (CENTRUM SILVER 50+WOMEN PO) Take by mouth, Historical Med      ondansetron (Zofran ODT) 4 mg disintegrating tablet Take 1 tablet (4 mg total) by mouth every 6 (six) hours as needed for nausea or vomiting, Starting Tue 7/11/2023, Normal      pantoprazole (PROTONIX) 40 mg tablet TAKE ONE TABLET  DAILY, Starting Tue 2/4/2025, Normal      !! predniSONE 10 mg tablet Take 1 tablet daily for 5 days, Normal      !! predniSONE 10 mg tablet Take 2 tablets for 5 days then 1 tablet for 5 days, Normal      propylthiouracil 50 mg tablet TAKE 2-1/2 TABS DAILY, Normal      sodium chloride 0.9 % nebulizer solution TAKE 3 ML BY NEBULIZATION 2 (TWO) TIMES A DAY AS NEEDED FOR WHEEZING, Starting u 2/23/2023, Normal       !! - Potential duplicate medications found. Please discuss with provider.          ED SEPSIS DOCUMENTATION   Time reflects when diagnosis was documented in both MDM as applicable and the Disposition within this note       Time User Action Codes Description Comment    2/26/2025  4:35 PM Ruiz Berrios [J45.901] Asthma exacerbation                  Ruiz Berrios DO  03/03/25 1309

## 2025-02-28 ENCOUNTER — TELEPHONE (OUTPATIENT)
Age: 85
End: 2025-02-28

## 2025-02-28 LAB
ATRIAL RATE: 68 BPM
P AXIS: 31 DEGREES
PR INTERVAL: 142 MS
QRS AXIS: -11 DEGREES
QRSD INTERVAL: 82 MS
QT INTERVAL: 400 MS
QTC INTERVAL: 425 MS
T WAVE AXIS: 19 DEGREES
VENTRICULAR RATE: 68 BPM

## 2025-02-28 PROCEDURE — 93010 ELECTROCARDIOGRAM REPORT: CPT | Performed by: INTERNAL MEDICINE

## 2025-02-28 NOTE — TELEPHONE ENCOUNTER
Attempted to contact patient per referral as she would be in need of a follow up visit, was unable to leave a voicemail as her mail box was full. Thank you.

## 2025-03-12 DIAGNOSIS — K21.9 GASTROESOPHAGEAL REFLUX DISEASE WITHOUT ESOPHAGITIS: ICD-10-CM

## 2025-03-12 RX ORDER — PANTOPRAZOLE SODIUM 40 MG/1
40 TABLET, DELAYED RELEASE ORAL DAILY
Qty: 30 TABLET | Refills: 0 | Status: SHIPPED | OUTPATIENT
Start: 2025-03-12

## 2025-03-13 DIAGNOSIS — J84.89 BOOP (BRONCHIOLITIS OBLITERANS WITH ORGANIZING PNEUMONIA) (HCC): ICD-10-CM

## 2025-03-13 RX ORDER — ALBUTEROL SULFATE 0.83 MG/ML
SOLUTION RESPIRATORY (INHALATION)
Qty: 375 ML | Refills: 11 | Status: SHIPPED | OUTPATIENT
Start: 2025-03-13

## 2025-03-13 NOTE — TELEPHONE ENCOUNTER
Refill must be reviewed and completed by the office or provider. The refill is unable to be approved or denied by the medication management team.    Albuterol as needed fro wheezing, last refill 2021 - Please review to see if the refill is appropriate.

## 2025-03-19 ENCOUNTER — TELEPHONE (OUTPATIENT)
Dept: ENDOCRINOLOGY | Facility: CLINIC | Age: 85
End: 2025-03-19

## 2025-03-21 DIAGNOSIS — I10 HYPERTENSION, ESSENTIAL: ICD-10-CM

## 2025-03-21 RX ORDER — LISINOPRIL 20 MG/1
20 TABLET ORAL DAILY
Qty: 30 TABLET | Refills: 0 | Status: SHIPPED | OUTPATIENT
Start: 2025-03-21

## 2025-04-02 DIAGNOSIS — R05.3 CHRONIC COUGH: Chronic | ICD-10-CM

## 2025-04-03 RX ORDER — CODEINE PHOSPHATE AND GUAIFENESIN 10; 100 MG/5ML; MG/5ML
5 SOLUTION ORAL 3 TIMES DAILY PRN
Qty: 300 ML | Refills: 0 | Status: SHIPPED | OUTPATIENT
Start: 2025-04-03

## 2025-04-14 DIAGNOSIS — I25.119 CORONARY ARTERY DISEASE WITH ANGINA PECTORIS, UNSPECIFIED VESSEL OR LESION TYPE, UNSPECIFIED WHETHER NATIVE OR TRANSPLANTED HEART (HCC): ICD-10-CM

## 2025-04-14 DIAGNOSIS — E78.5 HYPERLIPIDEMIA LDL GOAL <70: ICD-10-CM

## 2025-04-14 DIAGNOSIS — K21.9 GASTROESOPHAGEAL REFLUX DISEASE WITHOUT ESOPHAGITIS: ICD-10-CM

## 2025-04-14 RX ORDER — PANTOPRAZOLE SODIUM 40 MG/1
40 TABLET, DELAYED RELEASE ORAL DAILY
Qty: 30 TABLET | Refills: 1 | Status: SHIPPED | OUTPATIENT
Start: 2025-04-14

## 2025-04-15 DIAGNOSIS — E78.2 MIXED HYPERLIPIDEMIA: Primary | ICD-10-CM

## 2025-04-15 PROBLEM — I11.0 HYPERTENSIVE HEART DISEASE WITH CHRONIC COMBINED SYSTOLIC AND DIASTOLIC CONGESTIVE HEART FAILURE (HCC): Status: ACTIVE | Noted: 2017-07-05

## 2025-04-15 PROBLEM — E03.9 ACQUIRED HYPOTHYROIDISM: Status: ACTIVE | Noted: 2021-11-29

## 2025-04-15 PROBLEM — I50.42 HYPERTENSIVE HEART DISEASE WITH CHRONIC COMBINED SYSTOLIC AND DIASTOLIC CONGESTIVE HEART FAILURE (HCC): Status: ACTIVE | Noted: 2017-07-05

## 2025-04-15 RX ORDER — ATORVASTATIN CALCIUM 40 MG/1
40 TABLET, FILM COATED ORAL DAILY
Qty: 30 TABLET | Refills: 0 | Status: SHIPPED | OUTPATIENT
Start: 2025-04-15

## 2025-04-16 ENCOUNTER — OFFICE VISIT (OUTPATIENT)
Age: 85
End: 2025-04-16

## 2025-04-16 ENCOUNTER — TELEPHONE (OUTPATIENT)
Age: 85
End: 2025-04-16

## 2025-04-16 VITALS
BODY MASS INDEX: 35.19 KG/M2 | HEART RATE: 74 BPM | TEMPERATURE: 98.6 F | SYSTOLIC BLOOD PRESSURE: 134 MMHG | OXYGEN SATURATION: 97 % | DIASTOLIC BLOOD PRESSURE: 70 MMHG | WEIGHT: 218 LBS

## 2025-04-16 DIAGNOSIS — I50.42 HYPERTENSIVE HEART DISEASE WITH CHRONIC COMBINED SYSTOLIC AND DIASTOLIC CONGESTIVE HEART FAILURE (HCC): ICD-10-CM

## 2025-04-16 DIAGNOSIS — G47.34 SLEEP-RELATED NONOBSTRUCTIVE ALVEOLAR HYPOVENTILATION: ICD-10-CM

## 2025-04-16 DIAGNOSIS — I11.0 HYPERTENSIVE HEART DISEASE WITH CHRONIC COMBINED SYSTOLIC AND DIASTOLIC CONGESTIVE HEART FAILURE (HCC): ICD-10-CM

## 2025-04-16 DIAGNOSIS — E05.90 HYPERTHYROIDISM: ICD-10-CM

## 2025-04-16 DIAGNOSIS — E66.813 CLASS 3 SEVERE OBESITY DUE TO EXCESS CALORIES WITHOUT SERIOUS COMORBIDITY IN ADULT, UNSPECIFIED BMI: ICD-10-CM

## 2025-04-16 DIAGNOSIS — I25.119 CORONARY ARTERY DISEASE WITH ANGINA PECTORIS, UNSPECIFIED VESSEL OR LESION TYPE, UNSPECIFIED WHETHER NATIVE OR TRANSPLANTED HEART (HCC): ICD-10-CM

## 2025-04-16 DIAGNOSIS — E11.65 TYPE 2 DIABETES MELLITUS WITH HYPERGLYCEMIA, WITHOUT LONG-TERM CURRENT USE OF INSULIN (HCC): Primary | ICD-10-CM

## 2025-04-16 DIAGNOSIS — J45.30 MILD PERSISTENT ASTHMA WITHOUT COMPLICATION: Chronic | ICD-10-CM

## 2025-04-16 DIAGNOSIS — J39.8 TRACHEAL STENOSIS: ICD-10-CM

## 2025-04-16 DIAGNOSIS — E78.2 MIXED HYPERLIPIDEMIA: ICD-10-CM

## 2025-04-16 DIAGNOSIS — N18.31 STAGE 3A CHRONIC KIDNEY DISEASE (HCC): ICD-10-CM

## 2025-04-16 LAB — SL AMB POCT HEMOGLOBIN AIC: 8.7 (ref ?–6.5)

## 2025-04-16 PROCEDURE — 99214 OFFICE O/P EST MOD 30 MIN: CPT | Performed by: FAMILY MEDICINE

## 2025-04-16 PROCEDURE — G2211 COMPLEX E/M VISIT ADD ON: HCPCS | Performed by: FAMILY MEDICINE

## 2025-04-16 PROCEDURE — 83036 HEMOGLOBIN GLYCOSYLATED A1C: CPT | Performed by: FAMILY MEDICINE

## 2025-04-16 RX ORDER — DULAGLUTIDE 1.5 MG/.5ML
INJECTION, SOLUTION SUBCUTANEOUS
Qty: 1 ML | Refills: 5 | Status: SHIPPED | OUTPATIENT
Start: 2025-04-16

## 2025-04-16 RX ORDER — PROPYLTHIOURACIL 50 MG/1
TABLET ORAL
Qty: 270 TABLET | Refills: 3 | Status: SHIPPED | OUTPATIENT
Start: 2025-04-16

## 2025-04-16 NOTE — ASSESSMENT & PLAN NOTE
Wt Readings from Last 3 Encounters:   04/16/25 98.9 kg (218 lb)   02/26/25 99.8 kg (220 lb)   02/24/25 99.8 kg (220 lb)       Orders:  •  Ambulatory referral to chronic care management; Future

## 2025-04-16 NOTE — ASSESSMENT & PLAN NOTE
SOB is about same. Continue same management  Orders:  •  Ambulatory referral to chronic care management; Future

## 2025-04-16 NOTE — ASSESSMENT & PLAN NOTE
Sleeps with two pillows  Orders:  •  Ambulatory referral to chronic care management; Future    Given patient education resources on subject

## 2025-04-16 NOTE — PATIENT INSTRUCTIONS
Tips for Good Sleep     Start with basic sleep hygiene:  1. Life can be hectic, but do your best to honor the fundamental rhythmic infrastructure of life:    Regular bed and rising time, obtaining exposure to early morning light and evening dim light    Regular times for meals and exercise.    Don't nap. Although napping is known to offer a number of medical and psychological benefits, it is contraindicated with insomnia.     2.  Reduce 'body noise':    Manage caffeine, nicotine, alcohol and other drugs. Alcohol should never be taken as a sleep aid because it suppresses deep & REM (dreaming) sleep which are necessary for health.    Given its substantial half-life, standard cautions about caffeine may not be sufficiently conservative for many. Avoid all caffeine and other stimulant use after noon.      3. Reduce mind noise:     Anxiety and depression, high stress and unresolved emotions all are common causes of disturbed sleep (insomnia). Talk to your doctor about further treatment of these, including seeing a therapist to assist resolving unprocessed emotions.     Use the Bedroom (or at least the bed) for sleep & sex only. Don't have a TV in the bedroom, and don't work in the bedroom     Go to bed only when sleepy.         Develop a consistent bedtime ritual to facilitate letting go of wakefulness, e.g. warm bath, journaling (excellent to process emotions), restful practices that induce relaxation response such as relaxation exercises. Headspace Premium version has a 30 part healthy sleep module set and a nice sleep session.      Turn lights down low an hour prior to bedtime. If bright lights are unavoidable in the evening, use ary/orange glasses to block melatonin-suppressing blue light for the 1 - 2 hrs. prior to bedtime.      Keep evening entertainment light. Don't watch the evening news if upsetting. Light comedy is ok. Avoid 'post-dramatic' stress disorder such as watching scary movies and news     Emphasize  "the key process of letting go or surrender in sleep onset. In the end, we cannot finagle sleep. We can set the stage and be receptive to it, but we cannot intentionally \"go to sleep.\" Efforts to do so typically backfire   .    4. Reduce bedroom noise:    Healthy sleep environment: Bedroom cool (~ 68 F), completely dark, quiet,   psychologically safe and as environmentally 'green' as is feasible. Dusk simulation by dimming lights 1 - 3 hrs. prior to bedtime.      HEPA filtration & houseplants, organic and non-   toxic bedding.    Avoid clock watching - position the clock away from the bed       3. Regular exercise is critical and encourages deep sleep    However, avoid aerobics at least 3 - 4 hours prior to bed because it raises core temperature, which can interfere with sleep.     5. Nutrition:    Avoid high glycemic (like processed bakery goods & snacks) and harder to digest foods as bedtime snacks. Avoid GERD (heartburn)  aggravating foods. As an alternative, consider complex carbohydrates that may help transport tryptophan, a precursor to melatonin, across the blood-brain barrier.   Calcium, Mg, B-complex support sleep    Other sleep aids:   Sleep masks can help to keep dark. Also comforting   Ear plugs   White noise: fans and humidifiers,   'Sleep Bug' and other apps   Brain wave entrainment      6. Supplements that support sleep     Botanicals alone or in combination like valerian, lemon balm, lavender, chamomile (caution, some diuretic effect), and/or hops. A good brand is 'Phytocalm' by [employer]   Valerian ( Valeriana officinalis ) root  Requires 2 - 3 weeks to work. Dose: 1-3 grams crude root or 800-1200 mg of an extract standardized to 0.8-1.0% valerenic acid taken 30-60 minutes   prior to bedtime   More for chronic insomnia. Can be used as part of wean off benzos. .    Hops ( Humulus lupulus) Commission E approved for anxiety and sleep disturbance Dose is 300-500 mg before bed either in " capsule or tincture.       Melatonin is useful in aging populations and/or with circadian   irregularities. Always couple supplementation with other sleep hygiene recommendations. Dose: Adults: 0.25-0.5 mg SL sustained-release: ( not the common 3 mg tab). SL bypasses first pass liver metabolism & has more reliable levels. Take at ~ 8PM rather than at bedtime   ~ 11 PM. Not a sleeping pill. Rather a gentle invitation to sleep, but will   not override a lot of noise. As melatonin levels rise, body temp.   lowers. Adverse effects rare & usually related to v. high doses. May   increase dreaming. Melatonin works best in jet lag, shift workers, visually blind to re-set circadian   rhythm. For jetlag, works best if travel eastward. Take it a couple of   hours prior to when would have gone to sleep at one's origin.         Counterproductive measures that don't help much in the long run:   Hypnotics don't work, with no improvement on sleep polysomography. They just prevent remembering poor sleep. Include Ambien, Lunesta, Benzodiazepines    OTC sleepers such as Benadryl (diphenhydramine) are problematic - anti-cholinergic side effects like dry mouth, can lead to REM suppression. ½ life ~ 18 hrs. can disturb memory.   Staying longer in bed when didn't sleep well is generally counter-productive

## 2025-04-16 NOTE — ASSESSMENT & PLAN NOTE
Discussed healthy lifestyle including healthy eating and movement      Orders:  •  Ambulatory referral to chronic care management; Future

## 2025-04-16 NOTE — ASSESSMENT & PLAN NOTE
Lab Results   Component Value Date    HGBA1C 8.7 (A) 04/16/2025       Orders:  •  POCT hemoglobin A1c  •  Ambulatory referral to chronic care management; Future  •  Dulaglutide (Trulicity) 1.5 MG/0.5ML SOAJ; 0.25 mg under the skin every 7 days for 4 doses (28 days), THEN 0.5 mg under the skin every 7 days  •  propylthiouracil 50 mg tablet; TAKE 2-1/2 TABS DAILY

## 2025-04-16 NOTE — PROGRESS NOTES
Assessment & Plan  Type 2 diabetes mellitus with hyperglycemia, without long-term current use of insulin ([facility])    Lab Results   Component Value Date    HGBA1C 8.7 (A) 04/16/2025       Orders:  •  POCT hemoglobin A1c  •  Ambulatory referral to chronic care management; Future  •  Dulaglutide (Trulicity) 1.5 MG/0.5ML SOAJ; 0.25 mg under the skin every 7 days for 4 doses (28 days), THEN 0.5 mg under the skin every 7 days  •  propylthiouracil 50 mg tablet; TAKE 2-1/2 TABS DAILY    Hypertensive heart disease with chronic combined systolic and diastolic congestive heart failure ([facility])  Wt Readings from Last 3 Encounters:   04/16/25 98.9 kg (218 lb)   02/26/25 99.8 kg (220 lb)   02/24/25 99.8 kg (220 lb)       Orders:  •  Ambulatory referral to chronic care management; Future    Coronary artery disease with angina pectoris, unspecified vessel or lesion type, unspecified whether native or transplanted heart ([facility])  Clinically stable, continue same medical management      Orders:  •  Ambulatory referral to chronic care management; Future    Tracheal stenosis  Stable, continue same management        Orders:  •  Ambulatory referral to chronic care management; Future    Mild persistent asthma without complication  SOB is about same. Continue same management  Orders:  •  Ambulatory referral to chronic care management; Future    Stage 3a chronic kidney disease ([facility])  Lab Results   Component Value Date    EGFR 51 02/26/2025    EGFR 53 11/14/2024    EGFR 57 (L) 10/31/2024    CREATININE 1.01 02/26/2025    CREATININE 0.98 11/14/2024    CREATININE 0.99 10/31/2024       Orders:  •  Ambulatory referral to chronic care management; Future      Sleep-related nonobstructive alveolar hypoventilation  Sleeps with two pillows  Orders:  •  Ambulatory referral to chronic care management; Future    Given patient education resources on subject    Mixed hyperlipidemia  Healthy diet reviewed       Class 3 severe obesity due to excess calories without  serious comorbidity in adult, unspecified BMI (HCC)  Discussed healthy lifestyle including healthy eating and movement      Orders:  •  Ambulatory referral to chronic care management; Future    Hyperthyroidism  Clinically euthyroid  Orders:  •  propylthiouracil 50 mg tablet; TAKE 2-1/2 TABS DAILY        Reviewed and reinforced basics of healthy lifestyle  Risks and benefits of therapeutic plan discussed, answered all patient questions and concerns and patient expressed understanding and agreement of therapeutic plan.    Return in about 2 months (around 6/16/2025) for Chronic dis. f/u 1/2 hr..      Plan next visit:   Review chronic medical problems and progress on lifestyle change.    Chief complaint and HPI: Alaina Maza is a 84 y.o. female who presents for follow up of multiple chronic medical problems, as listed below in problem list. All problems are relatively stable except for the following issues:   Told might have asthma.  NO smoke, secondary smoke, dust or pets.   Breathing is same.   Sleep: not good - hard to get to sleep.   Sleeps with two pillows. Breathing more difficult if sleeps flat for a long time (years).  Mood: gets upset watching TV news, otherwise ok.   NO angina.   Can walk only a short distance because of ANDRADE (baseline)  A1C       Review of systems:  No fever/chills/sweats/unexplained weight loss  No change in digestion or bowel movements  No change in urination  No new musculoskeletal or neurological symptoms      Chart reviewed for relevant medical, surgical and psychosocial history, medications and allergies, labs and studies, and relevant ones discussed with patient as needed    WBC   Date/Time Value Ref Range Status   02/26/2025 03:41 PM 4.51 4.31 - 10.16 Thousand/uL Final     Hemoglobin   Date/Time Value Ref Range Status   02/26/2025 03:41 PM 12.4 11.5 - 15.4 g/dL Final     Hematocrit   Date/Time Value Ref Range Status   02/26/2025 03:41 PM 39.2 34.8 - 46.1 % Final     MCV   Date/Time  Value Ref Range Status   02/26/2025 03:41 PM 92 82 - 98 fL Final     Platelets   Date/Time Value Ref Range Status   02/26/2025 03:41  149 - 390 Thousands/uL Final     Sodium   Date/Time Value Ref Range Status   02/26/2025 03:41  135 - 147 mmol/L Final   10/31/2024 07:55  134 - 144 mmol/L Final     Potassium   Date/Time Value Ref Range Status   02/26/2025 03:41 PM 4.0 3.5 - 5.3 mmol/L Final   10/31/2024 07:55 AM 4.6 3.5 - 5.2 mmol/L Final     Chloride   Date/Time Value Ref Range Status   02/26/2025 03:41  96 - 108 mmol/L Final   10/31/2024 07:55  96 - 106 mmol/L Final     CO2   Date/Time Value Ref Range Status   02/26/2025 03:41 PM 22 21 - 32 mmol/L Final   10/31/2024 07:55 AM 16 (L) 20 - 29 mmol/L Final     ANION GAP   Date/Time Value Ref Range Status   02/26/2025 03:41 PM 9 4 - 13 mmol/L Final     BUN   Date/Time Value Ref Range Status   02/26/2025 03:41 PM 23 5 - 25 mg/dL Final   10/31/2024 07:55 AM 24 8 - 27 mg/dL Final     Creatinine   Date/Time Value Ref Range Status   02/26/2025 03:41 PM 1.01 0.60 - 1.30 mg/dL Final     Comment:     Standardized to IDMS reference method     Glucose   Date/Time Value Ref Range Status   02/26/2025 03:41  (H) 65 - 140 mg/dL Final     Comment:     If the patient is fasting, the ADA then defines impaired fasting glucose as > 100 mg/dL and diabetes as > or equal to 123 mg/dL.     Glucose, Random   Date/Time Value Ref Range Status   10/31/2024 07:55  (H) 70 - 99 mg/dL Final     Calcium   Date/Time Value Ref Range Status   02/26/2025 03:41 PM 8.9 8.4 - 10.2 mg/dL Final     AST   Date/Time Value Ref Range Status   02/26/2025 03:41 PM 21 13 - 39 U/L Final   10/31/2024 07:55 AM 16 0 - 40 IU/L Final     ALT   Date/Time Value Ref Range Status   02/26/2025 03:41 PM 13 7 - 52 U/L Final     Comment:     Specimen collection should occur prior to Sulfasalazine administration due to the potential for falsely depressed results.    10/31/2024 07:55 AM 13 0  - 32 IU/L Final     Alkaline Phosphatase   Date/Time Value Ref Range Status   02/26/2025 03:41 PM 54 34 - 104 U/L Final     Total Protein   Date/Time Value Ref Range Status   02/26/2025 03:41 PM 7.3 6.4 - 8.4 g/dL Final     Protein, Total   Date/Time Value Ref Range Status   10/31/2024 07:55 AM 7.2 6.0 - 8.5 g/dL Final     Albumin   Date/Time Value Ref Range Status   02/26/2025 03:41 PM 4.1 3.5 - 5.0 g/dL Final     Total Bilirubin   Date/Time Value Ref Range Status   02/26/2025 03:41 PM 0.30 0.20 - 1.00 mg/dL Final     Comment:     Use of this assay is not recommended for patients undergoing treatment with eltrombopag due to the potential for falsely elevated results.  N-acetyl-p-benzoquinone imine (metabolite of Acetaminophen) will generate erroneously low results in samples for patients that have taken an overdose of Acetaminophen.     TOTAL BILIRUBIN   Date/Time Value Ref Range Status   10/31/2024 07:55 AM 0.2 0.0 - 1.2 mg/dL Final     eGFR   Date/Time Value Ref Range Status   02/26/2025 03:41 PM 51 ml/min/1.73sq m Final       Hemoglobin A1C   Date/Time Value Ref Range Status   04/16/2025 08:48 AM 8.7 (A) <=6.5 Final   02/23/2024 07:36 AM 10.9 (H) 4.8 - 5.6 % Final     Comment:              Prediabetes: 5.7 - 6.4           Diabetes: >6.4           Glycemic control for adults with diabetes: <7.0       Cholesterol, Total   Date/Time Value Ref Range Status   02/23/2024 07:33  100 - 199 mg/dL Final     LDL Calculated   Date/Time Value Ref Range Status   02/23/2024 07:33 AM 76 0 - 99 mg/dL Final     HDL   Date/Time Value Ref Range Status   02/23/2024 07:33 AM 42 >39 mg/dL Final     T. Chol/HDL Ratio   Date/Time Value Ref Range Status   02/23/2024 07:33 AM 3.3 0.0 - 4.4 ratio Final     Comment:                                       T. Chol/HDL Ratio                                              Men  Women                                1/2 Avg.Risk  3.4    3.3                                    Avg.Risk  5.0     4.4                                 2X Avg.Risk  9.6    7.1                                 3X Avg.Risk 23.4   11.0       Triglycerides   Date/Time Value Ref Range Status   02/23/2024 07:33  0 - 149 mg/dL Final     TSH   Date/Time Value Ref Range Status   10/31/2024 07:55 AM 1.600 0.450 - 4.500 uIU/mL Final     Magnesium   Date/Time Value Ref Range Status   03/03/2024 01:47 PM 1.6 (L) 1.9 - 2.7 mg/dL Final       Patient Active Problem List   Diagnosis   • Coronary artery disease with angina pectoris, unspecified vessel or lesion type, unspecified whether native or transplanted heart ([facility])   • Hypertension, essential   • Tracheal stenosis   • 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[facility])   • Subglottic stenosis   • Class 1 obesity due to excess calories without serious comorbidity with body mass index (BMI) of 34.0 to 34.9 in adult   • Sleep-related nonobstructive alveolar hypoventilation   • Mild persistent asthma without complication   • Mixed hyperlipidemia   • Hypertensive heart disease with chronic combined systolic and diastolic congestive heart failure ([facility])   • Chronic bilateral low back pain without sciatica   • Mild persistent asthma with acute exacerbation   • Stage 3a chronic kidney disease ([facility])   • Acquired hypothyroidism   • Type 2 diabetes mellitus with hyperglycemia, without long-term current use of insulin ([facility])   • Class 3 severe obesity due to excess calories without serious comorbidity in adult, unspecified BMI ([facility])   • Nocturnal hypoxemia due to obesity             EXAM:  The patient appears chronically ill. well, in no acute apparent distress.  Alert and oriented times three, pleasant and cooperative. Vital signs are as noted by the nurse. /70 (BP Location: Left arm, Patient Position: Sitting, Cuff Size: Standard)   Pulse 74   Temp 98.6 °F (37 °C) (Tympanic)   Wt 98.9 kg (218 lb)   SpO2 97%   BMI 35.19 kg/m²     Head: normocephalic, atraumatic  Eyes: well perfused conjunctiva, not clinically  pale, not jaundiced  Ears: external ear: no gross lesions  Nose: no rhinorrhea  Neck: supple, tracheostomy in place. No stridor  Lungs: + baseline mild respiratory labor. Hoarse breath sounds  Heart: Regular rate and rhythm, no murmurs, gallops or rubs  Abdomen: Benign: soft, non-tender, non-distended.  No guarding or rebound. No masses or organomegaly. Normal bowel sounds,   Skin: No pallor. No rashes noted  Extremities: Moves all extremities normally. No pedal edema  Neuro: Grossly normal

## 2025-04-16 NOTE — ASSESSMENT & PLAN NOTE
Lab Results   Component Value Date    EGFR 51 02/26/2025    EGFR 53 11/14/2024    EGFR 57 (L) 10/31/2024    CREATININE 1.01 02/26/2025    CREATININE 0.98 11/14/2024    CREATININE 0.99 10/31/2024       Orders:  •  Ambulatory referral to chronic care management; Future

## 2025-04-16 NOTE — ASSESSMENT & PLAN NOTE
Clinically stable, continue same medical management      Orders:  •  Ambulatory referral to chronic care management; Future

## 2025-04-16 NOTE — ASSESSMENT & PLAN NOTE
Stable, continue same management        Orders:  •  Ambulatory referral to chronic care management; Future

## 2025-04-18 ENCOUNTER — HOSPITAL ENCOUNTER (OUTPATIENT)
Dept: RADIOLOGY | Facility: HOSPITAL | Age: 85
Discharge: HOME | End: 2025-04-18
Payer: COMMERCIAL

## 2025-04-18 ENCOUNTER — PATIENT OUTREACH (OUTPATIENT)
Age: 85
End: 2025-04-18

## 2025-04-18 DIAGNOSIS — Z13.820 ENCOUNTER FOR SCREENING FOR OSTEOPOROSIS: ICD-10-CM

## 2025-04-18 PROCEDURE — 77080 DXA BONE DENSITY AXIAL: CPT

## 2025-04-18 NOTE — PROGRESS NOTES
Outpatient Care Management Note:    Re: chronic care management     I received referral and reviewed chart . Alaina has type 2 diabetes, CKD, asthma, chronic pain,hypertension, hypothyroidism and obesity . She was identified for chronic care management program . I called her and introduced myself . I provided my name, role and contact information . I explained program and she declined at this time .  I will close referral .

## 2025-04-18 NOTE — PROGRESS NOTES
Chronic Care Management Program Consent:    Patient informed of availability of Chronic Care Management services. The services will billed monthly by their Primary Care Provider only. Patient is informed there may be a monthly cost sharing associated with the Chronic Care Management services. Patient is aware that financial counseling is available to assist with any co-pay questions or concerns.    Chronic Care Management services include:    24/7 access to care.  Comprehensive plan of care created by the provider.  Individualized care planning by the care manager(s).  Transitional care support.    The patient is informed that they have the right to stop Chronic Care Management services at anytime.       Patient consents to Chronic Care Management services? No     Patient informed that consent is needed only once unless the patient switches qualifying providers.

## 2025-05-01 DIAGNOSIS — I10 HYPERTENSION, ESSENTIAL: ICD-10-CM

## 2025-05-01 RX ORDER — LISINOPRIL 20 MG/1
20 TABLET ORAL DAILY
Qty: 30 TABLET | Refills: 1 | Status: SHIPPED | OUTPATIENT
Start: 2025-05-01

## 2025-05-08 DIAGNOSIS — R05.3 CHRONIC COUGH: Chronic | ICD-10-CM

## 2025-05-09 DIAGNOSIS — R05.3 CHRONIC COUGH: Chronic | ICD-10-CM

## 2025-05-09 RX ORDER — CODEINE PHOSPHATE AND GUAIFENESIN 10; 100 MG/5ML; MG/5ML
SOLUTION ORAL
Qty: 300 ML | Refills: 1 | OUTPATIENT
Start: 2025-05-09

## 2025-05-09 RX ORDER — CODEINE PHOSPHATE AND GUAIFENESIN 10; 100 MG/5ML; MG/5ML
5 SOLUTION ORAL 3 TIMES DAILY PRN
Qty: 300 ML | Refills: 0 | Status: SHIPPED | OUTPATIENT
Start: 2025-05-09

## 2025-05-09 NOTE — TELEPHONE ENCOUNTER
Patient's son, , called to check the status of the refill request for guaiFENesin-codeine cough syrup. He was advised that the request on 05/08/25 is pending approval.  said that she is out of medication.     Does the patient have enough for 3 days?   [] Yes   [x] No - Send as HP to POD    
Please see request. Pt has f/u scheduled 6/17/25
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 Last drink on Friday

## 2025-05-13 ENCOUNTER — APPOINTMENT (EMERGENCY)
Dept: RADIOLOGY | Facility: HOSPITAL | Age: 85
End: 2025-05-13
Payer: COMMERCIAL

## 2025-05-13 ENCOUNTER — HOSPITAL ENCOUNTER (EMERGENCY)
Facility: HOSPITAL | Age: 85
Discharge: HOME | End: 2025-05-13
Attending: EMERGENCY MEDICINE
Payer: COMMERCIAL

## 2025-05-13 VITALS
TEMPERATURE: 97.8 F | SYSTOLIC BLOOD PRESSURE: 132 MMHG | HEART RATE: 80 BPM | OXYGEN SATURATION: 96 % | RESPIRATION RATE: 18 BRPM | DIASTOLIC BLOOD PRESSURE: 60 MMHG

## 2025-05-13 DIAGNOSIS — R07.81 RIB PAIN: Primary | ICD-10-CM

## 2025-05-13 PROCEDURE — 96372 THER/PROPH/DIAG INJ SC/IM: CPT

## 2025-05-13 PROCEDURE — 71250 CT THORAX DX C-: CPT

## 2025-05-13 PROCEDURE — 99284 EMERGENCY DEPT VISIT MOD MDM: CPT | Performed by: EMERGENCY MEDICINE

## 2025-05-13 PROCEDURE — 99283 EMERGENCY DEPT VISIT LOW MDM: CPT

## 2025-05-13 RX ORDER — KETOROLAC TROMETHAMINE 30 MG/ML
15 INJECTION, SOLUTION INTRAMUSCULAR; INTRAVENOUS ONCE
Status: COMPLETED | OUTPATIENT
Start: 2025-05-13 | End: 2025-05-13

## 2025-05-13 RX ORDER — ACETAMINOPHEN 325 MG/1
975 TABLET ORAL ONCE
Status: COMPLETED | OUTPATIENT
Start: 2025-05-13 | End: 2025-05-13

## 2025-05-13 RX ORDER — LIDOCAINE 50 MG/G
1 PATCH TOPICAL ONCE
Status: DISCONTINUED | OUTPATIENT
Start: 2025-05-13 | End: 2025-05-13 | Stop reason: HOSPADM

## 2025-05-13 RX ADMIN — ACETAMINOPHEN 975 MG: 325 TABLET, FILM COATED ORAL at 08:52

## 2025-05-13 RX ADMIN — KETOROLAC TROMETHAMINE 15 MG: 30 INJECTION, SOLUTION INTRAMUSCULAR; INTRAVENOUS at 08:53

## 2025-05-13 RX ADMIN — LIDOCAINE 5% 1 PATCH: 700 PATCH TOPICAL at 08:55

## 2025-05-13 NOTE — ED PROVIDER NOTES
Time reflects when diagnosis was documented in both MDM as applicable and the Disposition within this note       Time User Action Codes Description Comment    5/13/2025  9:45 AM Radha Reyes Add [R07.81] Rib pain           ED Disposition       ED Disposition   Discharge    Condition   Stable    Date/Time   Tue May 13, 2025  9:45 AM    Comment   Alaina Lily discharge to home/self care.                   Assessment & Plan       Medical Decision Making  Pt is a 85yo F who presents with rib pain.    Differential diagnosis to include but not limited to fracture, contusion, PNA, muscular strain.  Will plan for imaging. Will treat symptomatically. See ED course for results and details.    Plan to discharge pt with f/u to PCP. Discussed returning the ED with new or worsening of symptoms. Discussed use of over the counter medications as stated on the bottle as needed for pain. Pt expressed understanding of discharge instructions, return precautions, and medication instructions and is stable for discharge at this time. All questions were answered and pt was discharged without incident.           Amount and/or Complexity of Data Reviewed  Radiology: ordered. Decision-making details documented in ED Course.    Risk  OTC drugs.  Prescription drug management.        ED Course as of 05/13/25 1033   Tue May 13, 2025   0938 CT chest without contrast  1.  No acute thoracic pathology identified. No acute or displaced rib fracture identified.  2.  Similar scattered areas of atelectasis or scarring throughout the lungs. Mild generalized mosaic attenuation can be seen with air trapping. No consolidative airspace disease.  3.  Stable heterogeneous attenuation anterior mediastinal mass.  4.  The main pulmonary artery is enlarged measuring up to 4.1 cm which can be seen with senescence or pulmonary arterial hypertension.   0943 Pt reassessed and resting comfortably. Pt made aware of all results and plan for DC with supportive  "treatment at home and PCP follow up. Pt agreeable.        Medications   lidocaine (LIDODERM) 5 % patch 1 patch (1 patch Topical Medication Applied 25 0810)   acetaminophen (TYLENOL) tablet 975 mg (975 mg Oral Given 25 0886)   ketorolac (TORADOL) injection 15 mg (15 mg Intramuscular Given 25 0853)       ED Risk Strat Scores                    No data recorded                            History of Present Illness       Chief Complaint   Patient presents with    Rib Injury     Pt reports after 1 week of rib pain after patient had an incident of high pressure water beat onto her ribs in the shower. Patient state the hose/shower head broke off and the water came out \"so hard\" onto her ribs and now she has bi/lat pain to anterior and posterior surface of ribs       Past Medical History:   Diagnosis Date    Asthma 2024    BOOP (bronchiolitis obliterans with organizing pneumonia) (Formerly [facility]) 2006    Coronary artery disease     Disease of thyroid gland     HL (hearing loss)     Papillary thyroid carcinoma (Formerly [facility]) 2021    Post-surgical hypothyroidism 2017    Voice disorder       Past Surgical History:   Procedure Laterality Date    HYSTERECTOMY      REPLACEMENT TOTAL KNEE      THYROID SURGERY      TRACHEOSTOMY  2006      Family History   Problem Relation Age of Onset    Heart disease Mother     Hypertension Mother     Heart disease Father     Pneumonia Brother          of COVID    Dementia Brother       Social History     Tobacco Use    Smoking status: Never     Passive exposure: Past    Smokeless tobacco: Never   Vaping Use    Vaping status: Never Used   Substance Use Topics    Alcohol use: Never    Drug use: Never      E-Cigarette/Vaping    E-Cigarette Use Never User       E-Cigarette/Vaping Substances    Nicotine No     THC No     CBD No     Flavoring No     Other No     Unknown No       I have reviewed and agree with the history as documented.     Pt is an 83yo F who presents for rib " pain.  Patient reports that last week while in the shower, the showerhead broke off and patient was hit with high water pressure in her back.  Patient reports that the day after this occurred, she began with bilateral posterior rib pain.  Patient denies any other trauma or injury to the area.  Patient reports she has baseline shortness of breath but has not been more short of breath than usual.  Patient denies any coughing.  Patient denies any other pain or complaints.  Patient reports she has been taking her medications regularly.  She does report she was recently started on Trulicity but denies any other recent medication changes.  Patient reports she has been taking Tylenol at home for pain with last dose yesterday.          Objective       ED Triage Vitals [05/13/25 0807]   Temperature Pulse Blood Pressure Respirations SpO2 Patient Position - Orthostatic VS   98.6 °F (37 °C) 80 145/74 18 99 % Sitting      Temp Source Heart Rate Source BP Location FiO2 (%) Pain Score    Oral Monitor Right arm -- 10 - Worst Possible Pain      Vitals      Date and Time Temp Pulse SpO2 Resp BP Pain Score FACES Pain Rating User   05/13/25 0959 97.8 °F (36.6 °C) 80 96 % 18 132/60 -- -- KD   05/13/25 0853 -- -- -- -- -- 10 - Worst Possible Pain -- KD   05/13/25 0852 -- -- -- -- -- 10 - Worst Possible Pain -- KD   05/13/25 0807 98.6 °F (37 °C) 80 99 % 18 145/74 10 - Worst Possible Pain -- JOSE DE JESUS            Physical Exam  Vitals reviewed.   Constitutional:       General: She is not in acute distress.     Appearance: She is well-developed. She is not toxic-appearing or diaphoretic.   HENT:      Head: Normocephalic and atraumatic.      Right Ear: External ear normal.      Left Ear: External ear normal.      Nose: Nose normal.      Mouth/Throat:      Pharynx: Oropharynx is clear.   Eyes:      Extraocular Movements: Extraocular movements intact.      Pupils: Pupils are equal, round, and reactive to light.   Neck:     Cardiovascular:      Rate  and Rhythm: Normal rate and regular rhythm.      Heart sounds: Normal heart sounds.   Pulmonary:      Effort: Tachypnea present. No respiratory distress.      Breath sounds: Normal breath sounds.   Abdominal:      General: There is no distension.      Palpations: Abdomen is soft.      Tenderness: There is no abdominal tenderness.   Musculoskeletal:         General: Normal range of motion.      Cervical back: Normal range of motion and neck supple. No tenderness.      Thoracic back: No tenderness.      Lumbar back: No tenderness.   Skin:     General: Skin is warm and dry.      Capillary Refill: Capillary refill takes less than 2 seconds.   Neurological:      General: No focal deficit present.      Mental Status: She is alert and oriented to person, place, and time.   Psychiatric:         Speech: Speech normal.         Behavior: Behavior is cooperative.         Results Reviewed       None            CT chest without contrast   Final Interpretation by Eyal Gonzalez MD (05/13 0920)      1.  No acute thoracic pathology identified. No acute or displaced rib fracture identified.   2.  Similar scattered areas of atelectasis or scarring throughout the lungs. Mild generalized mosaic attenuation can be seen with air trapping. No consolidative airspace disease.   3.  Stable heterogeneous attenuation anterior mediastinal mass, compatible with prominent substernal thyroid glandular tissue. Per the electronic medical record, the patient has a history of right thyroidectomy and a history of papillary thyroid    carcinoma.   4.  The main pulmonary artery is enlarged measuring up to 4.1 cm which can be seen with senescence or pulmonary arterial hypertension.               Workstation performed: [id number]             Procedures    ED Medication and Procedure Management   Prior to Admission Medications   Prescriptions Last Dose Informant Patient Reported? Taking?   Ascorbic Acid (vitamin C) 1000 MG tablet  Self No No   Sig: Take 1  tablet (1,000 mg total) by mouth daily   Blood Glucose Monitoring Suppl (OneTouch Verio) w/Device KIT  Self No No   Sig: Use to test blood sugar 2 times a day   Calcium Carbonate-Vitamin D3 (Calcium 600+D) 600-400 MG-UNIT TABS  Self Yes No   Sig: Take 1 tablet by mouth daily     Dulaglutide (Trulicity) 1.5 MG/0.5ML SOAJ   No No   Si.25 mg under the skin every 7 days for 4 doses (28 days), THEN 0.5 mg under the skin every 7 days   Lancets (onetouch ultrasoft) lancets  Self No No   Sig: Use to test blood sugar 2 times a day   Multiple Vitamins-Minerals (CENTRUM SILVER 50+WOMEN PO)  Self Yes No   Sig: Take by mouth   acetaminophen (TYLENOL) 650 mg CR tablet  Self No No   Sig: Take 1 tablet (650 mg total) by mouth every 8 (eight) hours as needed for mild pain   albuterol (2.5 mg/3 mL) 0.083 % nebulizer solution   No No   Sig: USE ONE VIAL VIA NEBULIZER EVERY 4 (FOUR) HOURS AS NEEDED FOR WHEEZING OR SHORTNESS OF BREATH   aspirin 81 mg chewable tablet  Self No No   Sig: Chew 1 tablet (81 mg total) daily   atorvastatin (LIPITOR) 40 mg tablet   No No   Sig: TAKE 1 TABLET DAILY   budesonide (PULMICORT) 0.5 mg/2 mL nebulizer solution   No No   Sig: TAKE 2 ML BY NEBULIZATION 2 (TWO) TIMES A DAY   furosemide (LASIX) 40 mg tablet   No No   Sig: TAKE 1 TABLET BY MOUTH DAILY   gabapentin (NEURONTIN) 100 mg capsule   No No   Sig: Take 1 capsule (100 mg total) by mouth daily at bedtime   gabapentin (NEURONTIN) 300 mg capsule   No No   Sig: Take 1 capsule (300 mg total) by mouth 2 (two) times a day   glipiZIDE (GLUCOTROL XL) 2.5 mg 24 hr tablet   No No   Sig: TAKE 2 TABLETS BY MOUTH DAILY   glucose blood (OneTouch Verio) test strip  Self No No   Sig: Use to test blood sugar 2 times daily   guaiFENesin-codeine (ROBITUSSIN AC) 100-10 mg/5 mL oral solution   No No   Sig: Take 5 mL by mouth 3 (three) times a day as needed for cough   ipratropium-albuterol (DUO-NEB) 0.5-2.5 mg/3 mL nebulizer solution   No No   Sig: Take 3 mL by  nebulization every 6 (six) hours as needed for wheezing or shortness of breath   isosorbide mononitrate (IMDUR) 30 mg 24 hr tablet   No No   Sig: TAKE 1 TABLET DAILY   lidocaine (Lidoderm) 5 %  Self No No   Sig: Apply 1 patch topically over 12 hours daily Remove & Discard patch within 12 hours or as directed by MD   lisinopril (ZESTRIL) 20 mg tablet   No No   Sig: TAKE 1 TABLET DAILY   metoprolol tartrate (LOPRESSOR) 25 mg tablet  Self No No   Sig: TAKE 1/2 TABLET EVERY 12 HOURS   ondansetron (Zofran ODT) 4 mg disintegrating tablet  Self No No   Sig: Take 1 tablet (4 mg total) by mouth every 6 (six) hours as needed for nausea or vomiting   pantoprazole (PROTONIX) 40 mg tablet   No No   Sig: TAKE ONE TABLET DAILY   propylthiouracil 50 mg tablet   No No   Sig: TAKE 2-1/2 TABS DAILY   sodium chloride 0.9 % nebulizer solution  Self No No   Sig: TAKE 3 ML BY NEBULIZATION 2 (TWO) TIMES A DAY AS NEEDED FOR WHEEZING      Facility-Administered Medications: None     Discharge Medication List as of 5/13/2025  9:46 AM        CONTINUE these medications which have NOT CHANGED    Details   acetaminophen (TYLENOL) 650 mg CR tablet Take 1 tablet (650 mg total) by mouth every 8 (eight) hours as needed for mild pain, Starting Wed 6/30/2021, Normal      albuterol (2.5 mg/3 mL) 0.083 % nebulizer solution USE ONE VIAL VIA NEBULIZER EVERY 4 (FOUR) HOURS AS NEEDED FOR WHEEZING OR SHORTNESS OF BREATH, Normal      Ascorbic Acid (vitamin C) 1000 MG tablet Take 1 tablet (1,000 mg total) by mouth daily, Starting Tue 11/10/2020, Normal      aspirin 81 mg chewable tablet Chew 1 tablet (81 mg total) daily, Starting Tue 11/10/2020, Normal      atorvastatin (LIPITOR) 40 mg tablet TAKE 1 TABLET DAILY, Starting Tue 4/15/2025, Normal      Blood Glucose Monitoring Suppl (OneTouch Verio) w/Device KIT Use to test blood sugar 2 times a day, Normal      budesonide (PULMICORT) 0.5 mg/2 mL nebulizer solution TAKE 2 ML BY NEBULIZATION 2 (TWO) TIMES A DAY,  Normal      Calcium Carbonate-Vitamin D3 (Calcium 600+D) 600-400 MG-UNIT TABS Take 1 tablet by mouth daily  , Historical Med      Dulaglutide (Trulicity) 1.5 MG/0.5ML SOAJ 0.25 mg under the skin every 7 days for 4 doses (28 days), THEN 0.5 mg under the skin every 7 days, Normal      furosemide (LASIX) 40 mg tablet TAKE 1 TABLET BY MOUTH DAILY, Starting Fri 1/3/2025, Normal      gabapentin (NEURONTIN) 300 mg capsule Take 1 capsule (300 mg total) by mouth 2 (two) times a day, Starting Mon 2/24/2025, Until Sun 5/25/2025, Normal      glipiZIDE (GLUCOTROL XL) 2.5 mg 24 hr tablet TAKE 2 TABLETS BY MOUTH DAILY, Starting Fri 1/3/2025, Normal      glucose blood (OneTouch Verio) test strip Use to test blood sugar 2 times daily, Normal      guaiFENesin-codeine (ROBITUSSIN AC) 100-10 mg/5 mL oral solution Take 5 mL by mouth 3 (three) times a day as needed for cough, Starting Fri 5/9/2025, Normal      ipratropium-albuterol (DUO-NEB) 0.5-2.5 mg/3 mL nebulizer solution Take 3 mL by nebulization every 6 (six) hours as needed for wheezing or shortness of breath, Starting Wed 8/21/2024, Sample      isosorbide mononitrate (IMDUR) 30 mg 24 hr tablet TAKE 1 TABLET DAILY, Starting Tue 2/4/2025, Normal      Lancets (onetouch ultrasoft) lancets Use to test blood sugar 2 times a day, Normal      lidocaine (Lidoderm) 5 % Apply 1 patch topically over 12 hours daily Remove & Discard patch within 12 hours or as directed by MD, Starting Tue 7/11/2023, Normal      lisinopril (ZESTRIL) 20 mg tablet TAKE 1 TABLET DAILY, Starting Thu 5/1/2025, Normal      metoprolol tartrate (LOPRESSOR) 25 mg tablet TAKE 1/2 TABLET EVERY 12 HOURS, Normal      Multiple Vitamins-Minerals (CENTRUM SILVER 50+WOMEN PO) Take by mouth, Historical Med      ondansetron (Zofran ODT) 4 mg disintegrating tablet Take 1 tablet (4 mg total) by mouth every 6 (six) hours as needed for nausea or vomiting, Starting Tue 7/11/2023, Normal      pantoprazole (PROTONIX) 40 mg tablet TAKE  ONE TABLET DAILY, Starting Mon 4/14/2025, Normal      propylthiouracil 50 mg tablet TAKE 2-1/2 TABS DAILY, Normal      sodium chloride 0.9 % nebulizer solution TAKE 3 ML BY NEBULIZATION 2 (TWO) TIMES A DAY AS NEEDED FOR WHEEZING, Starting u 2/23/2023, Normal           No discharge procedures on file.  ED SEPSIS DOCUMENTATION   Time reflects when diagnosis was documented in both MDM as applicable and the Disposition within this note       Time User Action Codes Description Comment    5/13/2025  9:45 AM Radha Reyes Add [R07.81] Rib pain                  Radha Reyes MD  05/13/25 2385

## 2025-05-13 NOTE — ED NOTES
Pt stated she would like an [employer] called to take her home. This RN asked patient to ambulate around the unit to show competency while walking. Patient ambulated without difficulty around the ER.      Patricia Young RN  05/13/25 0725

## 2025-05-13 NOTE — DISCHARGE INSTRUCTIONS
Follow-up with primary care for further care. Contact info provided below if needed.  Use over the counter medications as stated on the bottle as needed for pain control.  - Tylenol  - Lidocaine patch (you can leave the current one on for 12 hours)  Return to the ED with new or worsening symptoms.

## 2025-05-15 ENCOUNTER — VBI (OUTPATIENT)
Age: 85
End: 2025-05-15

## 2025-05-15 NOTE — TELEPHONE ENCOUNTER
05/15/25 9:11 AM    Patient contacted post ED visit, outreach attempt made but message could not be left. Additional outreach attempt will be made.     Thank you.  Cindy Garcia  [facility]

## 2025-05-15 NOTE — LETTER
[facility]  [address]  [address]  [place] [address]  [phone]    Date: 05/19/25    Alaina Maza  [address] [address]    Dear Alaina:                                                                                                                                Thank you for choosing [facility] emergency department for care.  Your primary care provider wants to make sure that your ongoing medical care is being addressed. If you require follow up care as a result of your emergency department visit, there are a few things the practice would like you to know.                As part of the network's continuing commitment to caring for our patients, we have added more same day appointments and have extended office hours to meet your medical needs. After hours, on-call physicians are available via your primary care provider's main office line.               We encourage you to contact our office prior to seeking treatment to discuss your symptoms with the medical staff.  Together, we can determine the correct course of action.  A majority of non-emergent conditions such as: common cold, flu-like symptoms, fevers, strains/sprains, dislocations, minor burns, cuts and animal bites can be treated at [facility] facilities. Diagnostic testing is available at some sites.               Of course, if you are experiencing a life threatening medical emergency call 911 or proceed directly to the nearest emergency room.    Your nearest [facility] facility is conveniently located at:    [facility]  [address]  [place] [address]  [phone]  SKIP THE WAIT  Conveniently offered at most [facility] locations  [place] your spot online at [email or web address] or on the [facility]    Sincerely,    [facility]  Dept: [phone]

## 2025-05-16 NOTE — TELEPHONE ENCOUNTER
05/16/25 11:30 AM    Patient contacted post ED visit, outreach attempt made but message could not be left. Additional outreach attempt will be made.     Thank you.  Cindy Garcia  [facility]

## 2025-05-19 ENCOUNTER — PROCEDURE VISIT (OUTPATIENT)
Age: 85
End: 2025-05-19
Payer: COMMERCIAL

## 2025-05-19 VITALS — HEIGHT: 66 IN | RESPIRATION RATE: 18 BRPM | WEIGHT: 218 LBS | BODY MASS INDEX: 35.03 KG/M2

## 2025-05-19 DIAGNOSIS — M77.42 METATARSALGIA OF BOTH FEET: ICD-10-CM

## 2025-05-19 DIAGNOSIS — M77.41 METATARSALGIA OF BOTH FEET: ICD-10-CM

## 2025-05-19 DIAGNOSIS — M54.16 RADICULOPATHY OF LUMBAR REGION: ICD-10-CM

## 2025-05-19 DIAGNOSIS — B35.1 ONYCHOMYCOSIS: Primary | ICD-10-CM

## 2025-05-19 DIAGNOSIS — E11.42 DIABETIC POLYNEUROPATHY ASSOCIATED WITH TYPE 2 DIABETES MELLITUS (HCC): ICD-10-CM

## 2025-05-19 PROCEDURE — 99213 OFFICE O/P EST LOW 20 MIN: CPT | Performed by: PODIATRIST

## 2025-05-19 RX ORDER — GABAPENTIN 100 MG/1
100 CAPSULE ORAL
Qty: 30 CAPSULE | Refills: 1 | Status: SHIPPED | OUTPATIENT
Start: 2025-05-19 | End: 2025-07-18

## 2025-05-19 NOTE — PROGRESS NOTES
Assessment/Plan: Metatarsalgia secondary to diabetic neuropathy and radiculopathy.  Pain upon ambulation.  Mycosis of nail.     Plan.  Chart reviewed.  PCP notes reviewed.  Lab work reviewed.  Patient evaluated.  At this time because patient has continued night neuropathic pain we will increase gabapentin dosing to 300 mg twice daily.  Patient advised on proper foot hygiene and nail cutting techniques.  Shoe recommendations made.  Aftercare instruction given.  Return for follow-up.           Assessment  Diagnoses and all orders for this visit:     Metatarsalgia of both feet  -     gabapentin (NEURONTIN) 300 mg capsule; Take 1 capsule (300 mg total) by mouth 2 (two) times a day     Diabetic polyneuropathy associated with type 2 diabetes mellitus (HCC)  -     gabapentin (NEURONTIN) 300 mg capsule; Take 1 capsule (300 mg total) by mouth 2 (two) times a day     Radiculopathy of lumbar region  -     gabapentin (NEURONTIN) 300 mg capsule; Take 1 capsule (300 mg total) by mouth 2 (two) times a day     Onychomycosis              Subjective: Patient is diabetic.  She has low back pain.  She is taking gabapentin.  Gives her minimal relief.  She still gets pain in her feet at night.  No history of trauma          Allergies   Allergen Reactions    Iodine - Food Allergy Swelling    Shellfish-Derived Products - Food Allergy Hives    Morphine Rash           Current Medications      Current Outpatient Medications:     gabapentin (NEURONTIN) 300 mg capsule, Take 1 capsule (300 mg total) by mouth 2 (two) times a day, Disp: 180 capsule, Rfl: 0    acetaminophen (TYLENOL) 650 mg CR tablet, Take 1 tablet (650 mg total) by mouth every 8 (eight) hours as needed for mild pain, Disp: 30 tablet, Rfl: 0    Ascorbic Acid (vitamin C) 1000 MG tablet, Take 1 tablet (1,000 mg total) by mouth daily, Disp: 30 tablet, Rfl: 5    aspirin 81 mg chewable tablet, Chew 1 tablet (81 mg total) daily, Disp: 30 tablet, Rfl: 5    atorvastatin (LIPITOR) 40 mg  tablet, TAKE 1 TABLET DAILY, Disp: 90 tablet, Rfl: 1    benzonatate (TESSALON PERLES) 100 mg capsule, Take 1 capsule (100 mg total) by mouth 2 (two) times a day as needed for cough (Patient not taking: Reported on 12/11/2024), Disp: 20 capsule, Rfl: 0    benzonatate (TESSALON PERLES) 100 mg capsule, Take 1 capsule (100 mg total) by mouth every 8 (eight) hours (Patient not taking: Reported on 12/11/2024), Disp: 21 capsule, Rfl: 0    Blood Glucose Monitoring Suppl (OneTouch Verio) w/Device KIT, Use to test blood sugar 2 times a day, Disp: 1 kit, Rfl: 0    budesonide (PULMICORT) 0.5 mg/2 mL nebulizer solution, TAKE 2 ML BY NEBULIZATION 2 (TWO) TIMES A DAY, Disp: 120 mL, Rfl: 5    Calcium Carbonate-Vitamin D3 (Calcium 600+D) 600-400 MG-UNIT TABS, Take 1 tablet by mouth daily  , Disp: , Rfl:     furosemide (LASIX) 40 mg tablet, TAKE 1 TABLET BY MOUTH DAILY, Disp: 90 tablet, Rfl: 1    gabapentin (NEURONTIN) 100 mg capsule, Take 1 capsule (100 mg total) by mouth daily at bedtime, Disp: 30 capsule, Rfl: 1    glipiZIDE (GLUCOTROL XL) 2.5 mg 24 hr tablet, TAKE 2 TABLETS BY MOUTH DAILY, Disp: 180 tablet, Rfl: 1    glucose blood (OneTouch Verio) test strip, Use to test blood sugar 2 times daily, Disp: 100 strip, Rfl: 3    guaiFENesin-codeine (ROBITUSSIN AC) 100-10 mg/5 mL oral solution, Take 5 mL by mouth 3 (three) times a day as needed for cough, Disp: 300 mL, Rfl: 0    ipratropium-albuterol (DUO-NEB) 0.5-2.5 mg/3 mL nebulizer solution, Take 3 mL by nebulization every 6 (six) hours as needed for wheezing or shortness of breath, Disp: 360 mL, Rfl: 7    isosorbide mononitrate (IMDUR) 30 mg 24 hr tablet, TAKE 1 TABLET DAILY, Disp: 30 tablet, Rfl: 5    Lancets (onetouch ultrasoft) lancets, Use to test blood sugar 2 times a day, Disp: 100 each, Rfl: 3    lidocaine (Lidoderm) 5 %, Apply 1 patch topically over 12 hours daily Remove & Discard patch within 12 hours or as directed by MD, Disp: 15 patch, Rfl: 0    lisinopril (ZESTRIL) 20  mg tablet, TAKE 1 TABLET DAILY, Disp: 30 tablet, Rfl: 0    metoprolol tartrate (LOPRESSOR) 25 mg tablet, TAKE 1/2 TABLET EVERY 12 HOURS, Disp: 60 tablet, Rfl: 5    Multiple Vitamins-Minerals (CENTRUM SILVER 50+WOMEN PO), Take by mouth, Disp: , Rfl:     ondansetron (Zofran ODT) 4 mg disintegrating tablet, Take 1 tablet (4 mg total) by mouth every 6 (six) hours as needed for nausea or vomiting, Disp: 10 tablet, Rfl: 0    pantoprazole (PROTONIX) 40 mg tablet, TAKE ONE TABLET DAILY, Disp: 30 tablet, Rfl: 0    predniSONE 10 mg tablet, Take 1 tablet daily for 5 days (Patient not taking: Reported on 12/11/2024), Disp: 5 tablet, Rfl: 0    predniSONE 10 mg tablet, Take 2 tablets for 5 days then 1 tablet for 5 days (Patient not taking: Reported on 12/11/2024), Disp: 15 tablet, Rfl: 0    propylthiouracil 50 mg tablet, TAKE 2-1/2 TABS DAILY, Disp: 270 tablet, Rfl: 3    sodium chloride 0.9 % nebulizer solution, TAKE 3 ML BY NEBULIZATION 2 (TWO) TIMES A DAY AS NEEDED FOR WHEEZING, Disp: 300 mL, Rfl: 6        Problem List       Patient Active Problem List   Diagnosis    Coronary artery disease with angina pectoris, unspecified vessel or lesion type, unspecified whether native or transplanted heart ([facility])    Hypertension, essential    Tracheal stenosis    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[facility])    Subglottic stenosis    Chronic cough    Class 1 obesity due to excess calories without serious comorbidity with body mass index (BMI) of 34.0 to 34.9 in adult    Sleep-related nonobstructive alveolar hypoventilation    Mild persistent asthma without complication    Mixed hyperlipidemia    Hypertensive heart disease    Chronic bilateral low back pain without sciatica    Mild persistent asthma with acute exacerbation    Stage 3a chronic kidney disease ([facility])    Hyperthyroidism    Type 2 diabetes mellitus with hyperglycemia, without long-term current use of insulin ([facility])    Class 3 severe obesity due to excess calories without serious comorbidity in adult,  unspecified BMI (HCC)    Nocturnal hypoxemia due to obesity               Subjective  Patient ID: Alaina Maza is a 84 y.o. female.     HPI     The following portions of the patient's history were reviewed and updated as appropriate:      family history includes Dementia in her brother; Heart disease in her father and mother; Hypertension in her mother; Pneumonia in her brother.       reports that she has never smoked. She has been exposed to tobacco smoke. She has never used smokeless tobacco. She reports that she does not drink alcohol and does not use drugs.         Objective:  Patient's shoes and socks removed.    Foot ExamPhysical Exam           General  General Appearance: appears stated age and healthy   Orientation: alert and oriented to person, place, and time   Affect: appropriate   Gait: antalgic         Right Foot/Ankle      Inspection and Palpation  Tenderness: metatarsals   Swelling: dorsum   Arch: pes planus  Hammertoes: fifth toe  Skin Exam: dry skin;      Neurovascular  Dorsalis pedis: 2+  Posterior tibial: 1+  Saphenous nerve sensation: absent  Tibial nerve sensation: absent  Superficial peroneal nerve sensation: absent  Deep peroneal nerve sensation: absent  Sural nerve sensation: absent        Left Foot/Ankle       Inspection and Palpation  Tenderness: metatarsals   Swelling: dorsum   Arch: pes planus  Hammertoes: fifth toe  Skin Exam: dry skin;      Neurovascular  Dorsalis pedis: 2+  Posterior tibial: 1+  Saphenous nerve sensation: absent  Tibial nerve sensation: absent  Superficial peroneal nerve sensation: absent  Deep peroneal nerve sensation: absent  Sural nerve sensation: absent        Physical Exam  Vitals and nursing note reviewed.   Constitutional:       Appearance: Normal appearance.   Cardiovascular:      Rate and Rhythm: Normal rate and regular rhythm.      Pulses: no weak pulses.           Dorsalis pedis pulses are 2+ on the right side and 2+ on the left side.        Posterior  tibial pulses are 1+ on the right side and 1+ on the left side.   Feet:      Right foot:      Skin integrity: Dry skin present.      Left foot:      Skin integrity: Dry skin present.   Skin:     Capillary Refill: Capillary refill takes less than 2 seconds.   Neurological:      Mental Status: She is alert.   Psychiatric:         Mood and Affect: Mood normal.         Behavior: Behavior normal.         Thought Content: Thought content normal.         Judgment: Judgment normal.      Patient's shoes and socks removed.     Right Foot/Ankle   Right Foot Inspection  Skin Exam: dry skin.      Toe Exam: right toe deformity.      Sensory   Vibration: absent  Proprioception: diminished  Monofilament testing: diminished     Vascular  Capillary refills: < 3 seconds  The right DP pulse is 2+. The right PT pulse is 1+.      Right Toe  - Comprehensive Exam  Arch: pes planus  Hammertoes: fifth toe  Swelling: dorsum   Tenderness: metatarsals         Left Foot/Ankle  Left Foot Inspection  Skin Exam: dry skin.      Toe Exam: left toe deformity.      Sensory   Vibration: absent  Proprioception: diminished  Monofilament testing: diminished     Vascular  Capillary refills: < 3 seconds  The left DP pulse is 2+. The left PT pulse is 1+.      Left Toe  - Comprehensive Exam  Arch: pes planus  Hammertoes: fifth toe  Swelling: dorsum   Tenderness: metatarsals         Assign Risk Category  Deformity present  Loss of protective sensation  No weak pulses  Risk: 2

## 2025-05-19 NOTE — TELEPHONE ENCOUNTER
05/19/25 10:25 AM    Patient contacted post ED visit, outreach attempt made but message could not be left. Additional outreach attempt will be made.     Thank you.  Cindy Garcia  [facility]      05/19/25 10:25 AM    Patient contacted post ED visit, phone outreaches were unsuccessful and a MyChart letter has been sent to the patient as follow-up.    Thank you.  Cindy Garcia  [facility]

## 2025-05-23 DIAGNOSIS — K21.9 GASTROESOPHAGEAL REFLUX DISEASE WITHOUT ESOPHAGITIS: ICD-10-CM

## 2025-05-23 RX ORDER — PANTOPRAZOLE SODIUM 40 MG/1
40 TABLET, DELAYED RELEASE ORAL DAILY
Qty: 30 TABLET | Refills: 1 | Status: SHIPPED | OUTPATIENT
Start: 2025-05-23

## 2025-06-04 DIAGNOSIS — I10 HYPERTENSION, ESSENTIAL: ICD-10-CM

## 2025-06-04 RX ORDER — LISINOPRIL 20 MG/1
20 TABLET ORAL DAILY
Qty: 90 TABLET | Refills: 1 | Status: SHIPPED | OUTPATIENT
Start: 2025-06-04

## 2025-06-12 DIAGNOSIS — J84.89 BOOP (BRONCHIOLITIS OBLITERANS WITH ORGANIZING PNEUMONIA) (HCC): ICD-10-CM

## 2025-06-13 DIAGNOSIS — R05.3 CHRONIC COUGH: Chronic | ICD-10-CM

## 2025-06-13 RX ORDER — BUDESONIDE 0.5 MG/2ML
INHALANT ORAL
Qty: 120 ML | Refills: 11 | Status: SHIPPED | OUTPATIENT
Start: 2025-06-13

## 2025-06-16 RX ORDER — CODEINE PHOSPHATE AND GUAIFENESIN 10; 100 MG/5ML; MG/5ML
5 SOLUTION ORAL 3 TIMES DAILY PRN
Qty: 300 ML | Refills: 0 | Status: SHIPPED | OUTPATIENT
Start: 2025-06-16

## 2025-06-17 ENCOUNTER — OFFICE VISIT (OUTPATIENT)
Dept: PULMONOLOGY | Facility: MEDICAL CENTER | Age: 85
End: 2025-06-17
Payer: COMMERCIAL

## 2025-06-17 VITALS
TEMPERATURE: 97.9 F | SYSTOLIC BLOOD PRESSURE: 140 MMHG | OXYGEN SATURATION: 97 % | HEART RATE: 78 BPM | HEIGHT: 66 IN | RESPIRATION RATE: 20 BRPM | WEIGHT: 220 LBS | DIASTOLIC BLOOD PRESSURE: 86 MMHG | BODY MASS INDEX: 35.36 KG/M2

## 2025-06-17 DIAGNOSIS — J39.8 TRACHEAL STENOSIS: ICD-10-CM

## 2025-06-17 DIAGNOSIS — E66.9 NOCTURNAL HYPOXEMIA DUE TO OBESITY: Primary | ICD-10-CM

## 2025-06-17 DIAGNOSIS — J45.30 MILD PERSISTENT ASTHMA WITHOUT COMPLICATION: ICD-10-CM

## 2025-06-17 DIAGNOSIS — G47.36 NOCTURNAL HYPOXEMIA DUE TO OBESITY: Primary | ICD-10-CM

## 2025-06-17 DIAGNOSIS — R05.3 CHRONIC COUGH: ICD-10-CM

## 2025-06-17 PROCEDURE — 99214 OFFICE O/P EST MOD 30 MIN: CPT | Performed by: NURSE PRACTITIONER

## 2025-06-17 RX ORDER — IPRATROPIUM BROMIDE AND ALBUTEROL SULFATE 2.5; .5 MG/3ML; MG/3ML
3 SOLUTION RESPIRATORY (INHALATION) EVERY 6 HOURS PRN
Qty: 360 ML | Refills: 7 | Status: SHIPPED | OUTPATIENT
Start: 2025-06-17

## 2025-06-17 NOTE — ASSESSMENT & PLAN NOTE
Patient does have mild persistent asthma she does not use an inhaler because of her tracheostomy but does use a nebulizer with budesonide twice daily.  He is stable at this time and continues to use this nebulized steroid    Orders:    ipratropium-albuterol (DUO-NEB) 0.5-2.5 mg/3 mL nebulizer solution; Take 3 mL by nebulization every 6 (six) hours as needed for wheezing or shortness of breath

## 2025-06-17 NOTE — PROGRESS NOTES
Follow-up  Visit - Pulmonary Medicine   Name: Alaina Maza      : 1940      MRN: [id number]  Encounter Provider: KIERA Ruth  Encounter Date: 2025   [facility]  :  Assessment & Plan  Mild persistent asthma without complication  Patient does have mild persistent asthma she does not use an inhaler because of her tracheostomy but does use a nebulizer with budesonide twice daily.  He is stable at this time and continues to use this nebulized steroid    Orders:    ipratropium-albuterol (DUO-NEB) 0.5-2.5 mg/3 mL nebulizer solution; Take 3 mL by nebulization every 6 (six) hours as needed for wheezing or shortness of breath    Nocturnal hypoxemia due to obesity  Continues to use and benefit from supplemental oxygen at nighttime 3 L specifically she does not need oxygen during the day and has been very compliant and usage at nighttime.  Has comorbidities that include hypoventilation secondary to obesity and also persistent asthma.         Tracheal stenosis  Patient does have tracheal stenosis she did have a tracheostomy placed in the remote past.  She had had pneumonia and does go to ENT for cuff replacement         Chronic cough  Has been using guaifenesin with codeine at least twice daily for some time.  Is the only thing that allows her to have relief from her cough which worsens at nighttime and also during the day.  She has a history of a tracheostomy and also has tracheal stenosis.             Return in about 3 months (around 2025).    History of Present Illness   Alaina Maza is a 84 y.o. female who presents with a history of subglottic stenosis.  He had a tracheostomy that was placed around  and had a cuffless trach change by her ENT this patient also has mild persistent asthma she uses budesonide via nebulizer and also has been treated in the past with acute bronchitis patient also has sleep-related nonobstructive alveolar  "hypoventilation for which she uses 3 L additionally she has chronic cough this is a chronic condition and has been on guaifenesin with codeine chronically this has helped her cough and she uses it twice daily    Review of Systems   Constitutional: Negative.    HENT: Negative.     Respiratory:  Positive for cough.         Cough is mostly nonproductive   Cardiovascular: Negative.    Gastrointestinal: Negative.    Endocrine: Negative.    Genitourinary: Negative.    Musculoskeletal: Negative.    Allergic/Immunologic: Negative.    Neurological: Negative.    Hematological: Negative.    Psychiatric/Behavioral: Negative.         Aside from what is mentioned in the HPI, ROS is otherwise negative         Medical History Reviewed by provider this encounter:  Tobacco  Allergies  Meds  Problems  Med Hx  Surg Hx  Fam Hx     .    Objective   /86 (BP Location: Left arm, Patient Position: Sitting, Cuff Size: Standard)   Pulse 78   Temp 97.9 °F (36.6 °C) (Temporal)   Resp 20   Ht 5' 6\" (1.676 m)   Wt 99.8 kg (220 lb)   SpO2 97%   BMI 35.51 kg/m²     Physical Exam  Constitutional:       Appearance: She is obese.   HENT:      Head: Normocephalic and atraumatic.      Nose: Nose normal.      Mouth/Throat:      Mouth: Mucous membranes are moist.      Pharynx: Oropharynx is clear.      Comments: Crowded oral airway    Cardiovascular:      Rate and Rhythm: Normal rate and regular rhythm.      Pulses: Normal pulses.      Heart sounds: Normal heart sounds.   Pulmonary:      Effort: Pulmonary effort is normal.      Breath sounds: Normal breath sounds.     Musculoskeletal:         General: Normal range of motion.      Cervical back: Normal range of motion.     Skin:     General: Skin is warm and dry.      Capillary Refill: Capillary refill takes less than 2 seconds.     Neurological:      General: No focal deficit present.      Mental Status: She is alert and oriented to person, place, and time.     Psychiatric:         Mood " "and Affect: Mood normal.         Behavior: Behavior normal.           Diagnostic Data:  Labs: I personally reviewed the most recent laboratory data pertinent to today's visit.      Radiology results:        PFT/spirometry results:  No results found for: \"FEV1\", \"FVC\", \"ZWQ6LWQ\", \"TLC\", \"DLCO\"       Oximetry testing:      Other studies:      KIERA Ruth      "

## 2025-06-17 NOTE — ASSESSMENT & PLAN NOTE
Has been using guaifenesin with codeine at least twice daily for some time.  Is the only thing that allows her to have relief from her cough which worsens at nighttime and also during the day.  She has a history of a tracheostomy and also has tracheal stenosis.

## 2025-06-17 NOTE — ASSESSMENT & PLAN NOTE
Continues to use and benefit from supplemental oxygen at nighttime 3 L specifically she does not need oxygen during the day and has been very compliant and usage at nighttime.  Has comorbidities that include hypoventilation secondary to obesity and also persistent asthma.

## 2025-06-17 NOTE — ASSESSMENT & PLAN NOTE
Patient does have tracheal stenosis she did have a tracheostomy placed in the remote past.  She had had pneumonia and does go to ENT for cuff replacement

## 2025-06-24 NOTE — ASSESSMENT & PLAN NOTE
Orders:  •  CBC and differential; Future  •  Comprehensive metabolic panel; Future  •  Iron Panel (Includes Ferritin, Iron Sat%, Iron, and TIBC); Future  •  Hepatitis B surface antibody; Future

## 2025-06-24 NOTE — ASSESSMENT & PLAN NOTE
We discussed healthy lifestyle and she is encouraged to check home BS more regularly  Lab Results   Component Value Date    HGBA1C 8.7 (A) 04/16/2025     Increase Dulaglutide  Orders:  •  CBC and differential; Future  •  Comprehensive metabolic panel; Future  •  Iron Panel (Includes Ferritin, Iron Sat%, Iron, and TIBC); Future  •  Dulaglutide (Trulicity) 1.5 MG/0.5ML SOAJ; Inject 0.25 mL under the skin once a week = 0.75 mg weekly

## 2025-06-24 NOTE — ASSESSMENT & PLAN NOTE
Cllinically stable  Orders:  •  CBC and differential; Future  •  Comprehensive metabolic panel; Future  •  Iron Panel (Includes Ferritin, Iron Sat%, Iron, and TIBC); Future  •  Hepatitis B surface antibody; Future

## 2025-06-24 NOTE — ASSESSMENT & PLAN NOTE
Wt Readings from Last 3 Encounters:   06/25/25 102 kg (225 lb 6.4 oz)   06/17/25 99.8 kg (220 lb)   05/19/25 98.9 kg (218 lb)       We discussed healthy eating.         Orders:  •  CBC and differential; Future  •  Comprehensive metabolic panel; Future  •  Iron Panel (Includes Ferritin, Iron Sat%, Iron, and TIBC); Future  •  Hepatitis B surface antibody; Future

## 2025-06-24 NOTE — ASSESSMENT & PLAN NOTE
/80 (BP Location: Left arm, Patient Position: Sitting, Cuff Size: Standard)   Pulse (!) 54   Temp 98.2 °F (36.8 °C) (Tympanic)   Wt 102 kg (225 lb 6.4 oz)   SpO2 99%   BMI 36.38 kg/m²     Orders:  •  CBC and differential; Future  •  Comprehensive metabolic panel; Future  •  Iron Panel (Includes Ferritin, Iron Sat%, Iron, and TIBC); Future  •  Hepatitis B surface antibody; Future

## 2025-06-24 NOTE — PROGRESS NOTES
Name: Alaina Maza      : 1940      MRN: [id number]  Encounter Provider: John Epps MD  Encounter Date: 2025   Encounter department: [facility]  :  Assessment & Plan  Hypertension, essential  /80 (BP Location: Left arm, Patient Position: Sitting, Cuff Size: Standard)   Pulse (!) 54   Temp 98.2 °F (36.8 °C) (Tympanic)   Wt 102 kg (225 lb 6.4 oz)   SpO2 99%   BMI 36.38 kg/m²     Orders:  •  CBC and differential; Future  •  Comprehensive metabolic panel; Future  •  Iron Panel (Includes Ferritin, Iron Sat%, Iron, and TIBC); Future  •  Hepatitis B surface antibody; Future    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         Orders:  •  CBC and differential; Future  •  Comprehensive metabolic panel; Future  •  Iron Panel (Includes Ferritin, Iron Sat%, Iron, and TIBC); Future  •  Hepatitis B surface antibody; Future    Acquired hypothyroidism  Cllinically stable  Orders:  •  CBC and differential; Future  •  Comprehensive metabolic panel; Future  •  Iron Panel (Includes Ferritin, Iron Sat%, Iron, and TIBC); Future  •  Hepatitis B surface antibody; Future    Type 2 diabetes mellitus with hyperglycemia, without long-term current use of insulin (HCC)  We discussed healthy lifestyle and she is encouraged to check home BS more regularly  Lab Results   Component Value Date    HGBA1C 8.7 (A) 2025     Increase Dulaglutide  Orders:  •  CBC and differential; Future  •  Comprehensive metabolic panel; Future  •  Iron Panel (Includes Ferritin, Iron Sat%, Iron, and TIBC); Future  •  Dulaglutide (Trulicity) 1.5 MG/0.5ML SOAJ; Inject 0.25 mL under the skin once a week = 0.75 mg weekly    Mixed hyperlipidemia    Orders:  •  CBC and differential;  Future  •  Comprehensive metabolic panel; Future  •  Iron Panel (Includes Ferritin, Iron Sat%, Iron, and TIBC); Future  •  Hepatitis B surface antibody; Future    Metabolic dysfunction-associated steatotic liver disease (MASLD)  Suspect  Orders:  •  CBC and differential; Future  •  Comprehensive metabolic panel; Future  •  Iron Panel (Includes Ferritin, Iron Sat%, Iron, and TIBC); Future  •  Hepatitis B surface antibody; Future  •  Hepatitis C antibody; Future           History of Present Illness   No new concerns except R pinky toe.          Review of Systems   Constitutional:  Negative for chills and fever.   HENT:  Negative for ear pain and sore throat.    Eyes:  Negative for pain and visual disturbance.   Respiratory:  Positive for shortness of breath. Negative for cough.         SHORTNESS OF BREATH is baseline and unchanged   Cardiovascular:  Negative for chest pain and palpitations.   Gastrointestinal:  Negative for abdominal pain and vomiting.   Genitourinary:  Negative for dysuria and hematuria.   Musculoskeletal:  Negative for arthralgias and back pain.   Skin:  Positive for wound. Negative for color change and rash.        Pressure on lateral side of R pinky toe, no actual ulcer or skin breakdown. She is to f/u with podiatry. Probably needs therapeutic shoes. Has severe chronic foot strain pattern. With bunions and clawed toes   Neurological:  Negative for seizures and syncope.   Psychiatric/Behavioral:  Positive for sleep disturbance. Negative for dysphoric mood.         Sometimes hard to sleep. Usually ok   All other systems reviewed and are negative.      Objective   /80 (BP Location: Left arm, Patient Position: Sitting, Cuff Size: Standard)   Pulse (!) 54   Temp 98.2 °F (36.8 °C) (Tympanic)   Wt 102 kg (225 lb 6.4 oz)   SpO2 99%   BMI 36.38 kg/m²      Physical Exam  Vitals and nursing note reviewed.   Constitutional:       General: She is not in acute distress.     Appearance: She is  well-developed. She is obese.   HENT:      Head: Normocephalic and atraumatic.     Eyes:      Conjunctiva/sclera: Conjunctivae normal.       Cardiovascular:      Rate and Rhythm: Normal rate and regular rhythm.   Pulmonary:      Effort: Respiratory distress present.      Breath sounds: Wheezing present.      Comments: Scattered wheezes and rhonchi are her baseline  Abdominal:      Palpations: Abdomen is soft.      Tenderness: There is no abdominal tenderness.     Musculoskeletal:         General: Deformity present. No swelling.      Cervical back: Neck supple.      Comments: Severe chronic foot strain patterns with clawed toes and bunions, pressure on R lateral pinky toe, among others without actual skin breakdown     Skin:     General: Skin is warm and dry.      Capillary Refill: Capillary refill takes less than 2 seconds.     Neurological:      Mental Status: She is alert and oriented to person, place, and time.     Psychiatric:         Mood and Affect: Mood normal.         Behavior: Behavior normal.         Thought Content: Thought content normal.

## 2025-06-25 ENCOUNTER — OFFICE VISIT (OUTPATIENT)
Age: 85
End: 2025-06-25

## 2025-06-25 ENCOUNTER — APPOINTMENT (EMERGENCY)
Dept: RADIOLOGY | Facility: HOSPITAL | Age: 85
End: 2025-06-25
Payer: COMMERCIAL

## 2025-06-25 ENCOUNTER — HOSPITAL ENCOUNTER (EMERGENCY)
Facility: HOSPITAL | Age: 85
Discharge: HOME | End: 2025-06-25
Attending: EMERGENCY MEDICINE | Admitting: EMERGENCY MEDICINE
Payer: COMMERCIAL

## 2025-06-25 ENCOUNTER — TELEPHONE (OUTPATIENT)
Dept: PODIATRY | Facility: CLINIC | Age: 85
End: 2025-06-25

## 2025-06-25 VITALS
WEIGHT: 225.4 LBS | HEART RATE: 54 BPM | SYSTOLIC BLOOD PRESSURE: 132 MMHG | OXYGEN SATURATION: 99 % | DIASTOLIC BLOOD PRESSURE: 80 MMHG | TEMPERATURE: 98.2 F | BODY MASS INDEX: 36.38 KG/M2

## 2025-06-25 VITALS
OXYGEN SATURATION: 97 % | RESPIRATION RATE: 20 BRPM | SYSTOLIC BLOOD PRESSURE: 122 MMHG | HEART RATE: 70 BPM | DIASTOLIC BLOOD PRESSURE: 74 MMHG | TEMPERATURE: 97.6 F

## 2025-06-25 DIAGNOSIS — I11.0 HYPERTENSIVE HEART DISEASE WITH CHRONIC COMBINED SYSTOLIC AND DIASTOLIC CONGESTIVE HEART FAILURE (HCC): ICD-10-CM

## 2025-06-25 DIAGNOSIS — S90.426A: Primary | ICD-10-CM

## 2025-06-25 DIAGNOSIS — K76.0 METABOLIC DYSFUNCTION-ASSOCIATED STEATOTIC LIVER DISEASE (MASLD): ICD-10-CM

## 2025-06-25 DIAGNOSIS — I10 HYPERTENSION, ESSENTIAL: Primary | ICD-10-CM

## 2025-06-25 DIAGNOSIS — R60.0 LOWER EXTREMITY EDEMA: ICD-10-CM

## 2025-06-25 DIAGNOSIS — E11.65 TYPE 2 DIABETES MELLITUS WITH HYPERGLYCEMIA, WITHOUT LONG-TERM CURRENT USE OF INSULIN (HCC): ICD-10-CM

## 2025-06-25 DIAGNOSIS — E78.2 MIXED HYPERLIPIDEMIA: ICD-10-CM

## 2025-06-25 DIAGNOSIS — I50.42 HYPERTENSIVE HEART DISEASE WITH CHRONIC COMBINED SYSTOLIC AND DIASTOLIC CONGESTIVE HEART FAILURE (HCC): ICD-10-CM

## 2025-06-25 DIAGNOSIS — E03.9 ACQUIRED HYPOTHYROIDISM: ICD-10-CM

## 2025-06-25 LAB
ALBUMIN SERPL BCG-MCNC: 4.1 G/DL (ref 3.5–5)
ALP SERPL-CCNC: 53 U/L (ref 34–104)
ALT SERPL W P-5'-P-CCNC: 11 U/L (ref 7–52)
ANION GAP SERPL CALCULATED.3IONS-SCNC: 10 MMOL/L (ref 4–13)
AST SERPL W P-5'-P-CCNC: 23 U/L (ref 13–39)
BASOPHILS # BLD AUTO: 0.01 THOUSANDS/ÂΜL (ref 0–0.1)
BASOPHILS NFR BLD AUTO: 0 % (ref 0–1)
BILIRUB SERPL-MCNC: 0.53 MG/DL (ref 0.2–1)
BNP SERPL-MCNC: 132 PG/ML (ref 0–100)
BUN SERPL-MCNC: 19 MG/DL (ref 5–25)
CALCIUM SERPL-MCNC: 9.1 MG/DL (ref 8.4–10.2)
CARDIAC TROPONIN I PNL SERPL HS: 4 NG/L (ref ?–50)
CHLORIDE SERPL-SCNC: 103 MMOL/L (ref 96–108)
CO2 SERPL-SCNC: 20 MMOL/L (ref 21–32)
CREAT SERPL-MCNC: 0.95 MG/DL (ref 0.6–1.3)
EOSINOPHIL # BLD AUTO: 0.13 THOUSAND/ÂΜL (ref 0–0.61)
EOSINOPHIL NFR BLD AUTO: 4 % (ref 0–6)
ERYTHROCYTE [DISTWIDTH] IN BLOOD BY AUTOMATED COUNT: 13.2 % (ref 11.6–15.1)
GFR SERPL CREATININE-BSD FRML MDRD: 55 ML/MIN/1.73SQ M
GLUCOSE SERPL-MCNC: 138 MG/DL (ref 65–140)
HCT VFR BLD AUTO: 39.2 % (ref 34.8–46.1)
HGB BLD-MCNC: 12.6 G/DL (ref 11.5–15.4)
IMM GRANULOCYTES # BLD AUTO: 0.01 THOUSAND/UL (ref 0–0.2)
IMM GRANULOCYTES NFR BLD AUTO: 0 % (ref 0–2)
LYMPHOCYTES # BLD AUTO: 1.35 THOUSANDS/ÂΜL (ref 0.6–4.47)
LYMPHOCYTES NFR BLD AUTO: 37 % (ref 14–44)
MCH RBC QN AUTO: 29 PG (ref 26.8–34.3)
MCHC RBC AUTO-ENTMCNC: 32.1 G/DL (ref 31.4–37.4)
MCV RBC AUTO: 90 FL (ref 82–98)
MONOCYTES # BLD AUTO: 0.3 THOUSAND/ÂΜL (ref 0.17–1.22)
MONOCYTES NFR BLD AUTO: 8 % (ref 4–12)
NEUTROPHILS # BLD AUTO: 1.84 THOUSANDS/ÂΜL (ref 1.85–7.62)
NEUTS SEG NFR BLD AUTO: 51 % (ref 43–75)
NRBC BLD AUTO-RTO: 0 /100 WBCS
PLATELET # BLD AUTO: 165 THOUSANDS/UL (ref 149–390)
PMV BLD AUTO: 11.7 FL (ref 8.9–12.7)
POTASSIUM SERPL-SCNC: 6.2 MMOL/L (ref 3.5–5.3)
PROT SERPL-MCNC: 7.2 G/DL (ref 6.4–8.4)
RBC # BLD AUTO: 4.34 MILLION/UL (ref 3.81–5.12)
SODIUM SERPL-SCNC: 133 MMOL/L (ref 135–147)
WBC # BLD AUTO: 3.64 THOUSAND/UL (ref 4.31–10.16)

## 2025-06-25 PROCEDURE — 84484 ASSAY OF TROPONIN QUANT: CPT | Performed by: EMERGENCY MEDICINE

## 2025-06-25 PROCEDURE — 36415 COLL VENOUS BLD VENIPUNCTURE: CPT | Performed by: EMERGENCY MEDICINE

## 2025-06-25 PROCEDURE — 80053 COMPREHEN METABOLIC PANEL: CPT | Performed by: EMERGENCY MEDICINE

## 2025-06-25 PROCEDURE — 83880 ASSAY OF NATRIURETIC PEPTIDE: CPT | Performed by: EMERGENCY MEDICINE

## 2025-06-25 PROCEDURE — 85025 COMPLETE CBC W/AUTO DIFF WBC: CPT | Performed by: EMERGENCY MEDICINE

## 2025-06-25 PROCEDURE — 99285 EMERGENCY DEPT VISIT HI MDM: CPT | Performed by: EMERGENCY MEDICINE

## 2025-06-25 PROCEDURE — G2211 COMPLEX E/M VISIT ADD ON: HCPCS | Performed by: FAMILY MEDICINE

## 2025-06-25 PROCEDURE — 99214 OFFICE O/P EST MOD 30 MIN: CPT | Performed by: FAMILY MEDICINE

## 2025-06-25 PROCEDURE — 99284 EMERGENCY DEPT VISIT MOD MDM: CPT

## 2025-06-25 PROCEDURE — 93971 EXTREMITY STUDY: CPT

## 2025-06-25 PROCEDURE — 93005 ELECTROCARDIOGRAM TRACING: CPT

## 2025-06-25 RX ORDER — FUROSEMIDE 10 MG/ML
40 INJECTION INTRAMUSCULAR; INTRAVENOUS ONCE
Status: DISCONTINUED | OUTPATIENT
Start: 2025-06-25 | End: 2025-06-25

## 2025-06-25 RX ORDER — DULAGLUTIDE 1.5 MG/.5ML
0.25 INJECTION, SOLUTION SUBCUTANEOUS WEEKLY
Qty: 3 ML | Refills: 3 | Status: SHIPPED | OUTPATIENT
Start: 2025-06-25

## 2025-06-25 RX ORDER — FUROSEMIDE 40 MG/1
40 TABLET ORAL ONCE
Status: COMPLETED | OUTPATIENT
Start: 2025-06-25 | End: 2025-06-25

## 2025-06-25 RX ADMIN — FUROSEMIDE 40 MG: 40 TABLET ORAL at 20:18

## 2025-06-25 NOTE — TELEPHONE ENCOUNTER
[facility] is calling to inform PCP that the Rx (Trulicity) is unable to be dispensed at his pharmacy due to $57 loss. Contacted patient to confirm an alternate pharmacy, she is requesting Rx be sent to [facility].

## 2025-06-25 NOTE — TELEPHONE ENCOUNTER
Hello,    Please advise if a forced appointment can be accommodated for the patient:    Call back #: [phone]    Insurance: [employer]    Reason for appointment: 5th toe, right foot is turning black.  She is very worried about losing her toe as she is diabetic    Requested doctor and/or location: Dr Isak Domingo      Thank you.

## 2025-06-25 NOTE — ED PROVIDER NOTES
Time reflects when diagnosis was documented in both MDM as applicable and the Disposition within this note       Time User Action Codes Description Comment    6/25/2025  7:50 PM Fred Hansen Add [S90.426A] Toe blister without infection     6/25/2025  7:50 PM Fred Hansen Add [R60.0] Lower extremity edema           ED Disposition       ED Disposition   Discharge    Condition   Stable    Date/Time   Wed Jun 25, 2025  7:50 PM    Comment   Alaina Link discharge to home/self care.                   Assessment & Plan       Medical Decision Making  Differential diagnosis for patient's leg swelling includes but is not limited to DVT, less likely CHF exacerbation, renal derangement given unilateral distribution.  I ordered and reviewed a venous duplex which was negative for DVT.  I ordered and reviewed lab work including CBC, CMP, troponin, BNP which were all grossly unremarkable.  I ordered and independently interpreted an EKG which demonstrates normal sinus rhythm rate of 73  without ST or T wave abnormalities, occasional PVCs.  Patient diuresed with Lasix.  I discussed patient with podiatry who reviewed images of the foot and agree that this is most likely a hemorrhagic bolus, recommends treatment with Betadine and Band-Aid until can follow-up with podiatry clinic.  Patient discharged with return precautions.    Amount and/or Complexity of Data Reviewed  Labs: ordered.    Risk  Prescription drug management.             Medications   furosemide (LASIX) injection 40 mg (has no administration in time range)       ED Risk Strat Scores                    No data recorded                            History of Present Illness       Chief Complaint   Patient presents with    Toe Injury     States went to  today for checkup. Noted R little toe to be discolored, called foot  who was not in today. So told she should come get it checked. No pain, diabetic       Past Medical History[1]   Past Surgical History[2]    Family History[3]   Social History[4]   E-Cigarette/Vaping    E-Cigarette Use Never User       E-Cigarette/Vaping Substances    Nicotine No     THC No     CBD No     Flavoring No     Other No     Unknown No       I have reviewed and agree with the history as documented.     Patient is an 84-year-old female sent in by primary care provider for evaluation of toe wound.  Patient states that she has noted swelling of her entire right lower extremity for the past few days and area of blistering on the toe over the same interval.  Patient denies any pain in the area.  Patient denies fevers, chills, fatigue.  Patient denies chest pain, shortness of breath.  Patient states that she has had unilateral swelling of this leg in the past which has responded to diuresis.        Review of Systems   Constitutional:  Negative for chills, fatigue and fever.   Respiratory:  Negative for cough and shortness of breath.    Cardiovascular:  Positive for leg swelling. Negative for chest pain.   Gastrointestinal:  Negative for diarrhea, nausea and vomiting.   Musculoskeletal:  Negative for arthralgias and myalgias.   Neurological:  Negative for weakness and numbness.   Psychiatric/Behavioral:  Negative for confusion.    All other systems reviewed and are negative.          Objective       ED Triage Vitals [06/25/25 1644]   Temperature Pulse Blood Pressure Respirations SpO2 Patient Position - Orthostatic VS   97.6 °F (36.4 °C) 70 122/74 (!) 24 97 % Sitting      Temp Source Heart Rate Source BP Location FiO2 (%) Pain Score    Tympanic Monitor Left arm -- No Pain      Vitals      Date and Time Temp Pulse SpO2 Resp BP Pain Score FACES Pain Rating User   06/25/25 1644 97.6 °F (36.4 °C) 70 97 % 24 122/74 No Pain -- LS            Physical Exam  Vitals and nursing note reviewed.   Constitutional:       General: She is not in acute distress.     Appearance: Normal appearance. She is not ill-appearing, toxic-appearing or diaphoretic.      Comments:  Well-appearing, nontoxic, nondistressed   HENT:      Head: Normocephalic and atraumatic.      Comments: Moist mucous membranes     Right Ear: External ear normal.      Left Ear: External ear normal.     Eyes:      General:         Right eye: No discharge.         Left eye: No discharge.       Cardiovascular:      Comments: Regular rate and rhythm, no murmurs rubs or gallops.  Extremities warm and well-perfused without mottling  Pulmonary:      Effort: No respiratory distress.      Comments: No increased work of breathing.  Speaking in complete sentences.  Lungs clear to auscultation bilaterally without wheezes, rales, rhonchi.  Satting 97% on room air indicating adequate oxygenation  Abdominal:      General: There is no distension.     Musculoskeletal:         General: No deformity.      Cervical back: Normal range of motion.      Comments: Pitting edema of the right lower extremity.  Right fifth toe lesion consistent with hemorrhagic bolus.  No erythema or warmth to suggest infection.     Skin:     Findings: No lesion or rash.     Neurological:      Mental Status: She is alert and oriented to person, place, and time. Mental status is at baseline.     Psychiatric:         Mood and Affect: Mood and affect normal.         Results Reviewed       Procedure Component Value Units Date/Time    HS Troponin I 2hr [[id number]]     Lab Status: No result Specimen: Blood     HS Troponin 0hr (reflex protocol) [[id number]]  (Normal) Collected: 06/25/25 1840    Lab Status: Final result Specimen: Blood from Arm, Right Updated: 06/25/25 1915     hs TnI 0hr 4 ng/L     B-Type Natriuretic Peptide(BNP) [[id number]]  (Abnormal) Collected: 06/25/25 1840    Lab Status: Final result Specimen: Blood from Arm, Right Updated: 06/25/25 1913      pg/mL     Comprehensive metabolic panel [[id number]]  (Abnormal) Collected: 06/25/25 1840    Lab Status: Final result Specimen: Blood from Arm, Right Updated: 06/25/25 1907     Sodium 133 mmol/L       Potassium 6.2 mmol/L      Chloride 103 mmol/L      CO2 20 mmol/L      ANION GAP 10 mmol/L      BUN 19 mg/dL      Creatinine 0.95 mg/dL      Glucose 138 mg/dL      Calcium 9.1 mg/dL      AST 23 U/L      ALT 11 U/L      Alkaline Phosphatase 53 U/L      Total Protein 7.2 g/dL      Albumin 4.1 g/dL      Total Bilirubin 0.53 mg/dL      eGFR 55 ml/min/1.73sq m     Narrative:      National Kidney Disease Foundation guidelines for Chronic Kidney Disease (CKD):     Stage 1 with normal or high GFR (GFR > 90 mL/min/1.73 square meters)    Stage 2 Mild CKD (GFR = 60-89 mL/min/1.73 square meters)    Stage 3A Moderate CKD (GFR = 45-59 mL/min/1.73 square meters)    Stage 3B Moderate CKD (GFR = 30-44 mL/min/1.73 square meters)    Stage 4 Severe CKD (GFR = 15-29 mL/min/1.73 square meters)    Stage 5 End Stage CKD (GFR <15 mL/min/1.73 square meters)  Note: GFR calculation is accurate only with a steady state creatinine    CBC and differential [[id number]]  (Abnormal) Collected: 06/25/25 1840    Lab Status: Final result Specimen: Blood from Arm, Right Updated: 06/25/25 1850     WBC 3.64 Thousand/uL      RBC 4.34 Million/uL      Hemoglobin 12.6 g/dL      Hematocrit 39.2 %      MCV 90 fL      MCH 29.0 pg      MCHC 32.1 g/dL      RDW 13.2 %      MPV 11.7 fL      Platelets 165 Thousands/uL      nRBC 0 /100 WBCs      Segmented % 51 %      Immature Grans % 0 %      Lymphocytes % 37 %      Monocytes % 8 %      Eosinophils Relative 4 %      Basophils Relative 0 %      Absolute Neutrophils 1.84 Thousands/µL      Absolute Immature Grans 0.01 Thousand/uL      Absolute Lymphocytes 1.35 Thousands/µL      Absolute Monocytes 0.30 Thousand/µL      Eosinophils Absolute 0.13 Thousand/µL      Basophils Absolute 0.01 Thousands/µL             VAS lower limb venous duplex study, unilateral/limited    (Results Pending)       Procedures    ED Medication and Procedure Management   Prior to Admission Medications   Prescriptions Last Dose Informant Patient  Reported? Taking?   Ascorbic Acid (vitamin C) 1000 MG tablet  Self No No   Sig: Take 1 tablet (1,000 mg total) by mouth daily   Blood Glucose Monitoring Suppl (OneTouch Verio) w/Device KIT  Self No No   Sig: Use to test blood sugar 2 times a day   Calcium Carbonate-Vitamin D3 (Calcium 600+D) 600-400 MG-UNIT TABS  Self Yes No   Sig: Take 1 tablet by mouth in the morning.   Dulaglutide (Trulicity) 1.5 MG/0.5ML SOAJ   No No   Sig: Inject 0.25 mL under the skin once a week = 0.75 mg weekly   Lancets (onetouch ultrasoft) lancets  Self No No   Sig: Use to test blood sugar 2 times a day   Multiple Vitamins-Minerals (CENTRUM SILVER 50+WOMEN PO)  Self Yes No   Sig: Take by mouth   acetaminophen (TYLENOL) 650 mg CR tablet  Self No No   Sig: Take 1 tablet (650 mg total) by mouth every 8 (eight) hours as needed for mild pain   albuterol (2.5 mg/3 mL) 0.083 % nebulizer solution  Self No No   Sig: USE ONE VIAL VIA NEBULIZER EVERY 4 (FOUR) HOURS AS NEEDED FOR WHEEZING OR SHORTNESS OF BREATH   aspirin 81 mg chewable tablet  Self No No   Sig: Chew 1 tablet (81 mg total) daily   atorvastatin (LIPITOR) 40 mg tablet  Self No No   Sig: TAKE 1 TABLET DAILY   budesonide (PULMICORT) 0.5 mg/2 mL nebulizer solution  Self No No   Sig: TAKE 2 ML BY NEBULIZATION 2 (TWO) TIMES A DAY   furosemide (LASIX) 40 mg tablet  Self No No   Sig: TAKE 1 TABLET BY MOUTH DAILY   gabapentin (NEURONTIN) 100 mg capsule  Self No No   Sig: Take 1 capsule (100 mg total) by mouth daily at bedtime   gabapentin (NEURONTIN) 300 mg capsule  Self No No   Sig: Take 1 capsule (300 mg total) by mouth 2 (two) times a day   glipiZIDE (GLUCOTROL XL) 2.5 mg 24 hr tablet  Self No No   Sig: TAKE 2 TABLETS BY MOUTH DAILY   glucose blood (OneTouch Verio) test strip  Self No No   Sig: Use to test blood sugar 2 times daily   guaiFENesin-codeine (ROBITUSSIN AC) 100-10 mg/5 mL oral solution  Self No No   Sig: TAKE 5 ML BY MOUTH 3 (THREE) TIMES A DAY AS NEEDED FOR COUGH    ipratropium-albuterol (DUO-NEB) 0.5-2.5 mg/3 mL nebulizer solution   No No   Sig: Take 3 mL by nebulization every 6 (six) hours as needed for wheezing or shortness of breath   isosorbide mononitrate (IMDUR) 30 mg 24 hr tablet  Self No No   Sig: TAKE 1 TABLET DAILY   lidocaine (Lidoderm) 5 %  Self No No   Sig: Apply 1 patch topically over 12 hours daily Remove & Discard patch within 12 hours or as directed by MD   lisinopril (ZESTRIL) 20 mg tablet  Self No No   Sig: TAKE ONE TABLET DAILY   metoprolol tartrate (LOPRESSOR) 25 mg tablet  Self No No   Sig: TAKE 1/2 TABLET EVERY 12 HOURS   ondansetron (Zofran ODT) 4 mg disintegrating tablet  Self No No   Sig: Take 1 tablet (4 mg total) by mouth every 6 (six) hours as needed for nausea or vomiting   pantoprazole (PROTONIX) 40 mg tablet  Self No No   Sig: TAKE ONE TABLET DAILY   propylthiouracil 50 mg tablet  Self No No   Sig: TAKE 2-1/2 TABS DAILY   sodium chloride 0.9 % nebulizer solution  Self No No   Sig: TAKE 3 ML BY NEBULIZATION 2 (TWO) TIMES A DAY AS NEEDED FOR WHEEZING      Facility-Administered Medications: None     Patient's Medications   Discharge Prescriptions    No medications on file     No discharge procedures on file.  ED SEPSIS DOCUMENTATION   Time reflects when diagnosis was documented in both MDM as applicable and the Disposition within this note       Time User Action Codes Description Comment    6/25/2025  7:50 PM Fred Hansen Add [S90.426A] Toe blister without infection     6/25/2025  7:50 PM Fred Hansen Add [R60.0] Lower extremity edema                    [1]   Past Medical History:  Diagnosis Date    Asthma 11/2024    BOOP (bronchiolitis obliterans with organizing pneumonia) ([facility]) 01/01/2006    Coronary artery disease     Disease of thyroid gland     HL (hearing loss)     Papillary thyroid carcinoma (HCC) 11/29/2021    Post-surgical hypothyroidism 12/05/2017    Voice disorder    [2]   Past Surgical History:  Procedure Laterality  Date    HYSTERECTOMY      REPLACEMENT TOTAL KNEE      THYROID SURGERY      TRACHEOSTOMY  2006   [3]   Family History  Problem Relation Name Age of Onset    Heart disease Mother Eilise     Hypertension Mother Eilise     Heart disease Father      Pneumonia Brother           of COVID    Dementia Brother     [4]   Social History  Tobacco Use    Smoking status: Never     Passive exposure: Past    Smokeless tobacco: Never   Vaping Use    Vaping status: Never Used   Substance Use Topics    Alcohol use: Never    Drug use: Never        Fred Hansen MD  25

## 2025-06-25 NOTE — TELEPHONE ENCOUNTER
Patient called back and I did let her know that Dr. Domingo wants her to go to the ER or urgent care to get started with treatment. Once she is seen there we can  follow up and he can decide treatment and follow ups.

## 2025-06-25 NOTE — DISCHARGE INSTRUCTIONS
We recommend daily treatment with Betadine and a Band-Aid over the toe wound.  Follow-up with the podiatry office in the next few weeks for reassessment.  If you have any severe worsening pain, fevers, chills, fatigue, return to the emergency department.

## 2025-06-26 ENCOUNTER — VBI (OUTPATIENT)
Age: 85
End: 2025-06-26

## 2025-06-26 LAB
ATRIAL RATE: 73 BPM
P AXIS: 56 DEGREES
PR INTERVAL: 150 MS
QRS AXIS: -4 DEGREES
QRSD INTERVAL: 78 MS
QT INTERVAL: 398 MS
QTC INTERVAL: 438 MS
T WAVE AXIS: 11 DEGREES
VENTRICULAR RATE: 73 BPM

## 2025-06-26 PROCEDURE — 93971 EXTREMITY STUDY: CPT | Performed by: SURGERY

## 2025-06-26 PROCEDURE — 93010 ELECTROCARDIOGRAM REPORT: CPT | Performed by: INTERNAL MEDICINE

## 2025-06-26 NOTE — TELEPHONE ENCOUNTER
06/26/25 1:58 PM    Patient contacted post ED visit, [facility] department spoke with patient/caregiver and outreach was successful.    Thank you.  Missy Pettit MA  [facility]

## 2025-06-27 RX ORDER — DULAGLUTIDE 1.5 MG/.5ML
0.25 INJECTION, SOLUTION SUBCUTANEOUS WEEKLY
Qty: 3 ML | Refills: 3 | OUTPATIENT
Start: 2025-06-27

## 2025-07-09 ENCOUNTER — TELEPHONE (OUTPATIENT)
Age: 85
End: 2025-07-09

## 2025-07-09 DIAGNOSIS — M54.16 RADICULOPATHY OF LUMBAR REGION: ICD-10-CM

## 2025-07-09 DIAGNOSIS — M77.41 METATARSALGIA OF BOTH FEET: ICD-10-CM

## 2025-07-09 DIAGNOSIS — E11.42 DIABETIC POLYNEUROPATHY ASSOCIATED WITH TYPE 2 DIABETES MELLITUS (HCC): ICD-10-CM

## 2025-07-09 DIAGNOSIS — M77.42 METATARSALGIA OF BOTH FEET: ICD-10-CM

## 2025-07-09 DIAGNOSIS — R05.3 CHRONIC COUGH: Chronic | ICD-10-CM

## 2025-07-09 RX ORDER — GABAPENTIN 100 MG/1
100 CAPSULE ORAL
Qty: 30 CAPSULE | Refills: 1 | Status: SHIPPED | OUTPATIENT
Start: 2025-07-09

## 2025-07-09 RX ORDER — CODEINE PHOSPHATE AND GUAIFENESIN 10; 100 MG/5ML; MG/5ML
5 SOLUTION ORAL 2 TIMES DAILY PRN
Qty: 300 ML | Refills: 0 | Status: SHIPPED | OUTPATIENT
Start: 2025-07-15

## 2025-07-09 NOTE — TELEPHONE ENCOUNTER
Patient's son called and requested a script for a trach cannula for the patient and an appointment with Dr. Albright. I spoke to Karoline at the [place] office and she noted that the patient and/or family needs to order the supplies as the office does not get the supplies.     I relayed this to the son and he disagreed stating she needs a script for the cannula and we have to do it. I was going to place him on hold and check with the office when he said he had to leave for work and disconnected the call.

## 2025-07-09 NOTE — TELEPHONE ENCOUNTER
"I called [facility] at [phone] to check on the status of the patient's trach supplies.  I was told no supplies have been sent to the patient since 7/30/24.  I was also told, again, that the patient ,or her representative, has to call every month to order the supplies they need.  The patient's son, ,  has been informed about this many times. He was also told that the [employer] company had been refusing to send supplies due to an outstanding bill.  [facility] told me that the overdue bill from 2024, was recently paid in full on 7/7/25.    This office fills out a supply order once per year to cover what the patient will need for a year, but then the patient calls monthly to \"order\" what she needs specifically for that month.  The patient is now ready for her annual supply order and will need an appt in July so Dr. Albright can fill out the annual form.  Please transfer the son to the office if he calls back so we can get her overbooked in the month of July.   "

## 2025-07-14 DIAGNOSIS — E11.9 TYPE 2 DIABETES MELLITUS WITHOUT COMPLICATION, WITHOUT LONG-TERM CURRENT USE OF INSULIN (HCC): ICD-10-CM

## 2025-07-14 DIAGNOSIS — I10 HYPERTENSION, ESSENTIAL: ICD-10-CM

## 2025-07-15 RX ORDER — GLIPIZIDE 2.5 MG/1
5 TABLET, EXTENDED RELEASE ORAL DAILY
Qty: 180 TABLET | Refills: 1 | Status: SHIPPED | OUTPATIENT
Start: 2025-07-15

## 2025-07-15 RX ORDER — FUROSEMIDE 40 MG/1
40 TABLET ORAL DAILY
Qty: 90 TABLET | Refills: 1 | Status: SHIPPED | OUTPATIENT
Start: 2025-07-15

## 2025-07-17 ENCOUNTER — OFFICE VISIT (OUTPATIENT)
Dept: AUDIOLOGY | Facility: CLINIC | Age: 85
End: 2025-07-17

## 2025-07-17 ENCOUNTER — OFFICE VISIT (OUTPATIENT)
Dept: OTOLARYNGOLOGY | Facility: CLINIC | Age: 85
End: 2025-07-17
Payer: COMMERCIAL

## 2025-07-17 VITALS — TEMPERATURE: 97.8 F | HEIGHT: 66 IN | BODY MASS INDEX: 35.36 KG/M2 | WEIGHT: 220 LBS

## 2025-07-17 DIAGNOSIS — J38.6 SUBGLOTTIC STENOSIS: ICD-10-CM

## 2025-07-17 DIAGNOSIS — Z93.0 TRACHEOSTOMY DEPENDENCE (HCC): Primary | ICD-10-CM

## 2025-07-17 DIAGNOSIS — Z93.0 TRACHEOSTOMY PRESENT (HCC): ICD-10-CM

## 2025-07-17 DIAGNOSIS — H90.3 SENSORY HEARING LOSS, BILATERAL: Primary | ICD-10-CM

## 2025-07-17 PROCEDURE — 99213 OFFICE O/P EST LOW 20 MIN: CPT | Performed by: OTOLARYNGOLOGY

## 2025-07-17 NOTE — PROGRESS NOTES
"Assessment/Plan:  Trach stable.  New supplies ordered.  F/u next month for trach change.      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 Tracheostomy present (HCC)  Z93.0              Subjective:      Patient ID: Alaina Maza is a 85 y.o. female.    F/u for trach care.  No c/o.  Pt has not been wearing her inner cannula, because she feels that she breaths better without it.        The following portions of the patient's history were reviewed and updated as appropriate: allergies, current medications, past family history, past medical history, past social history, past surgical history and problem list.    Review of Systems      Objective:      Temp 97.8 °F (36.6 °C) (Temporal)   Ht 5' 6\" (1.676 m)   Wt 99.8 kg (220 lb)   BMI 35.51 kg/m²          Physical Exam  Constitutional:       Appearance: She is well-developed.   HENT:      Head: Normocephalic and atraumatic.      Right Ear: Tympanic membrane, ear canal and external ear normal. No drainage. No middle ear effusion.      Left Ear: Tympanic membrane, ear canal and external ear normal. No drainage.  No middle ear effusion.      Nose: Nose normal.      Mouth/Throat:      Pharynx: Uvula midline. No oropharyngeal exudate.      Tonsils: 2+ on the right. 2+ on the left.   Neck:      Thyroid: No thyroid mass or thyromegaly.      Trachea: Trachea normal. No tracheal deviation.     Lymphadenopathy:      Cervical: No cervical adenopathy.     Neurological:      Mental Status: She is alert.         "

## 2025-07-17 NOTE — PROGRESS NOTES
Walk-In Hearing Aid Evaluation  Name:  Alaina Maza  :  1940  Age:  85 y.o.  MRN:  [id number]  Date of Evaluation: 25     HISTORY:    Alaina Maza was seen today for a hearing aid evaluation following her ENT appointment. Patients current hearing aids are not fitting correctly.     DEVICE SELECTION:    At this time, patient wishes to proceed with the purchase of the below listed hearing aid(s) pending Medicaid approval.     The patient selected the Belle AI 12 ITE R hearing aids.    Level: Entry    Color: [place]     NOTES:    Ear mold impressions were taken without incident.     Devices ordered as specified above (order # [id number]).    Kole Hogan., Au.D., CCC-A  Clinical Audiologist  [facility]  [address] [facility] [address]

## 2025-07-18 ENCOUNTER — TELEPHONE (OUTPATIENT)
Dept: OTOLARYNGOLOGY | Facility: CLINIC | Age: 85
End: 2025-07-18

## 2025-07-22 LAB
DME PARACHUTE DELIVERY DATE REQUESTED: NORMAL
DME PARACHUTE DELIVERY DATE REQUESTED: NORMAL
DME PARACHUTE ITEM DESCRIPTION: NORMAL
DME PARACHUTE ORDER STATUS: NORMAL
DME PARACHUTE ORDER STATUS: NORMAL
DME PARACHUTE SUPPLIER NAME: NORMAL
DME PARACHUTE SUPPLIER NAME: NORMAL
DME PARACHUTE SUPPLIER PHONE: NORMAL
DME PARACHUTE SUPPLIER PHONE: NORMAL

## 2025-07-25 DIAGNOSIS — K21.9 GASTROESOPHAGEAL REFLUX DISEASE WITHOUT ESOPHAGITIS: ICD-10-CM

## 2025-07-25 RX ORDER — PANTOPRAZOLE SODIUM 40 MG/1
40 TABLET, DELAYED RELEASE ORAL DAILY
Qty: 30 TABLET | Refills: 1 | Status: SHIPPED | OUTPATIENT
Start: 2025-07-25

## 2025-07-29 NOTE — PROGRESS NOTES
Progress Note    Name:  Alaina Maza  :  1940  Age:  85 y.o.  MRN:  [id number]  Date of Evaluation: 25     Hearing aids arrived.  Juan [employer]  [address] [facility]  R# [id number]  L# [id number]  # [id number]  Hearing aids Warranty 27   warranty 28  Will call to schedule HAP.    Severiano Vásquez.  Clinical Audiologist

## 2025-08-04 ENCOUNTER — OFFICE VISIT (OUTPATIENT)
Dept: AUDIOLOGY | Facility: CLINIC | Age: 85
End: 2025-08-04
Payer: COMMERCIAL

## 2025-08-04 DIAGNOSIS — H90.3 SENSORINEURAL HEARING LOSS, BILATERAL: Primary | ICD-10-CM

## 2025-08-04 PROCEDURE — V5160 DISPENSING FEE BINAURAL: HCPCS | Performed by: AUDIOLOGIST

## 2025-08-04 PROCEDURE — V5260 HEARING AID, DIGIT, BIN, ITE: HCPCS | Performed by: AUDIOLOGIST

## 2025-08-11 ENCOUNTER — PROCEDURE VISIT (OUTPATIENT)
Age: 85
End: 2025-08-11
Payer: COMMERCIAL

## 2025-08-12 ENCOUNTER — TELEPHONE (OUTPATIENT)
Age: 85
End: 2025-08-12

## 2025-08-20 ENCOUNTER — OFFICE VISIT (OUTPATIENT)
Dept: AUDIOLOGY | Facility: CLINIC | Age: 85
End: 2025-08-20

## 2025-08-20 DIAGNOSIS — R05.3 CHRONIC COUGH: Chronic | ICD-10-CM

## 2025-08-20 DIAGNOSIS — H90.3 SENSORINEURAL HEARING LOSS, BILATERAL: Primary | ICD-10-CM

## 2025-08-21 RX ORDER — CODEINE PHOSPHATE AND GUAIFENESIN 10; 100 MG/5ML; MG/5ML
5 SOLUTION ORAL 2 TIMES DAILY PRN
Qty: 300 ML | Refills: 0 | Status: SHIPPED | OUTPATIENT
Start: 2025-08-21